# Patient Record
Sex: MALE | Race: BLACK OR AFRICAN AMERICAN | NOT HISPANIC OR LATINO
[De-identification: names, ages, dates, MRNs, and addresses within clinical notes are randomized per-mention and may not be internally consistent; named-entity substitution may affect disease eponyms.]

---

## 2015-10-14 RX ORDER — LEVETIRACETAM 250 MG/1
1 TABLET, FILM COATED ORAL
Qty: 0 | Refills: 0 | COMMUNITY
Start: 2015-10-14

## 2017-02-16 ENCOUNTER — APPOINTMENT (OUTPATIENT)
Dept: GASTROENTEROLOGY | Facility: CLINIC | Age: 82
End: 2017-02-16

## 2017-02-16 VITALS
HEIGHT: 69 IN | HEART RATE: 54 BPM | RESPIRATION RATE: 14 BRPM | TEMPERATURE: 98.2 F | SYSTOLIC BLOOD PRESSURE: 110 MMHG | DIASTOLIC BLOOD PRESSURE: 62 MMHG | OXYGEN SATURATION: 95 % | BODY MASS INDEX: 26.66 KG/M2 | WEIGHT: 180 LBS

## 2017-02-16 DIAGNOSIS — R13.14 DYSPHAGIA, PHARYNGOESOPHAGEAL PHASE: ICD-10-CM

## 2017-02-19 ENCOUNTER — INPATIENT (INPATIENT)
Facility: HOSPITAL | Age: 82
LOS: 8 days | Discharge: NURSING, REHAB OR LONG-TERM CARE | DRG: 871 | End: 2017-02-28
Attending: INTERNAL MEDICINE | Admitting: INTERNAL MEDICINE
Payer: MEDICARE

## 2017-02-19 VITALS
SYSTOLIC BLOOD PRESSURE: 79 MMHG | OXYGEN SATURATION: 97 % | RESPIRATION RATE: 26 BRPM | DIASTOLIC BLOOD PRESSURE: 43 MMHG | HEART RATE: 79 BPM

## 2017-02-19 DIAGNOSIS — Z99.2 DEPENDENCE ON RENAL DIALYSIS: Chronic | ICD-10-CM

## 2017-02-19 DIAGNOSIS — A41.9 SEPSIS, UNSPECIFIED ORGANISM: ICD-10-CM

## 2017-02-19 DIAGNOSIS — I50.32 CHRONIC DIASTOLIC (CONGESTIVE) HEART FAILURE: ICD-10-CM

## 2017-02-19 LAB
APPEARANCE UR: (no result)
BACTERIA # UR AUTO: (no result) /HPF
BASE EXCESS BLDCOV CALC-SCNC: 2.8 MMOL/L — HIGH (ref -9.3–0.3)
BILIRUB UR-MCNC: (no result)
COLOR SPEC: (no result)
COMMENT - URINE: SIGNIFICANT CHANGE UP
DIFF PNL FLD: (no result)
EPI CELLS # UR: SIGNIFICANT CHANGE UP /HPF
GAS PNL BLDCOV: 7.38 — SIGNIFICANT CHANGE UP (ref 7.25–7.45)
GAS PNL BLDCOV: SIGNIFICANT CHANGE UP
GLUCOSE UR QL: NEGATIVE — SIGNIFICANT CHANGE UP
HCO3 BLDCOV-SCNC: 28.9 MMOL/L — SIGNIFICANT CHANGE UP
KETONES UR-MCNC: NEGATIVE — SIGNIFICANT CHANGE UP
LEUKOCYTE ESTERASE UR-ACNC: (no result)
NITRITE UR-MCNC: POSITIVE
NT-PROBNP SERPL-SCNC: HIGH PG/ML
PCO2 BLDCOV: 50 MMHG — HIGH (ref 27–49)
PH UR: 8 — SIGNIFICANT CHANGE UP (ref 4–8)
PO2 BLDCOA: 54 MMHG — HIGH (ref 17–41)
PROT UR-MCNC: >=300 MG/DL
RBC CASTS # UR COMP ASSIST: (no result) /HPF
SAO2 % BLDCOV: SIGNIFICANT CHANGE UP
SP GR SPEC: 1.02 — SIGNIFICANT CHANGE UP (ref 1–1.03)
UROBILINOGEN FLD QL: 1 E.U./DL — SIGNIFICANT CHANGE UP
WBC UR QL: (no result) /HPF

## 2017-02-19 PROCEDURE — 93010 ELECTROCARDIOGRAM REPORT: CPT

## 2017-02-19 PROCEDURE — 71010: CPT | Mod: 26

## 2017-02-19 PROCEDURE — 99291 CRITICAL CARE FIRST HOUR: CPT

## 2017-02-19 PROCEDURE — 99291 CRITICAL CARE FIRST HOUR: CPT | Mod: 25

## 2017-02-19 RX ORDER — ACETAMINOPHEN 500 MG
650 TABLET ORAL ONCE
Qty: 0 | Refills: 0 | Status: COMPLETED | OUTPATIENT
Start: 2017-02-19 | End: 2017-02-19

## 2017-02-19 RX ORDER — ACETAMINOPHEN 500 MG
650 TABLET ORAL EVERY 6 HOURS
Qty: 0 | Refills: 0 | Status: DISCONTINUED | OUTPATIENT
Start: 2017-02-19 | End: 2017-02-22

## 2017-02-19 RX ORDER — SODIUM CHLORIDE 9 MG/ML
500 INJECTION INTRAMUSCULAR; INTRAVENOUS; SUBCUTANEOUS ONCE
Qty: 0 | Refills: 0 | Status: COMPLETED | OUTPATIENT
Start: 2017-02-19 | End: 2017-02-19

## 2017-02-19 RX ORDER — VANCOMYCIN HCL 1 G
1000 VIAL (EA) INTRAVENOUS ONCE
Qty: 0 | Refills: 0 | Status: COMPLETED | OUTPATIENT
Start: 2017-02-19 | End: 2017-02-19

## 2017-02-19 RX ORDER — NOREPINEPHRINE BITARTRATE/D5W 8 MG/250ML
0.01 PLASTIC BAG, INJECTION (ML) INTRAVENOUS
Qty: 8 | Refills: 0 | Status: DISCONTINUED | OUTPATIENT
Start: 2017-02-19 | End: 2017-02-19

## 2017-02-19 RX ORDER — PIPERACILLIN AND TAZOBACTAM 4; .5 G/20ML; G/20ML
INJECTION, POWDER, LYOPHILIZED, FOR SOLUTION INTRAVENOUS
Qty: 0 | Refills: 0 | Status: DISCONTINUED | OUTPATIENT
Start: 2017-02-19 | End: 2017-02-19

## 2017-02-19 RX ORDER — NOREPINEPHRINE BITARTRATE/D5W 8 MG/250ML
0.01 PLASTIC BAG, INJECTION (ML) INTRAVENOUS
Qty: 4 | Refills: 0 | Status: DISCONTINUED | OUTPATIENT
Start: 2017-02-19 | End: 2017-02-19

## 2017-02-19 RX ORDER — VANCOMYCIN HCL 1 G
1000 VIAL (EA) INTRAVENOUS ONCE
Qty: 0 | Refills: 0 | Status: DISCONTINUED | OUTPATIENT
Start: 2017-02-20 | End: 2017-02-22

## 2017-02-19 RX ORDER — NOREPINEPHRINE BITARTRATE/D5W 8 MG/250ML
0.02 PLASTIC BAG, INJECTION (ML) INTRAVENOUS
Qty: 8 | Refills: 0 | Status: DISCONTINUED | OUTPATIENT
Start: 2017-02-19 | End: 2017-02-19

## 2017-02-19 RX ORDER — HEPARIN SODIUM 5000 [USP'U]/ML
5000 INJECTION INTRAVENOUS; SUBCUTANEOUS EVERY 8 HOURS
Qty: 0 | Refills: 0 | Status: DISCONTINUED | OUTPATIENT
Start: 2017-02-19 | End: 2017-02-28

## 2017-02-19 RX ORDER — PIPERACILLIN AND TAZOBACTAM 4; .5 G/20ML; G/20ML
3.38 INJECTION, POWDER, LYOPHILIZED, FOR SOLUTION INTRAVENOUS ONCE
Qty: 0 | Refills: 0 | Status: COMPLETED | OUTPATIENT
Start: 2017-02-19 | End: 2017-02-19

## 2017-02-19 RX ORDER — IPRATROPIUM/ALBUTEROL SULFATE 18-103MCG
3 AEROSOL WITH ADAPTER (GRAM) INHALATION EVERY 4 HOURS
Qty: 0 | Refills: 0 | Status: DISCONTINUED | OUTPATIENT
Start: 2017-02-19 | End: 2017-02-28

## 2017-02-19 RX ORDER — MIRTAZAPINE 45 MG/1
15 TABLET, ORALLY DISINTEGRATING ORAL AT BEDTIME
Qty: 0 | Refills: 0 | Status: DISCONTINUED | OUTPATIENT
Start: 2017-02-19 | End: 2017-02-28

## 2017-02-19 RX ORDER — METOCLOPRAMIDE HCL 10 MG
10 TABLET ORAL
Qty: 0 | Refills: 0 | Status: DISCONTINUED | OUTPATIENT
Start: 2017-02-19 | End: 2017-02-28

## 2017-02-19 RX ORDER — PIPERACILLIN AND TAZOBACTAM 4; .5 G/20ML; G/20ML
2.25 INJECTION, POWDER, LYOPHILIZED, FOR SOLUTION INTRAVENOUS EVERY 12 HOURS
Qty: 0 | Refills: 0 | Status: DISCONTINUED | OUTPATIENT
Start: 2017-02-20 | End: 2017-02-26

## 2017-02-19 RX ORDER — POTASSIUM CHLORIDE 20 MEQ
10 PACKET (EA) ORAL ONCE
Qty: 0 | Refills: 0 | Status: DISCONTINUED | OUTPATIENT
Start: 2017-02-19 | End: 2017-02-19

## 2017-02-19 RX ORDER — NOREPINEPHRINE BITARTRATE/D5W 8 MG/250ML
0.01 PLASTIC BAG, INJECTION (ML) INTRAVENOUS
Qty: 8 | Refills: 0 | Status: DISCONTINUED | OUTPATIENT
Start: 2017-02-19 | End: 2017-02-22

## 2017-02-19 RX ORDER — POTASSIUM CHLORIDE 20 MEQ
10 PACKET (EA) ORAL ONCE
Qty: 0 | Refills: 0 | Status: COMPLETED | OUTPATIENT
Start: 2017-02-19 | End: 2017-02-19

## 2017-02-19 RX ORDER — SODIUM POLYSTYRENE SULFONATE 4.1 MEQ/G
15 POWDER, FOR SUSPENSION ORAL
Qty: 0 | Refills: 0 | Status: DISCONTINUED | OUTPATIENT
Start: 2017-02-19 | End: 2017-02-19

## 2017-02-19 RX ORDER — SODIUM CHLORIDE 9 MG/ML
250 INJECTION INTRAMUSCULAR; INTRAVENOUS; SUBCUTANEOUS ONCE
Qty: 0 | Refills: 0 | Status: COMPLETED | OUTPATIENT
Start: 2017-02-19 | End: 2017-02-19

## 2017-02-19 RX ORDER — LEVETIRACETAM 250 MG/1
250 TABLET, FILM COATED ORAL
Qty: 0 | Refills: 0 | Status: DISCONTINUED | OUTPATIENT
Start: 2017-02-19 | End: 2017-02-19

## 2017-02-19 RX ORDER — PANTOPRAZOLE SODIUM 20 MG/1
40 TABLET, DELAYED RELEASE ORAL
Qty: 0 | Refills: 0 | Status: DISCONTINUED | OUTPATIENT
Start: 2017-02-19 | End: 2017-02-23

## 2017-02-19 RX ORDER — DOCUSATE SODIUM 100 MG
100 CAPSULE ORAL DAILY
Qty: 0 | Refills: 0 | Status: DISCONTINUED | OUTPATIENT
Start: 2017-02-19 | End: 2017-02-28

## 2017-02-19 RX ORDER — LEVETIRACETAM 250 MG/1
250 TABLET, FILM COATED ORAL EVERY 12 HOURS
Qty: 0 | Refills: 0 | Status: DISCONTINUED | OUTPATIENT
Start: 2017-02-19 | End: 2017-02-28

## 2017-02-19 RX ORDER — SIMVASTATIN 20 MG/1
20 TABLET, FILM COATED ORAL AT BEDTIME
Qty: 0 | Refills: 0 | Status: DISCONTINUED | OUTPATIENT
Start: 2017-02-19 | End: 2017-02-28

## 2017-02-19 RX ORDER — GABAPENTIN 400 MG/1
100 CAPSULE ORAL
Qty: 0 | Refills: 0 | Status: DISCONTINUED | OUTPATIENT
Start: 2017-02-19 | End: 2017-02-28

## 2017-02-19 RX ORDER — ASPIRIN/CALCIUM CARB/MAGNESIUM 324 MG
81 TABLET ORAL DAILY
Qty: 0 | Refills: 0 | Status: DISCONTINUED | OUTPATIENT
Start: 2017-02-19 | End: 2017-02-28

## 2017-02-19 RX ORDER — FLUTICASONE PROPIONATE AND SALMETEROL 50; 250 UG/1; UG/1
1 POWDER ORAL; RESPIRATORY (INHALATION)
Qty: 0 | Refills: 0 | Status: DISCONTINUED | OUTPATIENT
Start: 2017-02-19 | End: 2017-02-28

## 2017-02-19 RX ORDER — FOLIC ACID 0.8 MG
1 TABLET ORAL DAILY
Qty: 0 | Refills: 0 | Status: DISCONTINUED | OUTPATIENT
Start: 2017-02-19 | End: 2017-02-28

## 2017-02-19 RX ORDER — LEVETIRACETAM 250 MG/1
250 TABLET, FILM COATED ORAL
Qty: 0 | Refills: 0 | Status: DISCONTINUED | OUTPATIENT
Start: 2017-02-20 | End: 2017-02-28

## 2017-02-19 RX ADMIN — PIPERACILLIN AND TAZOBACTAM 200 GRAM(S): 4; .5 INJECTION, POWDER, LYOPHILIZED, FOR SOLUTION INTRAVENOUS at 13:40

## 2017-02-19 RX ADMIN — SODIUM CHLORIDE 1000 MILLILITER(S): 9 INJECTION INTRAMUSCULAR; INTRAVENOUS; SUBCUTANEOUS at 14:19

## 2017-02-19 RX ADMIN — SODIUM CHLORIDE 500 MILLILITER(S): 9 INJECTION INTRAMUSCULAR; INTRAVENOUS; SUBCUTANEOUS at 13:40

## 2017-02-19 RX ADMIN — LEVETIRACETAM 410 MILLIGRAM(S): 250 TABLET, FILM COATED ORAL at 23:06

## 2017-02-19 RX ADMIN — Medication 250 MILLIGRAM(S): at 14:19

## 2017-02-19 RX ADMIN — HEPARIN SODIUM 5000 UNIT(S): 5000 INJECTION INTRAVENOUS; SUBCUTANEOUS at 23:06

## 2017-02-19 RX ADMIN — Medication 100 MILLIEQUIVALENT(S): at 23:31

## 2017-02-19 RX ADMIN — Medication 650 MILLIGRAM(S): at 13:31

## 2017-02-19 RX ADMIN — SODIUM CHLORIDE 500 MILLILITER(S): 9 INJECTION INTRAMUSCULAR; INTRAVENOUS; SUBCUTANEOUS at 18:12

## 2017-02-19 NOTE — CHART NOTE - NSCHARTNOTEFT_GEN_A_CORE
PGY1 Event Note    Patient upon arrival to the floors had a SBP 71/34 that dipped to a SBP of 63. ICU senior resident and Attending were notified. As per ICU attending, 250 cc bolus of normal saline was administered. Patient Blood pressure continued to drop so a decision was made to start Levophed peripherally to increase patients MAPs. Patients more lethargic and somnolent not answering questions when prompted.  Patients emergency contact was called however no response was obtained. A decision was made to place a central line and to transfer the patient to the ICU for continued care and titration of pressors. Consent for central line obtained via 2 physician consent.

## 2017-02-19 NOTE — ED ADULT NURSE NOTE - PMH
Alzheimers disease    Anemia  Anemia  Angina pectoris    Atherosclerosis of coronary artery  CAD (coronary artery disease)  AV graft thrombosis    CAD (coronary artery disease)    Chronic obstructive pulmonary disease  Chronic obstructive pulmonary disease  Congestive heart failure  CHF (congestive heart failure)  COPD (chronic obstructive pulmonary disease)    Deep venous embolism and thrombosis of lower extremity  DVT (deep venous thrombosis)  Dementia without behavioral disturbance  Dementia  Depressive disorder  Depression  Dysphagia    End-stage renal disease  ESRD on hemodialysis  ESRD (end stage renal disease)    Essential hypertension  HTN (hypertension)  Heart failure    HTN (hypertension)    Hyperlipidemia    Hypotension    Legal blindness  Legally blind  Nondependent alcohol abuse  ETOH abuse  Osteoarthritis  Osteoarthritis  Osteoarthritis    Polyneuropathy    PVD (peripheral vascular disease)    Seizures  post traumatic

## 2017-02-19 NOTE — H&P ADULT. - HISTORY OF PRESENT ILLNESS
88 YO M h/o ESRD (HD MWF, last HD Friday 2/17), Alzheimer's disease, CAD, COPD, angina, anemia, CHF, depression, HTN, HLD, OA, PVD, polyneuropathy, blindness sent from [facility] after he was noted to be febrile to 101, desaturating with increased oral secretions. Upon questioning, "patient does not know why he is here". Patient is a poor historian.  At baseline, pt is alert and oriented, wheelchair bound, and requires assistance with ADLs. Patient was recently  here in december 2016 for hypotension related to vomiting 2/2 to his [place] diverticulum. Patient was scheduled to follow up with GI as an outpatient for his condition.   In the ED, VS T 101.5 HR 66-79 BP 63-93/35-52 RR 18-29 SPO2 97-99%. Pt was placed on BiPAP for work of breathing, received Vancomycin 1 g, Zosyn 3.375 g,  cc bolus x2, Tylenol 650 mg. 77 yo F PMH HTN, HLD, PE/DVT on Xarelto, HIV on HAART (after receiving blood product in 1990s?), RA, GERD, IDDM p/w hyperglycemia for the past couple of days. blindness sent from [facility] after he was noted to be febrile to 101, desaturating with increased oral secretions. Upon questioning, "patient does not know why he is here". Patient is a poor historian.  At baseline, pt is alert and oriented, wheelchair bound, and requires assistance with ADLs. Patient was recently  here in december 2016 for hypotension related to vomiting 2/2 to his [place] diverticulum. Patient was scheduled to follow up with GI as an outpatient for his condition.   In the ED, VS T 101.5 HR 66-79 BP 63-93/35-52 RR 18-29 SPO2 97-99%. Pt was placed on BiPAP for work of breathing, received Vancomycin 1 g, Zosyn 3.375 g,  cc bolus x2, Tylenol 650 mg. 88 YO M h/o ESRD (HD MWF, last HD Friday 2/17), Alzheimer's disease, CAD, COPD, angina, anemia, CHF, depression, HTN, HLD, OA, PVD, polyneuropathy, blindness sent from [facility] after he was noted to be febrile to 101, desaturating with increased oral secretions. Upon questioning, "patient does not know why he is here". Patient is a poor historian.  At baseline, pt is alert and oriented, wheelchair bound, and requires assistance with ADLs. Patient was recently  here in december 2016 for hypotension related to vomiting 2/2 to his [place] diverticulum. Patient was scheduled to follow up with GI as an outpatient for his condition.   In the ED, VS T 101.5 HR 66-79 BP 63-93/35-52 RR 18-29 SPO2 97-99%. Pt was placed on BiPAP for work of breathing, received Vancomycin 1 g, Zosyn 3.375 g,  cc bolus x2, Tylenol 650 mg.

## 2017-02-19 NOTE — ED PROVIDER NOTE - OBJECTIVE STATEMENT
from [place] with fever, shortness of breath. Patient unable to provide any history due to dementia/ clinical status.

## 2017-02-19 NOTE — ED PROVIDER NOTE - PROGRESS NOTE DETAILS
discussed with caregiver on NH papers, Isaac Teodororonn [phone].  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  No known family.  No code status known.

## 2017-02-19 NOTE — ED PROVIDER NOTE - MEDICAL DECISION MAKING DETAILS
pt with ams, fever, hypotension.  sepsis vitals on arrival.  unable to provide history.  empiric vanc/zosyn given.  nacl 500ml bolus x2 for low bp but not given additional fluid due to ESRD on hd and appears volume overloaded clinically.  icu consulted.  started bipap for respiratory distress with improvement.  admit to tele for further treatment

## 2017-02-19 NOTE — ED ADULT TRIAGE NOTE - CHIEF COMPLAINT QUOTE
Pt BIBA from a nursing home for SOB to r/o pneumonia. Hypotensive noted at truage and pt has been taken to [facility].

## 2017-02-19 NOTE — CONSULT NOTE ADULT - ASSESSMENT
88 YO M h/o ESRD (HD MWF, last HD Friday 2/17), Alzheimer's disease, CAD, COPD, angina, anemia, CHF, dementia, depression, HTN, HLD, OA, PVD, polyneuropathy, blindness sent from [facility] after he was noted to be febrile to 101, desaturating with increased oral secretions and congested.

## 2017-02-19 NOTE — CONSULT NOTE ADULT - SUBJECTIVE AND OBJECTIVE BOX
ICU CONSULT    Patient is a 87y old  Male who presents with a chief complaint of     87yMale    PMHx -   PSx -   Meds -   Advair 250/50 1 puff INH BID  Albuterol 0.083% nebs Q4H  Artificial tears 1 gtt in both eyes QID  Aspirin 81 mg Daily  Colace 100 mg QHS  Folic acid 1 mg Daily  Ipratropium Bromide 0.02% 2.5 ml INH Q4H  Keppra 250 mg Daily MWF after HD  Keppra 250 mg BID  Metoclopramide 10 mg TID with meals  Neurontin 100 mg BID  Nitroglycerin Patch 24 Hr 0.1 mg/Hr TID PRN  Omeprazole 40 mg Daily  Periguard ointment  [place] 15 mg QHS  Simvastatin 20 mg QHS  Sodium Polystyrene 15 gm/60 mL 60 mL PO QHS MWF  Vitamins A & D ointment daily  Allergies -   FHx -   Sx -       PHYSICAL EXAM   Vital Signs Last 24 Hrs  T(C): 38.6, Max: 38.6 ( @ 13:08)  T(F): 101.5, Max: 101.5 ( @ 13:08)  HR: 67 (66 - 79)  BP: 93/52 (63/35 - 93/52)  BP(mean): --  RR: 18 (18 - 26)  SpO2: 100% (97% - 100%)      General -   HEENT -   CV -   Resp -   Abdomen -   Extremities -   Skin -       LABS                        9.2    15.4  )-----------( 302      ( 2017 13:25 )             27.6     2017 13:25    140    |  97     |  46     ----------------------------<  116    3.0     |  30     |  6.71     Ca    8.7        2017 13:25    TPro  7.6    /  Alb  2.4    /  TBili  0.6    /  DBili  x      /  AST  12     /  ALT  8      /  AlkPhos  107    2017 13:25    PT/INR - ( 2017 13:28 )   PT: 15.3 sec;   INR: 1.37          PTT - ( 2017 13:28 )  PTT:29.7 sec  Lactate, Blood: 1.7 mmoL/L ( @ 13:28)    Urinalysis Basic - ( 2017 14:04 )    Color: Red / Appearance: Cloudy / S.020 / pH: x  Gluc: x / Ketone: NEGATIVE  / Bili: Small / Urobili: 1.0 E.U./dL   Blood: x / Protein: >=300 mg/dL / Nitrite: POSITIVE   Leuk Esterase: Large / RBC: Many /HPF / WBC 5-10 /HPF   Sq Epi: x / Non Sq Epi: Rare /HPF / Bacteria: Many /HPF            IMAGING   CXR -   EKG - Sinus rhythm with 1st degree AV block, LAD ICU CONSULT    86 YO M h/o ESRD (HD MWF, last HD ), Alzheimer's disease, CAD, COPD, angina, anemia, CHF, dementia, depression, HTN, HLD, OA, PVD, polyneuropathy, blindness sent from [facility] after he was noted to be febrile to 101, desaturating with increased oral secretions and congested. Per NH nurse, pt was seen by the NH physician who felt that the patient was weak and transferred him to the ED. At baseline, pt is alert and oriented, wheelchair bound, and requires assistance with ADLs. On exam, pt is somnolent but arousable, unable to cooperate with exam.      In the ED, VS T 101.5 HR 66-79 BP 63-93/35-52 RR 18-29 SPO2 97-99%. Pt was placed on BiPAP for work of breathing, received Vancomycin 1 g, Zosyn 3.375 g,  cc bolus x2, Tylenol 650 mg.     Allergies    clonidine (Unknown)    Intolerances    Medications     Advair 250/50 1 puff INH BID  Albuterol 0.083% nebs Q4H  Artificial tears 1 gtt in both eyes QID  Aspirin 81 mg Daily  Colace 100 mg QHS  Folic acid 1 mg Daily  Ipratropium Bromide 0.02% 2.5 ml INH Q4H  Keppra 250 mg Daily MWF after HD  Keppra 250 mg BID  Metoclopramide 10 mg TID with meals  Neurontin 100 mg BID  Nitroglycerin Patch 24 Hr 0.1 mg/Hr TID PRN  Omeprazole 40 mg Daily  Periguard ointment  [place] 15 mg QHS  Simvastatin 20 mg QHS  Sodium Polystyrene 15 gm/60 mL 60 mL PO QHS MWF  Vitamins A & D ointment daily    PAST MEDICAL & SURGICAL HISTORY:  AV graft thrombosis  Osteoarthritis  PVD (peripheral vascular disease)  COPD (chronic obstructive pulmonary disease)  Heart failure  Angina pectoris  ESRD (end stage renal disease)  Seizures: post traumatic  CAD (coronary artery disease)  HTN (hypertension)  Polyneuropathy  Hyperlipidemia  Dysphagia  Hypotension  Alzheimers disease  Osteoarthritis: Osteoarthritis  Chronic obstructive pulmonary disease: Chronic obstructive pulmonary disease  Anemia: Anemia  Dementia without behavioral disturbance: Dementia  Atherosclerosis of coronary artery: CAD (coronary artery disease)  Nondependent alcohol abuse: ETOH abuse  Depressive disorder: Depression  End-stage renal disease: ESRD on hemodialysis  Essential hypertension: HTN (hypertension)  Deep venous embolism and thrombosis of lower extremity: DVT (deep venous thrombosis)  Congestive heart failure: CHF (congestive heart failure)  Legal blindness: Legally blind  Hemodialysis access, AV graft: LUE loop AVG  Acquired arteriovenous fistula: AV fistula     FAMILY HISTORY:  Family history unknown    SOCIAL HISTORY:  Uknown    PHYSICAL EXAM   Vital Signs Last 24 Hrs  T(C): 38.6, Max: 38.6 ( @ 13:08)  T(F): 101.5, Max: 101.5 ( @ 13:08)  HR: 67 (66 - 79)  BP: 93/52 (63/35 - 93/52)  BP(mean): --  RR: 18 (18 - 26)  SpO2: 100% (97% - 100%)    General - Alert & Oriented x 3 (name, place, year), lethargic   HEENT - NCAT, L pupil ~1 mm, R cataract, Dry mucous membranes  Neck - Supple  CV - RRR, (+) S1/ S2, No M/R/G; No JVD  Resp - BiPAP, mild respiratory distress, no accessory muscle use, Bibasilar crackles  Abdomen - Soft, NT, ND, Normoactive BS  Extremities - Radial pulses 2+, DP pulses 1+ B/L, No LE edema  Lymph nodes - No cervical/supraclavicular/submandibular LAD  Neuro - A&O x3, responds to commands  Skin - Warm, dry, intact, skin tenting present    LABS                        9.2    15.4  )-----------( 302      ( 2017 13:25 )             27.6     2017 13:25    140    |  97     |  46     ----------------------------<  116    3.0     |  30     |  6.71     Ca    8.7        2017 13:25    TPro  7.6    /  Alb  2.4    /  TBili  0.6    /  DBili  x      /  AST  12     /  ALT  8      /  AlkPhos  107    2017 13:25    PT/INR - ( 2017 13:28 )   PT: 15.3 sec;   INR: 1.37          PTT - ( 2017 13:28 )  PTT:29.7 sec  Lactate, Blood: 1.7 mmoL/L ( @ 13:28)    Urinalysis Basic - ( 2017 14:04 )    Color: Red / Appearance: Cloudy / S.020 / pH: x  Gluc: x / Ketone: NEGATIVE  / Bili: Small / Urobili: 1.0 E.U./dL   Blood: x / Protein: >=300 mg/dL / Nitrite: POSITIVE   Leuk Esterase: Large / RBC: Many /HPF / WBC 5-10 /HPF   Sq Epi: x / Non Sq Epi: Rare /HPF / Bacteria: Many /HPF    IMAGING   CXR - R basilar infiltrate, pulmonary venous congestion  EKG - Sinus rhythm with 1st degree AV block, LAD ICU CONSULT    86 YO M h/o ESRD (HD MWF, last HD ), Alzheimer's disease, CAD, COPD, angina, anemia, CHF, depression, HTN, HLD, OA, PVD, polyneuropathy, blindness sent from [facility] after he was noted to be febrile to 101, desaturating with increased oral secretions and congested. Per NH nurse, pt was seen by the NH physician who felt that the patient was weak and transferred him to the ED. At baseline, pt is alert and oriented, wheelchair bound, and requires assistance with ADLs. On exam, pt is somnolent but arousable, unable to cooperate with exam.      In the ED, VS T 101.5 HR 66-79 BP 63-93/35-52 RR 18-29 SPO2 97-99%. Pt was placed on BiPAP for work of breathing, received Vancomycin 1 g, Zosyn 3.375 g,  cc bolus x2, Tylenol 650 mg.     Allergies    clonidine (Unknown)    Intolerances    Medications     Advair 250/50 1 puff INH BID  Albuterol 0.083% nebs Q4H  Artificial tears 1 gtt in both eyes QID  Aspirin 81 mg Daily  Colace 100 mg QHS  Folic acid 1 mg Daily  Ipratropium Bromide 0.02% 2.5 ml INH Q4H  Keppra 250 mg Daily MWF after HD  Keppra 250 mg BID  Metoclopramide 10 mg TID with meals  Neurontin 100 mg BID  Nitroglycerin Patch 24 Hr 0.1 mg/Hr TID PRN  Omeprazole 40 mg Daily  Periguard ointment  [place] 15 mg QHS  Simvastatin 20 mg QHS  Sodium Polystyrene 15 gm/60 mL 60 mL PO QHS MWF  Vitamins A & D ointment daily    PAST MEDICAL & SURGICAL HISTORY:  AV graft thrombosis  Osteoarthritis  PVD (peripheral vascular disease)  COPD (chronic obstructive pulmonary disease)  Heart failure  Angina pectoris  ESRD (end stage renal disease)  Seizures: post traumatic  CAD (coronary artery disease)  HTN (hypertension)  Polyneuropathy  Hyperlipidemia  Dysphagia  Hypotension  Alzheimers disease  Osteoarthritis: Osteoarthritis  Chronic obstructive pulmonary disease: Chronic obstructive pulmonary disease  Anemia: Anemia  Dementia without behavioral disturbance: Dementia  Atherosclerosis of coronary artery: CAD (coronary artery disease)  Nondependent alcohol abuse: ETOH abuse  Depressive disorder: Depression  End-stage renal disease: ESRD on hemodialysis  Essential hypertension: HTN (hypertension)  Deep venous embolism and thrombosis of lower extremity: DVT (deep venous thrombosis)  Congestive heart failure: CHF (congestive heart failure)  Legal blindness: Legally blind  Hemodialysis access, AV graft: LUE loop AVG  Acquired arteriovenous fistula: AV fistula     FAMILY HISTORY:  Family history unknown    SOCIAL HISTORY:  Uknown    PHYSICAL EXAM   Vital Signs Last 24 Hrs  T(C): 38.6, Max: 38.6 ( @ 13:08)  T(F): 101.5, Max: 101.5 ( @ 13:08)  HR: 67 (66 - 79)  BP: 93/52 (63/35 - 93/52)  BP(mean): --  RR: 18 (18 - 26)  SpO2: 100% (97% - 100%)    General - Alert & Oriented x 3 (name, place, year), lethargic   HEENT - NCAT, L pupil ~1 mm, R cataract, Dry mucous membranes  Neck - Supple  CV - RRR, (+) S1/ S2, No M/R/G; No JVD  Resp - BiPAP, mild respiratory distress, no accessory muscle use, Bibasilar crackles  Abdomen - Soft, NT, ND, Normoactive BS  Extremities - Radial pulses 2+, DP pulses 1+ B/L, No LE edema  Lymph nodes - No cervical/supraclavicular/submandibular LAD  Neuro - A&O x3, responds to commands  Skin - Warm, dry, intact, skin tenting present    LABS                        9.2    15.4  )-----------( 302      ( 2017 13:25 )             27.6     2017 13:25    140    |  97     |  46     ----------------------------<  116    3.0     |  30     |  6.71     Ca    8.7        2017 13:25    TPro  7.6    /  Alb  2.4    /  TBili  0.6    /  DBili  x      /  AST  12     /  ALT  8      /  AlkPhos  107    2017 13:25    PT/INR - ( 2017 13:28 )   PT: 15.3 sec;   INR: 1.37          PTT - ( 2017 13:28 )  PTT:29.7 sec  Lactate, Blood: 1.7 mmoL/L ( @ 13:28)    Urinalysis Basic - ( 2017 14:04 )    Color: Red / Appearance: Cloudy / S.020 / pH: x  Gluc: x / Ketone: NEGATIVE  / Bili: Small / Urobili: 1.0 E.U./dL   Blood: x / Protein: >=300 mg/dL / Nitrite: POSITIVE   Leuk Esterase: Large / RBC: Many /HPF / WBC 5-10 /HPF   Sq Epi: x / Non Sq Epi: Rare /HPF / Bacteria: Many /HPF    IMAGING   CXR - R basilar infiltrate, pulmonary venous congestion  EKG - Sinus rhythm with 1st degree AV block, LAD

## 2017-02-19 NOTE — ED PROVIDER NOTE - CRITICAL CARE PROVIDED
direct patient care (not related to procedure)/conducted a detailed discussion of DNR status/documentation/telephone consultation with the patient's family/consultation with other physicians/additional history taking/interpretation of diagnostic studies/5 min speaking to primary contact

## 2017-02-19 NOTE — CONSULT NOTE ADULT - PROBLEM SELECTOR RECOMMENDATION 9
- Pt with tachypnea, leukocytosis, and subjective fever (per NH nurse manager)  - UA with positive nitrite, large LE, and 5-10 WBCs  - CXR shows R basilar infiltrate worse than previous CXR 12/14/16  - s/p 1L NS bolus with response in BP, upon review of previous progress notes and H&P, pt now at baseline BP  - s/p Vancomycin and Zosyn, in the setting of new infiltrate and positive UA, would c/w Vanc and Zosyn for HCAP as pt is from a NH. Zosyn would also cover for UTI  - F/u blood cultures, urine cultures - Pt with tachypnea, leukocytosis, and subjective fever (per NH nurse manager)  - UA with positive nitrite, large LE, and 5-10 WBCs  - CXR shows R basilar infiltrate worse than previous CXR 12/14/16  - s/p 1L NS bolus with response in BP, upon review of previous progress notes and H&P, pt now at baseline BP  - s/p Vancomycin and Zosyn, in the setting of new infiltrate and positive UA, would c/w Vanc and Zosyn for HCAP as pt is from a NH. Zosyn would also cover for UTI  - F/u blood cultures, urine cultures  - Admit to [facility]

## 2017-02-19 NOTE — H&P ADULT. - ASSESSMENT
A/P: 79 yo F PMH HTN, HLD, PE/DVT on Xarelto, HIV on HAART (after receiving blood product in 1990s?), RA, GERD, IDDM p/w hyperglycemia for the past couple of days and febrile.     ID: Septic shock 2/2 UTI vs secretions: Patient was febrile to 101, tachycardic, tachypneic, leukocytosis,  with heavy oral secretions and trouble protecting his airway, From last admission it is known that he is a chronic aspiration risk. Patient has a positive UA.   - Empiric Abx - c/w vancomycin and zosyn   - fu BCX/uCX   - Titrate levophed to MAP>65     GI: #Dysmotility: Patient with chronic dysmotility and probable aspiration risk.   - FU speech and swallow eval in AM   Respiratory:   Tachypnea: Patient tachypneic on admission with secretions. Currently stable on bipap monitor for s/s of needing intubation.   #COPD: C/W advair   #PE: C/W home xarelto   CV: HD: Stable. Patient is WWP and currently no lactate. Central line in place.   - Titrate levophed as per above   #CAD: C/W ASA daily   Renal: ESRD: Patient is on Dialysis MWF.   - FU with nephro regarding schedule   Neuro: #Seizure disorder   - C/W Keppra   #Alzheimers: C/W Keppra   FENP: Do not replete lytes as on HD, Pending speech eval, Hep sub Q PPX.   DISPO: MICU  CODE: FULL A/P: 79 yo F PMH HTN, HLD, PE/DVT on Xarelto, HIV on HAART (after receiving blood product in 1990s?), RA, GERD, IDDM p/w hyperglycemia for the past couple of days and febrile.     ID: Septic shock 2/2 UTI vs asp PNA : Patient was febrile to 101, tachycardic, tachypneic, leukocytosis,  with heavy oral secretions and trouble protecting his airway, From last admission it is known that he is a chronic aspiration risk. Patient has a positive UA.   - Empiric Abx - c/w vancomycin and zosyn   - fu BCX/uCX   - Titrate levophed to MAP>65     GI: #Dysmotility: Patient with chronic dysmotility and probable aspiration risk.   - FU speech and swallow eval in AM   Respiratory:   Tachypnea: Patient tachypneic on admission with secretions. Currently stable on bipap monitor for s/s of needing intubation.   #COPD: C/W advair   #PE: C/W home xarelto   CV: HD: Stable. Patient is WWP and currently no lactate. Central line in place.   - Titrate levophed as per above   #CAD: C/W ASA daily   Renal: ESRD: Patient is on Dialysis MWF.   - FU with nephro regarding schedule   Neuro: #Seizure disorder   - C/W Keppra   #Alzheimers: C/W Keppra   FENP: Do not replete lytes as on HD, Pending speech eval, Hep sub Q PPX.   DISPO: MICU  CODE: FULL A/P: 79 yo F PMH HTN, HLD, PE/DVT on Xarelto, HIV on HAART (after receiving blood product in 1990s?), RA, GERD, IDDM p/w hyperglycemia for the past couple of days and febrile.     ID: Septic shock 2/2 UTI vs asp PNA : Patient was febrile to 101, tachycardic, tachypneic, leukocytosis,  with heavy oral secretions and trouble protecting his airway, From last admission it is known that he is a chronic aspiration risk. Patient has a positive UA.   - Empiric Abx - c/w vancomycin and zosyn   - fu BCX/uCX   - Titrate levophed to MAP>65     GI: #Dysmotility: Patient with chronic dysmotility and probable aspiration risk.   - FU speech and swallow eval in AM   - Collasteral about manometry study   Respiratory:   Tachypnea: Patient tachypneic on admission with secretions. Currently tachypneic bipap  -  monitor for s/s of needing intubation.   #COPD: C/W advair, Duonebs Q4     CV: HD: Stable. Patient is WWP and currently no lactate. Central line in place.   - Titrate levophed as per above   #CAD: C/W ASA daily   Renal: ESRD: Patient is on Dialysis MWF.   - FU with nephro regarding schedule   Neuro: #Seizure disorder   - C/W Keppra   #Alzheimers: C/W Keppra   FENP: Do not replete lytes as on HD, Pending speech eval, Hep sub Q PPX.   DISPO: MICU  CODE: FULL

## 2017-02-19 NOTE — ED ADULT NURSE REASSESSMENT NOTE - NS ED NURSE REASSESS COMMENT FT1
BP 67/36. MD Jennings and RADHA Real have been made aware. As per MD Jennings, will reassess BP after 1L NS and no vasopressor med at this time.

## 2017-02-19 NOTE — H&P ADULT. - ATTENDING COMMENTS
Seen in ER lethargic but awakened to name, BP 90 systolic. chest clear, cor rr, abd soft, ext without edema.  CXR cler, UA positive.  Uncler site of infection.  Initially responded to fluid.  Received piptazo and vanc  triaged to floor.  Became hypotensive required levo and placement of tlc    A - septic shock/ unclear source of sepsis/encephalopathy/ CKD end stage/denentia    Suggest  levo for BP  continue vanco piptzo pending cultures  BIPAP follow ABG  palliative care to see to better defien LOC

## 2017-02-19 NOTE — ED ADULT NURSE NOTE - OBJECTIVE STATEMENT
[facility] [place]; pt moved immediately to resus room and sepsis interventions started. Pt noted to have AV fistula on left arm positive for bruit, no thrill noted. Pt noted to be hypotensive, on non-rebreather. Labs drawn and sent, pt placed on bipap settings 14/5/50%. Pt receiving antibiotics and NS as ordered. will continue to monitor closely.

## 2017-02-19 NOTE — ED PROVIDER NOTE - CONSTITUTIONAL, MLM
normal... ill appearing, well nourished, awake, alert, oriented to person,  and in moderate apparent distress.

## 2017-02-20 DIAGNOSIS — N18.6 END STAGE RENAL DISEASE: ICD-10-CM

## 2017-02-20 LAB
ANION GAP SERPL CALC-SCNC: 14 MMOL/L — SIGNIFICANT CHANGE UP (ref 9–16)
ANISOCYTOSIS BLD QL: SLIGHT — SIGNIFICANT CHANGE UP
APTT BLD: 30.1 SEC — SIGNIFICANT CHANGE UP (ref 27.5–37.4)
BASOPHILS NFR BLD AUTO: 1 % — SIGNIFICANT CHANGE UP (ref 0–2)
BUN SERPL-MCNC: 53 MG/DL — HIGH (ref 7–23)
CALCIUM SERPL-MCNC: 8.3 MG/DL — LOW (ref 8.5–10.5)
CHLORIDE SERPL-SCNC: 99 MMOL/L — SIGNIFICANT CHANGE UP (ref 96–108)
CO2 SERPL-SCNC: 28 MMOL/L — SIGNIFICANT CHANGE UP (ref 22–31)
CREAT SERPL-MCNC: 7.52 MG/DL — HIGH (ref 0.5–1.3)
EOSINOPHIL NFR BLD AUTO: 3 % — SIGNIFICANT CHANGE UP (ref 0–6)
GIANT PLATELETS BLD QL SMEAR: PRESENT — SIGNIFICANT CHANGE UP
GLUCOSE SERPL-MCNC: 111 MG/DL — HIGH (ref 70–99)
HBA1C BLD-MCNC: 5.1 % — SIGNIFICANT CHANGE UP (ref 4.8–5.6)
HBV SURFACE AG SER-ACNC: SIGNIFICANT CHANGE UP
HCT VFR BLD CALC: 27 % — LOW (ref 39–50)
HCV AB S/CO SERPL IA: 0.26 S/CO — SIGNIFICANT CHANGE UP
HCV AB SERPL-IMP: SIGNIFICANT CHANGE UP
HGB BLD-MCNC: 8.9 G/DL — LOW (ref 13–17)
INR BLD: 1.45 — HIGH (ref 0.88–1.16)
LG PLATELETS BLD QL AUTO: PRESENT — SIGNIFICANT CHANGE UP
LYMPHOCYTES # BLD AUTO: 14 % — SIGNIFICANT CHANGE UP (ref 13–44)
MAGNESIUM SERPL-MCNC: 1.7 MG/DL — SIGNIFICANT CHANGE UP (ref 1.6–2.4)
MANUAL DIF COMMENT BLD-IMP: SIGNIFICANT CHANGE UP
MANUAL SMEAR VERIFICATION: SIGNIFICANT CHANGE UP
MCHC RBC-ENTMCNC: 31.2 PG — SIGNIFICANT CHANGE UP (ref 27–34)
MCHC RBC-ENTMCNC: 33 G/DL — SIGNIFICANT CHANGE UP (ref 32–36)
MCV RBC AUTO: 94.7 FL — SIGNIFICANT CHANGE UP (ref 80–100)
MONOCYTES NFR BLD AUTO: 6 % — SIGNIFICANT CHANGE UP (ref 2–14)
NEUTROPHILS NFR BLD AUTO: 47 % — SIGNIFICANT CHANGE UP (ref 43–77)
NEUTS BAND # BLD: 29 % — HIGH
PHOSPHATE SERPL-MCNC: 3 MG/DL — SIGNIFICANT CHANGE UP (ref 2.5–4.5)
PLAT MORPH BLD: (no result)
PLATELET # BLD AUTO: 322 K/UL — SIGNIFICANT CHANGE UP (ref 150–400)
POTASSIUM SERPL-MCNC: 2.9 MMOL/L — CRITICAL LOW (ref 3.5–5.3)
POTASSIUM SERPL-SCNC: 2.9 MMOL/L — CRITICAL LOW (ref 3.5–5.3)
PROTHROM AB SERPL-ACNC: 16.2 SEC — HIGH (ref 10–13.1)
RBC # BLD: 2.85 M/UL — LOW (ref 4.2–5.8)
RBC # FLD: 15.7 % — SIGNIFICANT CHANGE UP (ref 10.3–16.9)
RBC BLD AUTO: (no result)
SODIUM SERPL-SCNC: 141 MMOL/L — SIGNIFICANT CHANGE UP (ref 135–145)
WBC # BLD: 16.3 K/UL — HIGH (ref 3.8–10.5)
WBC # FLD AUTO: 16.3 K/UL — HIGH (ref 3.8–10.5)

## 2017-02-20 PROCEDURE — 71010: CPT | Mod: 26

## 2017-02-20 PROCEDURE — 99291 CRITICAL CARE FIRST HOUR: CPT

## 2017-02-20 RX ORDER — MIDODRINE HYDROCHLORIDE 2.5 MG/1
5 TABLET ORAL EVERY 8 HOURS
Qty: 0 | Refills: 0 | Status: DISCONTINUED | OUTPATIENT
Start: 2017-02-20 | End: 2017-02-28

## 2017-02-20 RX ORDER — INSULIN LISPRO 100/ML
VIAL (ML) SUBCUTANEOUS
Qty: 0 | Refills: 0 | Status: DISCONTINUED | OUTPATIENT
Start: 2017-02-20 | End: 2017-02-28

## 2017-02-20 RX ORDER — SODIUM CHLORIDE 9 MG/ML
500 INJECTION INTRAMUSCULAR; INTRAVENOUS; SUBCUTANEOUS ONCE
Qty: 0 | Refills: 0 | Status: COMPLETED | OUTPATIENT
Start: 2017-02-20 | End: 2017-02-20

## 2017-02-20 RX ORDER — ALBUMIN HUMAN 25 %
50 VIAL (ML) INTRAVENOUS
Qty: 0 | Refills: 0 | Status: COMPLETED | OUTPATIENT
Start: 2017-02-20 | End: 2017-02-20

## 2017-02-20 RX ORDER — POTASSIUM CHLORIDE 20 MEQ
20 PACKET (EA) ORAL
Qty: 0 | Refills: 0 | Status: DISCONTINUED | OUTPATIENT
Start: 2017-02-20 | End: 2017-02-20

## 2017-02-20 RX ADMIN — MIRTAZAPINE 15 MILLIGRAM(S): 45 TABLET, ORALLY DISINTEGRATING ORAL at 22:04

## 2017-02-20 RX ADMIN — Medication 3 MILLILITER(S): at 10:36

## 2017-02-20 RX ADMIN — HEPARIN SODIUM 5000 UNIT(S): 5000 INJECTION INTRAVENOUS; SUBCUTANEOUS at 14:24

## 2017-02-20 RX ADMIN — Medication 10 MILLIGRAM(S): at 17:17

## 2017-02-20 RX ADMIN — Medication 1 MILLIGRAM(S): at 12:16

## 2017-02-20 RX ADMIN — Medication 50 MILLILITER(S): at 19:20

## 2017-02-20 RX ADMIN — Medication 1 DROP(S): at 12:17

## 2017-02-20 RX ADMIN — Medication 81 MILLIGRAM(S): at 12:16

## 2017-02-20 RX ADMIN — PIPERACILLIN AND TAZOBACTAM 200 GRAM(S): 4; .5 INJECTION, POWDER, LYOPHILIZED, FOR SOLUTION INTRAVENOUS at 22:05

## 2017-02-20 RX ADMIN — Medication 3 MILLILITER(S): at 22:29

## 2017-02-20 RX ADMIN — Medication 3 MILLILITER(S): at 17:26

## 2017-02-20 RX ADMIN — LEVETIRACETAM 410 MILLIGRAM(S): 250 TABLET, FILM COATED ORAL at 09:50

## 2017-02-20 RX ADMIN — LEVETIRACETAM 410 MILLIGRAM(S): 250 TABLET, FILM COATED ORAL at 22:02

## 2017-02-20 RX ADMIN — Medication 3 MILLILITER(S): at 13:23

## 2017-02-20 RX ADMIN — MIDODRINE HYDROCHLORIDE 5 MILLIGRAM(S): 2.5 TABLET ORAL at 22:11

## 2017-02-20 RX ADMIN — HEPARIN SODIUM 5000 UNIT(S): 5000 INJECTION INTRAVENOUS; SUBCUTANEOUS at 05:48

## 2017-02-20 RX ADMIN — GABAPENTIN 100 MILLIGRAM(S): 400 CAPSULE ORAL at 19:13

## 2017-02-20 RX ADMIN — HEPARIN SODIUM 5000 UNIT(S): 5000 INJECTION INTRAVENOUS; SUBCUTANEOUS at 22:04

## 2017-02-20 RX ADMIN — Medication 50 MILLIEQUIVALENT(S): at 08:14

## 2017-02-20 RX ADMIN — Medication 1 DROP(S): at 19:13

## 2017-02-20 RX ADMIN — PIPERACILLIN AND TAZOBACTAM 200 GRAM(S): 4; .5 INJECTION, POWDER, LYOPHILIZED, FOR SOLUTION INTRAVENOUS at 10:29

## 2017-02-20 RX ADMIN — Medication 100 MILLIGRAM(S): at 12:16

## 2017-02-20 RX ADMIN — SODIUM CHLORIDE 1000 MILLILITER(S): 9 INJECTION INTRAMUSCULAR; INTRAVENOUS; SUBCUTANEOUS at 12:30

## 2017-02-20 RX ADMIN — Medication 10 MILLIGRAM(S): at 12:15

## 2017-02-20 RX ADMIN — SIMVASTATIN 20 MILLIGRAM(S): 20 TABLET, FILM COATED ORAL at 22:05

## 2017-02-20 RX ADMIN — Medication 50 MILLILITER(S): at 18:00

## 2017-02-20 RX ADMIN — LEVETIRACETAM 410 MILLIGRAM(S): 250 TABLET, FILM COATED ORAL at 22:05

## 2017-02-20 RX ADMIN — Medication 50 MILLILITER(S): at 20:00

## 2017-02-20 RX ADMIN — SODIUM CHLORIDE 1000 MILLILITER(S): 9 INJECTION INTRAMUSCULAR; INTRAVENOUS; SUBCUTANEOUS at 15:05

## 2017-02-20 NOTE — CONSULT NOTE ADULT - PROBLEM SELECTOR RECOMMENDATION 9
Patient is an 87 year old male with ESRD on HD M/W/F via left AVG whom presented to the hospital with sepsis likely from pneumonia. Patient noted to have pulmonary congestion on Xray chest.     P - patient is due for dialysis today   Optiflux 180,   4K bath   Goal UF 2-3kg over 3.5 hours   Please monitor blood pressure closely while on dialysis  Please maintain blood pressure > 100

## 2017-02-20 NOTE — CONSULT NOTE ADULT - SUBJECTIVE AND OBJECTIVE BOX
Patient is a 87y Male with a history of ESRD on HD M/W/F via left AVG whom presented to the hospital for desaturation and increased oral secretions from [facility]. Patient was also noted to be febrile to 101. Patient is well known to the nephrology team. Patient was noted to be in respiratory distress requiring BiPAP. Nephrology consulted for management of hemodialysis.     PAST MEDICAL & SURGICAL HISTORY:  AV graft thrombosis  Osteoarthritis  PVD (peripheral vascular disease)  COPD (chronic obstructive pulmonary disease)  Heart failure  Angina pectoris  ESRD (end stage renal disease)  Seizures: post traumatic  CAD (coronary artery disease)  HTN (hypertension)  Polyneuropathy  Hyperlipidemia  Dysphagia  Hypotension  Alzheimers disease  Osteoarthritis: Osteoarthritis  Chronic obstructive pulmonary disease: Chronic obstructive pulmonary disease  Anemia: Anemia  Dementia without behavioral disturbance: Dementia  Atherosclerosis of coronary artery: CAD (coronary artery disease)  Nondependent alcohol abuse: ETOH abuse  Depressive disorder: Depression  End-stage renal disease: ESRD on hemodialysis  Essential hypertension: HTN (hypertension)  Deep venous embolism and thrombosis of lower extremity: DVT (deep venous thrombosis)  Congestive heart failure: CHF (congestive heart failure)  Legal blindness: Legally blind  Hemodialysis access, AV graft: LUE loop AVG  Acquired arteriovenous fistula: AV fistula    MEDICATIONS  (STANDING):  fluticasone / salmeterol 250-50 MICROgram(s) Diskus 1Dose(s) Inhalation two times a day  aspirin enteric coated 81milliGRAM(s) Oral daily  docusate sodium 100milliGRAM(s) Oral daily  heparin  Injectable 5000Unit(s) SubCutaneous every 8 hours  folic acid 1milliGRAM(s) Oral daily  artificial  tears Solution 1Drop(s) Both EYES every 6 hours  mirtazapine 15milliGRAM(s) Oral at bedtime  gabapentin 100milliGRAM(s) Oral two times a day  metoclopramide 10milliGRAM(s) Oral three times a day with meals  simvastatin 20milliGRAM(s) Oral at bedtime  pantoprazole    Tablet 40milliGRAM(s) Oral before breakfast  piperacillin/tazobactam IVPB. 2.25Gram(s) IV Intermittent every 12 hours  vancomycin  IVPB 1000milliGRAM(s) IV Intermittent once  ALBUTerol/ipratropium for Nebulization 3milliLiter(s) Nebulizer every 4 hours  norepinephrine Infusion 0.01MICROgram(s)/kG/Min IV Continuous <Continuous>  levETIRAcetam  IVPB 250milliGRAM(s) IV Intermittent every 12 hours  levETIRAcetam  IVPB 250milliGRAM(s) IV Intermittent <User Schedule>  insulin lispro (HumaLOG) corrective regimen sliding scale  SubCutaneous three times a day before meals    MEDICATIONS  (PRN):  acetaminophen   Tablet 650milliGRAM(s) Oral every 6 hours PRN For Temp greater than 38 C (100.4 F)  acetaminophen  Suppository 650milliGRAM(s) Rectal every 6 hours PRN For Temp greater than 38 C (100.4 F)    Allergies    clonidine (Unknown)    Intolerances    FAMILY HISTORY:  Family history unknown    T(C): , Max: 38.6 ( @ 13:08)  T(F): , Max: 101.5 ( @ 13:08)  HR: 72  BP: 100/50  BP(mean): 69  RR: 26  SpO2: 100%    I & Os for 24h ending  @ 07:00  =============================================  IN: 492 ml / OUT: 0 ml / NET: 492 ml    I & Os for current day (as of  @ 10:58)  =============================================  IN: 203 ml / OUT: 0 ml / NET: 203 ml    Height (cm): 180.3 ( @ 17:49)  Weight (kg): 65 ( @ 17:49)  BMI (kg/m2): 20 ( @ 17:49)  BSA (m2): 1.83 ( @ 17:49)    LABS:                        8.9    16.3  )-----------( 322      ( 2017 06:35 )             27.0     2017 06:32    141    |  99     |  53     ----------------------------<  111    2.9     |  28     |  7.52     Ca    8.3        2017 06:32  Phos  3.0       2017 06:32  Mg     1.7       2017 06:32    TPro  7.6    /  Alb  2.4    /  TBili  0.6    /  DBili  x      /  AST  12     /  ALT  8      /  AlkPhos  107    2017 13:25    Hemoglobin A1C, Whole Blood: 5.1 % [4.8 - 5.6] ( @ 08:39)  Creatine Kinase, Serum: 115 U/L [39 - 308] ( @ 13:28)    PT/INR - ( 2017 06:34 )   PT: 16.2 sec;   INR: 1.45        PTT - ( 2017 06:34 )  PTT:30.1 sec  Urinalysis Basic - ( 2017 14:04 )    Color: Red / Appearance: Cloudy / S.020 / pH: x  Gluc: x / Ketone: NEGATIVE  / Bili: Small / Urobili: 1.0 E.U./dL   Blood: x / Protein: >=300 mg/dL / Nitrite: POSITIVE   Leuk Esterase: Large / RBC: Many /HPF / WBC 5-10 /HPF   Sq Epi: x / Non Sq Epi: Rare /HPF / Bacteria: Many /HPF    Xray chest   The cardiomediastinal silhouette is within   normal limits. Pulmonary vascular congestion seen. Persistent bibasilar   opacities, more prominent on the right. No pneumothorax. No pleural   effusion seen.

## 2017-02-20 NOTE — CONSULT NOTE ADULT - RS GEN PE MLT RESP DETAILS PC
airway patent/B/L rales, moderate respiratory distress, requiring BiPAP/good air movement/breath sounds equal

## 2017-02-20 NOTE — PROGRESS NOTE ADULT - SUBJECTIVE AND OBJECTIVE BOX
OVERNIGHT: No overnight events.  SUBJECTIVE: Patient seen and examined at bedside.     ROS:  CV: Denies chest pain  Resp: Denies SOB  GI: Denies abdominal pain, constipation, diarrhea, nausea, vomiting  : Denies dysuria  ID: Denies fevers, chills  MSK: Denies joint pain     OBJECTIVE:    VITAL SIGNS:  ICU Vital Signs Last 24 Hrs  T(C): 37.2, Max: 38.6 ( @ 13:08)  T(F): 98.9, Max: 101.5 ( @ 13:08)  HR: 78 (61 - 94)  BP: 125/64 (63/35 - 150/70)  BP(mean): 82 (76 - 94)  ABP: --  ABP(mean): --  RR: 27 (16 - 30)  SpO2: 100% (94% - 100%)        I & Os for current day (as of  @ 06:50)  =============================================  IN: 488 ml / OUT: 0 ml / NET: 488 ml    CAPILLARY BLOOD GLUCOSE  106 (2017 06:00)      PHYSICAL EXAM:    General: NAD, comfortable  HEENT: NCAT, PERRL, clear conjunctiva, no scleral icterus  Neck: supple, no JVD  Respiratory: B/L coarse BS.   Cardiovascular: RRR, normal S1S2, no M/R/G  Abdomen: soft, NT/ND, bowel sounds in all four quadrants, no palpable masses  Extremities: WWP, no clubbing, cyanosis, or edema    MEDICATIONS:  MEDICATIONS  (STANDING):  fluticasone / salmeterol 250-50 MICROgram(s) Diskus 1Dose(s) Inhalation two times a day  aspirin enteric coated 81milliGRAM(s) Oral daily  docusate sodium 100milliGRAM(s) Oral daily  heparin  Injectable 5000Unit(s) SubCutaneous every 8 hours  folic acid 1milliGRAM(s) Oral daily  artificial  tears Solution 1Drop(s) Both EYES every 6 hours  mirtazapine 15milliGRAM(s) Oral at bedtime  gabapentin 100milliGRAM(s) Oral two times a day  metoclopramide 10milliGRAM(s) Oral three times a day with meals  simvastatin 20milliGRAM(s) Oral at bedtime  pantoprazole    Tablet 40milliGRAM(s) Oral before breakfast  piperacillin/tazobactam IVPB. 2.25Gram(s) IV Intermittent every 12 hours  vancomycin  IVPB 1000milliGRAM(s) IV Intermittent once  ALBUTerol/ipratropium for Nebulization 3milliLiter(s) Nebulizer every 4 hours  norepinephrine Infusion 0.01MICROgram(s)/kG/Min IV Continuous <Continuous>  levETIRAcetam  IVPB 250milliGRAM(s) IV Intermittent every 12 hours  levETIRAcetam  IVPB 250milliGRAM(s) IV Intermittent <User Schedule>    MEDICATIONS  (PRN):  acetaminophen   Tablet 650milliGRAM(s) Oral every 6 hours PRN For Temp greater than 38 C (100.4 F)  acetaminophen  Suppository 650milliGRAM(s) Rectal every 6 hours PRN For Temp greater than 38 C (100.4 F)      ALLERGIES:  Allergies    clonidine (Unknown)    Intolerances        LABS:                        9.2    15.4  )-----------( 302      ( 2017 13:25 )             27.6     2017 13:25    140    |  97     |  46     ----------------------------<  116    3.0     |  30     |  6.71     Ca    8.7        2017 13:25    TPro  7.6    /  Alb  2.4    /  TBili  0.6    /  DBili  x      /  AST  12     /  ALT  8      /  AlkPhos  107    2017 13:25    PT/INR - ( 2017 13:28 )   PT: 15.3 sec;   INR: 1.37          PTT - ( 2017 13:28 )  PTT:29.7 sec  Urinalysis Basic - ( 2017 14:04 )    Color: Red / Appearance: Cloudy / S.020 / pH: x  Gluc: x / Ketone: NEGATIVE  / Bili: Small / Urobili: 1.0 E.U./dL   Blood: x / Protein: >=300 mg/dL / Nitrite: POSITIVE   Leuk Esterase: Large / RBC: Many /HPF / WBC 5-10 /HPF   Sq Epi: x / Non Sq Epi: Rare /HPF / Bacteria: Many /HPF        RADIOLOGY & ADDITIONAL TESTS: Reviewed.        A/P: 77 yo F PMH HTN, HLD, HIV on HAART (after receiving blood product in ?), RA, GERD, IDDM p/w hyperglycemia for the past couple of days and febrile.     ID:   #Septic shock 2/2 UTI vs asp PNA : Patient was febrile to 101, tachycardic, tachypneic, leukocytosis,  with heavy oral secretions and trouble protecting his airway, From last admission it is known that he is a chronic aspiration risk. Patient has a positive UA.   - Empiric Abx - c/w vancomycin and zosyn   - fu BCX/uCX   - Titrate levophed to MAP>65       # HIV: Unclear what medications he is on.   - Collateral from nursing home  - CD4 count       GI:     #Dysmotility: Patient with chronic dysmotility and probable aspiration risk.   - FU speech and swallow eval in AM   - Collateral about manometry study   Respiratory:   Tachypnea: Patient tachypneic on admission with secretions. Currently tachypneic bipap  -  monitor for s/s of needing intubation.   #COPD: C/W advair, Duonebs Q4     CV:   #HD: Stable. Patient is WWP and currently no lactate. Central line in place.   - Titrate levophed as per above   #CAD: C/W ASA daily   Heme:   Anemia: Likely 2/2 to ESRD.   - Active T/S   Renal: ESRD: Patient is on Dialysis MWF.   - FU with nephro regarding schedule   Neuro: #Seizure disorder   - C/W Keppra   #Alzheimers: C/W Keppra   FENP: Do not replete lytes as on HD, Pending speech eval, Hep sub Q PPX.   DISPO: MICU  CODE: FULL OVERNIGHT: No overnight events.  SUBJECTIVE: Patient seen and examined at bedside.       OBJECTIVE:    VITAL SIGNS:  ICU Vital Signs Last 24 Hrs  T(C): 37.2, Max: 38.6 ( @ 13:08)  T(F): 98.9, Max: 101.5 ( @ 13:08)  HR: 78 (61 - 94)  BP: 125/64 (63/35 - 150/70)  BP(mean): 82 (76 - 94)  ABP: --  ABP(mean): --  RR: 27 (16 - 30)  SpO2: 100% (94% - 100%)        I & Os for current day (as of  @ 06:50)  =============================================  IN: 488 ml / OUT: 0 ml / NET: 488 ml    CAPILLARY BLOOD GLUCOSE  106 (2017 06:00)      PHYSICAL EXAM:    General: NAD, comfortable  HEENT: NCAT, PERRL, clear conjunctiva, no scleral icterus  Neck: supple, no JVD  Respiratory: B/L coarse BS.   Cardiovascular: RRR, normal S1S2, no M/R/G  Abdomen: soft, NT/ND, bowel sounds in all four quadrants, no palpable masses  Extremities: WWP, no clubbing, cyanosis, or edema    MEDICATIONS:  MEDICATIONS  (STANDING):  fluticasone / salmeterol 250-50 MICROgram(s) Diskus 1Dose(s) Inhalation two times a day  aspirin enteric coated 81milliGRAM(s) Oral daily  docusate sodium 100milliGRAM(s) Oral daily  heparin  Injectable 5000Unit(s) SubCutaneous every 8 hours  folic acid 1milliGRAM(s) Oral daily  artificial  tears Solution 1Drop(s) Both EYES every 6 hours  mirtazapine 15milliGRAM(s) Oral at bedtime  gabapentin 100milliGRAM(s) Oral two times a day  metoclopramide 10milliGRAM(s) Oral three times a day with meals  simvastatin 20milliGRAM(s) Oral at bedtime  pantoprazole    Tablet 40milliGRAM(s) Oral before breakfast  piperacillin/tazobactam IVPB. 2.25Gram(s) IV Intermittent every 12 hours  vancomycin  IVPB 1000milliGRAM(s) IV Intermittent once  ALBUTerol/ipratropium for Nebulization 3milliLiter(s) Nebulizer every 4 hours  norepinephrine Infusion 0.01MICROgram(s)/kG/Min IV Continuous <Continuous>  levETIRAcetam  IVPB 250milliGRAM(s) IV Intermittent every 12 hours  levETIRAcetam  IVPB 250milliGRAM(s) IV Intermittent <User Schedule>    MEDICATIONS  (PRN):  acetaminophen   Tablet 650milliGRAM(s) Oral every 6 hours PRN For Temp greater than 38 C (100.4 F)  acetaminophen  Suppository 650milliGRAM(s) Rectal every 6 hours PRN For Temp greater than 38 C (100.4 F)      ALLERGIES:  Allergies    clonidine (Unknown)    Intolerances        LABS:                        9.2    15.4  )-----------( 302      ( 2017 13:25 )             27.6     2017 13:25    140    |  97     |  46     ----------------------------<  116    3.0     |  30     |  6.71     Ca    8.7        2017 13:25    TPro  7.6    /  Alb  2.4    /  TBili  0.6    /  DBili  x      /  AST  12     /  ALT  8      /  AlkPhos  107    2017 13:25    PT/INR - ( 2017 13:28 )   PT: 15.3 sec;   INR: 1.37          PTT - ( 2017 13:28 )  PTT:29.7 sec  Urinalysis Basic - ( 2017 14:04 )    Color: Red / Appearance: Cloudy / S.020 / pH: x  Gluc: x / Ketone: NEGATIVE  / Bili: Small / Urobili: 1.0 E.U./dL   Blood: x / Protein: >=300 mg/dL / Nitrite: POSITIVE   Leuk Esterase: Large / RBC: Many /HPF / WBC 5-10 /HPF   Sq Epi: x / Non Sq Epi: Rare /HPF / Bacteria: Many /HPF        RADIOLOGY & ADDITIONAL TESTS: Reviewed.        A/P: 77 yo F PMH HTN, HLD, HIV  (after receiving blood product in ?), RA, GERD, IDDM p/w hyperglycemia for the past couple of days and febrile.     ID:   #Septic shock 2/2  asp PNA POA : Patient was febrile to 101, tachycardic, tachypneic, leukocytosis. UTI was positive on admission, but patient is usually anuric.  From last admission it is known that he is a chronic aspiration risk. Patient has a positive UA.   - Empiric Abx - c/w vancomycin and zosyn   - fu BCX/uCX   - Titrate levophed to MAP>65     # HIV: Unclear what medications he is on.   - Collateral from nursing home  - CD4 count     GI:     #Dysmotility: Patient with chronic dysmotility and probable aspiration risk.   - FU speech and swallow eval in AM   - Collateral about manometry study       Respiratory:   Tachypnea: Patient tachypneic on admission with secretions. Currently tachypneic bipap  -  monitor for s/s of needing intubation.   #COPD: C/W advair, Duonebs Q4     CV:   #HD: Stable. Patient is WWP and currently no lactate. Central line in place.   - Titrate levophed as per above   #CAD: C/W ASA daily     Heme:   #Anemia: Likely 2/2 to ESRD.   - Active T/S   Renal:   #ESRD: Patient is on Dialysis MWF.   - FU with nephro regarding schedule   Neuro:   #Seizure disorder   - C/W Keppra   #Alzheimers: C/W Keppra     FENP: Do not replete lytes as on HD, Pending speech eval, Hep sub Q PPX.   DISPO: MICU  CODE: FULL OVERNIGHT: No overnight events.  SUBJECTIVE: Patient seen and examined at bedside.       OBJECTIVE:    VITAL SIGNS:  ICU Vital Signs Last 24 Hrs  T(C): 37.2, Max: 38.6 ( @ 13:08)  T(F): 98.9, Max: 101.5 ( @ 13:08)  HR: 78 (61 - 94)  BP: 125/64 (63/35 - 150/70)  BP(mean): 82 (76 - 94)  ABP: --  ABP(mean): --  RR: 27 (16 - 30)  SpO2: 100% (94% - 100%)        I & Os for current day (as of  @ 06:50)  =============================================  IN: 488 ml / OUT: 0 ml / NET: 488 ml    CAPILLARY BLOOD GLUCOSE  106 (2017 06:00)      PHYSICAL EXAM:    General: NAD, comfortable  HEENT: NCAT, PERRL, clear conjunctiva, no scleral icterus  Neck: supple, no JVD  Respiratory: B/L coarse BS.   Cardiovascular: RRR, normal S1S2, no M/R/G  Abdomen: soft, NT/ND, bowel sounds in all four quadrants, no palpable masses  Extremities: WWP, no clubbing, cyanosis, or edema    MEDICATIONS:  MEDICATIONS  (STANDING):  fluticasone / salmeterol 250-50 MICROgram(s) Diskus 1Dose(s) Inhalation two times a day  aspirin enteric coated 81milliGRAM(s) Oral daily  docusate sodium 100milliGRAM(s) Oral daily  heparin  Injectable 5000Unit(s) SubCutaneous every 8 hours  folic acid 1milliGRAM(s) Oral daily  artificial  tears Solution 1Drop(s) Both EYES every 6 hours  mirtazapine 15milliGRAM(s) Oral at bedtime  gabapentin 100milliGRAM(s) Oral two times a day  metoclopramide 10milliGRAM(s) Oral three times a day with meals  simvastatin 20milliGRAM(s) Oral at bedtime  pantoprazole    Tablet 40milliGRAM(s) Oral before breakfast  piperacillin/tazobactam IVPB. 2.25Gram(s) IV Intermittent every 12 hours  vancomycin  IVPB 1000milliGRAM(s) IV Intermittent once  ALBUTerol/ipratropium for Nebulization 3milliLiter(s) Nebulizer every 4 hours  norepinephrine Infusion 0.01MICROgram(s)/kG/Min IV Continuous <Continuous>  levETIRAcetam  IVPB 250milliGRAM(s) IV Intermittent every 12 hours  levETIRAcetam  IVPB 250milliGRAM(s) IV Intermittent <User Schedule>    MEDICATIONS  (PRN):  acetaminophen   Tablet 650milliGRAM(s) Oral every 6 hours PRN For Temp greater than 38 C (100.4 F)  acetaminophen  Suppository 650milliGRAM(s) Rectal every 6 hours PRN For Temp greater than 38 C (100.4 F)      ALLERGIES:  Allergies    clonidine (Unknown)    Intolerances        LABS:                        9.2    15.4  )-----------( 302      ( 2017 13:25 )             27.6     2017 13:25    140    |  97     |  46     ----------------------------<  116    3.0     |  30     |  6.71     Ca    8.7        2017 13:25    TPro  7.6    /  Alb  2.4    /  TBili  0.6    /  DBili  x      /  AST  12     /  ALT  8      /  AlkPhos  107    2017 13:25    PT/INR - ( 2017 13:28 )   PT: 15.3 sec;   INR: 1.37          PTT - ( 2017 13:28 )  PTT:29.7 sec  Urinalysis Basic - ( 2017 14:04 )    Color: Red / Appearance: Cloudy / S.020 / pH: x  Gluc: x / Ketone: NEGATIVE  / Bili: Small / Urobili: 1.0 E.U./dL   Blood: x / Protein: >=300 mg/dL / Nitrite: POSITIVE   Leuk Esterase: Large / RBC: Many /HPF / WBC 5-10 /HPF   Sq Epi: x / Non Sq Epi: Rare /HPF / Bacteria: Many /HPF        RADIOLOGY & ADDITIONAL TESTS: Reviewed.        A/P: 77 yo F PMH HTN, HLD, HIV  (after receiving blood product in ?), RA, GERD, IDDM p/w hyperglycemia for the past couple of days and febrile.     ID:   #Septic shock 2/2  asp PNA POA : Patient was febrile to 101, tachycardic, tachypneic, leukocytosis. UTI was positive on admission, but patient is usually anuric. He was given a one time 500 cc bolus prior to b From last admission it is known that he is a chronic aspiration risk. Patient has a positive UA. Currently HD stable/perfusing well with CVP normal.   - Empiric Abx - c/w vancomycin and zosyn   - fu BCX/uCX   - Titrate levophed to MAP>65     CV:   #HD: Stable. Patient is WWP and currently no lactate. Central line in place.   - Titrate levophed as per above   #CAD: C/W ASA daily     Respiratory:   #Tachypnea: Patient tachypneic on admission with secretions. Now he is stable on NC and saturating well.   #COPD: C/W Matt singleton Q4     GI:     #Dysmotility: Patient with chronic dysmotility and probable aspiration risk.   - FU speech and swallow eval in AM   - Collateral about manometry study   Heme:   #Anemia: Likely 2/2 to ESRD.   - Active T/S   Renal:   #ESRD: Patient is on Dialysis MWF.     Neuro:   #Seizure disorder   - C/W Keppra   #Alzheimers: C/W Keppra     FENP: Do not replete lytes as on HD, Pending speech eval, Hep sub Q PPX.   DISPO: MICU  CODE: FULL

## 2017-02-20 NOTE — PROGRESS NOTE ADULT - SUBJECTIVE AND OBJECTIVE BOX
Patient was seen and evaluated on dialysis.   Patient is tolerating the procedure well.   HR: 78  BP: 104/53  Continue dialysis:   Optiflux 180,   4K bath   Goal UF 1.5kg over 3.5 hours

## 2017-02-20 NOTE — CONSULT NOTE ADULT - PROBLEM SELECTOR RECOMMENDATION 2
Patient presented with sepsis possibly from pneumonia. Patient was noted to be hypotensive and started on levophed. Please maintain systolic blood pressure > 100. Management as per primary team.

## 2017-02-21 DIAGNOSIS — J18.9 PNEUMONIA, UNSPECIFIED ORGANISM: ICD-10-CM

## 2017-02-21 DIAGNOSIS — D64.9 ANEMIA, UNSPECIFIED: ICD-10-CM

## 2017-02-21 DIAGNOSIS — K59.9 FUNCTIONAL INTESTINAL DISORDER, UNSPECIFIED: ICD-10-CM

## 2017-02-21 LAB
ANION GAP SERPL CALC-SCNC: 12 MMOL/L — SIGNIFICANT CHANGE UP (ref 9–16)
ANION GAP SERPL CALC-SCNC: 13 MMOL/L — SIGNIFICANT CHANGE UP (ref 9–16)
BASOPHILS NFR BLD AUTO: 0.3 % — SIGNIFICANT CHANGE UP (ref 0–2)
BUN SERPL-MCNC: 38 MG/DL — HIGH (ref 7–23)
BUN SERPL-MCNC: 44 MG/DL — HIGH (ref 7–23)
CALCIUM SERPL-MCNC: 8.6 MG/DL — SIGNIFICANT CHANGE UP (ref 8.5–10.5)
CALCIUM SERPL-MCNC: 8.8 MG/DL — SIGNIFICANT CHANGE UP (ref 8.5–10.5)
CHLORIDE SERPL-SCNC: 100 MMOL/L — SIGNIFICANT CHANGE UP (ref 96–108)
CHLORIDE SERPL-SCNC: 101 MMOL/L — SIGNIFICANT CHANGE UP (ref 96–108)
CO2 SERPL-SCNC: 27 MMOL/L — SIGNIFICANT CHANGE UP (ref 22–31)
CO2 SERPL-SCNC: 28 MMOL/L — SIGNIFICANT CHANGE UP (ref 22–31)
CREAT SERPL-MCNC: 5.32 MG/DL — HIGH (ref 0.5–1.3)
CREAT SERPL-MCNC: 6.18 MG/DL — HIGH (ref 0.5–1.3)
EOSINOPHIL NFR BLD AUTO: 1.2 % — SIGNIFICANT CHANGE UP (ref 0–6)
GLUCOSE SERPL-MCNC: 101 MG/DL — HIGH (ref 70–99)
GLUCOSE SERPL-MCNC: 105 MG/DL — HIGH (ref 70–99)
GRAM STN FLD: SIGNIFICANT CHANGE UP
HBV SURFACE AB SER-ACNC: SIGNIFICANT CHANGE UP
HCT VFR BLD CALC: 22 % — LOW (ref 39–50)
HCT VFR BLD CALC: 22.5 % — LOW (ref 39–50)
HCT VFR BLD CALC: 23.7 % — LOW (ref 39–50)
HGB BLD-MCNC: 7.4 G/DL — LOW (ref 13–17)
HGB BLD-MCNC: 7.5 G/DL — LOW (ref 13–17)
HGB BLD-MCNC: 7.8 G/DL — LOW (ref 13–17)
INR BLD: 1.73 — HIGH (ref 0.88–1.16)
LYMPHOCYTES # BLD AUTO: 14.5 % — SIGNIFICANT CHANGE UP (ref 13–44)
MAGNESIUM SERPL-MCNC: 1.6 MG/DL — SIGNIFICANT CHANGE UP (ref 1.6–2.4)
MCHC RBC-ENTMCNC: 30.6 PG — SIGNIFICANT CHANGE UP (ref 27–34)
MCHC RBC-ENTMCNC: 30.8 PG — SIGNIFICANT CHANGE UP (ref 27–34)
MCHC RBC-ENTMCNC: 31.3 PG — SIGNIFICANT CHANGE UP (ref 27–34)
MCHC RBC-ENTMCNC: 32.9 G/DL — SIGNIFICANT CHANGE UP (ref 32–36)
MCHC RBC-ENTMCNC: 33.3 G/DL — SIGNIFICANT CHANGE UP (ref 32–36)
MCHC RBC-ENTMCNC: 33.6 G/DL — SIGNIFICANT CHANGE UP (ref 32–36)
MCV RBC AUTO: 91.7 FL — SIGNIFICANT CHANGE UP (ref 80–100)
MCV RBC AUTO: 92.9 FL — SIGNIFICANT CHANGE UP (ref 80–100)
MCV RBC AUTO: 93.8 FL — SIGNIFICANT CHANGE UP (ref 80–100)
MONOCYTES NFR BLD AUTO: 9.9 % — SIGNIFICANT CHANGE UP (ref 2–14)
NEUTROPHILS NFR BLD AUTO: 74.1 % — SIGNIFICANT CHANGE UP (ref 43–77)
PHOSPHATE SERPL-MCNC: 1.8 MG/DL — LOW (ref 2.5–4.5)
PLATELET # BLD AUTO: 256 K/UL — SIGNIFICANT CHANGE UP (ref 150–400)
PLATELET # BLD AUTO: 273 K/UL — SIGNIFICANT CHANGE UP (ref 150–400)
PLATELET # BLD AUTO: 288 K/UL — SIGNIFICANT CHANGE UP (ref 150–400)
POTASSIUM SERPL-MCNC: 3.1 MMOL/L — LOW (ref 3.5–5.3)
POTASSIUM SERPL-MCNC: 3.6 MMOL/L — SIGNIFICANT CHANGE UP (ref 3.5–5.3)
POTASSIUM SERPL-SCNC: 3.1 MMOL/L — LOW (ref 3.5–5.3)
POTASSIUM SERPL-SCNC: 3.6 MMOL/L — SIGNIFICANT CHANGE UP (ref 3.5–5.3)
PROTHROM AB SERPL-ACNC: 19.3 SEC — HIGH (ref 10–13.1)
RBC # BLD: 2.4 M/UL — LOW (ref 4.2–5.8)
RBC # BLD: 2.4 M/UL — LOW (ref 4.2–5.8)
RBC # BLD: 2.55 M/UL — LOW (ref 4.2–5.8)
RBC # FLD: 15.4 % — SIGNIFICANT CHANGE UP (ref 10.3–16.9)
RBC # FLD: 15.7 % — SIGNIFICANT CHANGE UP (ref 10.3–16.9)
RBC # FLD: 15.9 % — SIGNIFICANT CHANGE UP (ref 10.3–16.9)
SODIUM SERPL-SCNC: 140 MMOL/L — SIGNIFICANT CHANGE UP (ref 135–145)
SODIUM SERPL-SCNC: 141 MMOL/L — SIGNIFICANT CHANGE UP (ref 135–145)
SPECIMEN SOURCE: SIGNIFICANT CHANGE UP
WBC # BLD: 12.5 K/UL — HIGH (ref 3.8–10.5)
WBC # BLD: 12.8 K/UL — HIGH (ref 3.8–10.5)
WBC # BLD: 13.8 K/UL — HIGH (ref 3.8–10.5)
WBC # FLD AUTO: 12.5 K/UL — HIGH (ref 3.8–10.5)
WBC # FLD AUTO: 12.8 K/UL — HIGH (ref 3.8–10.5)
WBC # FLD AUTO: 13.8 K/UL — HIGH (ref 3.8–10.5)

## 2017-02-21 PROCEDURE — 73130 X-RAY EXAM OF HAND: CPT | Mod: 26,LT

## 2017-02-21 PROCEDURE — 99291 CRITICAL CARE FIRST HOUR: CPT

## 2017-02-21 PROCEDURE — 71010: CPT | Mod: 26,76

## 2017-02-21 PROCEDURE — 99232 SBSQ HOSP IP/OBS MODERATE 35: CPT | Mod: GC

## 2017-02-21 RX ORDER — POTASSIUM CHLORIDE 20 MEQ
10 PACKET (EA) ORAL
Qty: 0 | Refills: 0 | Status: DISCONTINUED | OUTPATIENT
Start: 2017-02-21 | End: 2017-02-21

## 2017-02-21 RX ORDER — ACETYLCYSTEINE 200 MG/ML
5 VIAL (ML) MISCELLANEOUS EVERY 4 HOURS
Qty: 0 | Refills: 0 | Status: DISCONTINUED | OUTPATIENT
Start: 2017-02-21 | End: 2017-02-28

## 2017-02-21 RX ORDER — RADIOPAQUE PVC MARKERS/BARIUM 24MARKERS
30 CAPSULE ORAL ONCE
Qty: 0 | Refills: 0 | Status: COMPLETED | OUTPATIENT
Start: 2017-02-21 | End: 2017-02-21

## 2017-02-21 RX ORDER — POTASSIUM PHOSPHATE, MONOBASIC POTASSIUM PHOSPHATE, DIBASIC 236; 224 MG/ML; MG/ML
15 INJECTION, SOLUTION INTRAVENOUS ONCE
Qty: 0 | Refills: 0 | Status: COMPLETED | OUTPATIENT
Start: 2017-02-21 | End: 2017-02-21

## 2017-02-21 RX ORDER — POTASSIUM CHLORIDE 20 MEQ
40 PACKET (EA) ORAL DAILY
Qty: 0 | Refills: 0 | Status: DISCONTINUED | OUTPATIENT
Start: 2017-02-21 | End: 2017-02-21

## 2017-02-21 RX ORDER — ERYTHROPOIETIN 10000 [IU]/ML
7000 INJECTION, SOLUTION INTRAVENOUS; SUBCUTANEOUS ONCE
Qty: 0 | Refills: 0 | Status: COMPLETED | OUTPATIENT
Start: 2017-02-22 | End: 2018-01-20

## 2017-02-21 RX ORDER — MAGNESIUM SULFATE 500 MG/ML
2 VIAL (ML) INJECTION ONCE
Qty: 0 | Refills: 0 | Status: COMPLETED | OUTPATIENT
Start: 2017-02-21 | End: 2017-02-21

## 2017-02-21 RX ORDER — SODIUM CHLORIDE 9 MG/ML
250 INJECTION INTRAMUSCULAR; INTRAVENOUS; SUBCUTANEOUS ONCE
Qty: 0 | Refills: 0 | Status: COMPLETED | OUTPATIENT
Start: 2017-02-21 | End: 2017-02-21

## 2017-02-21 RX ORDER — HEPARIN SODIUM 5000 [USP'U]/ML
INJECTION INTRAVENOUS; SUBCUTANEOUS
Qty: 0 | Refills: 0 | Status: COMPLETED | OUTPATIENT
Start: 2017-02-21

## 2017-02-21 RX ADMIN — PANTOPRAZOLE SODIUM 40 MILLIGRAM(S): 20 TABLET, DELAYED RELEASE ORAL at 07:11

## 2017-02-21 RX ADMIN — HEPARIN SODIUM 5000 UNIT(S): 5000 INJECTION INTRAVENOUS; SUBCUTANEOUS at 21:37

## 2017-02-21 RX ADMIN — GABAPENTIN 100 MILLIGRAM(S): 400 CAPSULE ORAL at 07:12

## 2017-02-21 RX ADMIN — LEVETIRACETAM 410 MILLIGRAM(S): 250 TABLET, FILM COATED ORAL at 18:23

## 2017-02-21 RX ADMIN — FLUTICASONE PROPIONATE AND SALMETEROL 1 DOSE(S): 50; 250 POWDER ORAL; RESPIRATORY (INHALATION) at 07:20

## 2017-02-21 RX ADMIN — Medication 1 DROP(S): at 12:17

## 2017-02-21 RX ADMIN — Medication 3 MILLILITER(S): at 15:32

## 2017-02-21 RX ADMIN — POTASSIUM PHOSPHATE, MONOBASIC POTASSIUM PHOSPHATE, DIBASIC 62.5 MILLIMOLE(S): 236; 224 INJECTION, SOLUTION INTRAVENOUS at 09:26

## 2017-02-21 RX ADMIN — Medication 5 MILLILITER(S): at 16:58

## 2017-02-21 RX ADMIN — Medication 1 DROP(S): at 07:21

## 2017-02-21 RX ADMIN — MIDODRINE HYDROCHLORIDE 5 MILLIGRAM(S): 2.5 TABLET ORAL at 07:12

## 2017-02-21 RX ADMIN — Medication 10 MILLIGRAM(S): at 12:17

## 2017-02-21 RX ADMIN — Medication 1 DROP(S): at 18:23

## 2017-02-21 RX ADMIN — PIPERACILLIN AND TAZOBACTAM 200 GRAM(S): 4; .5 INJECTION, POWDER, LYOPHILIZED, FOR SOLUTION INTRAVENOUS at 12:17

## 2017-02-21 RX ADMIN — Medication 3 MILLILITER(S): at 05:49

## 2017-02-21 RX ADMIN — PIPERACILLIN AND TAZOBACTAM 200 GRAM(S): 4; .5 INJECTION, POWDER, LYOPHILIZED, FOR SOLUTION INTRAVENOUS at 21:54

## 2017-02-21 RX ADMIN — GABAPENTIN 100 MILLIGRAM(S): 400 CAPSULE ORAL at 18:23

## 2017-02-21 RX ADMIN — MIRTAZAPINE 15 MILLIGRAM(S): 45 TABLET, ORALLY DISINTEGRATING ORAL at 21:37

## 2017-02-21 RX ADMIN — Medication 1 MILLIGRAM(S): at 12:17

## 2017-02-21 RX ADMIN — Medication 3 MILLILITER(S): at 19:02

## 2017-02-21 RX ADMIN — Medication 100 MILLIEQUIVALENT(S): at 08:22

## 2017-02-21 RX ADMIN — Medication 100 MILLIEQUIVALENT(S): at 07:18

## 2017-02-21 RX ADMIN — Medication 50 GRAM(S): at 07:22

## 2017-02-21 RX ADMIN — MIDODRINE HYDROCHLORIDE 5 MILLIGRAM(S): 2.5 TABLET ORAL at 15:24

## 2017-02-21 RX ADMIN — Medication 650 MILLIGRAM(S): at 09:39

## 2017-02-21 RX ADMIN — Medication 3 MILLILITER(S): at 02:06

## 2017-02-21 RX ADMIN — Medication 1 DROP(S): at 23:07

## 2017-02-21 RX ADMIN — Medication 1 DROP(S): at 00:36

## 2017-02-21 RX ADMIN — Medication 10 MILLIGRAM(S): at 18:23

## 2017-02-21 RX ADMIN — Medication 81 MILLIGRAM(S): at 12:17

## 2017-02-21 RX ADMIN — HEPARIN SODIUM 5000 UNIT(S): 5000 INJECTION INTRAVENOUS; SUBCUTANEOUS at 07:19

## 2017-02-21 RX ADMIN — Medication 5 MILLILITER(S): at 21:31

## 2017-02-21 RX ADMIN — SIMVASTATIN 20 MILLIGRAM(S): 20 TABLET, FILM COATED ORAL at 21:37

## 2017-02-21 RX ADMIN — Medication 3 MILLILITER(S): at 11:02

## 2017-02-21 RX ADMIN — Medication 10 MILLIGRAM(S): at 07:22

## 2017-02-21 RX ADMIN — HEPARIN SODIUM 5000 UNIT(S): 5000 INJECTION INTRAVENOUS; SUBCUTANEOUS at 15:15

## 2017-02-21 RX ADMIN — Medication 3 MILLILITER(S): at 21:32

## 2017-02-21 RX ADMIN — LEVETIRACETAM 410 MILLIGRAM(S): 250 TABLET, FILM COATED ORAL at 07:19

## 2017-02-21 RX ADMIN — SODIUM CHLORIDE 500 MILLILITER(S): 9 INJECTION INTRAMUSCULAR; INTRAVENOUS; SUBCUTANEOUS at 20:54

## 2017-02-21 RX ADMIN — MIDODRINE HYDROCHLORIDE 5 MILLIGRAM(S): 2.5 TABLET ORAL at 21:37

## 2017-02-21 NOTE — PROGRESS NOTE ADULT - SUBJECTIVE AND OBJECTIVE BOX
Patient seen and examined at bedside.   Events noted and reviewed.  Awake and alert.  Tolerated dialysis well yesterday.        T(C): , Max: 37.8 ( @ 00:55)  T(F): , Max: 100.1 ( @ 00:55)  HR: 90  BP: 92/51  BP(mean): 63  RR: 37  SpO2: 98%  Wt(kg): --  I & Os for 24h ending  @ 07:00  =============================================  IN:    IV PiggyBack: 1400 ml    Sodium Chloride 0.9% IV Bolus: 500 ml    Oral Fluid: 75 ml    norepinephrine Infusion: 58.3 ml    Total IN: 2033.3 ml  ---------------------------------------------  OUT:    Other: 2000 ml    Total OUT: 2000 ml  ---------------------------------------------  Total NET: 33.3 ml    I & Os for current day (as of  @ 08:58)  =============================================  IN:    IV PiggyBack: 100 ml    Total IN: 100 ml  ---------------------------------------------  OUT:    Total OUT: 0 ml  ---------------------------------------------  Total NET: 100 ml        fluticasone / salmeterol 250-50 MICROgram(s) Diskus 1 two times a day  aspirin enteric coated 81 daily  docusate sodium 100 daily  heparin  Injectable 5000 every 8 hours  folic acid 1 daily  artificial  tears Solution 1 every 6 hours  mirtazapine 15 at bedtime  gabapentin 100 two times a day  metoclopramide 10 three times a day with meals  simvastatin 20 at bedtime  pantoprazole    Tablet 40 before breakfast  piperacillin/tazobactam IVPB. 2.25 every 12 hours  vancomycin  IVPB 1000 once  ALBUTerol/ipratropium for Nebulization 3 every 4 hours  norepinephrine Infusion 0.01 <Continuous>  levETIRAcetam  IVPB 250 every 12 hours  levETIRAcetam  IVPB 250 <User Schedule>  insulin lispro (HumaLOG) corrective regimen sliding scale  Before meals and at bedtime  midodrine 5 every 8 hours  potassium phosphate IVPB 15 once  potassium chloride    Tablet ER 40 daily    Allergies    clonidine (Unknown)          PHYSICAL EXAM:  Constitutional:  No acute distress  Respiratory: Clear to auscultation   Cardiovascular: S1, S2.  Regular rate and rhythm.    Gastrointestinal: soft, non-tender, non-distended, normal bowel sounds; no HSM  Vasc/Extremities:  No lower extremity edema.    Skin: Warm. Dry.      Psychiatric: Normal affect.    ACCESS: left arm AVF + bruit and thrill    LABS:                        7.5    12.5  )-----------( 256      ( 2017 07:54 )             22.5     2017 05:53    141    |  100    |  38     ----------------------------<  101    3.1     |  28     |  5.32     Ca    8.8        2017 05:53  Phos  1.8       2017 05:53  Mg     1.6       2017 05:53    TPro  7.6    /  Alb  2.4    /  TBili  0.6    /  DBili  x      /  AST  12     /  ALT  8      /  AlkPhos  107    2017 13:25      PT/INR - ( 2017 05:52 )   PT: 19.3 sec;   INR: 1.73          PTT - ( 2017 06:34 )  PTT:30.1 sec  Urinalysis Basic - ( 2017 14:04 )    Color: Red / Appearance: Cloudy / S.020 / pH: x  Gluc: x / Ketone: NEGATIVE  / Bili: Small / Urobili: 1.0 E.U./dL   Blood: x / Protein: >=300 mg/dL / Nitrite: POSITIVE   Leuk Esterase: Large / RBC: Many /HPF / WBC 5-10 /HPF   Sq Epi: x / Non Sq Epi: Rare /HPF / Bacteria: Many /HPF            RADIOLOGY & ADDITIONAL STUDIES:

## 2017-02-21 NOTE — CONSULT NOTE ADULT - ASSESSMENT
87 year old male with PMH ESRD (HD MWF, aneuric), Alzheimer's disease, CAD, COPD, angina, anemia, CHF, dementia, depression, HTN, HLD, OA, PVD, polyneuropathy, blindness sent from rehab with sepsis. We are consulted for longstanding history of dysphagia    #dysphagia - presents with likely aspiration pneumonia, probably from hx of regurgitation of solids and liquids. 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 Given his age and comorbidities, definitive achalasia tx is not practical, although palliative endoscopic therapy can be offered.  -NPO for possible EGD tomorrow pending clinical status

## 2017-02-21 NOTE — PROGRESS NOTE ADULT - ATTENDING COMMENTS
Pneumonia with resolving shock. NPO until cleared by SLP, high risk for aspiration will discuss with family regarding goals of care.

## 2017-02-21 NOTE — PROGRESS NOTE ADULT - SUBJECTIVE AND OBJECTIVE BOX
INTERVAL HPI/OVERNIGHT EVENTS:    OVERNIGHT: No overnight events.  SUBJECTIVE: Patient seen and examined at bedside.     ROS:  CV: Denies chest pain  Resp: Denies SOB  GI: Denies abdominal pain, constipation, diarrhea, nausea, vomiting  : Denies dysuria  ID: Denies fevers, chills  MSK: Denies joint pain     OBJECTIVE:    VITAL SIGNS:  ICU Vital Signs Last 24 Hrs  T(C): 37.6, Max: 37.8 ( @ 00:55)  T(F): 99.7, Max: 100.1 ( @ 00:55)  HR: 89 (69 - 101)  BP: 157/65 (85/47 - 157/65)  BP(mean): 85 (53 - 96)  ABP: --  ABP(mean): --  RR: 22 (21 - 37)  SpO2: 99% (96% - 100%)      I & Os for 24h ending  @ 07:00  =============================================  IN: 492 ml / OUT: 0 ml / NET: 492 ml    I & Os for current day (as of  @ 06:26)  =============================================  IN: 1731.3 ml / OUT: 2000 ml / NET: -268.7 ml    CAPILLARY BLOOD GLUCOSE  121 (2017 16:00)      PHYSICAL EXAM:      PHYSICAL EXAM:    General: NAD, comfortable  Neuro: Obeys commands, AAOx2   HEENT: NCAT, PERRL, clear conjunctiva, no scleral icterus  Neck: supple, no JVD, Right IJ CDI   Respiratory: B/L coarse BS.   Cardiovascular: RRR, normal S1S2, no M/R/G  Abdomen: soft, NT/ND, bowel sounds in all four quadrants, no palpable masses  Extremities: WWP, no clubbing, cyanosis, or edema    MEDICATIONS:  MEDICATIONS  (STANDING):  fluticasone / salmeterol 250-50 MICROgram(s) Diskus 1Dose(s) Inhalation two times a day  aspirin enteric coated 81milliGRAM(s) Oral daily  docusate sodium 100milliGRAM(s) Oral daily  heparin  Injectable 5000Unit(s) SubCutaneous every 8 hours  folic acid 1milliGRAM(s) Oral daily  artificial  tears Solution 1Drop(s) Both EYES every 6 hours  mirtazapine 15milliGRAM(s) Oral at bedtime  gabapentin 100milliGRAM(s) Oral two times a day  metoclopramide 10milliGRAM(s) Oral three times a day with meals  simvastatin 20milliGRAM(s) Oral at bedtime  pantoprazole    Tablet 40milliGRAM(s) Oral before breakfast  piperacillin/tazobactam IVPB. 2.25Gram(s) IV Intermittent every 12 hours  vancomycin  IVPB 1000milliGRAM(s) IV Intermittent once  ALBUTerol/ipratropium for Nebulization 3milliLiter(s) Nebulizer every 4 hours  norepinephrine Infusion 0.01MICROgram(s)/kG/Min IV Continuous <Continuous>  levETIRAcetam  IVPB 250milliGRAM(s) IV Intermittent every 12 hours  levETIRAcetam  IVPB 250milliGRAM(s) IV Intermittent <User Schedule>  insulin lispro (HumaLOG) corrective regimen sliding scale  SubCutaneous three times a day before meals  midodrine 5milliGRAM(s) Oral every 8 hours    MEDICATIONS  (PRN):  acetaminophen   Tablet 650milliGRAM(s) Oral every 6 hours PRN For Temp greater than 38 C (100.4 F)  acetaminophen  Suppository 650milliGRAM(s) Rectal every 6 hours PRN For Temp greater than 38 C (100.4 F)      ALLERGIES:  Allergies    clonidine (Unknown)    Intolerances        LABS:                        7.8    13.8  )-----------( 288      ( 2017 05:52 )             23.7     2017 05:53    141    |  100    |  38     ----------------------------<  101    3.1     |  28     |  5.32     Ca    8.8        2017 05:53  Phos  1.8       2017 05:53  Mg     1.6       2017 05:53    TPro  7.6    /  Alb  2.4    /  TBili  0.6    /  DBili  x      /  AST  12     /  ALT  8      /  AlkPhos  107    2017 13:25    PT/INR - ( 2017 05:52 )   PT: 19.3 sec;   INR: 1.73          PTT - ( 2017 06:34 )  PTT:30.1 sec  Urinalysis Basic - ( 2017 14:04 )    Color: Red / Appearance: Cloudy / S.020 / pH: x  Gluc: x / Ketone: NEGATIVE  / Bili: Small / Urobili: 1.0 E.U./dL   Blood: x / Protein: >=300 mg/dL / Nitrite: POSITIVE   Leuk Esterase: Large / RBC: Many /HPF / WBC 5-10 /HPF     A/P: 79 yo F PMH HTN, HLD, RA, GERD, IDDM p/w hyperglycemia for the past couple of days and febrile.     ID:   #Septic shock 2/2  asp PNA POA : Patient was febrile to 101, tachycardic, tachypneic, leukocytosis. UTI was positive on admission, but patient is usually anuric. He was given a one time 500 cc bolus prior to b From last admission it is known that he is a chronic aspiration risk. Patient has a positive UA. Currently HD stable/perfusing well with CVP normal.   - Empiric Abx - c/w vancomycin and zosyn   - fu BCX/uCX   - Titrate levophed to MAP>65     CV:   #HD: Stable. Patient is WWP and currently no lactate. Central line in place.   - Titrate levophed as per above and use midodrine.   #CAD: C/W ASA daily     Respiratory:   #Tachypnea: Patient tachypneic on admission with secretions. Now he is stable on NC and saturating well.   #COPD: C/W advMatt ocasio Q4     GI:     #Dysmotility: Patient with chronic dysmotility and probable aspiration risk.   - FU speech and swallow eval in AM   - Collateral about manometry study   Heme:   #Anemia: Likely 2/2 to ESRD.   - Active T/S   Renal:   #ESRD: Patient is on Dialysis MWF.     Endo  #IDDM: Patient on mISS   Neuro:   #Seizure disorder   - C/W Keppra   #Alzheimers: C/W Keppra     FENP: Do not replete lytes as on HD, Pending speech eval, Hep sub Q PPX.   DISPO: MICU  CODE: FULL       Sq Epi: x / Non Sq Epi: Rare /HPF / Bacteria: Many /HPF        RADIOLOGY & ADDITIONAL TESTS: Reviewed. OVERNIGHT: No overnight events.  SUBJECTIVE: Patient seen and examined at bedside.     ROS:  CV: Denies chest pain  Resp: Denies SOB  GI: Denies abdominal pain, constipation, diarrhea, nausea, vomiting  : Denies dysuria  ID: Denies fevers, chills  MSK: Denies joint pain     OBJECTIVE:    VITAL SIGNS:  ICU Vital Signs Last 24 Hrs  T(C): 37.6, Max: 37.8 ( @ 00:55)  T(F): 99.7, Max: 100.1 ( @ 00:55)  HR: 89 (69 - 101)  BP: 157/65 (85/47 - 157/65)  BP(mean): 85 (53 - 96)  ABP: --  ABP(mean): --  RR: 22 (21 - 37)  SpO2: 99% (96% - 100%)      I & Os for 24h ending  @ 07:00  =============================================  IN: 492 ml / OUT: 0 ml / NET: 492 ml    I & Os for current day (as of  @ 06:26)  =============================================  IN: 1731.3 ml / OUT: 2000 ml / NET: -268.7 ml    CAPILLARY BLOOD GLUCOSE  121 (2017 16:00)        PHYSICAL EXAM:    General: NAD, comfortable, with ronchorous cough   Neuro: Obeys commands, AAOx2   HEENT: NCAT, PERRL, clear conjunctiva, no scleral icterus  Neck: supple, no JVD, Right IJ CDI   Respiratory: B/L coarse BS.   Cardiovascular: RRR, normal S1S2, no M/R/G  Abdomen: soft, NT/ND, bowel sounds in all four quadrants, no palpable masses  Extremities: WWP, no clubbing, cyanosis, or edema    MEDICATIONS:  MEDICATIONS  (STANDING):  fluticasone / salmeterol 250-50 MICROgram(s) Diskus 1Dose(s) Inhalation two times a day  aspirin enteric coated 81milliGRAM(s) Oral daily  docusate sodium 100milliGRAM(s) Oral daily  heparin  Injectable 5000Unit(s) SubCutaneous every 8 hours  folic acid 1milliGRAM(s) Oral daily  artificial  tears Solution 1Drop(s) Both EYES every 6 hours  mirtazapine 15milliGRAM(s) Oral at bedtime  gabapentin 100milliGRAM(s) Oral two times a day  metoclopramide 10milliGRAM(s) Oral three times a day with meals  simvastatin 20milliGRAM(s) Oral at bedtime  pantoprazole    Tablet 40milliGRAM(s) Oral before breakfast  piperacillin/tazobactam IVPB. 2.25Gram(s) IV Intermittent every 12 hours  vancomycin  IVPB 1000milliGRAM(s) IV Intermittent once  ALBUTerol/ipratropium for Nebulization 3milliLiter(s) Nebulizer every 4 hours  norepinephrine Infusion 0.01MICROgram(s)/kG/Min IV Continuous <Continuous>  levETIRAcetam  IVPB 250milliGRAM(s) IV Intermittent every 12 hours  levETIRAcetam  IVPB 250milliGRAM(s) IV Intermittent <User Schedule>  insulin lispro (HumaLOG) corrective regimen sliding scale  SubCutaneous three times a day before meals  midodrine 5milliGRAM(s) Oral every 8 hours    MEDICATIONS  (PRN):  acetaminophen   Tablet 650milliGRAM(s) Oral every 6 hours PRN For Temp greater than 38 C (100.4 F)  acetaminophen  Suppository 650milliGRAM(s) Rectal every 6 hours PRN For Temp greater than 38 C (100.4 F)      ALLERGIES:  Allergies    clonidine (Unknown)    Intolerances        LABS:                        7.8    13.8  )-----------( 288      ( 2017 05:52 )             23.7     2017 05:53    141    |  100    |  38     ----------------------------<  101    3.1     |  28     |  5.32     Ca    8.8        2017 05:53  Phos  1.8       2017 05:53  Mg     1.6       2017 05:53    TPro  7.6    /  Alb  2.4    /  TBili  0.6    /  DBili  x      /  AST  12     /  ALT  8      /  AlkPhos  107    2017 13:25    PT/INR - ( 2017 05:52 )   PT: 19.3 sec;   INR: 1.73          PTT - ( 2017 06:34 )  PTT:30.1 sec  Urinalysis Basic - ( 2017 14:04 )    Color: Red / Appearance: Cloudy / S.020 / pH: x  Gluc: x / Ketone: NEGATIVE  / Bili: Small / Urobili: 1.0 E.U./dL   Blood: x / Protein: >=300 mg/dL / Nitrite: POSITIVE   Leuk Esterase: Large / RBC: Many /HPF / WBC 5-10 /HPF     A/P: 79 yo F PMH ESRD, Epilepsy, Alzheimers dementia, HTN, HLD, RA, GERD, IDDM admitted to MICU 2/2 to septic shock, now with decreasing levophed requirements.    ID:   #Septic shock 2/  asp PNA/UTI  POA : Patient was febrile to 101, tachycardic, tachypneic, leukocytosis requiring levophed and admission to MICU. UTI was positive on admission, but patient is usually anuric, so culture not sent and found to have a RLL infiltrate as possible sources as he is known to have chronic aspirations. Currently HD stable/perfusing well but he is having increasing fevers and copious secretions. Also noted on  to have pus from penile head.   - Empiric Abx - c/w vancomycin and zosyn until source is identified   - Titrate levophed to MAP>60 due to known baseline of low SBP  - BCX from admission are NGTD, will FU repeat cultures today as well as surgical swab culture.      CV:   #HD: Stable. Patient is WWP and currently no lactate. Central line in place.   - Titrate levophed as per above and use midodrine.   #CAD: C/W ASA daily     Respiratory:   #Tachypnea: Patient tachypneic on admission with secretions. Now he is stable on NC and saturating well.   #COPD: C/W advair, Duonesylvia Q4     GI:     #Dysmotility: Patient with chronic dysmotility and probable aspiration risk.   - FU speech and swallow eval in AM   - Collateral about manometry study   Heme:   #Anemia: Likely 2/2 to ESRD.   - Active T/S   Renal:   #ESRD: Patient is on Dialysis MWF.     Endo  #IDDM: Patient on mISS   Neuro:   #Seizure disorder   - C/W Keppra   #Alzheimers: C/W Keppra     FENP: Do not replete lytes as on HD, Pending speech eval, Hep sub Q PPX.   DISPO: MICU  CODE: FULL       Sq Epi: x / Non Sq Epi: Rare /HPF / Bacteria: Many /HPF        RADIOLOGY & ADDITIONAL TESTS: Reviewed. OVERNIGHT: No overnight events.  SUBJECTIVE: Patient seen and examined at bedside.     ROS:  CV: Denies chest pain  Resp: Denies SOB  GI: Denies abdominal pain, constipation, diarrhea, nausea, vomiting  : Denies dysuria  ID: Denies fevers, chills  MSK: Denies joint pain     OBJECTIVE:    VITAL SIGNS:  ICU Vital Signs Last 24 Hrs  T(C): 37.6, Max: 37.8 ( @ 00:55)  T(F): 99.7, Max: 100.1 ( @ 00:55)  HR: 89 (69 - 101)  BP: 157/65 (85/47 - 157/65)  BP(mean): 85 (53 - 96)  ABP: --  ABP(mean): --  RR: 22 (21 - 37)  SpO2: 99% (96% - 100%)      I & Os for 24h ending  @ 07:00  =============================================  IN: 492 ml / OUT: 0 ml / NET: 492 ml    I & Os for current day (as of  @ 06:26)  =============================================  IN: 1731.3 ml / OUT: 2000 ml / NET: -268.7 ml    CAPILLARY BLOOD GLUCOSE  121 (2017 16:00)        PHYSICAL EXAM:    General: NAD, comfortable, with ronchorous cough   Neuro: Obeys commands, AAOx2   HEENT: NCAT, PERRL, clear conjunctiva, no scleral icterus  Neck: supple, no JVD, Right IJ CDI   Respiratory: B/L coarse BS.   Cardiovascular: RRR, normal S1S2, no M/R/G  Abdomen: soft, NT/ND, bowel sounds in all four quadrants, no palpable masses  Extremities: WWP, no clubbing, cyanosis, or edema    MEDICATIONS:  MEDICATIONS  (STANDING):  fluticasone / salmeterol 250-50 MICROgram(s) Diskus 1Dose(s) Inhalation two times a day  aspirin enteric coated 81milliGRAM(s) Oral daily  docusate sodium 100milliGRAM(s) Oral daily  heparin  Injectable 5000Unit(s) SubCutaneous every 8 hours  folic acid 1milliGRAM(s) Oral daily  artificial  tears Solution 1Drop(s) Both EYES every 6 hours  mirtazapine 15milliGRAM(s) Oral at bedtime  gabapentin 100milliGRAM(s) Oral two times a day  metoclopramide 10milliGRAM(s) Oral three times a day with meals  simvastatin 20milliGRAM(s) Oral at bedtime  pantoprazole    Tablet 40milliGRAM(s) Oral before breakfast  piperacillin/tazobactam IVPB. 2.25Gram(s) IV Intermittent every 12 hours  vancomycin  IVPB 1000milliGRAM(s) IV Intermittent once  ALBUTerol/ipratropium for Nebulization 3milliLiter(s) Nebulizer every 4 hours  norepinephrine Infusion 0.01MICROgram(s)/kG/Min IV Continuous <Continuous>  levETIRAcetam  IVPB 250milliGRAM(s) IV Intermittent every 12 hours  levETIRAcetam  IVPB 250milliGRAM(s) IV Intermittent <User Schedule>  insulin lispro (HumaLOG) corrective regimen sliding scale  SubCutaneous three times a day before meals  midodrine 5milliGRAM(s) Oral every 8 hours    MEDICATIONS  (PRN):  acetaminophen   Tablet 650milliGRAM(s) Oral every 6 hours PRN For Temp greater than 38 C (100.4 F)  acetaminophen  Suppository 650milliGRAM(s) Rectal every 6 hours PRN For Temp greater than 38 C (100.4 F)      ALLERGIES:  Allergies    clonidine (Unknown)    Intolerances        LABS:                        7.8    13.8  )-----------( 288      ( 2017 05:52 )             23.7     2017 05:53    141    |  100    |  38     ----------------------------<  101    3.1     |  28     |  5.32     Ca    8.8        2017 05:53  Phos  1.8       2017 05:53  Mg     1.6       2017 05:53    TPro  7.6    /  Alb  2.4    /  TBili  0.6    /  DBili  x      /  AST  12     /  ALT  8      /  AlkPhos  107    2017 13:25    PT/INR - ( 2017 05:52 )   PT: 19.3 sec;   INR: 1.73          PTT - ( 2017 06:34 )  PTT:30.1 sec  Urinalysis Basic - ( 2017 14:04 )    Color: Red / Appearance: Cloudy / S.020 / pH: x  Gluc: x / Ketone: NEGATIVE  / Bili: Small / Urobili: 1.0 E.U./dL   Blood: x / Protein: >=300 mg/dL / Nitrite: POSITIVE   Leuk Esterase: Large / RBC: Many /HPF / WBC 5-10 /HPF     A/P: 77 yo F PMH ESRD, Epilepsy, Alzheimers dementia, HTN, HLD, RA, GERD, IDDM admitted to MICU 2/ to septic shock, now with decreasing levophed requirements.    ID:   #Septic shock   asp PNA/UTI  POA : Patient was febrile to 101, tachycardic, tachypneic, leukocytosis requiring levophed and admission to MICU. UTI was positive on admission, but patient is usually anuric, so culture not sent and found to have a RLL infiltrate as possible sources as he is known to have chronic aspirations. Currently HD stable/perfusing well but he is having increasing fevers and copious secretions. Also noted on  to have pus from penile head.   - Empiric Abx - c/w vancomycin and zosyn until source is identified   - Titrate levophed to MAP>60 due to known baseline of low SBP  - BCX from admission are NGTD, will FU repeat cultures drawn  as well as surgical swab culture.      CV:   #HD: Stable. Patient is WWP and currently no lactate. Central line in place.   - Titrate levophed as per above and transition to midodrine when able to tolerate PO.  #CAD: C/W ASA daily     Respiratory:   #Tachypnea: Patient tachypneic on admission with secretions. Now he is stable, improving tachypnea,  on 2 L NC and saturating well.   #COPD: C/W advair, Duonebs Q4     GI:     #Dysmotility: Patient with chronic dysmotility and probable aspiration risk which was noted a few weeks ago on past admission for emesis.   - Per S/S Barium esophagram   - GI consulted, FU reccs   - Collateral about manometry study   Heme:   #Anemia: Likely 2/2 to ESRD.   - Active T/S   Renal:   #ESRD: Patient is on Dialysis MWF.     Endo  #IDDM: Patient on mISS   Neuro:   #Seizure disorder   - C/W Keppra   #Alzheimers: C/W Keppra     FENP: Do not replete lytes as on HD, Pending speech eval, Hep sub Q PPX.   DISPO: MICU  Lines: MARION placed    CODE: FULL       Sq Epi: x / Non Sq Epi: Rare /HPF / Bacteria: Many /HPF        RADIOLOGY & ADDITIONAL TESTS: Reviewed. OVERNIGHT: No overnight events.  SUBJECTIVE: Patient seen and examined at bedside.     ROS:  CV: Denies chest pain  Resp: Denies SOB  GI: Denies abdominal pain, constipation, diarrhea, nausea, vomiting  : Denies dysuria  ID: Denies fevers, chills  MSK: Denies joint pain     OBJECTIVE:    VITAL SIGNS:  ICU Vital Signs Last 24 Hrs  T(C): 37.6, Max: 37.8 ( @ 00:55)  T(F): 99.7, Max: 100.1 ( @ 00:55)  HR: 89 (69 - 101)  BP: 157/65 (85/47 - 157/65)  BP(mean): 85 (53 - 96)  ABP: --  ABP(mean): --  RR: 22 (21 - 37)  SpO2: 99% (96% - 100%)      I & Os for 24h ending  @ 07:00  =============================================  IN: 492 ml / OUT: 0 ml / NET: 492 ml    I & Os for current day (as of  @ 06:26)  =============================================  IN: 1731.3 ml / OUT: 2000 ml / NET: -268.7 ml    CAPILLARY BLOOD GLUCOSE  121 (2017 16:00)        PHYSICAL EXAM:    General: NAD, comfortable, with ronchorous cough   Neuro: Obeys commands, AAOx2   HEENT: NCAT, PERRL, clear conjunctiva, no scleral icterus  Neck: supple, no JVD, Right IJ CDI   Respiratory: B/L coarse BS.   Cardiovascular: RRR, normal S1S2, no M/R/G  Abdomen: soft, NT/ND, bowel sounds in all four quadrants, no palpable masses  Extremities: WWP, no clubbing, cyanosis, or edema    MEDICATIONS:  MEDICATIONS  (STANDING):  fluticasone / salmeterol 250-50 MICROgram(s) Diskus 1Dose(s) Inhalation two times a day  aspirin enteric coated 81milliGRAM(s) Oral daily  docusate sodium 100milliGRAM(s) Oral daily  heparin  Injectable 5000Unit(s) SubCutaneous every 8 hours  folic acid 1milliGRAM(s) Oral daily  artificial  tears Solution 1Drop(s) Both EYES every 6 hours  mirtazapine 15milliGRAM(s) Oral at bedtime  gabapentin 100milliGRAM(s) Oral two times a day  metoclopramide 10milliGRAM(s) Oral three times a day with meals  simvastatin 20milliGRAM(s) Oral at bedtime  pantoprazole    Tablet 40milliGRAM(s) Oral before breakfast  piperacillin/tazobactam IVPB. 2.25Gram(s) IV Intermittent every 12 hours  vancomycin  IVPB 1000milliGRAM(s) IV Intermittent once  ALBUTerol/ipratropium for Nebulization 3milliLiter(s) Nebulizer every 4 hours  norepinephrine Infusion 0.01MICROgram(s)/kG/Min IV Continuous <Continuous>  levETIRAcetam  IVPB 250milliGRAM(s) IV Intermittent every 12 hours  levETIRAcetam  IVPB 250milliGRAM(s) IV Intermittent <User Schedule>  insulin lispro (HumaLOG) corrective regimen sliding scale  SubCutaneous three times a day before meals  midodrine 5milliGRAM(s) Oral every 8 hours    MEDICATIONS  (PRN):  acetaminophen   Tablet 650milliGRAM(s) Oral every 6 hours PRN For Temp greater than 38 C (100.4 F)  acetaminophen  Suppository 650milliGRAM(s) Rectal every 6 hours PRN For Temp greater than 38 C (100.4 F)      ALLERGIES:  Allergies    clonidine (Unknown)    Intolerances        LABS:                        7.8    13.8  )-----------( 288      ( 2017 05:52 )             23.7     2017 05:53    141    |  100    |  38     ----------------------------<  101    3.1     |  28     |  5.32     Ca    8.8        2017 05:53  Phos  1.8       2017 05:53  Mg     1.6       2017 05:53    TPro  7.6    /  Alb  2.4    /  TBili  0.6    /  DBili  x      /  AST  12     /  ALT  8      /  AlkPhos  107    2017 13:25    PT/INR - ( 2017 05:52 )   PT: 19.3 sec;   INR: 1.73          PTT - ( 2017 06:34 )  PTT:30.1 sec  Urinalysis Basic - ( 2017 14:04 )    Color: Red / Appearance: Cloudy / S.020 / pH: x  Gluc: x / Ketone: NEGATIVE  / Bili: Small / Urobili: 1.0 E.U./dL   Blood: x / Protein: >=300 mg/dL / Nitrite: POSITIVE   Leuk Esterase: Large / RBC: Many /HPF / WBC 5-10 /HPF     A/P: 77 yo F PMH ESRD, Epilepsy, Alzheimers dementia, HTN, HLD, RA, GERD, IDDM admitted to MICU 2/ to septic shock, now with decreasing levophed requirements.    ID:   #Septic shock   asp PNA/UTI  POA : Patient was febrile to 101, tachycardic, tachypneic, leukocytosis requiring levophed and admission to MICU. UTI was positive on admission, but patient is usually anuric, so culture not sent and found to have a RLL infiltrate as possible sources as he is known to have chronic aspirations. Currently HD stable/perfusing well but he is having increasing fevers and copious secretions. Also noted on  to have pus from penile head.   - Empiric Abx - c/w vancomycin and zosyn until source is identified   - Titrate levophed to MAP>60 due to known baseline of low SBP  - BCX from admission are NGTD, will FU repeat cultures drawn  as well as surgical swab culture.      CV:   #HD: Stable. Patient is WWP and currently no lactate. Central line in place.   - Titrate levophed as per above and transition to midodrine when able to tolerate PO.  #CAD: C/W ASA daily     Respiratory:   #Tachypnea: Patient tachypneic on admission with secretions. Now he is stable, improving tachypnea,  on 2 L NC and saturating well.   #COPD: C/W advair, Duonebs Q4     GI:     #Dysmotility: Patient with chronic dysmotility and probable aspiration risk which was noted a few weeks ago on past admission for emesis.   - Per S/S Barium esophagram   - GI consulted, FU reccs   -C/W reglan  - Collateral about manometry study   Heme:   #Anemia: Likely 2/2 to ESRD.   - Active T/S   Renal:   #ESRD: Patient is on Dialysis MWF.     Endo  #IDDM: Patient on mISS   Neuro:   #Seizure disorder   - C/W Keppra   #Alzheimers: C/W Keppra     FENP: Do not replete lytes as on HD, Pending speech eval, Hep sub Q PPX.   DISPO: MICU  Lines: MARION placed    CODE: FULL       Sq Epi: x / Non Sq Epi: Rare /HPF / Bacteria: Many /HPF        RADIOLOGY & ADDITIONAL TESTS: Reviewed.

## 2017-02-21 NOTE — CONSULT NOTE ADULT - SUBJECTIVE AND OBJECTIVE BOX
88 YO M h/o ESRD (HD MWF, last HD Friday 2/17), Alzheimer's disease, CAD, COPD, angina, anemia, CHF, depression, HTN, HLD, OA, PVD, polyneuropathy, blindness presents from rehab with sepsis    Patient presents with pneumonia, likely 2/2 aspiration, admitted to ICU for levophed support. Now mentating better and off of levophed.  He has long standing hx of dysphagia manifesting as recurrent postprandial regurgitation. He reports no problems swallowing, but has poor PO intake and does not relate the regurgitation to swallowing issue. He has no odynophagia. Last endoscopy was 2015 showing resoltion of previous candida, tertiary contractions concerning for motility disorder, and distal esophageal diverticulum. Last admission in december 2016 barium esophagram shows multiple esophageal diverticula, most prominently in the distal esophagus. 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 He followed up outpatient, but has not yet recieved any medical or endoscopic therapy since.    REVIEW OF SYSTEMS:  Constitutional: No fever, weight loss or fatigue  ENMT:  No difficulty hearing, tinnitus, vertigo; No sinus or throat pain  Respiratory: No cough, wheezing, chills or hemoptysis  Cardiovascular: No chest pain, palpitations, dizziness or leg swelling  Gastrointestinal: No abdominal or epigastric pain. No nausea, vomiting or hematemesis; No diarrhea or constipation. No melena or hematochezia.  Skin: No itching, burning, rashes or lesions   Musculoskeletal: No joint pain or swelling; No muscle, back or extremity pain    PAST MEDICAL & SURGICAL HISTORY:  AV graft thrombosis  Osteoarthritis  PVD (peripheral vascular disease)  COPD (chronic obstructive pulmonary disease)  Heart failure  Angina pectoris  ESRD (end stage renal disease)  Seizures: post traumatic  CAD (coronary artery disease)  HTN (hypertension)  Polyneuropathy  Hyperlipidemia  Dysphagia  Hypotension  Alzheimers disease  Osteoarthritis: Osteoarthritis  Chronic obstructive pulmonary disease: Chronic obstructive pulmonary disease  Anemia: Anemia  Dementia without behavioral disturbance: Dementia  Atherosclerosis of coronary artery: CAD (coronary artery disease)  Nondependent alcohol abuse: ETOH abuse  Depressive disorder: Depression  End-stage renal disease: ESRD on hemodialysis  Essential hypertension: HTN (hypertension)  Deep venous embolism and thrombosis of lower extremity: DVT (deep venous thrombosis)  Congestive heart failure: CHF (congestive heart failure)  Legal blindness: Legally blind  Hemodialysis access, AV graft: LUE loop AVG  Acquired arteriovenous fistula: AV fistula      FAMILY HISTORY:  Family history unknown      SOCIAL HISTORY:  Smoking Status: [ ] Current, [ ] Former, [ ] Never  Pack Years:    MEDICATIONS:  MEDICATIONS  (STANDING):  fluticasone / salmeterol 250-50 MICROgram(s) Diskus 1Dose(s) Inhalation two times a day  aspirin enteric coated 81milliGRAM(s) Oral daily  docusate sodium 100milliGRAM(s) Oral daily  heparin  Injectable 5000Unit(s) SubCutaneous every 8 hours  folic acid 1milliGRAM(s) Oral daily  artificial  tears Solution 1Drop(s) Both EYES every 6 hours  mirtazapine 15milliGRAM(s) Oral at bedtime  gabapentin 100milliGRAM(s) Oral two times a day  metoclopramide 10milliGRAM(s) Oral three times a day with meals  simvastatin 20milliGRAM(s) Oral at bedtime  pantoprazole    Tablet 40milliGRAM(s) Oral before breakfast  piperacillin/tazobactam IVPB. 2.25Gram(s) IV Intermittent every 12 hours  vancomycin  IVPB 1000milliGRAM(s) IV Intermittent once  ALBUTerol/ipratropium for Nebulization 3milliLiter(s) Nebulizer every 4 hours  norepinephrine Infusion 0.01MICROgram(s)/kG/Min IV Continuous <Continuous>  levETIRAcetam  IVPB 250milliGRAM(s) IV Intermittent every 12 hours  levETIRAcetam  IVPB 250milliGRAM(s) IV Intermittent <User Schedule>  insulin lispro (HumaLOG) corrective regimen sliding scale  SubCutaneous Before meals and at bedtime  midodrine 5milliGRAM(s) Oral every 8 hours  potassium chloride    Tablet ER 40milliEquivalent(s) Oral daily  barium sulfate 0.1% Suspension 30milliLiter(s) Oral once    MEDICATIONS  (PRN):  acetaminophen   Tablet 650milliGRAM(s) Oral every 6 hours PRN For Temp greater than 38 C (100.4 F)  acetaminophen  Suppository 650milliGRAM(s) Rectal every 6 hours PRN For Temp greater than 38 C (100.4 F)      Allergies    clonidine (Unknown)    Intolerances        Vital Signs Last 24 Hrs  T(C): 37.6, Max: 38.3 (02-21 @ 09:16)  T(F): 99.6, Max: 101 (02-21 @ 09:16)  HR: 78 (76 - 101)  BP: 105/58 (86/47 - 157/65)  BP(mean): 75 (53 - 88)  RR: 27 (20 - 37)  SpO2: 99% (95% - 100%)  I & Os for 24h ending 02-21 @ 07:00  =============================================  IN: 2033.3 ml / OUT: 2000 ml / NET: 33.3 ml    I & Os for current day (as of 02-21 @ 14:46)  =============================================  IN: 234.2 ml / OUT: 0 ml / NET: 234.2 ml        PHYSICAL EXAM:    General: lethargic; in no acute distress  HEENT: dry mucous membranes, conjunctiva and sclera clear  Gastrointestinal: Soft, non-tender non-distended; Normal bowel sounds; No rebound or guarding  Extremities: Normal range of motion, No clubbing, cyanosis or edema  Neurological: Alert and oriented x2  Skin: Warm and dry. No obvious rash      LABS:                        7.5    12.5  )-----------( 256      ( 21 Feb 2017 07:54 )             22.5     21 Feb 2017 05:53    141    |  100    |  38     ----------------------------<  101    3.1     |  28     |  5.32     Ca    8.8        21 Feb 2017 05:53  Phos  1.8       21 Feb 2017 05:53  Mg     1.6       21 Feb 2017 05:53        Culture Results:   Moderate Normal Respiratory Trudy present  Culture in progress (02-20 @ 15:19)    RADIOLOGY & ADDITIONAL STUDIES:

## 2017-02-22 DIAGNOSIS — N18.6 END STAGE RENAL DISEASE: ICD-10-CM

## 2017-02-22 DIAGNOSIS — N18.9 CHRONIC KIDNEY DISEASE, UNSPECIFIED: ICD-10-CM

## 2017-02-22 LAB
ALBUMIN SERPL ELPH-MCNC: 2.1 G/DL — LOW (ref 3.4–5)
ALP SERPL-CCNC: 79 U/L — SIGNIFICANT CHANGE UP (ref 40–120)
ALT FLD-CCNC: 7 U/L — LOW (ref 12–42)
ANION GAP SERPL CALC-SCNC: 13 MMOL/L — SIGNIFICANT CHANGE UP (ref 9–16)
AST SERPL-CCNC: 9 U/L — LOW (ref 15–37)
BASOPHILS NFR BLD AUTO: 0.5 % — SIGNIFICANT CHANGE UP (ref 0–2)
BILIRUB SERPL-MCNC: 0.7 MG/DL — SIGNIFICANT CHANGE UP (ref 0.2–1.2)
BUN SERPL-MCNC: 47 MG/DL — HIGH (ref 7–23)
CALCIUM SERPL-MCNC: 8.2 MG/DL — LOW (ref 8.5–10.5)
CHLORIDE SERPL-SCNC: 102 MMOL/L — SIGNIFICANT CHANGE UP (ref 96–108)
CO2 SERPL-SCNC: 25 MMOL/L — SIGNIFICANT CHANGE UP (ref 22–31)
CREAT SERPL-MCNC: 6.54 MG/DL — HIGH (ref 0.5–1.3)
CULTURE RESULTS: SIGNIFICANT CHANGE UP
EOSINOPHIL NFR BLD AUTO: 2.4 % — SIGNIFICANT CHANGE UP (ref 0–6)
FERRITIN SERPL-MCNC: 3100.5 NG/ML — HIGH (ref 26–388)
FOLATE SERPL-MCNC: >20 NG/ML — SIGNIFICANT CHANGE UP (ref 4.8–24.2)
GLUCOSE SERPL-MCNC: 98 MG/DL — SIGNIFICANT CHANGE UP (ref 70–99)
HCT VFR BLD CALC: 20.8 % — CRITICAL LOW (ref 39–50)
HGB BLD-MCNC: 7.1 G/DL — LOW (ref 13–17)
IRON SATN MFR SERPL: 41 UG/DL — LOW (ref 65–175)
IRON SATN MFR SERPL: 53 % — HIGH (ref 26–39)
LYMPHOCYTES # BLD AUTO: 14.7 % — SIGNIFICANT CHANGE UP (ref 13–44)
MAGNESIUM SERPL-MCNC: 1.8 MG/DL — SIGNIFICANT CHANGE UP (ref 1.6–2.4)
MCHC RBC-ENTMCNC: 31.1 PG — SIGNIFICANT CHANGE UP (ref 27–34)
MCHC RBC-ENTMCNC: 34.1 G/DL — SIGNIFICANT CHANGE UP (ref 32–36)
MCV RBC AUTO: 91.2 FL — SIGNIFICANT CHANGE UP (ref 80–100)
MONOCYTES NFR BLD AUTO: 10.8 % — SIGNIFICANT CHANGE UP (ref 2–14)
NEUTROPHILS NFR BLD AUTO: 71.6 % — SIGNIFICANT CHANGE UP (ref 43–77)
PHOSPHATE SERPL-MCNC: 3 MG/DL — SIGNIFICANT CHANGE UP (ref 2.5–4.5)
PLATELET # BLD AUTO: 254 K/UL — SIGNIFICANT CHANGE UP (ref 150–400)
POTASSIUM SERPL-MCNC: 3.4 MMOL/L — LOW (ref 3.5–5.3)
POTASSIUM SERPL-SCNC: 3.4 MMOL/L — LOW (ref 3.5–5.3)
PROT SERPL-MCNC: 6.5 G/DL — SIGNIFICANT CHANGE UP (ref 6.4–8.2)
RBC # BLD: 2.28 M/UL — LOW (ref 4.2–5.8)
RBC # BLD: 2.28 M/UL — LOW (ref 4.2–5.8)
RBC # FLD: 15.6 % — SIGNIFICANT CHANGE UP (ref 10.3–16.9)
RETICS/RBC NFR: 1.5 % — SIGNIFICANT CHANGE UP (ref 0.5–2.5)
SODIUM SERPL-SCNC: 140 MMOL/L — SIGNIFICANT CHANGE UP (ref 135–145)
SPECIMEN SOURCE: SIGNIFICANT CHANGE UP
TIBC SERPL-MCNC: 78 UG/DL — LOW (ref 250–450)
TRANSFERRIN SERPL-MCNC: 70.6 MG/DL — LOW (ref 200–360)
VANCOMYCIN TROUGH SERPL-MCNC: 5 UG/ML — LOW (ref 10–20)
VIT B12 SERPL-MCNC: >2000 PG/ML — HIGH (ref 243–894)
WBC # BLD: 11 K/UL — HIGH (ref 3.8–10.5)
WBC # FLD AUTO: 11 K/UL — HIGH (ref 3.8–10.5)

## 2017-02-22 PROCEDURE — 99291 CRITICAL CARE FIRST HOUR: CPT

## 2017-02-22 PROCEDURE — 71010: CPT | Mod: 26

## 2017-02-22 PROCEDURE — 90935 HEMODIALYSIS ONE EVALUATION: CPT | Mod: GC

## 2017-02-22 PROCEDURE — 99223 1ST HOSP IP/OBS HIGH 75: CPT

## 2017-02-22 RX ORDER — ERYTHROPOIETIN 10000 [IU]/ML
7000 INJECTION, SOLUTION INTRAVENOUS; SUBCUTANEOUS ONCE
Qty: 0 | Refills: 0 | Status: COMPLETED | OUTPATIENT
Start: 2017-02-22 | End: 2017-02-22

## 2017-02-22 RX ORDER — ACETAMINOPHEN 500 MG
650 TABLET ORAL EVERY 6 HOURS
Qty: 0 | Refills: 0 | Status: DISCONTINUED | OUTPATIENT
Start: 2017-02-22 | End: 2017-02-22

## 2017-02-22 RX ORDER — ACETAMINOPHEN 500 MG
650 TABLET ORAL EVERY 6 HOURS
Qty: 0 | Refills: 0 | Status: DISCONTINUED | OUTPATIENT
Start: 2017-02-22 | End: 2017-02-28

## 2017-02-22 RX ORDER — HEPARIN SODIUM 5000 [USP'U]/ML
1000 INJECTION INTRAVENOUS; SUBCUTANEOUS ONCE
Qty: 0 | Refills: 0 | Status: COMPLETED | OUTPATIENT
Start: 2017-02-22 | End: 2017-02-22

## 2017-02-22 RX ORDER — HEPARIN SODIUM 5000 [USP'U]/ML
500 INJECTION INTRAVENOUS; SUBCUTANEOUS
Qty: 0 | Refills: 0 | Status: COMPLETED | OUTPATIENT
Start: 2017-02-22 | End: 2017-02-22

## 2017-02-22 RX ORDER — SODIUM CHLORIDE 9 MG/ML
250 INJECTION INTRAMUSCULAR; INTRAVENOUS; SUBCUTANEOUS ONCE
Qty: 0 | Refills: 0 | Status: COMPLETED | OUTPATIENT
Start: 2017-02-22 | End: 2017-02-22

## 2017-02-22 RX ORDER — HEPARIN SODIUM 5000 [USP'U]/ML
INJECTION INTRAVENOUS; SUBCUTANEOUS
Qty: 0 | Refills: 0 | Status: COMPLETED | OUTPATIENT
Start: 2017-02-22 | End: 2017-02-22

## 2017-02-22 RX ORDER — ACETAMINOPHEN 500 MG
650 TABLET ORAL ONCE
Qty: 0 | Refills: 0 | Status: COMPLETED | OUTPATIENT
Start: 2017-02-22 | End: 2017-02-22

## 2017-02-22 RX ORDER — NOREPINEPHRINE BITARTRATE/D5W 8 MG/250ML
0.01 PLASTIC BAG, INJECTION (ML) INTRAVENOUS
Qty: 8 | Refills: 0 | Status: DISCONTINUED | OUTPATIENT
Start: 2017-02-22 | End: 2017-02-22

## 2017-02-22 RX ORDER — VANCOMYCIN HCL 1 G
1500 VIAL (EA) INTRAVENOUS ONCE
Qty: 0 | Refills: 0 | Status: COMPLETED | OUTPATIENT
Start: 2017-02-22 | End: 2017-02-22

## 2017-02-22 RX ORDER — ACETAMINOPHEN 500 MG
1000 TABLET ORAL ONCE
Qty: 0 | Refills: 0 | Status: COMPLETED | OUTPATIENT
Start: 2017-02-22 | End: 2017-02-22

## 2017-02-22 RX ADMIN — Medication 5 MILLILITER(S): at 06:03

## 2017-02-22 RX ADMIN — LEVETIRACETAM 410 MILLIGRAM(S): 250 TABLET, FILM COATED ORAL at 05:15

## 2017-02-22 RX ADMIN — HEPARIN SODIUM 5000 UNIT(S): 5000 INJECTION INTRAVENOUS; SUBCUTANEOUS at 15:00

## 2017-02-22 RX ADMIN — Medication 650 MILLIGRAM(S): at 10:10

## 2017-02-22 RX ADMIN — Medication 5 MILLILITER(S): at 13:58

## 2017-02-22 RX ADMIN — FLUTICASONE PROPIONATE AND SALMETEROL 1 DOSE(S): 50; 250 POWDER ORAL; RESPIRATORY (INHALATION) at 19:17

## 2017-02-22 RX ADMIN — Medication 3 MILLILITER(S): at 10:01

## 2017-02-22 RX ADMIN — Medication 10 MILLIGRAM(S): at 07:10

## 2017-02-22 RX ADMIN — Medication 1 DROP(S): at 12:00

## 2017-02-22 RX ADMIN — LEVETIRACETAM 410 MILLIGRAM(S): 250 TABLET, FILM COATED ORAL at 15:37

## 2017-02-22 RX ADMIN — Medication 650 MILLIGRAM(S): at 18:00

## 2017-02-22 RX ADMIN — Medication 5 MILLILITER(S): at 23:33

## 2017-02-22 RX ADMIN — LEVETIRACETAM 410 MILLIGRAM(S): 250 TABLET, FILM COATED ORAL at 19:16

## 2017-02-22 RX ADMIN — HEPARIN SODIUM 5000 UNIT(S): 5000 INJECTION INTRAVENOUS; SUBCUTANEOUS at 21:32

## 2017-02-22 RX ADMIN — HEPARIN SODIUM 0.1 UNIT(S): 5000 INJECTION INTRAVENOUS; SUBCUTANEOUS at 12:13

## 2017-02-22 RX ADMIN — HEPARIN SODIUM 5000 UNIT(S): 5000 INJECTION INTRAVENOUS; SUBCUTANEOUS at 05:14

## 2017-02-22 RX ADMIN — Medication 650 MILLIGRAM(S): at 07:09

## 2017-02-22 RX ADMIN — SODIUM CHLORIDE 500 MILLILITER(S): 9 INJECTION INTRAMUSCULAR; INTRAVENOUS; SUBCUTANEOUS at 04:35

## 2017-02-22 RX ADMIN — ERYTHROPOIETIN 7000 UNIT(S): 10000 INJECTION, SOLUTION INTRAVENOUS; SUBCUTANEOUS at 13:51

## 2017-02-22 RX ADMIN — MIDODRINE HYDROCHLORIDE 5 MILLIGRAM(S): 2.5 TABLET ORAL at 05:15

## 2017-02-22 RX ADMIN — Medication 5 MILLILITER(S): at 17:08

## 2017-02-22 RX ADMIN — Medication 3 MILLILITER(S): at 13:58

## 2017-02-22 RX ADMIN — Medication 300 MILLIGRAM(S): at 17:49

## 2017-02-22 RX ADMIN — Medication 5 MILLILITER(S): at 10:02

## 2017-02-22 RX ADMIN — Medication 3 MILLILITER(S): at 06:03

## 2017-02-22 RX ADMIN — HEPARIN SODIUM 0.1 UNIT(S): 5000 INJECTION INTRAVENOUS; SUBCUTANEOUS at 13:53

## 2017-02-22 RX ADMIN — Medication 5 MILLILITER(S): at 02:48

## 2017-02-22 RX ADMIN — Medication 400 MILLIGRAM(S): at 20:37

## 2017-02-22 RX ADMIN — PIPERACILLIN AND TAZOBACTAM 200 GRAM(S): 4; .5 INJECTION, POWDER, LYOPHILIZED, FOR SOLUTION INTRAVENOUS at 10:23

## 2017-02-22 RX ADMIN — FLUTICASONE PROPIONATE AND SALMETEROL 1 DOSE(S): 50; 250 POWDER ORAL; RESPIRATORY (INHALATION) at 06:54

## 2017-02-22 RX ADMIN — Medication 1 DROP(S): at 19:17

## 2017-02-22 RX ADMIN — Medication 1 DROP(S): at 05:15

## 2017-02-22 RX ADMIN — Medication 3 MILLILITER(S): at 23:32

## 2017-02-22 RX ADMIN — PANTOPRAZOLE SODIUM 40 MILLIGRAM(S): 20 TABLET, DELAYED RELEASE ORAL at 06:07

## 2017-02-22 RX ADMIN — HEPARIN SODIUM 0.1 UNIT(S): 5000 INJECTION INTRAVENOUS; SUBCUTANEOUS at 13:10

## 2017-02-22 RX ADMIN — Medication 3 MILLILITER(S): at 02:49

## 2017-02-22 RX ADMIN — PIPERACILLIN AND TAZOBACTAM 200 GRAM(S): 4; .5 INJECTION, POWDER, LYOPHILIZED, FOR SOLUTION INTRAVENOUS at 21:32

## 2017-02-22 RX ADMIN — GABAPENTIN 100 MILLIGRAM(S): 400 CAPSULE ORAL at 05:15

## 2017-02-22 RX ADMIN — Medication 3 MILLILITER(S): at 17:06

## 2017-02-22 NOTE — PROGRESS NOTE ADULT - SUBJECTIVE AND OBJECTIVE BOX
INTERVAL HPI/OVERNIGHT EVENTS:    SUBJECTIVE: Patient seen and examined at bedside.     ROS:  CV: Denies chest pain  Resp: Denies SOB  GI: Denies abdominal pain, constipation, diarrhea, nausea, vomiting  : Denies dysuria  ID: Denies fevers, chills  MSK: Denies joint pain     OBJECTIVE:    VITAL SIGNS:  ICU Vital Signs Last 24 Hrs  T(C): 37, Max: 38.3 (02-21 @ 09:16)  T(F): 98.6, Max: 101 (02-21 @ 09:16)  HR: 90 (76 - 90)  BP: 101/50 (79/52 - 124/59)  BP(mean): 74 (50 - 96)  ABP: --  ABP(mean): --  RR: 16 (9 - 37)  SpO2: 93% (88% - 100%)        I & Os for current day (as of 02-22 @ 07:31)  =============================================  IN: 1159.2 ml / OUT: 0 ml / NET: 1159.2 ml    CAPILLARY BLOOD GLUCOSE  98 (22 Feb 2017 06:00)      PHYSICAL EXAM:    General: NAD  HEENT: NC/AT; PERRL, clear conjunctiva  Neck: supple  Respiratory: CTA b/l  Cardiovascular: +S1/S2; RRR  Abdomen: soft, NT/ND; +BS x4  Extremities: WWP, 2+ peripheral pulses b/l; no LE edema  Skin: normal color and turgor; no rash  Neurological:     MEDICATIONS:  MEDICATIONS  (STANDING):  fluticasone / salmeterol 250-50 MICROgram(s) Diskus 1Dose(s) Inhalation two times a day  aspirin enteric coated 81milliGRAM(s) Oral daily  docusate sodium 100milliGRAM(s) Oral daily  heparin  Injectable 5000Unit(s) SubCutaneous every 8 hours  folic acid 1milliGRAM(s) Oral daily  artificial  tears Solution 1Drop(s) Both EYES every 6 hours  mirtazapine 15milliGRAM(s) Oral at bedtime  gabapentin 100milliGRAM(s) Oral two times a day  metoclopramide 10milliGRAM(s) Oral three times a day with meals  simvastatin 20milliGRAM(s) Oral at bedtime  pantoprazole    Tablet 40milliGRAM(s) Oral before breakfast  piperacillin/tazobactam IVPB. 2.25Gram(s) IV Intermittent every 12 hours  vancomycin  IVPB 1000milliGRAM(s) IV Intermittent once  ALBUTerol/ipratropium for Nebulization 3milliLiter(s) Nebulizer every 4 hours  norepinephrine Infusion 0.01MICROgram(s)/kG/Min IV Continuous <Continuous>  levETIRAcetam  IVPB 250milliGRAM(s) IV Intermittent every 12 hours  levETIRAcetam  IVPB 250milliGRAM(s) IV Intermittent <User Schedule>  insulin lispro (HumaLOG) corrective regimen sliding scale  SubCutaneous Before meals and at bedtime  midodrine 5milliGRAM(s) Oral every 8 hours  acetylcysteine 20% Inhalation 5milliLiter(s) Inhalation every 4 hours  heparin -  Bolus     epoetin nicki Injectable 7000Unit(s) IV Push once    MEDICATIONS  (PRN):  acetaminophen   Tablet 650milliGRAM(s) Oral every 6 hours PRN For Temp greater than 38 C (100.4 F)  acetaminophen  Suppository 650milliGRAM(s) Rectal every 6 hours PRN For Temp greater than 38 C (100.4 F)      ALLERGIES:  Allergies    clonidine (Unknown)    Intolerances        LABS:                        7.1    11.0  )-----------( 254      ( 22 Feb 2017 05:02 )             20.8     22 Feb 2017 05:02    140    |  102    |  47     ----------------------------<  98     3.4     |  25     |  6.54     Ca    8.2        22 Feb 2017 05:02  Phos  3.0       22 Feb 2017 05:02  Mg     1.8       22 Feb 2017 05:02    TPro  6.5    /  Alb  2.1    /  TBili  0.7    /  DBili  x      /  AST  9      /  ALT  7      /  AlkPhos  79     22 Feb 2017 05:02    PT/INR - ( 21 Feb 2017 05:52 )   PT: 19.3 sec;   INR: 1.73                RADIOLOGY & ADDITIONAL TESTS: Reviewed. PGY-1 PROGRESS NOTE    INTERVAL HPI/ OVERNIGHT EVENTS: Hypotensive overnight to SBP 84 and MAP 53 off Levo, received 250cc IVF bolus, resolved. Hgb downtrending slowly, night team did rectal exam, guaiac negative.     SUBJECTIVE: Patient seen and examined at bedside. Follows commands, but only minimally answers questions. Awake and alert, but fatigued. Denies SOB at this time. Pt able to clear secretions and nurse has been suctioning them out of his mouth. Comfortable on NC @3L.     ROS:  CV: Denies chest pain  Resp: Denies SOB  GI: Denies abdominal pain, constipation, diarrhea, nausea, vomiting  ID: Denies fevers, chills  MSK: Denies joint pain     OBJECTIVE:    VITAL SIGNS:  ICU Vital Signs Last 24 Hrs  T(C): 37, Max: 38.3 (02-21 @ 09:16)  T(F): 98.6, Max: 101 (02-21 @ 09:16)  HR: 90 (76 - 90)  BP: 101/50 (79/52 - 124/59)  BP(mean): 74 (50 - 96)  RR: 16 (9 - 37)  SpO2: 93% (88% - 100%) on NC @3L    I & Os for current day (as of 02-22 @ 07:31)  =============================================  IN: 1159.2 ml / OUT: 0 ml / NET: 1159.2 ml    CAPILLARY BLOOD GLUCOSE  98 (22 Feb 2017 06:00)    PHYSICAL EXAM:  General: NAD, comfortable, with ronchorous cough   Neuro: Obeys commands, AAOx2, tremor at baseline  HEENT: NCAT, PERRL, clear conjunctiva, no scleral icterus  Neck: supple, no JVD, Right IJ CDI   Respiratory: B/L coarse BS.   Cardiovascular: RRR, normal S1S2, no M/R/G  Abdomen: soft, NTND, bowel sounds in all four quadrants, no palpable masses  Extremities: WWP, no clubbing, cyanosis, or edema    MEDICATIONS:  fluticasone / salmeterol 250-50 MICROgram(s) Diskus 1Dose(s) Inhalation two times a day  aspirin enteric coated 81milliGRAM(s) Oral daily  docusate sodium 100milliGRAM(s) Oral daily  heparin  Injectable 5000Unit(s) SubCutaneous every 8 hours  folic acid 1milliGRAM(s) Oral daily  artificial  tears Solution 1Drop(s) Both EYES every 6 hours  mirtazapine 15milliGRAM(s) Oral at bedtime  gabapentin 100milliGRAM(s) Oral two times a day  metoclopramide 10milliGRAM(s) Oral three times a day with meals  simvastatin 20milliGRAM(s) Oral at bedtime  pantoprazole    Tablet 40milliGRAM(s) Oral before breakfast  piperacillin/tazobactam IVPB. 2.25Gram(s) IV Intermittent every 12 hours  ALBUTerol/ipratropium for Nebulization 3milliLiter(s) Nebulizer every 4 hours  levETIRAcetam  IVPB 250milliGRAM(s) IV Intermittent every 12 hours  levETIRAcetam  IVPB 250milliGRAM(s) IV Intermittent <User Schedule>  insulin lispro (HumaLOG) corrective regimen sliding scale  SubCutaneous Before meals and at bedtime  midodrine 5milliGRAM(s) Oral every 8 hours  acetylcysteine 20% Inhalation 5milliLiter(s) Inhalation every 4 hours  heparin -  Bolus  IV Bolus   heparin -  Bolus 500Unit(s) IV Bolus every 1 hour  epoetin nicki Injectable 7000Unit(s) IV Push once  vancomycin  IVPB 1500milliGRAM(s) IV Intermittent once    LABS:             7.1    11.0  )-----------( 254                  20.8     140    |  102    |  47     ----------------------------<  98     3.4     |  25     |  6.54     Ca     8.2         Phos  3.0        Mg     1.8            TPro  6.5    /  Alb  2.1    /  TBili  0.7    /  DBili  x      /  AST  9      /  ALT  7      /  AlkPhos  79       PT/INR -  PT: 19.3 sec;   INR: 1.73       RADIOLOGY & ADDITIONAL TESTS: Reviewed.    ASSESSMENT + PLAN:   Pt is a 88yo M with PMH of ESRD, Epilepsy, Alzheimer's dementia, HTN, HLD, RA, GERD, IDDM admitted to MICU for septic shock 2/2 aspiration PNA vs. UTI, resolving, now off pressors.     #ID: Septic shock 2/2 asp PNA vs. UTI: Pt was febrile to 101, tachycardic, tachypneic, + leukocytosis requiring levophed and admission to MICU. UTI was positive on admission, but patient is usually anuric, so culture not sent; + pus found on penis yesterday. Found to have a RLL infiltrate on CXR in setting of chronic aspirations. Currently HD stable/perfusing well despite brief episode of hypotension overnight, with SBPs in 90-100s off levophed pressor support and on midodrine. Blood clx NGTD.  - Empiric Abx - c/w vancomycin and zosyn until source is identified   - Blood clx from admission are NGTD; f/u repeat blood clx drawn yesterday 2/21 and clx from penile pus.      #CV: Pt no longer requiring levophed pressor support, HD stable and perfusing well with SBPs 90-100s. C/w Midodrine 5mg po TID.  #CAD: C/w ASA daily     #Respiratory: Patient tachypneic on admission with secretions. Now he is stable, improving tachypnea, on 3L NC and saturating well.   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  - Pt unable to tolerate barium esophagram on 2/21 - desatted while in radiology. Pt possibly to go for EGD today - NPO at midnight and f/u GI recs regarding EGD.  - GI consulted, f/u recs.   - C/w Reglan    #Renal: ESRD: Dialysis MWF, no repletion of lytes as done during dialysis.    #Heme: Anemia: Likely 2/2 to ESRD.   -Active T/S   -C/w Epogen    #Endo: DM: C/w ISS.     #Neuro: Seizure disorder: C/w Keppra.    #FEN: NPO for possible EGD today. Do not replete lytes as on HD.  #PPX: HSQ, Protonix, bowel regimen  DISPO: MICU  Lines: MARION placed 02/19   CODE: FULL

## 2017-02-22 NOTE — DIETITIAN INITIAL EVALUATION ADULT. - NS AS NUTRI INTERV ENTERAL NUTRITION2
Rate/Composition/if unable to advance via po and consistent with goals of care will need consider alternative nutritional  support,Recommend:Nepro @40c/hr (goal rate

## 2017-02-22 NOTE — DIETITIAN INITIAL EVALUATION ADULT. - NS AS NUTRI INTERV MEALS SNACK2
pending SLP clearance for consistency :add renal diet with appropriate fluid restriction./Composition of meals/snacks

## 2017-02-22 NOTE — PROGRESS NOTE ADULT - ATTENDING COMMENTS
seen and evaluated while o dialysis.  BP very good.  Tolerating procedure well.  Continue full treatment as prescribed

## 2017-02-22 NOTE — PROGRESS NOTE ADULT - SUBJECTIVE AND OBJECTIVE BOX
Patient was seen and evaluated on dialysis.   Patient is tolerating the procedure well.   HR: 89  BP: 132/56    Initial UF goal of 0kg over 3.5 hours  In light of improved hemodynamics without an increase in his vasopressor use, have opted to increase goal to 1.5kg over 3.5 hours    Continue dialysis:   Dialyzer: F180         QB:  400      QD: 500  Goal UF 1.5kg over 3.5 Hours

## 2017-02-22 NOTE — PROGRESS NOTE ADULT - RS GEN PE MLT RESP DETAILS PC
diminished breath sounds, L/clear to auscultation bilaterally/normal/no rales/airway patent/no wheezes/diminished breath sounds, R/no rhonchi

## 2017-02-22 NOTE — CONSULT NOTE ADULT - SUBJECTIVE AND OBJECTIVE BOX
LUIS GARDNER   MRN-[id number]     :1929    HPI:  88 YO M h/o ESRD (HD MWF, last HD ), Alzheimer's disease, CAD, COPD, angina, anemia, CHF, depression, HTN, HLD, OA, PVD, polyneuropathy, blindness sent from [facility] after he was noted to be febrile to 101, desaturating with increased oral secretions.     PAST MEDICAL & SURGICAL HISTORY:  AV graft thrombosis  Osteoarthritis  PVD (peripheral vascular disease)  COPD (chronic obstructive pulmonary disease)  Heart failure  Angina pectoris  ESRD (end stage renal disease)  Seizures: post traumatic  CAD (coronary artery disease)  HTN (hypertension)  Polyneuropathy  Hyperlipidemia  Dysphagia  Hypotension  Alzheimers disease  Osteoarthritis: Osteoarthritis  Chronic obstructive pulmonary disease: Chronic obstructive pulmonary disease  Anemia: Anemia  Dementia without behavioral disturbance: Dementia  Atherosclerosis of coronary artery: CAD (coronary artery disease)  Nondependent alcohol abuse: ETOH abuse  Depressive disorder: Depression  End-stage renal disease: ESRD on hemodialysis  Essential hypertension: HTN (hypertension)  Deep venous embolism and thrombosis of lower extremity: DVT (deep venous thrombosis)  Congestive heart failure: CHF (congestive heart failure)  Legal blindness: Legally blind  Hemodialysis access, AV graft: LUE loop AVG  Acquired arteriovenous fistula: AV fistula    FAMILY HISTORY:  Family history unknown    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, former musician    ROS:    Unable to attain due to: AMS                     DYSPNEA (Charanjit 0-10):                        N/V (Y/N):                              SECRETIONS (Y/N):                AGITATION(Y/N):   PAIN (Y/N):          -Provocation/Palliation:     -Quality/Quantity:     -Radiating:     -Severity:     -Timing/Frequency:     -Impact on ADLs:    ALLERGIES:  Allergies    clonidine (Unknown)    Intolerances    OPIATE NAIVE (Y/N): y  -iStop reviewed (Y/N): y, ref#  [id number]    MEDICATIONS:      MEDICATIONS  (STANDING):  fluticasone / salmeterol 250-50 MICROgram(s) Diskus 1Dose(s) Inhalation two times a day  aspirin enteric coated 81milliGRAM(s) Oral daily  docusate sodium 100milliGRAM(s) Oral daily  heparin  Injectable 5000Unit(s) SubCutaneous every 8 hours  folic acid 1milliGRAM(s) Oral daily  artificial  tears Solution 1Drop(s) Both EYES every 6 hours  mirtazapine 15milliGRAM(s) Oral at bedtime  gabapentin 100milliGRAM(s) Oral two times a day  metoclopramide 10milliGRAM(s) Oral three times a day with meals  simvastatin 20milliGRAM(s) Oral at bedtime  pantoprazole    Tablet 40milliGRAM(s) Oral before breakfast  piperacillin/tazobactam IVPB. 2.25Gram(s) IV Intermittent every 12 hours  ALBUTerol/ipratropium for Nebulization 3milliLiter(s) Nebulizer every 4 hours  levETIRAcetam  IVPB 250milliGRAM(s) IV Intermittent every 12 hours  levETIRAcetam  IVPB 250milliGRAM(s) IV Intermittent <User Schedule>  insulin lispro (HumaLOG) corrective regimen sliding scale  SubCutaneous Before meals and at bedtime  midodrine 5milliGRAM(s) Oral every 8 hours  acetylcysteine 20% Inhalation 5milliLiter(s) Inhalation every 4 hours  vancomycin  IVPB 1500milliGRAM(s) IV Intermittent once    MEDICATIONS  (PRN):    PHYSICAL EXAM:  Vital Signs Last 24 Hrs  T(C): 37.1, Max: 38.8 ( @ 10:00)  T(F): 98.7, Max: 101.9 ( @ 10:00)  HR: 98 (76 - 98)  BP: 171/73 (79/52 - 171/73)  BP(mean): 92 (49 - 107)  RR: 25 (11 - 32)  SpO2: 99% (88% - 100%)  Daily     Daily Weight in k.8 (2017 12:19)  CAPILLARY BLOOD GLUCOSE  98 (2017 06:00)    I&O's Summary  I & Os for 24h ending 2017 07:00  =============================================  IN: 1159.2 ml / OUT: 0 ml / NET: 1159.2 ml    I & Os for current day (as of 2017 15:35)  =============================================  IN: 0 ml / OUT: 1500 ml / NET: -1500 ml    GENERAL: Tired looking, elderly gentleman just about to complete HD  HEENT: no tracking, blind per history  NECK: normal  CVS: SR on ECG  RESP: 2LNC, fine basilar rhonchi  GI: normal   : left AV graft currently in use for HD    MS: generalized weakness  NEURO: oriented to self and place, slow to react, answers simple questions appropriately  PSYCH: normal  SKIN: normal  LYMPH: normal  KARNOFSKY: PRE-ADMIT=40  %               CURRENT=30-40  %  CACHEXIA (Y/N):n  BMI: 20.0    LABS:    CBC:                        7.1    11.0  )-----------( 254      ( 2017 05:02 )             20.8     CMP:    2017 05:02    140    |  102    |  47     ----------------------------<  98     3.4     |  25     |  6.54     Ca    8.2        2017 05:02  Phos  3.0       2017 05:02  Mg     1.8       2017 05:02    TPro  6.5    /  Alb  2.1    /  TBili  0.7    /  DBili  x      /  AST  9      /  ALT  7      /  AlkPhos  79     2017 05:02    PT/INR - ( 2017 05:52 )   PT: 19.3 sec;   INR: 1.73        LIVER FUNCTIONS - ( 2017 05:02 )  Alb: 2.1 g/dL / Pro: 6.5 g/dL / ALK PHOS: 79 U/L / ALT: 7 U/L / AST: 9 U/L / GGT: x    Culture - Blood (17 @ 14:55)    Specimen Source: .Blood Blood-Peripheral    Culture Results:   No growth at 1 day.    IMAGING:  Reviewed  XAM:  XR CHEST 1 VIEW PORT IMMEDIATE                           PROCEDURE DATE:  2017     IMPRESSION:  Bilateral basilar consolidation.    EXAM:  ECHOCARDIOGRAM (CARDIOL)       PROCEDURE DATE:  2017  mild concentric left ventricular hypertrophy.left ventricular ejection fraction is estimated to be 65-70%, mildtricuspid regurgitation      ADVANCED DIRECTIVES:     Full Code       DECISION MAKER:  LEGAL SURROGATE: ? emergency contacts Estrellita Luke [phone]/ Isaac Land [phone]    GOALS OF CARE DISCUSSION       Palliative care info/counseling provided	                  REFERRALS	        Palliative Med        Unit SW/Case Mgmt       Speech/Swallow       Music Therapy       Ethics

## 2017-02-22 NOTE — CONSULT NOTE ADULT - ASSESSMENT
86 y/o legally blind savanna with h/o alzheimer's, Zencker's diverticulum, ESRD on HD, CHF, CADdepression, HTN, HLD, OA, PVD, polyneuropathy, COPD, dysphagia, DVT, p/w fevers and desaturation found to be in septic shock from aspiration vs. uti, with generalized weakness and hypoalbuminemia, seen for goals of care.       -will reach out to emergency contacts found on old charts to discuss further

## 2017-02-22 NOTE — DIETITIAN INITIAL EVALUATION ADULT. - ENERGY NEEDS
BMI:20,IBW:172lbs,90% of IBW.SKin intact.Noted vomiting/No diarrhea.Noted dysphagia/high aspiration risk.FLds per MD due to CHF/HD

## 2017-02-22 NOTE — DIETITIAN INITIAL EVALUATION ADULT. - OTHER INFO
86 y/o male wheelchairbound from assisted living.Presently NPO due to high aspiration risk.Noted SLP input.Barium swallow.Noted zenkers Diverticulum.

## 2017-02-23 LAB
-  AMPICILLIN/SULBACTAM: SIGNIFICANT CHANGE UP
-  AMPICILLIN: SIGNIFICANT CHANGE UP
-  AMPICILLIN: SIGNIFICANT CHANGE UP
-  CEFAZOLIN: SIGNIFICANT CHANGE UP
-  CEFTRIAXONE: SIGNIFICANT CHANGE UP
-  CIPROFLOXACIN: SIGNIFICANT CHANGE UP
-  GENTAMICIN: SIGNIFICANT CHANGE UP
-  LINEZOLID: SIGNIFICANT CHANGE UP
-  PIPERACILLIN/TAZOBACTAM: SIGNIFICANT CHANGE UP
-  TOBRAMYCIN: SIGNIFICANT CHANGE UP
-  TRIMETHOPRIM/SULFAMETHOXAZOLE: SIGNIFICANT CHANGE UP
ALBUMIN SERPL ELPH-MCNC: 2.2 G/DL — LOW (ref 3.4–5)
ALP SERPL-CCNC: 91 U/L — SIGNIFICANT CHANGE UP (ref 40–120)
ALT FLD-CCNC: 9 U/L — LOW (ref 12–42)
ANION GAP SERPL CALC-SCNC: 12 MMOL/L — SIGNIFICANT CHANGE UP (ref 9–16)
AST SERPL-CCNC: 23 U/L — SIGNIFICANT CHANGE UP (ref 15–37)
BASOPHILS NFR BLD AUTO: 0.3 % — SIGNIFICANT CHANGE UP (ref 0–2)
BILIRUB SERPL-MCNC: 0.4 MG/DL — SIGNIFICANT CHANGE UP (ref 0.2–1.2)
BUN SERPL-MCNC: 20 MG/DL — SIGNIFICANT CHANGE UP (ref 7–23)
CALCIUM SERPL-MCNC: 8.3 MG/DL — LOW (ref 8.5–10.5)
CHLORIDE SERPL-SCNC: 99 MMOL/L — SIGNIFICANT CHANGE UP (ref 96–108)
CO2 SERPL-SCNC: 27 MMOL/L — SIGNIFICANT CHANGE UP (ref 22–31)
CREAT SERPL-MCNC: 3.68 MG/DL — HIGH (ref 0.5–1.3)
CULTURE RESULTS: SIGNIFICANT CHANGE UP
CULTURE RESULTS: SIGNIFICANT CHANGE UP
EOSINOPHIL NFR BLD AUTO: 1.1 % — SIGNIFICANT CHANGE UP (ref 0–6)
GLUCOSE SERPL-MCNC: 100 MG/DL — HIGH (ref 70–99)
GRAM STN FLD: SIGNIFICANT CHANGE UP
HCT VFR BLD CALC: 24.2 % — LOW (ref 39–50)
HGB BLD-MCNC: 8 G/DL — LOW (ref 13–17)
LYMPHOCYTES # BLD AUTO: 18.1 % — SIGNIFICANT CHANGE UP (ref 13–44)
MAGNESIUM SERPL-MCNC: 1.9 MG/DL — SIGNIFICANT CHANGE UP (ref 1.6–2.4)
MCHC RBC-ENTMCNC: 30.8 PG — SIGNIFICANT CHANGE UP (ref 27–34)
MCHC RBC-ENTMCNC: 33.1 G/DL — SIGNIFICANT CHANGE UP (ref 32–36)
MCV RBC AUTO: 93.1 FL — SIGNIFICANT CHANGE UP (ref 80–100)
METHOD TYPE: SIGNIFICANT CHANGE UP
METHOD TYPE: SIGNIFICANT CHANGE UP
MONOCYTES NFR BLD AUTO: 7.4 % — SIGNIFICANT CHANGE UP (ref 2–14)
NEUTROPHILS NFR BLD AUTO: 73.1 % — SIGNIFICANT CHANGE UP (ref 43–77)
ORGANISM # SPEC MICROSCOPIC CNT: SIGNIFICANT CHANGE UP
PHOSPHATE SERPL-MCNC: 2.8 MG/DL — SIGNIFICANT CHANGE UP (ref 2.5–4.5)
PLATELET # BLD AUTO: 258 K/UL — SIGNIFICANT CHANGE UP (ref 150–400)
POTASSIUM SERPL-MCNC: 3.6 MMOL/L — SIGNIFICANT CHANGE UP (ref 3.5–5.3)
POTASSIUM SERPL-SCNC: 3.6 MMOL/L — SIGNIFICANT CHANGE UP (ref 3.5–5.3)
PROT SERPL-MCNC: 6.9 G/DL — SIGNIFICANT CHANGE UP (ref 6.4–8.2)
RBC # BLD: 2.6 M/UL — LOW (ref 4.2–5.8)
RBC # FLD: 16.4 % — SIGNIFICANT CHANGE UP (ref 10.3–16.9)
SODIUM SERPL-SCNC: 138 MMOL/L — SIGNIFICANT CHANGE UP (ref 135–145)
SPECIMEN SOURCE: SIGNIFICANT CHANGE UP
SPECIMEN SOURCE: SIGNIFICANT CHANGE UP
WBC # BLD: 14.2 K/UL — HIGH (ref 3.8–10.5)
WBC # FLD AUTO: 14.2 K/UL — HIGH (ref 3.8–10.5)

## 2017-02-23 PROCEDURE — 99497 ADVNCD CARE PLAN 30 MIN: CPT

## 2017-02-23 PROCEDURE — 99291 CRITICAL CARE FIRST HOUR: CPT

## 2017-02-23 PROCEDURE — 71010: CPT | Mod: 26

## 2017-02-23 PROCEDURE — 99233 SBSQ HOSP IP/OBS HIGH 50: CPT | Mod: 25,GC

## 2017-02-23 PROCEDURE — 99232 SBSQ HOSP IP/OBS MODERATE 35: CPT | Mod: GC

## 2017-02-23 RX ORDER — FENTANYL CITRATE 50 UG/ML
75 INJECTION INTRAVENOUS ONCE
Qty: 0 | Refills: 0 | Status: DISCONTINUED | OUTPATIENT
Start: 2017-02-23 | End: 2017-02-23

## 2017-02-23 RX ORDER — LINEZOLID 600 MG/300ML
600 INJECTION, SOLUTION INTRAVENOUS ONCE
Qty: 0 | Refills: 0 | Status: COMPLETED | OUTPATIENT
Start: 2017-02-23 | End: 2017-02-23

## 2017-02-23 RX ORDER — NOREPINEPHRINE BITARTRATE/D5W 8 MG/250ML
0.01 PLASTIC BAG, INJECTION (ML) INTRAVENOUS
Qty: 8 | Refills: 0 | Status: DISCONTINUED | OUTPATIENT
Start: 2017-02-23 | End: 2017-02-24

## 2017-02-23 RX ORDER — MIDAZOLAM HYDROCHLORIDE 1 MG/ML
4 INJECTION, SOLUTION INTRAMUSCULAR; INTRAVENOUS ONCE
Qty: 0 | Refills: 0 | Status: DISCONTINUED | OUTPATIENT
Start: 2017-02-23 | End: 2017-02-23

## 2017-02-23 RX ORDER — HEPARIN SODIUM 5000 [USP'U]/ML
INJECTION INTRAVENOUS; SUBCUTANEOUS
Qty: 0 | Refills: 0 | Status: DISCONTINUED | OUTPATIENT
Start: 2017-02-23 | End: 2017-02-28

## 2017-02-23 RX ORDER — ERYTHROPOIETIN 10000 [IU]/ML
7000 INJECTION, SOLUTION INTRAVENOUS; SUBCUTANEOUS
Qty: 0 | Refills: 0 | Status: DISCONTINUED | OUTPATIENT
Start: 2017-02-23 | End: 2017-02-28

## 2017-02-23 RX ORDER — PANTOPRAZOLE SODIUM 20 MG/1
40 TABLET, DELAYED RELEASE ORAL DAILY
Qty: 0 | Refills: 0 | Status: DISCONTINUED | OUTPATIENT
Start: 2017-02-23 | End: 2017-02-28

## 2017-02-23 RX ORDER — POTASSIUM CHLORIDE 20 MEQ
10 PACKET (EA) ORAL
Qty: 0 | Refills: 0 | Status: DISCONTINUED | OUTPATIENT
Start: 2017-02-23 | End: 2017-02-23

## 2017-02-23 RX ADMIN — Medication 5 MILLILITER(S): at 22:34

## 2017-02-23 RX ADMIN — Medication 1 DROP(S): at 11:40

## 2017-02-23 RX ADMIN — SIMVASTATIN 20 MILLIGRAM(S): 20 TABLET, FILM COATED ORAL at 21:25

## 2017-02-23 RX ADMIN — Medication 3 MILLILITER(S): at 11:29

## 2017-02-23 RX ADMIN — Medication 5 MILLILITER(S): at 02:08

## 2017-02-23 RX ADMIN — HEPARIN SODIUM 5000 UNIT(S): 5000 INJECTION INTRAVENOUS; SUBCUTANEOUS at 21:24

## 2017-02-23 RX ADMIN — Medication 5 MILLILITER(S): at 07:14

## 2017-02-23 RX ADMIN — LINEZOLID 300 MILLIGRAM(S): 600 INJECTION, SOLUTION INTRAVENOUS at 19:12

## 2017-02-23 RX ADMIN — Medication 5 MILLILITER(S): at 11:30

## 2017-02-23 RX ADMIN — Medication 5 MILLILITER(S): at 13:46

## 2017-02-23 RX ADMIN — LEVETIRACETAM 410 MILLIGRAM(S): 250 TABLET, FILM COATED ORAL at 05:46

## 2017-02-23 RX ADMIN — FENTANYL CITRATE 75 MICROGRAM(S): 50 INJECTION INTRAVENOUS at 18:07

## 2017-02-23 RX ADMIN — Medication 1 DROP(S): at 01:05

## 2017-02-23 RX ADMIN — PANTOPRAZOLE SODIUM 40 MILLIGRAM(S): 20 TABLET, DELAYED RELEASE ORAL at 11:40

## 2017-02-23 RX ADMIN — LEVETIRACETAM 410 MILLIGRAM(S): 250 TABLET, FILM COATED ORAL at 19:05

## 2017-02-23 RX ADMIN — Medication 1 DROP(S): at 05:46

## 2017-02-23 RX ADMIN — Medication 2: at 06:23

## 2017-02-23 RX ADMIN — Medication 1.22 MICROGRAM(S)/KG/MIN: at 18:08

## 2017-02-23 RX ADMIN — HEPARIN SODIUM 5000 UNIT(S): 5000 INJECTION INTRAVENOUS; SUBCUTANEOUS at 05:46

## 2017-02-23 RX ADMIN — Medication 10 MILLIGRAM(S): at 18:34

## 2017-02-23 RX ADMIN — FLUTICASONE PROPIONATE AND SALMETEROL 1 DOSE(S): 50; 250 POWDER ORAL; RESPIRATORY (INHALATION) at 18:08

## 2017-02-23 RX ADMIN — GABAPENTIN 100 MILLIGRAM(S): 400 CAPSULE ORAL at 18:32

## 2017-02-23 RX ADMIN — PIPERACILLIN AND TAZOBACTAM 200 GRAM(S): 4; .5 INJECTION, POWDER, LYOPHILIZED, FOR SOLUTION INTRAVENOUS at 09:30

## 2017-02-23 RX ADMIN — HEPARIN SODIUM 5000 UNIT(S): 5000 INJECTION INTRAVENOUS; SUBCUTANEOUS at 14:04

## 2017-02-23 RX ADMIN — MIDAZOLAM HYDROCHLORIDE 4 MILLIGRAM(S): 1 INJECTION, SOLUTION INTRAMUSCULAR; INTRAVENOUS at 18:08

## 2017-02-23 RX ADMIN — Medication 3 MILLILITER(S): at 02:08

## 2017-02-23 RX ADMIN — FLUTICASONE PROPIONATE AND SALMETEROL 1 DOSE(S): 50; 250 POWDER ORAL; RESPIRATORY (INHALATION) at 07:15

## 2017-02-23 RX ADMIN — Medication 2: at 18:31

## 2017-02-23 RX ADMIN — Medication 5 MILLILITER(S): at 18:23

## 2017-02-23 RX ADMIN — Medication 1 DROP(S): at 18:33

## 2017-02-23 RX ADMIN — Medication 3 MILLILITER(S): at 13:44

## 2017-02-23 RX ADMIN — Medication 3 MILLILITER(S): at 22:34

## 2017-02-23 RX ADMIN — Medication 3 MILLILITER(S): at 07:13

## 2017-02-23 RX ADMIN — Medication 3 MILLILITER(S): at 18:22

## 2017-02-23 NOTE — PROGRESS NOTE ADULT - ASSESSMENT
87 year old male with PMH ESRD (HD MWF, aneuric), Alzheimer's disease, CAD, COPD, angina, anemia, CHF, dementia, depression, HTN, HLD, OA, PVD, polyneuropathy, blindness sent from rehab with sepsis. We are consulted for longstanding history of dysphagia    #dysphagia - presents with likely aspiration pneumonia, probably from hx of regurgitation of solids and liquids. 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 Given his age and comorbidities, definitive achalasia tx is not practical, although palliative endoscopic therapy  will be beneficial.  My opinion is that patient was consentable this morning, and consent was taken, but Ethics will come to see patient and evaluate situation as he does not have any friends/family to assist in decision making  -NPO for possible EGD today pending clinical status

## 2017-02-23 NOTE — CONSULT NOTE ADULT - SUBJECTIVE AND OBJECTIVE BOX
Clinical Per Team:   Mr. De Jesus is an 87M PMH ESRD, Epilepsy, HTN, HLD, RA, GERD, IDDM admitted to MICU for septic shock 2/2 aspiration PNA no longer requiring pressors. Now being evaluated for chronic aspiration.    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  Patient had be thought to lack capacity and no surrogate was available to provide consent.    Ethics Process:  Reviewed the record, spoke with primary team and consulting GI before and again after meeting with the patient.  Talked with the patient at the bedside at 11:30am today.  Repeated elements of the discussion at noon with first the bedside MICU nurse, then medical resident present.  Also, phoned and left VM message for the patient's friend Love whom he hoped would be willing to serve as HCP.      Ethics Findings:   Although per the record, Mr. De Jesus demonstrated impaired mental status at times since admission, on my visit today he demonstrates quite good understanding of his overall situation and the GI procedure proposed, including risks, benefits and alternatives.  The GI doc saw the patient prior to my consult and documents his opinion of same.  All members of the staff present noted personally to me that they agree that the patient is participating with the ability to understand and deliberate.\    The patient consented to the procedure earlier today and I concur that the patient is capable.  He reiterated his desire to have the procedure.  The patient's goals for himself are to return to [facility] where he indicates that he is happy and "doing Ok."  Mr. De Jesus was given the opportunity to express his personal values around life support measures in the event that he suffers a complication in the procedure or if his clinical status should worsen.  He indicated that does have a clear view on the matter.  He does not want to accept intubation for respiratory distress nor CPR for arrest, understanding that if applicable, to defer these treatments will likely result in death.  He wishes for DNR/DNI orders to be in force even during the planned EGD procedure.  He was given the opportunity to consider accepting a trial of intubation, since he is at risk for respiratory trouble.  He declined to accept even a trial.  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    The patient indicated his desire to appoint his friend Love [phone].  However, Prerna Tello NP palliative informs me that she has contacted her since this morning and Love is not willing to serve.  She does seem concerned about the patient but does not want the responsibility.      Recommendations:  Recommend that the team enter DNR/DNI orders if there is physician agreement as this represents the patient's expressed wishes.  Primary team and GI are aware of the parameters and understand the patient's goals and priorities. Recommend that a MOLST is completed (which has now been initiated by palliative).  As pt's friend Love declines to be HCP, recommend asking if the patient wishes to appoint someone else.  If not, it will certainly help to address the MOLST while still with capacity. 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  Mr. De Jesus is looking forward to that visit.      *Of important note, if the patient loses decisional capacity, has no HCP or available surrogate,  and a therapeutic intervention is determined to be in the patients interest, the procedure may be authorized by an attending physician in collaboration with the rest of the treatment team.  Feel free to reconsult if additional ethics questions arise.    50 minutes spent, 30 mins in advance care planning discussion. Clinical Per Team:   Mr. De Jesus is an 87M PMH ESRD, Epilepsy, HTN, HLD, RA, GERD, IDDM admitted to MICU for septic shock 2/2 aspiration PNA no longer requiring pressors. Now being evaluated for chronic aspiration.    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  Patient had been thought to lack capacity and no surrogate was available to provide consent.    Ethics Process:  Reviewed the record, spoke with primary team and consulting GI before and again after meeting with the patient.  Talked with the patient at the bedside at 11:30am today.  Repeated elements of the discussion at noon with first the bedside MICU nurse, then medical resident present.  Also, phoned and left VM message for the patient's friend Love whom he hoped would be willing to serve as HCP.      Ethics Findings:   Although per the record, Mr. De Jesus demonstrated impaired mental status at times since admission, on my visit today he demonstrates quite good understanding of his overall situation and the GI procedure proposed, including risks, benefits and alternatives.  The GI doc saw the patient prior to my consult and documents his opinion of same.  All members of the staff present noted personally to me that they agree that the patient is participating with the ability to understand and deliberate.\    The patient consented to the procedure earlier today and I concur that the patient is capable.  He reiterated his desire to have the procedure.  The patient's goals for himself are to return to [facility] where he indicates that he is happy and "doing Ok."  Mr. De Jesus was given the opportunity to express his personal values around life support measures in the event that he suffers a complication in the procedure or if his clinical status should worsen.  He indicated that does have a clear view on the matter.  He does not want to accept intubation for respiratory distress nor CPR for arrest, understanding that if applicable, to defer these treatments will likely result in death.  He wishes for DNR/DNI orders to be in force even during the planned EGD procedure.  He was given the opportunity to consider accepting a trial of intubation, since he is at risk for respiratory trouble.  He declined to accept even a trial.  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    The patient indicated his desire to appoint his friend Love [phone].  However, Prerna Tello NP palliative informs me that she has contacted her since this morning and Love is not willing to serve.  She does seem concerned about the patient but does not want the responsibility.      Recommendations:  Recommend that the team enter DNR/DNI orders if there is physician agreement as this represents the patient's expressed wishes.  Primary team and GI are aware of the parameters and understand the patient's goals and priorities. Recommend that a MOLST is completed (which has now been initiated by palliative).  As pt's friend Love declines to be HCP, recommend asking if the patient wishes to appoint someone else.  If not, it will certainly help to address the MOLST while still with capacity. 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  Mr. De Jesus is looking forward to that visit.      *Of important note, if the patient loses decisional capacity, has no HCP or available surrogate,  and a therapeutic intervention is determined to be in the patients interest, the procedure may be authorized by an attending physician in collaboration with the rest of the treatment team.  Feel free to reconsult if additional ethics questions arise.    50 minutes spent, 30 mins in advance care planning discussion. Clinical Per Team:   Mr. De Jesus is an 87M PMH ESRD, Epilepsy, HTN, HLD, RA, GERD, IDDM admitted to MICU for septic shock 2/2 aspiration PNA no longer requiring pressors. Now being evaluated for chronic aspiration.    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  Patient had been thought to lack capacity and no surrogate was available to provide consent.    Ethics Process:  Reviewed the record, spoke with primary team and consulting GI before and again after meeting with the patient.  Talked with the patient at the bedside at 11:30am today.  Repeated elements of the discussion at noon with first the bedside MICU nurse, then medical resident present.  Also, phoned and left VM message for the patient's friend Love whom he hoped would be willing to serve as HCP.      Ethics Findings:   Although per the record, Mr. De Jesus demonstrated impaired mental status at times since admission, on my visit today he demonstrates quite good understanding of his overall situation and the GI procedure proposed, including risks, benefits and alternatives.  The GI doc saw the patient prior to my consult and documents his opinion of same.  All members of the staff present noted personally to me that they agree that the patient is participating with the ability to understand and deliberate.\    The patient consented to the procedure earlier today and I concur that the patient is capable.  He reiterated his desire to have the procedure.  The patient's goals for himself are to return to [facility] where he indicates that he is happy and "doing Ok."  Mr. De Jesus was given the opportunity to express his personal values around life support measures in the event that he suffers a complication in the procedure or if his clinical status should worsen.  He indicated that does have a clear view on the matter.  He does not want to accept intubation for respiratory distress nor CPR for arrest, understanding that if applicable, to defer these treatments will likely result in death.  He wishes for DNR/DNI orders to be in force even during the planned EGD procedure.  He was given the opportunity to consider accepting a trial of intubation, since he is at risk for respiratory trouble.  He declined to accept even a trial.  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    The patient indicated his desire to appoint his friend Love [phone] as HCP.  However, Prerna Tello NP palliative informs me that she has contacted her since this morning and Love is not willing to serve.  She does seem concerned about the patient but does not want the responsibility.      Recommendations:  Recommend that the team enter DNR/DNI orders if there is physician agreement as this represents the patient's expressed wishes.  Primary team and GI are aware of the parameters and understand the patient's goals and priorities. Recommend that a MOLST is completed (which has now been initiated by palliative).  As pt's friend Love declines to be HCP, recommend asking if the patient wishes to appoint someone else.  If not, it will certainly help to address the MOLST while still with capacity. 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  Mr. De Jesus is looking forward to that visit.      *Of important note, if the patient loses decisional capacity, has no HCP or available surrogate,  and a therapeutic intervention is determined to be in the patients interest, the procedure may be authorized by an attending physician in collaboration with the rest of the treatment team.  Feel free to reconsult if additional ethics questions arise.    50 minutes spent, 30 mins in advance care planning discussion. Clinical Per Team:   Mr. De Jesus is an 87M PMH ESRD, Epilepsy, HTN, HLD, RA, GERD, IDDM admitted to MICU for septic shock 2/2 aspiration PNA no longer requiring pressors. Now being evaluated for chronic aspiration.    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  Patient had been thought to lack capacity and no surrogate was available to provide consent.    Ethics Process:  Reviewed the record, spoke with primary team and consulting GI before and again after meeting with the patient.  Talked with the patient at the bedside at 11:30am today.  Repeated elements of the discussion at noon with first the bedside MICU nurse, then medical resident present.  Also, phoned and left VM message for the patient's friend Love whom he hoped would be willing to serve as HCP.      Ethics Findings:   Although per the record, Mr. De Jesus demonstrated impaired mental status at times since admission, on my visit today he demonstrates quite good understanding of his overall situation and the GI procedure proposed, including risks, benefits and alternatives.  The GI doc saw the patient prior to my consult and documents his opinion of same.  All members of the staff present noted personally to me that they agree that the patient is participating with the ability to understand and deliberate.\    The patient consented to the procedure earlier today and I concur that the patient is capable.  He reiterated his desire to have the procedure.  The patient's goals for himself are to return to [facility] where he indicates that he is happy and "doing Ok."  Mr. De Jesus was given the opportunity to express his personal values around life support measures in the event that he suffers a complication in the procedure or if his clinical status should worsen.  He indicated that does have a clear view on the matter.  He does not want to accept intubation for respiratory distress nor CPR for arrest, understanding that if applicable, to defer these treatments will likely result in death.  He wishes for DNR/DNI orders to be in force even during the planned EGD procedure.  He was given the opportunity to consider accepting a trial of intubation, since he is at risk for respiratory trouble.  He declined to accept even a trial.  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    The patient indicated his desire to appoint his friend Love [phone] as HCP.  However, Prerna Tello NP palliative informs me that she has contacted her since this morning and Love is not willing to serve.  She does seem concerned about the patient but does not want the responsibility.      Recommendations:  Recommend that the team enter DNR/DNI orders if there is physician agreement as this represents the patient's expressed wishes.  Primary team and GI are aware of the parameters and understand the patient's goals and priorities. Recommend that a MOLST is completed (which has now been initiated by palliative).  As pt's friend Love declines to be HCP, recommend asking if the patient wishes to appoint someone else.  If not, it will certainly help to address the MOLST while still with capacity. 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  Mr. De Jesus is looking forward to that visit.      *Of important note, if the patient loses decisional capacity, has no HCP or available surrogate, and a therapeutic intervention is determined to be in the patients interest, the procedure may be authorized by an attending physician in collaboration with the rest of the treatment team if the patient does not refuse.  Feel free to reconsult if additional ethics questions arise.    50 minutes spent, 30 mins in advance care planning discussion. Clinical Per Team:   Mr. De Jesus is an 87M PMH ESRD, Epilepsy, HTN, HLD, RA, GERD, IDDM admitted to MICU for septic shock 2/2 aspiration PNA no longer requiring pressors. Now being evaluated for chronic aspiration.    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  Patient had been thought to lack capacity and no surrogate was available to provide consent.    Ethics Process:  Reviewed the record, spoke with primary team and consulting GI before and again after meeting with the patient.  Talked with the patient at the bedside at 11:30am today.  Repeated elements of the discussion at noon with first the bedside MICU nurse, then medical resident present.  Also, phoned and left VM message for the patient's friend Love whom he hoped would be willing to serve as HCP.      Ethics Findings:   Although per the record, Mr. De Jesus demonstrated impaired mental status at times since admission, on my visit today he demonstrates quite good understanding of his overall situation and the GI procedure proposed, including risks, benefits and alternatives.  The GI doc saw the patient prior to my consult and documents his opinion of same.  All members of the staff present noted personally to me that they agree that the patient is participating with the ability to understand and deliberate.\    The patient consented to the procedure earlier today and I concur that the patient is capable.  He reiterated his desire to have the procedure.  The patient's goals for himself are to return to [facility] where he indicates that he is happy and "doing Ok."  Mr. De Jesus was given the opportunity to express his personal values around life support measures in the event that he suffers a complication in the procedure or if his clinical status should worsen.  He indicated that does have a clear view on the matter.  He does not want to accept intubation for respiratory distress nor CPR for arrest, understanding that if applicable, to defer these treatments will likely result in death.  He wishes for DNR/DNI orders to be in force even during the planned EGD procedure.  He was given the opportunity to consider accepting a trial of intubation, since he is at risk for respiratory trouble.  He declined to accept even a trial.  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    The patient indicated his desire to appoint his friend Love [phone] as HCP.  However, Prerna Tello NP palliative informs me that she has contacted her since this morning and Love is not willing to serve.  She does seem concerned about the patient but does not want the responsibility.      Recommendations:  Recommend that the team enter DNR/DNI orders if there is physician agreement as this represents the patient's expressed wishes.  Primary team and GI are aware of the parameters and understand the patient's goals and priorities. Recommend that a MOLST is completed (which has now been initiated by palliative).  As pt's friend Love declines to be HCP, recommend asking if the patient wishes to appoint someone else.  If not, it will certainly help to address the MOLST while still with capacity. 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  Mr. De Jesus is looking forward to that visit.      *Of important note, if the patient loses decisional capacity, has no HCP or available surrogate, and a therapeutic intervention is determined to be in the patients interest, the procedure may be authorized by an attending physician in collaboration with the rest of the treatment team if the patient does not refuse. The patient's prior decisions (MOLST orders) stand even if capacity is lost unless there is a compelling reason to readdress those orders.  Feel free to reconsult if additional ethics questions arise.    50 minutes spent, 30 mins in advance care planning discussion.

## 2017-02-23 NOTE — CONSULT NOTE ADULT - CONSULT REASON
Clarify mechanism for decision-making as the patient seems to lack capacity and no surrogate is available

## 2017-02-23 NOTE — PROGRESS NOTE ADULT - SUBJECTIVE AND OBJECTIVE BOX
ADVANCE CARE PLANNING MEETING  START TIME:1430  END TIME:1500  TOTAL TIME:30    A FACE TO FACE MEETING TO DISCUSS ADVANCE CARE PLANNING WAS HELD TODAY REGARDING: LUIS GARDNER  PRIMARY DECISION MAKER: Luis Gardner  ALTERNATE/SURROGATE: pt verbally elected friend Estrellita Butler to be HCP, but when told she didn't want to be HCP, pt said he was tired and wanted to sleep  DISCUSSED ADVANCE DIRECTIVES INCLUDING, BUT NOT LIMITED TO, HEALTHCARE PROXY AND CODE STATUS.  DECISION REGARDING CODE STATUS:DNR/DNI  DOCUMENTATION COMPLETED TODAY: NISHI.  Pt clearly stated that he would not want his life prolonged artificially on machines/tubes in the event his "heart stops or I stop breathing" when discussing cpr/intubation.  Feeding tubes were also discussed and pt opted for only a trial of a feeding tube if needed but "never for forever"

## 2017-02-23 NOTE — PROGRESS NOTE ADULT - SUBJECTIVE AND OBJECTIVE BOX
LUIS GARDNER   [id number]     : 1929    CC: Patient is a 87y old  Male who presents with a chief complaint of Fever (2017 17:26) rx for presumed aspiration pna.  Pt reports feeling better.  Per GI notes, pt was a&ox3 today    ROS:  UNABLE TO OBTAIN  due to:    DYSPNEA (Y/N): y	  N/V (Y/N):n	  SECRETIONS (Y/N):n	  AGITATION (Y/N):n  PAIN(Y/N):  n      -Provocation/Palliation:     -Quality/Quantity:     -Radiating:     -Severity:     -Timing/Frequency:     -Impact on ADLs:     OTHER REVIEW OF SYSTEMS:  ALLERGIES:  clonidine (Unknown)    OPIATE NAIVE (Y/N): y  -iStop reviewed (Y/N): y, ref#  [id number]    MEDICATIONS: reviewed  MEDICATIONS  (STANDING):  fluticasone / salmeterol 250-50 MICROgram(s) Diskus 1Dose(s) Inhalation two times a day  aspirin enteric coated 81milliGRAM(s) Oral daily  docusate sodium 100milliGRAM(s) Oral daily  heparin  Injectable 5000Unit(s) SubCutaneous every 8 hours  folic acid 1milliGRAM(s) Oral daily  artificial  tears Solution 1Drop(s) Both EYES every 6 hours  mirtazapine 15milliGRAM(s) Oral at bedtime  gabapentin 100milliGRAM(s) Oral two times a day  metoclopramide 10milliGRAM(s) Oral three times a day with meals  simvastatin 20milliGRAM(s) Oral at bedtime  piperacillin/tazobactam IVPB. 2.25Gram(s) IV Intermittent every 12 hours  ALBUTerol/ipratropium for Nebulization 3milliLiter(s) Nebulizer every 4 hours  levETIRAcetam  IVPB 250milliGRAM(s) IV Intermittent every 12 hours  levETIRAcetam  IVPB 250milliGRAM(s) IV Intermittent <User Schedule>  insulin lispro (HumaLOG) corrective regimen sliding scale  SubCutaneous Before meals and at bedtime  midodrine 5milliGRAM(s) Oral every 8 hours  acetylcysteine 20% Inhalation 5milliLiter(s) Inhalation every 4 hours  pantoprazole  Injectable 40milliGRAM(s) IV Push daily  botulinum toxin Type A Injectable 100Unit(s) IntraMuscular once    MEDICATIONS  (PRN):  acetaminophen  Suppository 650milliGRAM(s) Rectal every 6 hours PRN For Temp greater than 38 C (100.4 F)    PHYSICAL EXAM:  T(C): 36.8, Max: 38.9 ( @ 18:10)  T(F): 98.3, Max: 102 ( @ 18:10)  HR: 74 (70 - 102)  BP: 101/54 (75/52 - 144/60)  RR: 26 (19 - 32)  SpO2: 100% (93% - 100%)    GENERAL: Much more awake, alert and engaging today.  Reports he will be having an edoscopy later  HEENT: dry mucuous membranes    	  NECK: normal           CVS: NSR on ECG          	  RESP: nebulizer in progress, bilateral rhonchi      	  GI: soft, NT, ND             	  : left AV graft           	  MUSC: AVI x4      	  NEURO: alert and oriented x3, recalls speaking to a woman earlier regarding cpr and breathing tubes, following all commands     	  PSYCH: calm         SKIN: no open lesions        	   LYMPH: no cervical LAD         LABS: reviewed                        8.0    14.2  )-----------( 258      ( 2017 06:28 )             24.2     2017 06:29    138    |  99     |  20     ----------------------------<  100    3.6     |  27     |  3.68     Ca    8.3        2017 06:29  Phos  2.8       2017 06:29  Mg     1.9       2017 06:29    TPro  6.9    /  Alb  2.2    /  TBili  0.4    /  DBili  x      /  AST  23     /  ALT  9      /  AlkPhos  91     2017 06:29    LIVER FUNCTIONS - ( 2017 06:29 )  Alb: 2.2 g/dL / Pro: 6.9 g/dL / ALK PHOS: 91 U/L / ALT: 9 U/L / AST: 23 U/L / GGT: x           IMAGING: reviewed  EXAM:  XR CHEST 1 VIEW PORT IMMEDIATE                           PROCEDURE DATE:  2017       IMPRESSION: No significant interval change.    ADVANCED DIRECTIVES:     DNR     DNI     MOLST-17    DECISION MAKER: pt  LEGAL SURROGATE:    PSYCHOSOCIAL-SPIRITUAL ASSESSMENT:       Reviewed         GOALS OF CARE DISCUSSION       Palliative care info/counseling provided	           Advanced Directives addressed (*please see Advance Care Planning Note)	          	      AGENCY CHOICE DISCUSSED:          Other: LTC back at [facility]    REFERRALS	        Palliative Med        Music Therapy        [ ]CRITICAL CARE TIME PROVIDED TO UNSTABLE PT W/ ORGAN FAILURE    Start:               End:  	       Minutes:          [x]> 50% OF THE TIME SPENT IN COUNSELING AND COORDINATING CARE   Start:               End:  	       Minutes:  [ ]PROLONGED SERVICE             FACE TO FACE: [x]PT     [ ]PT & FAMILY   Start:               End:  	       Minutes:

## 2017-02-23 NOTE — PROGRESS NOTE ADULT - ATTENDING COMMENTS
unable to proceed with GI procedure yesterday as pt began to sundown- unable to give consent.  This AM awake and alert- recognizes me and states my name- reports that he amber like to procedure with GI intervention.  to d/w GI  Tolerated dialysis well yesterday-  Nephrologically stable   next HD 2/24

## 2017-02-23 NOTE — PROGRESS NOTE ADULT - SUBJECTIVE AND OBJECTIVE BOX
Patient seen and examined at bedside.     Last HD was on 2/22  Weight 72.2kg bedscale to 70.7kg bedscale  /69 to 129/70     Was able to increase UF goal in light of improving hemodynamics while on vasopressors     This morning unable to provide history due to mental status     fluticasone / salmeterol 250-50 MICROgram(s) Diskus 1Dose(s) two times a day  aspirin enteric coated 81milliGRAM(s) daily  docusate sodium 100milliGRAM(s) daily  heparin  Injectable 5000Unit(s) every 8 hours  folic acid 1milliGRAM(s) daily  artificial  tears Solution 1Drop(s) every 6 hours  mirtazapine 15milliGRAM(s) at bedtime  gabapentin 100milliGRAM(s) two times a day  metoclopramide 10milliGRAM(s) three times a day with meals  simvastatin 20milliGRAM(s) at bedtime  piperacillin/tazobactam IVPB. 2.25Gram(s) every 12 hours  ALBUTerol/ipratropium for Nebulization 3milliLiter(s) every 4 hours  levETIRAcetam  IVPB 250milliGRAM(s) every 12 hours  levETIRAcetam  IVPB 250milliGRAM(s) <User Schedule>  insulin lispro (HumaLOG) corrective regimen sliding scale  Before meals and at bedtime  midodrine 5milliGRAM(s) every 8 hours  acetylcysteine 20% Inhalation 5milliLiter(s) every 4 hours  acetaminophen  Suppository 650milliGRAM(s) every 6 hours PRN  pantoprazole  Injectable 40milliGRAM(s) daily      Allergies    clonidine (Unknown)    Intolerances        T(C): , Max: 38.9 (02-22 @ 18:10)  T(F): , Max: 102 (02-22 @ 18:10)  HR: 78  BP: 93/58  BP(mean): 69  RR: 25  SpO2: 100%  Wt(kg): --  I & Os for 24h ending 02-23 @ 07:00  =============================================  IN:    IV PiggyBack: 300 ml    Total IN: 300 ml  ---------------------------------------------  OUT:    Other: 1500 ml    Total OUT: 1500 ml  ---------------------------------------------  Total NET: -1200 ml    I & Os for current day (as of 02-23 @ 11:46)  =============================================  IN:    IV PiggyBack: 100 ml    Total IN: 100 ml  ---------------------------------------------  OUT:    Total OUT: 0 ml  ---------------------------------------------  Total NET: 100 ml          LABS:                        8.0    14.2  )-----------( 258      ( 23 Feb 2017 06:28 )             24.2     23 Feb 2017 06:29    138    |  99     |  20     ----------------------------<  100    3.6     |  27     |  3.68     Ca    8.3        23 Feb 2017 06:29  Phos  2.8       23 Feb 2017 06:29  Mg     1.9       23 Feb 2017 06:29    TPro  6.9    /  Alb  2.2    /  TBili  0.4    /  DBili  x      /  AST  23     /  ALT  9      /  AlkPhos  91     23 Feb 2017 06:29                RADIOLOGY & ADDITIONAL STUDIES:      ***Preliminary Report***    EXAM:  XR CHEST 1 VIEW PORT IMMEDIATE                           PROCEDURE DATE:  02/22/2017                 INTERPRETATION:  Resident preliminary report    CLINICAL INFORMATION: Altered mental status.  Fever.    TECHNIQUE: A frontal view of the chest is dated 2/22/2017 6:27 PM     COMPARISON: Chest x-ray 2/22/2017 4:17 AM     FINDINGS: Right-sided IJ venous catheter is noted with tip overlying the   cavoatrial junction. No change in right basilar consolidation. Suspected   consolidation at the left lung base. No pneumothorax. No acute osseous   abnormalities.    IMPRESSION: No significant interval change.            "Thank you for the opportunity to participate in the care of this   patient."    FREDDIE KRAFT M.D., RADIOLOGY RESIDENT

## 2017-02-23 NOTE — PROGRESS NOTE ADULT - SUBJECTIVE AND OBJECTIVE BOX
INTERVAL HPI/OVERNIGHT EVENTS: DONATO o/n    SUBJECTIVE: Patient seen and examined at bedside.     ROS: NEG F/C/S/HA/N/V/CP/Dyspnea/Abdominal Pain    OBJECTIVE:    VITAL SIGNS:  ICU Vital Signs Last 24 Hrs  T(C): 36.8, Max: 38.9 (02-22 @ 18:10)  T(F): 98.3, Max: 102 (02-22 @ 18:10)  HR: 74 (70 - 102)  BP: 101/54 (75/52 - 144/60)  BP(mean): 72 (55 - 94)  ABP: --  ABP(mean): --  RR: 26 (19 - 32)  SpO2: 100% (93% - 100%)      I & Os for 24h ending 02-23 @ 07:00  =============================================  IN: 300 ml / OUT: 1500 ml / NET: -1200 ml    I & Os for current day (as of 02-23 @ 14:44)  =============================================  IN: 100 ml / OUT: 0 ml / NET: 100 ml    CAPILLARY BLOOD GLUCOSE  137 (23 Feb 2017 11:00)      PHYSICAL EXAM:    General: NAD  HEENT: NC/AT; PERRL, clear conjunctiva  Neck: no JVD  Respiratory: mild bibasilar crackles  Cardiovascular: +S1/S2; RRR, no M/R/G  Abdomen: soft, NT/ND; +BS x4  Extremities: no edema  Neurological: AAOx2-3    MEDICATIONS:  MEDICATIONS  (STANDING):  fluticasone / salmeterol 250-50 MICROgram(s) Diskus 1Dose(s) Inhalation two times a day  aspirin enteric coated 81milliGRAM(s) Oral daily  docusate sodium 100milliGRAM(s) Oral daily  heparin  Injectable 5000Unit(s) SubCutaneous every 8 hours  folic acid 1milliGRAM(s) Oral daily  artificial  tears Solution 1Drop(s) Both EYES every 6 hours  mirtazapine 15milliGRAM(s) Oral at bedtime  gabapentin 100milliGRAM(s) Oral two times a day  metoclopramide 10milliGRAM(s) Oral three times a day with meals  simvastatin 20milliGRAM(s) Oral at bedtime  piperacillin/tazobactam IVPB. 2.25Gram(s) IV Intermittent every 12 hours  ALBUTerol/ipratropium for Nebulization 3milliLiter(s) Nebulizer every 4 hours  levETIRAcetam  IVPB 250milliGRAM(s) IV Intermittent every 12 hours  levETIRAcetam  IVPB 250milliGRAM(s) IV Intermittent <User Schedule>  insulin lispro (HumaLOG) corrective regimen sliding scale  SubCutaneous Before meals and at bedtime  midodrine 5milliGRAM(s) Oral every 8 hours  acetylcysteine 20% Inhalation 5milliLiter(s) Inhalation every 4 hours  pantoprazole  Injectable 40milliGRAM(s) IV Push daily  botulinum toxin Type A Injectable 100Unit(s) IntraMuscular once    MEDICATIONS  (PRN):  acetaminophen  Suppository 650milliGRAM(s) Rectal every 6 hours PRN For Temp greater than 38 C (100.4 F)      ALLERGIES:  Allergies    clonidine (Unknown)    Intolerances        LABS:                        8.0    14.2  )-----------( 258      ( 23 Feb 2017 06:28 )             24.2     23 Feb 2017 06:29    138    |  99     |  20     ----------------------------<  100    3.6     |  27     |  3.68     Ca    8.3        23 Feb 2017 06:29  Phos  2.8       23 Feb 2017 06:29  Mg     1.9       23 Feb 2017 06:29    TPro  6.9    /  Alb  2.2    /  TBili  0.4    /  DBili  x      /  AST  23     /  ALT  9      /  AlkPhos  91     23 Feb 2017 06:29          RADIOLOGY & ADDITIONAL TESTS: Reviewed.

## 2017-02-23 NOTE — PROGRESS NOTE ADULT - ASSESSMENT
88 y/o legally blind savanna with h/o alzheimer's, Zencker's diverticulum, ESRD on HD, CHF, CADdepression, HTN, HLD, OA, PVD, polyneuropathy, COPD, dysphagia, DVT, p/w fevers and desaturation found to be in septic shock from aspiration vs. uti, with generalized weakness and hypoalbuminemia with significantly improved mentation today     -case d/w primary team.  MOLST started.  Pt is now DNR/DNI

## 2017-02-23 NOTE — PROGRESS NOTE ADULT - SUBJECTIVE AND OBJECTIVE BOX
Pt seen and examined. Yesterday evening procedure was cancelled because patient was confused in setting of fever and rigors. Patient now feeling well, now at normal baseline state per him. When asked if he remembers what procedure was planned, he repeated that he was going to get botox. Risks and benefits of planned endoscopy was described at bedtime and patient expressed understanding    REVIEW OF SYSTEMS:  Constitutional: No fever, weight loss or fatigue  Cardiovascular: No chest pain, palpitations, dizziness or leg swelling  Gastrointestinal: No abdominal or epigastric pain. No nausea, vomiting or hematemesis; No diarrhea or constipation. No melena or hematochezia.  Skin: No itching, burning, rashes or lesions       MEDICATIONS:  MEDICATIONS  (STANDING):  fluticasone / salmeterol 250-50 MICROgram(s) Diskus 1Dose(s) Inhalation two times a day  aspirin enteric coated 81milliGRAM(s) Oral daily  docusate sodium 100milliGRAM(s) Oral daily  heparin  Injectable 5000Unit(s) SubCutaneous every 8 hours  folic acid 1milliGRAM(s) Oral daily  artificial  tears Solution 1Drop(s) Both EYES every 6 hours  mirtazapine 15milliGRAM(s) Oral at bedtime  gabapentin 100milliGRAM(s) Oral two times a day  metoclopramide 10milliGRAM(s) Oral three times a day with meals  simvastatin 20milliGRAM(s) Oral at bedtime  piperacillin/tazobactam IVPB. 2.25Gram(s) IV Intermittent every 12 hours  ALBUTerol/ipratropium for Nebulization 3milliLiter(s) Nebulizer every 4 hours  levETIRAcetam  IVPB 250milliGRAM(s) IV Intermittent every 12 hours  levETIRAcetam  IVPB 250milliGRAM(s) IV Intermittent <User Schedule>  insulin lispro (HumaLOG) corrective regimen sliding scale  SubCutaneous Before meals and at bedtime  midodrine 5milliGRAM(s) Oral every 8 hours  acetylcysteine 20% Inhalation 5milliLiter(s) Inhalation every 4 hours  pantoprazole  Injectable 40milliGRAM(s) IV Push daily    MEDICATIONS  (PRN):  acetaminophen  Suppository 650milliGRAM(s) Rectal every 6 hours PRN For Temp greater than 38 C (100.4 F)      Allergies    clonidine (Unknown)    Intolerances        Vital Signs Last 24 Hrs  T(C): 36.3, Max: 38.9 (02-22 @ 18:10)  T(F): 97.4, Max: 102 (02-22 @ 18:10)  HR: 78 (70 - 102)  BP: 93/58 (75/52 - 171/73)  BP(mean): 69 (55 - 107)  RR: 25 (19 - 32)  SpO2: 100% (93% - 100%)  I & Os for 24h ending 02-23 @ 07:00  =============================================  IN: 300 ml / OUT: 1500 ml / NET: -1200 ml    I & Os for current day (as of 02-23 @ 11:22)  =============================================  IN: 100 ml / OUT: 0 ml / NET: 100 ml      PHYSICAL EXAM:    General: Awake, AAOx3 in no acute distress  HEENT: Dry, conjunctiva and sclera clear  Gastrointestinal: Soft non-tender non-distended; Normal bowel sounds; No hepatosplenomegaly. No rebound or guarding  Skin: Warm and dry. No obvious rash    LABS:  CBC Full  -  ( 23 Feb 2017 06:28 )  WBC Count : 14.2 K/uL  Hemoglobin : 8.0 g/dL  Hematocrit : 24.2 %  Platelet Count - Automated : 258 K/uL  Mean Cell Volume : 93.1 fL  Mean Cell Hemoglobin : 30.8 pg  Mean Cell Hemoglobin Concentration : 33.1 g/dL  Auto Neutrophil # : x  Auto Lymphocyte # : x  Auto Monocyte # : x  Auto Eosinophil # : x  Auto Basophil # : x  Auto Neutrophil % : 73.1 %  Auto Lymphocyte % : 18.1 %  Auto Monocyte % : 7.4 %  Auto Eosinophil % : 1.1 %  Auto Basophil % : 0.3 %    23 Feb 2017 06:29    138    |  99     |  20     ----------------------------<  100    3.6     |  27     |  3.68     Ca    8.3        23 Feb 2017 06:29  Phos  2.8       23 Feb 2017 06:29  Mg     1.9       23 Feb 2017 06:29    TPro  6.9    /  Alb  2.2    /  TBili  0.4    /  DBili  x      /  AST  23     /  ALT  9      /  AlkPhos  91     23 Feb 2017 06:29

## 2017-02-23 NOTE — PROGRESS NOTE ADULT - RS GEN PE MLT RESP DETAILS PC
no rales/rhonchi/normal/diminished breath sounds, L/diminished breath sounds, R/no wheezes/clear to auscultation bilaterally

## 2017-02-23 NOTE — PROGRESS NOTE ADULT - ASSESSMENT
A/P:  87M PMH ESRD, Epilepsy, Alzheimer's dementia, HTN, HLD, RA, GERD, IDDM admitted to MICU for septic shock 2/2 aspiration PNA vs. UTI no longer requiring pressors. Now being evaluated for chronic aspiration    #ID: Septic shock 2/2 asp PNA vs. UTI: Pt was febrile to 101, tachycardic, tachypneic, + leukocytosis requiring levophed and admission to MICU. UTI was positive on admission, but patient is usually anuric, so culture not sent; + pus found on penis yesterday. Found to have a RLL infiltrate on CXR in setting of chronic aspirations now LLL suspicious for infiltrate. Currently HD stable/perfusing well despite brief episode of hypotension overnight, with SBPs in 90s-110s off levophed pressor support and on midodrine.  - Empiric Abx - c/w zosyn; d/c-ed vanc as low suspicion for MRSA  - All BCx NGTD, Sputum - nl resp soraida; f/u Penile pus Cx from 2/21     #CV: Pt no longer requiring levophed pressor support, HD stable and perfusing well with SBPs 90s-110s. - C/w Midodrine 5mg po TID.  #CAD: - C/w ASA daily     #Respiratory: Patient tachypneic on admission with secretions. Now he is stable, improving tachypnea, on 4L NC and saturating well.   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  - Pt unable to tolerate barium esophagram on 2/21 - desatted while in radiology. Plan to go for EGD today - DNI rescinded for elective procedure  - GI consulted, f/u recs.   - C/w Reglan    #Renal: ESRD: Dialysis MWF, no repletion of lytes as done during dialysis.    #Heme: Anemia: Likely 2/2 to ESRD.   -Active T/S   -C/w Epogen    #Endo: DM: C/w ISS.     #Neuro: Seizure disorder: C/w Keppra.    #FEN: NPO for possible EGD today. Do not replete lytes as on HD.  #PPX: HSQ, Protonix, bowel regimen  DISPO: MICU  Lines: MARION placed 02/19   CODE: FULL

## 2017-02-24 DIAGNOSIS — R63.8 OTHER SYMPTOMS AND SIGNS CONCERNING FOOD AND FLUID INTAKE: ICD-10-CM

## 2017-02-24 DIAGNOSIS — I25.10 ATHEROSCLEROTIC HEART DISEASE OF NATIVE CORONARY ARTERY WITHOUT ANGINA PECTORIS: ICD-10-CM

## 2017-02-24 DIAGNOSIS — N18.6 END STAGE RENAL DISEASE: ICD-10-CM

## 2017-02-24 DIAGNOSIS — R56.9 UNSPECIFIED CONVULSIONS: ICD-10-CM

## 2017-02-24 DIAGNOSIS — Z29.9 ENCOUNTER FOR PROPHYLACTIC MEASURES, UNSPECIFIED: ICD-10-CM

## 2017-02-24 DIAGNOSIS — Z91.89 OTHER SPECIFIED PERSONAL RISK FACTORS, NOT ELSEWHERE CLASSIFIED: ICD-10-CM

## 2017-02-24 DIAGNOSIS — E11.9 TYPE 2 DIABETES MELLITUS WITHOUT COMPLICATIONS: ICD-10-CM

## 2017-02-24 LAB
ALBUMIN SERPL ELPH-MCNC: 2 G/DL — LOW (ref 3.4–5)
ALP SERPL-CCNC: 84 U/L — SIGNIFICANT CHANGE UP (ref 40–120)
ALT FLD-CCNC: 9 U/L — LOW (ref 12–42)
ANION GAP SERPL CALC-SCNC: 13 MMOL/L — SIGNIFICANT CHANGE UP (ref 9–16)
AST SERPL-CCNC: 17 U/L — SIGNIFICANT CHANGE UP (ref 15–37)
BASOPHILS NFR BLD AUTO: 0.4 % — SIGNIFICANT CHANGE UP (ref 0–2)
BILIRUB SERPL-MCNC: 0.4 MG/DL — SIGNIFICANT CHANGE UP (ref 0.2–1.2)
BUN SERPL-MCNC: 28 MG/DL — HIGH (ref 7–23)
CALCIUM SERPL-MCNC: 8.1 MG/DL — LOW (ref 8.5–10.5)
CHLORIDE SERPL-SCNC: 100 MMOL/L — SIGNIFICANT CHANGE UP (ref 96–108)
CO2 SERPL-SCNC: 25 MMOL/L — SIGNIFICANT CHANGE UP (ref 22–31)
CREAT SERPL-MCNC: 4.99 MG/DL — HIGH (ref 0.5–1.3)
EOSINOPHIL NFR BLD AUTO: 2.4 % — SIGNIFICANT CHANGE UP (ref 0–6)
GLUCOSE SERPL-MCNC: 86 MG/DL — SIGNIFICANT CHANGE UP (ref 70–99)
HCT VFR BLD CALC: 22.6 % — LOW (ref 39–50)
HGB BLD-MCNC: 7.4 G/DL — LOW (ref 13–17)
LYMPHOCYTES # BLD AUTO: 20 % — SIGNIFICANT CHANGE UP (ref 13–44)
MAGNESIUM SERPL-MCNC: 1.7 MG/DL — SIGNIFICANT CHANGE UP (ref 1.6–2.4)
MCHC RBC-ENTMCNC: 30.8 PG — SIGNIFICANT CHANGE UP (ref 27–34)
MCHC RBC-ENTMCNC: 32.7 G/DL — SIGNIFICANT CHANGE UP (ref 32–36)
MCV RBC AUTO: 94.2 FL — SIGNIFICANT CHANGE UP (ref 80–100)
MONOCYTES NFR BLD AUTO: 4.7 % — SIGNIFICANT CHANGE UP (ref 2–14)
NEUTROPHILS NFR BLD AUTO: 72.5 % — SIGNIFICANT CHANGE UP (ref 43–77)
PHOSPHATE SERPL-MCNC: 3.4 MG/DL — SIGNIFICANT CHANGE UP (ref 2.5–4.5)
PLATELET # BLD AUTO: 248 K/UL — SIGNIFICANT CHANGE UP (ref 150–400)
POTASSIUM SERPL-MCNC: 3.3 MMOL/L — LOW (ref 3.5–5.3)
POTASSIUM SERPL-SCNC: 3.3 MMOL/L — LOW (ref 3.5–5.3)
PROT SERPL-MCNC: 6.5 G/DL — SIGNIFICANT CHANGE UP (ref 6.4–8.2)
RBC # BLD: 2.4 M/UL — LOW (ref 4.2–5.8)
RBC # FLD: 16.3 % — SIGNIFICANT CHANGE UP (ref 10.3–16.9)
SODIUM SERPL-SCNC: 138 MMOL/L — SIGNIFICANT CHANGE UP (ref 135–145)
WBC # BLD: 16.1 K/UL — HIGH (ref 3.8–10.5)
WBC # FLD AUTO: 16.1 K/UL — HIGH (ref 3.8–10.5)

## 2017-02-24 PROCEDURE — 99233 SBSQ HOSP IP/OBS HIGH 50: CPT

## 2017-02-24 PROCEDURE — 99232 SBSQ HOSP IP/OBS MODERATE 35: CPT

## 2017-02-24 PROCEDURE — 71010: CPT | Mod: 26

## 2017-02-24 PROCEDURE — 90935 HEMODIALYSIS ONE EVALUATION: CPT | Mod: GC

## 2017-02-24 RX ORDER — POTASSIUM CHLORIDE 20 MEQ
10 PACKET (EA) ORAL
Qty: 0 | Refills: 0 | Status: DISCONTINUED | OUTPATIENT
Start: 2017-02-24 | End: 2017-02-24

## 2017-02-24 RX ORDER — SODIUM CHLORIDE 9 MG/ML
1000 INJECTION, SOLUTION INTRAVENOUS
Qty: 0 | Refills: 0 | Status: DISCONTINUED | OUTPATIENT
Start: 2017-02-24 | End: 2017-02-24

## 2017-02-24 RX ORDER — MAGNESIUM SULFATE 500 MG/ML
1 VIAL (ML) INJECTION ONCE
Qty: 0 | Refills: 0 | Status: COMPLETED | OUTPATIENT
Start: 2017-02-24 | End: 2017-02-24

## 2017-02-24 RX ORDER — POTASSIUM CHLORIDE 20 MEQ
40 PACKET (EA) ORAL ONCE
Qty: 0 | Refills: 0 | Status: DISCONTINUED | OUTPATIENT
Start: 2017-02-24 | End: 2017-02-24

## 2017-02-24 RX ORDER — POTASSIUM CHLORIDE 20 MEQ
20 PACKET (EA) ORAL
Qty: 0 | Refills: 0 | Status: COMPLETED | OUTPATIENT
Start: 2017-02-24 | End: 2017-02-24

## 2017-02-24 RX ORDER — VANCOMYCIN HCL 1 G
1000 VIAL (EA) INTRAVENOUS ONCE
Qty: 0 | Refills: 0 | Status: COMPLETED | OUTPATIENT
Start: 2017-02-24 | End: 2017-02-24

## 2017-02-24 RX ADMIN — Medication 3 MILLILITER(S): at 14:24

## 2017-02-24 RX ADMIN — FLUTICASONE PROPIONATE AND SALMETEROL 1 DOSE(S): 50; 250 POWDER ORAL; RESPIRATORY (INHALATION) at 06:17

## 2017-02-24 RX ADMIN — SIMVASTATIN 20 MILLIGRAM(S): 20 TABLET, FILM COATED ORAL at 22:43

## 2017-02-24 RX ADMIN — Medication 5 MILLILITER(S): at 06:16

## 2017-02-24 RX ADMIN — Medication 50 MILLIEQUIVALENT(S): at 08:39

## 2017-02-24 RX ADMIN — Medication 3 MILLILITER(S): at 22:43

## 2017-02-24 RX ADMIN — MIDODRINE HYDROCHLORIDE 5 MILLIGRAM(S): 2.5 TABLET ORAL at 23:56

## 2017-02-24 RX ADMIN — SODIUM CHLORIDE 50 MILLILITER(S): 9 INJECTION, SOLUTION INTRAVENOUS at 11:21

## 2017-02-24 RX ADMIN — Medication 2: at 11:20

## 2017-02-24 RX ADMIN — HEPARIN SODIUM 5000 UNIT(S): 5000 INJECTION INTRAVENOUS; SUBCUTANEOUS at 23:57

## 2017-02-24 RX ADMIN — Medication 50 MILLIEQUIVALENT(S): at 05:52

## 2017-02-24 RX ADMIN — Medication 1 DROP(S): at 11:21

## 2017-02-24 RX ADMIN — Medication 3 MILLILITER(S): at 02:41

## 2017-02-24 RX ADMIN — GABAPENTIN 100 MILLIGRAM(S): 400 CAPSULE ORAL at 17:08

## 2017-02-24 RX ADMIN — Medication 5 MILLILITER(S): at 14:06

## 2017-02-24 RX ADMIN — Medication 1 DROP(S): at 05:53

## 2017-02-24 RX ADMIN — FLUTICASONE PROPIONATE AND SALMETEROL 1 DOSE(S): 50; 250 POWDER ORAL; RESPIRATORY (INHALATION) at 17:10

## 2017-02-24 RX ADMIN — Medication 1 DROP(S): at 00:15

## 2017-02-24 RX ADMIN — Medication 3 MILLILITER(S): at 06:16

## 2017-02-24 RX ADMIN — LEVETIRACETAM 410 MILLIGRAM(S): 250 TABLET, FILM COATED ORAL at 17:42

## 2017-02-24 RX ADMIN — PANTOPRAZOLE SODIUM 40 MILLIGRAM(S): 20 TABLET, DELAYED RELEASE ORAL at 11:20

## 2017-02-24 RX ADMIN — HEPARIN SODIUM 5000 UNIT(S): 5000 INJECTION INTRAVENOUS; SUBCUTANEOUS at 05:51

## 2017-02-24 RX ADMIN — Medication 5 MILLILITER(S): at 22:44

## 2017-02-24 RX ADMIN — HEPARIN SODIUM 5000 UNIT(S): 5000 INJECTION INTRAVENOUS; SUBCUTANEOUS at 13:18

## 2017-02-24 RX ADMIN — Medication 5 MILLILITER(S): at 10:51

## 2017-02-24 RX ADMIN — PIPERACILLIN AND TAZOBACTAM 200 GRAM(S): 4; .5 INJECTION, POWDER, LYOPHILIZED, FOR SOLUTION INTRAVENOUS at 09:10

## 2017-02-24 RX ADMIN — Medication 50 MILLIEQUIVALENT(S): at 06:56

## 2017-02-24 RX ADMIN — Medication 1 DROP(S): at 23:58

## 2017-02-24 RX ADMIN — Medication 250 MILLIGRAM(S): at 16:11

## 2017-02-24 RX ADMIN — Medication 3 MILLILITER(S): at 09:57

## 2017-02-24 RX ADMIN — Medication 5 MILLILITER(S): at 18:14

## 2017-02-24 RX ADMIN — Medication 3 MILLILITER(S): at 18:23

## 2017-02-24 RX ADMIN — LEVETIRACETAM 410 MILLIGRAM(S): 250 TABLET, FILM COATED ORAL at 05:52

## 2017-02-24 RX ADMIN — MIRTAZAPINE 15 MILLIGRAM(S): 45 TABLET, ORALLY DISINTEGRATING ORAL at 22:43

## 2017-02-24 RX ADMIN — LEVETIRACETAM 410 MILLIGRAM(S): 250 TABLET, FILM COATED ORAL at 11:20

## 2017-02-24 RX ADMIN — PIPERACILLIN AND TAZOBACTAM 200 GRAM(S): 4; .5 INJECTION, POWDER, LYOPHILIZED, FOR SOLUTION INTRAVENOUS at 22:42

## 2017-02-24 RX ADMIN — ERYTHROPOIETIN 7000 UNIT(S): 10000 INJECTION, SOLUTION INTRAVENOUS; SUBCUTANEOUS at 13:18

## 2017-02-24 RX ADMIN — Medication 100 GRAM(S): at 05:51

## 2017-02-24 RX ADMIN — Medication 1 DROP(S): at 17:08

## 2017-02-24 RX ADMIN — Medication 2: at 22:43

## 2017-02-24 RX ADMIN — Medication 10 MILLIGRAM(S): at 17:08

## 2017-02-24 NOTE — PROGRESS NOTE ADULT - ATTENDING COMMENTS
seen and evaluated while on dialysis  tolerating procedure well.  continue full treatment as prescribed

## 2017-02-24 NOTE — PROGRESS NOTE ADULT - ASSESSMENT
87M PMH ESRD, Epilepsy, Alzheimer's dementia, HTN, HLD, RA, GERD, IDDM admitted to MICU for septic shock 2/2 aspiration PNA vs. UTI no longer requiring pressors with no current SIRS. Recently evaluated for chronic aspiration w/ botox injection for achalasia. satting well on NC and mentating well. Currently on comfort feeds despite aspiration risk.

## 2017-02-24 NOTE — SWALLOW BEDSIDE ASSESSMENT ADULT - DIET PRIOR TO ADMI
Puree and nectar pe FEES recs from last admission on 12/16.  Previous to this, pt. on [facility] soft and thins at NH.

## 2017-02-24 NOTE — SWALLOW BEDSIDE ASSESSMENT ADULT - SWALLOW EVAL: RECOMMENDED DIET
Puree and Nectar and water sips for oral comfort. Can advance to [facility] soft and thins per patient's discretion.

## 2017-02-24 NOTE — PROGRESS NOTE ADULT - SUBJECTIVE AND OBJECTIVE BOX
INTERVAL HPI/OVERNIGHT EVENTS:    SUBJECTIVE: Patient seen and examined at bedside.     ROS:  CV: Denies chest pain  Resp: Denies SOB  GI: Denies abdominal pain, constipation, diarrhea, nausea, vomiting  : Denies dysuria  ID: Denies fevers, chills  MSK: Denies joint pain     OBJECTIVE:    VITAL SIGNS:  ICU Vital Signs Last 24 Hrs  T(C): 36.4, Max: 37 (02-24 @ 01:46)  T(F): 97.6, Max: 98.6 (02-24 @ 01:46)  HR: 72 (70 - 92)  BP: 108/53 (70/38 - 140/61)  BP(mean): 73 (55 - 100)  ABP: --  ABP(mean): --  RR: 24 (19 - 29)  SpO2: 100% (92% - 100%)      I & Os for 24h ending 02-23 @ 07:00  =============================================  IN: 300 ml / OUT: 1500 ml / NET: -1200 ml    I & Os for current day (as of 02-24 @ 06:13)  =============================================  IN: 780 ml / OUT: 0 ml / NET: 780 ml    CAPILLARY BLOOD GLUCOSE  162 (23 Feb 2017 17:00)      PHYSICAL EXAM:    General: NAD  HEENT: NC/AT; PERRL, clear conjunctiva  Neck: supple  Respiratory: CTA b/l  Cardiovascular: +S1/S2; RRR  Abdomen: soft, NT/ND; +BS x4  Extremities: WWP, 2+ peripheral pulses b/l; no LE edema  Skin: normal color and turgor; no rash  Neurological:     MEDICATIONS:  MEDICATIONS  (STANDING):  fluticasone / salmeterol 250-50 MICROgram(s) Diskus 1Dose(s) Inhalation two times a day  aspirin enteric coated 81milliGRAM(s) Oral daily  docusate sodium 100milliGRAM(s) Oral daily  heparin  Injectable 5000Unit(s) SubCutaneous every 8 hours  folic acid 1milliGRAM(s) Oral daily  artificial  tears Solution 1Drop(s) Both EYES every 6 hours  mirtazapine 15milliGRAM(s) Oral at bedtime  gabapentin 100milliGRAM(s) Oral two times a day  metoclopramide 10milliGRAM(s) Oral three times a day with meals  simvastatin 20milliGRAM(s) Oral at bedtime  piperacillin/tazobactam IVPB. 2.25Gram(s) IV Intermittent every 12 hours  ALBUTerol/ipratropium for Nebulization 3milliLiter(s) Nebulizer every 4 hours  levETIRAcetam  IVPB 250milliGRAM(s) IV Intermittent every 12 hours  levETIRAcetam  IVPB 250milliGRAM(s) IV Intermittent <User Schedule>  insulin lispro (HumaLOG) corrective regimen sliding scale  SubCutaneous Before meals and at bedtime  midodrine 5milliGRAM(s) Oral every 8 hours  acetylcysteine 20% Inhalation 5milliLiter(s) Inhalation every 4 hours  pantoprazole  Injectable 40milliGRAM(s) IV Push daily  norepinephrine Infusion 0.01MICROgram(s)/kG/Min IV Continuous <Continuous>  epoetin nicki Injectable 7000Unit(s) IV Push <User Schedule>  heparin -  Bolus     potassium chloride  20 mEq/100 mL IVPB 20milliEquivalent(s) IV Intermittent every 2 hours    MEDICATIONS  (PRN):  acetaminophen  Suppository 650milliGRAM(s) Rectal every 6 hours PRN For Temp greater than 38 C (100.4 F)      ALLERGIES:  Allergies    clonidine (Unknown)    Intolerances        LABS:                        7.4    16.1  )-----------( 248      ( 24 Feb 2017 03:52 )             22.6     24 Feb 2017 03:44    138    |  100    |  28     ----------------------------<  86     3.3     |  25     |  4.99     Ca    8.1        24 Feb 2017 03:44  Phos  3.4       24 Feb 2017 03:44  Mg     1.7       24 Feb 2017 03:44    TPro  6.5    /  Alb  2.0    /  TBili  0.4    /  DBili  x      /  AST  17     /  ALT  9      /  AlkPhos  84     24 Feb 2017 03:44          RADIOLOGY & ADDITIONAL TESTS: Reviewed. INTERVAL HPI/OVERNIGHT EVENTS:    SUBJECTIVE: Patient seen and examined at bedside.     ROS: NEG N/V/CP/Dyspnea/Abdominal Pain    OBJECTIVE:    VITAL SIGNS:  ICU Vital Signs Last 24 Hrs  T(C): 36.4, Max: 37 (02-24 @ 01:46)  T(F): 97.6, Max: 98.6 (02-24 @ 01:46)  HR: 72 (70 - 92)  BP: 108/53 (70/38 - 140/61)  BP(mean): 73 (55 - 100)  ABP: --  ABP(mean): --  RR: 24 (19 - 29)  SpO2: 100% (92% - 100%)      I & Os for 24h ending 02-23 @ 07:00  =============================================  IN: 300 ml / OUT: 1500 ml / NET: -1200 ml    I & Os for current day (as of 02-24 @ 06:13)  =============================================  IN: 780 ml / OUT: 0 ml / NET: 780 ml    CAPILLARY BLOOD GLUCOSE  162 (23 Feb 2017 17:00)      PHYSICAL EXAM:    General: NAD  HEENT: NC/AT; PERRL, clear conjunctiva  Neck: no JVD  Respiratory: CTA b/l  Cardiovascular: +S1/S2; RRR, no M/R/G  Abdomen: soft, NT/ND; +BS x4  Extremities: WWP, no LE edema  Neurological: AAOx3    MEDICATIONS:  MEDICATIONS  (STANDING):  fluticasone / salmeterol 250-50 MICROgram(s) Diskus 1Dose(s) Inhalation two times a day  aspirin enteric coated 81milliGRAM(s) Oral daily  docusate sodium 100milliGRAM(s) Oral daily  heparin  Injectable 5000Unit(s) SubCutaneous every 8 hours  folic acid 1milliGRAM(s) Oral daily  artificial  tears Solution 1Drop(s) Both EYES every 6 hours  mirtazapine 15milliGRAM(s) Oral at bedtime  gabapentin 100milliGRAM(s) Oral two times a day  metoclopramide 10milliGRAM(s) Oral three times a day with meals  simvastatin 20milliGRAM(s) Oral at bedtime  piperacillin/tazobactam IVPB. 2.25Gram(s) IV Intermittent every 12 hours  ALBUTerol/ipratropium for Nebulization 3milliLiter(s) Nebulizer every 4 hours  levETIRAcetam  IVPB 250milliGRAM(s) IV Intermittent every 12 hours  levETIRAcetam  IVPB 250milliGRAM(s) IV Intermittent <User Schedule>  insulin lispro (HumaLOG) corrective regimen sliding scale  SubCutaneous Before meals and at bedtime  midodrine 5milliGRAM(s) Oral every 8 hours  acetylcysteine 20% Inhalation 5milliLiter(s) Inhalation every 4 hours  pantoprazole  Injectable 40milliGRAM(s) IV Push daily  norepinephrine Infusion 0.01MICROgram(s)/kG/Min IV Continuous <Continuous>  epoetin nicki Injectable 7000Unit(s) IV Push <User Schedule>  heparin -  Bolus     potassium chloride  20 mEq/100 mL IVPB 20milliEquivalent(s) IV Intermittent every 2 hours    MEDICATIONS  (PRN):  acetaminophen  Suppository 650milliGRAM(s) Rectal every 6 hours PRN For Temp greater than 38 C (100.4 F)      ALLERGIES:  Allergies    clonidine (Unknown)    Intolerances        LABS:                        7.4    16.1  )-----------( 248      ( 24 Feb 2017 03:52 )             22.6     24 Feb 2017 03:44    138    |  100    |  28     ----------------------------<  86     3.3     |  25     |  4.99     Ca    8.1        24 Feb 2017 03:44  Phos  3.4       24 Feb 2017 03:44  Mg     1.7       24 Feb 2017 03:44    TPro  6.5    /  Alb  2.0    /  TBili  0.4    /  DBili  x      /  AST  17     /  ALT  9      /  AlkPhos  84     24 Feb 2017 03:44          RADIOLOGY & ADDITIONAL TESTS: Reviewed. TRANSFER NOTE:  Hospital Course: 87M PMH ESRD, Epilepsy, Alzheimer's dementia, HTN, HLD, RA, GERD, IDDM admitted to MICU for septic shock 2/2 aspiration PNA vs. UTI no longer requiring pressors. Evaluated for chronic aspiration and Zenker's diverticulum found in 2016.     SUBJECTIVE: Patient seen and examined at bedside.     ROS: NEG N/V/CP/Dyspnea/Abdominal Pain    OBJECTIVE:    VITAL SIGNS:  ICU Vital Signs Last 24 Hrs  T(C): 36.4, Max: 37 (02-24 @ 01:46)  T(F): 97.6, Max: 98.6 (02-24 @ 01:46)  HR: 72 (70 - 92)  BP: 108/53 (70/38 - 140/61)  BP(mean): 73 (55 - 100)  ABP: --  ABP(mean): --  RR: 24 (19 - 29)  SpO2: 100% (92% - 100%)      I & Os for 24h ending 02-23 @ 07:00  =============================================  IN: 300 ml / OUT: 1500 ml / NET: -1200 ml    I & Os for current day (as of 02-24 @ 06:13)  =============================================  IN: 780 ml / OUT: 0 ml / NET: 780 ml    CAPILLARY BLOOD GLUCOSE  162 (23 Feb 2017 17:00)      PHYSICAL EXAM:    General: NAD  HEENT: NC/AT; PERRL, clear conjunctiva  Neck: no JVD  Respiratory: CTA b/l  Cardiovascular: +S1/S2; RRR, no M/R/G  Abdomen: soft, NT/ND; +BS x4  Extremities: WWP, no LE edema  Neurological: AAOx3    MEDICATIONS:  MEDICATIONS  (STANDING):  fluticasone / salmeterol 250-50 MICROgram(s) Diskus 1Dose(s) Inhalation two times a day  aspirin enteric coated 81milliGRAM(s) Oral daily  docusate sodium 100milliGRAM(s) Oral daily  heparin  Injectable 5000Unit(s) SubCutaneous every 8 hours  folic acid 1milliGRAM(s) Oral daily  artificial  tears Solution 1Drop(s) Both EYES every 6 hours  mirtazapine 15milliGRAM(s) Oral at bedtime  gabapentin 100milliGRAM(s) Oral two times a day  metoclopramide 10milliGRAM(s) Oral three times a day with meals  simvastatin 20milliGRAM(s) Oral at bedtime  piperacillin/tazobactam IVPB. 2.25Gram(s) IV Intermittent every 12 hours  ALBUTerol/ipratropium for Nebulization 3milliLiter(s) Nebulizer every 4 hours  levETIRAcetam  IVPB 250milliGRAM(s) IV Intermittent every 12 hours  levETIRAcetam  IVPB 250milliGRAM(s) IV Intermittent <User Schedule>  insulin lispro (HumaLOG) corrective regimen sliding scale  SubCutaneous Before meals and at bedtime  midodrine 5milliGRAM(s) Oral every 8 hours  acetylcysteine 20% Inhalation 5milliLiter(s) Inhalation every 4 hours  pantoprazole  Injectable 40milliGRAM(s) IV Push daily  norepinephrine Infusion 0.01MICROgram(s)/kG/Min IV Continuous <Continuous>  epoetin nicki Injectable 7000Unit(s) IV Push <User Schedule>  heparin -  Bolus     potassium chloride  20 mEq/100 mL IVPB 20milliEquivalent(s) IV Intermittent every 2 hours    MEDICATIONS  (PRN):  acetaminophen  Suppository 650milliGRAM(s) Rectal every 6 hours PRN For Temp greater than 38 C (100.4 F)      ALLERGIES:  Allergies    clonidine (Unknown)    Intolerances        LABS:                        7.4    16.1  )-----------( 248      ( 24 Feb 2017 03:52 )             22.6     24 Feb 2017 03:44    138    |  100    |  28     ----------------------------<  86     3.3     |  25     |  4.99     Ca    8.1        24 Feb 2017 03:44  Phos  3.4       24 Feb 2017 03:44  Mg     1.7       24 Feb 2017 03:44    TPro  6.5    /  Alb  2.0    /  TBili  0.4    /  DBili  x      /  AST  17     /  ALT  9      /  AlkPhos  84     24 Feb 2017 03:44          RADIOLOGY & ADDITIONAL TESTS: Reviewed. TRANSFER NOTE:  Hospital Course: 87M PMH ESRD, Epilepsy, Alzheimer's dementia, HTN, HLD, RA, GERD, IDDM admitted to MICU for septic shock 2/2 aspiration PNA vs. UTI no longer requiring pressors. PNA seen on CXR bibasilar infiltrates and UA positive UTI w/ enterococcus raffinosus sens to vanc. Pt on day 6 out of 7 of zosyn, to finish 2/25. Vanc 7d course started 2/23. Evaluated for chronic aspiration and Zenker's diverticulum found in 2016. EGD done by GI, suctioned diverticulum, botox injection to achalasia site, and insertion of NGT. Episode of hypotension while under sedation for EGD, started on levo which was titrated off. No longer requiring additional pressors. NGT clogged off and dislodged. Seen by palliative w/ end result comfort feeds, no PEG.    SUBJECTIVE: Patient seen and examined at bedside.     ROS: NEG N/V/CP/Dyspnea/Abdominal Pain    OBJECTIVE:    VITAL SIGNS:  ICU Vital Signs Last 24 Hrs  T(C): 36.4, Max: 37 (02-24 @ 01:46)  T(F): 97.6, Max: 98.6 (02-24 @ 01:46)  HR: 72 (70 - 92)  BP: 108/53 (70/38 - 140/61)  BP(mean): 73 (55 - 100)  ABP: --  ABP(mean): --  RR: 24 (19 - 29)  SpO2: 100% (92% - 100%)      I & Os for 24h ending 02-23 @ 07:00  =============================================  IN: 300 ml / OUT: 1500 ml / NET: -1200 ml    I & Os for current day (as of 02-24 @ 06:13)  =============================================  IN: 780 ml / OUT: 0 ml / NET: 780 ml    CAPILLARY BLOOD GLUCOSE  162 (23 Feb 2017 17:00)      PHYSICAL EXAM:    General: NAD  HEENT: NC/AT; PERRL, clear conjunctiva  Neck: no JVD  Respiratory: CTA b/l  Cardiovascular: +S1/S2; RRR, no M/R/G  Abdomen: soft, NT/ND; +BS x4  Extremities: WWP, no LE edema  Neurological: AAOx3    MEDICATIONS:  MEDICATIONS  (STANDING):  fluticasone / salmeterol 250-50 MICROgram(s) Diskus 1Dose(s) Inhalation two times a day  aspirin enteric coated 81milliGRAM(s) Oral daily  docusate sodium 100milliGRAM(s) Oral daily  heparin  Injectable 5000Unit(s) SubCutaneous every 8 hours  folic acid 1milliGRAM(s) Oral daily  artificial  tears Solution 1Drop(s) Both EYES every 6 hours  mirtazapine 15milliGRAM(s) Oral at bedtime  gabapentin 100milliGRAM(s) Oral two times a day  metoclopramide 10milliGRAM(s) Oral three times a day with meals  simvastatin 20milliGRAM(s) Oral at bedtime  piperacillin/tazobactam IVPB. 2.25Gram(s) IV Intermittent every 12 hours  ALBUTerol/ipratropium for Nebulization 3milliLiter(s) Nebulizer every 4 hours  levETIRAcetam  IVPB 250milliGRAM(s) IV Intermittent every 12 hours  levETIRAcetam  IVPB 250milliGRAM(s) IV Intermittent <User Schedule>  insulin lispro (HumaLOG) corrective regimen sliding scale  SubCutaneous Before meals and at bedtime  midodrine 5milliGRAM(s) Oral every 8 hours  acetylcysteine 20% Inhalation 5milliLiter(s) Inhalation every 4 hours  pantoprazole  Injectable 40milliGRAM(s) IV Push daily  norepinephrine Infusion 0.01MICROgram(s)/kG/Min IV Continuous <Continuous>  epoetin nicki Injectable 7000Unit(s) IV Push <User Schedule>  heparin -  Bolus     potassium chloride  20 mEq/100 mL IVPB 20milliEquivalent(s) IV Intermittent every 2 hours    MEDICATIONS  (PRN):  acetaminophen  Suppository 650milliGRAM(s) Rectal every 6 hours PRN For Temp greater than 38 C (100.4 F)      ALLERGIES:  Allergies    clonidine (Unknown)    Intolerances        LABS:                        7.4    16.1  )-----------( 248      ( 24 Feb 2017 03:52 )             22.6     24 Feb 2017 03:44    138    |  100    |  28     ----------------------------<  86     3.3     |  25     |  4.99     Ca    8.1        24 Feb 2017 03:44  Phos  3.4       24 Feb 2017 03:44  Mg     1.7       24 Feb 2017 03:44    TPro  6.5    /  Alb  2.0    /  TBili  0.4    /  DBili  x      /  AST  17     /  ALT  9      /  AlkPhos  84     24 Feb 2017 03:44          RADIOLOGY & ADDITIONAL TESTS: Reviewed.

## 2017-02-24 NOTE — PROGRESS NOTE ADULT - ASSESSMENT
87 year old male with PMH ESRD (HD MWF, aneuric), Alzheimer's disease, CAD, COPD, angina, anemia, CHF, dementia, depression, HTN, HLD, OA, PVD, polyneuropathy, blindness sent from rehab with sepsis. We are consulted for longstanding history of dysphagia    #dysphagia - presents with likely aspiration pneumonia, probably from hx of regurgitation of solids and liquids. 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 Given his age and comorbidities, definitive achalasia tx is not practical, although palliative endoscopic therapy  will be beneficial.  S/P EGD with suction of copious secretions pooled in esopahgus and large esophageal diverticulum just prox to GEJ. Dilation with scope performed with botox injection at GEJ yielding more patent sphincter.   -Attempt speech and swallow evaluation today and remove NGT if not functioning  -if fails s/s and meeds NGT, proceed with caution as there seemed to be sinus tract from naso-oropharynx that required adjustment of position under direct visualization to correctly approach the esopahgus. PRocedural component should make passing NGT into stomach easier.  -cont abx 87 year old male with PMH ESRD (HD MWF, aneuric), Alzheimer's disease, CAD, COPD, angina, anemia, CHF, dementia, depression, HTN, HLD, OA, PVD, polyneuropathy, blindness sent from rehab with sepsis. We are consulted for longstanding history of dysphagia    #dysphagia - presents with likely aspiration pneumonia, probably from hx of regurgitation of solids and liquids. 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 Given his age and comorbidities, definitive achalasia tx is not practical, although palliative endoscopic therapy  will be beneficial.  S/P EGD with suction of copious secretions pooled in esopahgus and large esophageal diverticulum just prox to GEJ. Dilation with scope performed with botox injection at GEJ yielding more patent sphincter.   -Attempt speech and swallow evaluation today and remove NGT if not functioning  -if fails s/s and meeds NGT, proceed with caution as there seemed to be sinus tract from naso-oropharynx that required adjustment of position under direct visualization to correctly approach the esopahgus. PRocedural component should make passing NGT into stomach easier.  -cont abx    Addendum - decision was made to initiate comfort feeds.   -make sure patient remains upright at least 2 hours after meals, and only have small frequent meals  -please reconsult as needed

## 2017-02-24 NOTE — PROGRESS NOTE ADULT - SUBJECTIVE AND OBJECTIVE BOX
Patient seen and examined at bedside.     Due for dialysis today  Had been undergoing GI workup for aspiration at present  Has not had significant interdialytic fluid or oral intake. Merely about 1100cc   Liberated off vasopressors     Patient is resting but does not provide history at present     fluticasone / salmeterol 250-50 MICROgram(s) Diskus 1Dose(s) two times a day  aspirin enteric coated 81milliGRAM(s) daily  docusate sodium 100milliGRAM(s) daily  heparin  Injectable 5000Unit(s) every 8 hours  folic acid 1milliGRAM(s) daily  artificial  tears Solution 1Drop(s) every 6 hours  mirtazapine 15milliGRAM(s) at bedtime  gabapentin 100milliGRAM(s) two times a day  metoclopramide 10milliGRAM(s) three times a day with meals  simvastatin 20milliGRAM(s) at bedtime  piperacillin/tazobactam IVPB. 2.25Gram(s) every 12 hours  ALBUTerol/ipratropium for Nebulization 3milliLiter(s) every 4 hours  levETIRAcetam  IVPB 250milliGRAM(s) every 12 hours  levETIRAcetam  IVPB 250milliGRAM(s) <User Schedule>  insulin lispro (HumaLOG) corrective regimen sliding scale  Before meals and at bedtime  midodrine 5milliGRAM(s) every 8 hours  acetylcysteine 20% Inhalation 5milliLiter(s) every 4 hours  acetaminophen  Suppository 650milliGRAM(s) every 6 hours PRN  pantoprazole  Injectable 40milliGRAM(s) daily  epoetin nicki Injectable 7000Unit(s) <User Schedule>  heparin -  Bolus    dextrose 5%. 1000milliLiter(s) <Continuous>      Allergies    clonidine (Unknown)    Intolerances        T(C): , Max: 37 (02-24 @ 01:46)  T(F): , Max: 98.6 (02-24 @ 01:46)  HR: 78  BP: 109/59  BP(mean): 77  RR: 26  SpO2: 100%  Wt(kg): --  I & Os for 24h ending 02-24 @ 07:00  =============================================  IN:    IV PiggyBack: 950 ml    norepinephrine Infusion: 30 ml    Total IN: 980 ml  ---------------------------------------------  OUT:    Total OUT: 0 ml  ---------------------------------------------  Total NET: 980 ml    I & Os for current day (as of 02-24 @ 11:13)  =============================================  IN:    IV PiggyBack: 200 ml    Total IN: 200 ml  ---------------------------------------------  OUT:    Total OUT: 0 ml  ---------------------------------------------  Total NET: 200 ml          LABS:                        7.4    16.1  )-----------( 248      ( 24 Feb 2017 03:52 )             22.6     24 Feb 2017 03:44    138    |  100    |  28     ----------------------------<  86     3.3     |  25     |  4.99     Ca    8.1        24 Feb 2017 03:44  Phos  3.4       24 Feb 2017 03:44  Mg     1.7       24 Feb 2017 03:44    TPro  6.5    /  Alb  2.0    /  TBili  0.4    /  DBili  x      /  AST  17     /  ALT  9      /  AlkPhos  84     24 Feb 2017 03:44                RADIOLOGY & ADDITIONAL STUDIES:      ***Preliminary Report***    EXAM:  XR CHEST 1 VIEW PORT IMMEDIATE                           PROCEDURE DATE:  02/22/2017                 INTERPRETATION:  Resident preliminary report    CLINICAL INFORMATION: Altered mental status.  Fever.    TECHNIQUE: A frontal view of the chest is dated 2/22/2017 6:27 PM     COMPARISON: Chest x-ray 2/22/2017 4:17 AM     FINDINGS: Right-sided IJ venous catheter is noted with tip overlying the   cavoatrial junction. No change in right basilar consolidation. Suspected   consolidation at the left lung base. No pneumothorax. No acute osseous   abnormalities.    IMPRESSION: No significant interval change.            "Thank you for the opportunity to participate in the care of this   patient."    FREDDIE KRAFT M.D., RADIOLOGY RESIDENT

## 2017-02-24 NOTE — SWALLOW BEDSIDE ASSESSMENT ADULT - SPECIFY REASON(S)
Pt well known to this department from previous admission and this admission.  Now s/p EGD w Botox to esophagus 2/23.  Being followed by this service for dysphagia management.
Assess swallow function for safest, least restrictive PO intake.

## 2017-02-24 NOTE — PROGRESS NOTE ADULT - ASSESSMENT
86 y/o legally blind gentlemen with h/o alzheimer's, Zencker's diverticulum, ESRD on HD, CHF, CAD, depression, HTN, HLD, OA, PVD, polyneuropathy, COPD, dysphagia, DVT, p/w fevers and desaturation found to be in septic shock from aspiration vs. uti, with generalized weakness and hypoalbuminemia     -readdressed role of HCP and election with pt.  Pt continues to want friend Estrellita Butler to be his HCP despite being informed that she does not want to be, states he will discuss further with her when he returns to [facility].  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  Temporary feeding via NGT/peg readdressed, pt again refused any long term feeding.  Discussed comfort feeding with death as a possible consequence, pt cont. to opt for comfort feeding.  He is aware he will be getting an official swallow eval by Speech Pathology.  Above witnessed by Dr. Baer from ICU.

## 2017-02-24 NOTE — PROGRESS NOTE ADULT - SUBJECTIVE AND OBJECTIVE BOX
Pt seen and examined. Feelign well today. Reports less regurgitation.  no pain reported   of note nurse reports NGT now clogged    REVIEW OF SYSTEMS:  Constitutional: No fever, weight loss or fatigue  Cardiovascular: No chest pain, palpitations, dizziness or leg swelling  Gastrointestinal: No abdominal or epigastric pain. No nausea, vomiting or hematemesis; No diarrhea or constipation. No melena or hematochezia.  Skin: No itching, burning, rashes or lesions       MEDICATIONS:  MEDICATIONS  (STANDING):  fluticasone / salmeterol 250-50 MICROgram(s) Diskus 1Dose(s) Inhalation two times a day  aspirin enteric coated 81milliGRAM(s) Oral daily  docusate sodium 100milliGRAM(s) Oral daily  heparin  Injectable 5000Unit(s) SubCutaneous every 8 hours  folic acid 1milliGRAM(s) Oral daily  artificial  tears Solution 1Drop(s) Both EYES every 6 hours  mirtazapine 15milliGRAM(s) Oral at bedtime  gabapentin 100milliGRAM(s) Oral two times a day  metoclopramide 10milliGRAM(s) Oral three times a day with meals  simvastatin 20milliGRAM(s) Oral at bedtime  piperacillin/tazobactam IVPB. 2.25Gram(s) IV Intermittent every 12 hours  ALBUTerol/ipratropium for Nebulization 3milliLiter(s) Nebulizer every 4 hours  levETIRAcetam  IVPB 250milliGRAM(s) IV Intermittent every 12 hours  levETIRAcetam  IVPB 250milliGRAM(s) IV Intermittent <User Schedule>  insulin lispro (HumaLOG) corrective regimen sliding scale  SubCutaneous Before meals and at bedtime  midodrine 5milliGRAM(s) Oral every 8 hours  acetylcysteine 20% Inhalation 5milliLiter(s) Inhalation every 4 hours  pantoprazole  Injectable 40milliGRAM(s) IV Push daily  epoetin nicki Injectable 7000Unit(s) IV Push <User Schedule>  heparin -  Bolus       MEDICATIONS  (PRN):  acetaminophen  Suppository 650milliGRAM(s) Rectal every 6 hours PRN For Temp greater than 38 C (100.4 F)      Allergies    clonidine (Unknown)    Intolerances        Vital Signs Last 24 Hrs  T(C): 36.4, Max: 37 (02-24 @ 01:46)  T(F): 97.6, Max: 98.6 (02-24 @ 01:46)  HR: 72 (72 - 92)  BP: 109/54 (70/38 - 140/61)  BP(mean): 81 (55 - 100)  RR: 20 (11 - 29)  SpO2: 99% (92% - 100%)  I & Os for 24h ending 02-24 @ 07:00  =============================================  IN: 980 ml / OUT: 0 ml / NET: 980 ml    I & Os for current day (as of 02-24 @ 09:55)  =============================================  IN: 200 ml / OUT: 0 ml / NET: 200 ml      PHYSICAL EXAM:    General: Well developed; well nourished; in no acute distress  HEENT: dry, conjunctiva and sclera clear  Gastrointestinal: Soft non-tender non-distended; Normal bowel sounds; No hepatosplenomegaly. No rebound or guarding  Skin: Warm and dry. No obvious rash    LABS:  CBC Full  -  ( 24 Feb 2017 03:52 )  WBC Count : 16.1 K/uL  Hemoglobin : 7.4 g/dL  Hematocrit : 22.6 %  Platelet Count - Automated : 248 K/uL  Mean Cell Volume : 94.2 fL  Mean Cell Hemoglobin : 30.8 pg  Mean Cell Hemoglobin Concentration : 32.7 g/dL  Auto Neutrophil # : x  Auto Lymphocyte # : x  Auto Monocyte # : x  Auto Eosinophil # : x  Auto Basophil # : x  Auto Neutrophil % : 72.5 %  Auto Lymphocyte % : 20.0 %  Auto Monocyte % : 4.7 %  Auto Eosinophil % : 2.4 %  Auto Basophil % : 0.4 %    24 Feb 2017 03:44    138    |  100    |  28     ----------------------------<  86     3.3     |  25     |  4.99     Ca    8.1        24 Feb 2017 03:44  Phos  3.4       24 Feb 2017 03:44  Mg     1.7       24 Feb 2017 03:44    TPro  6.5    /  Alb  2.0    /  TBili  0.4    /  DBili  x      /  AST  17     /  ALT  9      /  AlkPhos  84     24 Feb 2017 03:44                    RADIOLOGY & ADDITIONAL STUDIES:

## 2017-02-24 NOTE — PROGRESS NOTE ADULT - ATTENDING COMMENTS
awake and alert.  s/p botox of esophagus.  Still not swallowing NGT in place.  comfortable  for dialysis today

## 2017-02-24 NOTE — SWALLOW BEDSIDE ASSESSMENT ADULT - SWALLOW EVAL: RECOMMENDED FEEDING/EATING TECHNIQUES
position upright (90 degrees)/allow for swallow between intakes/small sips/bites/maintain upright posture during/after eating for 30 mins

## 2017-02-24 NOTE — PROGRESS NOTE ADULT - ATTENDING COMMENTS
pt seen and examined on 2/25/2017  reviewed chart note, vs, labs  pt admited to ICU for septic shock from aspiration PNA vs UTI, stabilized, off pressors, transferred to regional from ICU. On GI evaluation during ICU stay noted to have  zenker's diverticulum s/p sucitioning and achalasia s/p botox injection.   agree w/ PE findings as above, except lungs CTA b/l at time of exam  a/p:   1. sepsis/ UTI/ PNA: on vanc and zosyn; checking vanc trough prior to next dose  2. achalasia: s/p GI intervention; pt wishes to have comfort feeds; follow up palliative recs  3. ESRD on HD: monitor lytes, follow up renal recs pt seen and examined on 2/25/2017  reviewed chart note, vs, labs  pt admited to ICU for septic shock from aspiration PNA vs UTI, stabilized, off pressors, transferred to regional from ICU. On GI evaluation during ICU stay noted to have  zenker's diverticulum s/p sucitioning and achalasia s/p botox injection.   agree w/ PE findings as above, except lungs CTA b/l at time of exam  a/p:   agree w/ plan as above   1. sepsis/ UTI/ PNA: on vanc and zosyn; checking vanc trough prior to next dose  2. achalasia: s/p GI intervention; pt wishes to have comfort feeds; follow up palliative recs  3. ESRD on HD: monitor lytes, follow up renal recs

## 2017-02-24 NOTE — PROGRESS NOTE ADULT - SUBJECTIVE AND OBJECTIVE BOX
LUIS GARDNER   MRN-[id number]     :1929    CC: Patient is a 87y old  Male who presents with a chief complaint of Fever (2017 17:26)    ROS:  UNABLE TO OBTAIN  due to:    DYSPNEA (Y/N):n	  N/V (Y/N):n	  SECRETIONS (Y/N):n	  AGITATION (Y/N):n  PAIN(Y/N): n       -Provocation/Palliation:     -Quality/Quantity:     -Radiating:     -Severity:     -Timing/Frequency:     -Impact on ADLs:     OTHER REVIEW OF SYSTEMS:  ALLERGIES:  clonidine (Unknown)    OPIATE NAIVE (Y/N): y  -iStop reviewed (Y/N): y, ref#  [id number]    MEDICATIONS: reviewed  MEDICATIONS  (STANDING):  fluticasone / salmeterol 250-50 MICROgram(s) Diskus 1Dose(s) Inhalation two times a day  aspirin enteric coated 81milliGRAM(s) Oral daily  docusate sodium 100milliGRAM(s) Oral daily  heparin  Injectable 5000Unit(s) SubCutaneous every 8 hours  folic acid 1milliGRAM(s) Oral daily  artificial  tears Solution 1Drop(s) Both EYES every 6 hours  mirtazapine 15milliGRAM(s) Oral at bedtime  gabapentin 100milliGRAM(s) Oral two times a day  metoclopramide 10milliGRAM(s) Oral three times a day with meals  simvastatin 20milliGRAM(s) Oral at bedtime  piperacillin/tazobactam IVPB. 2.25Gram(s) IV Intermittent every 12 hours  ALBUTerol/ipratropium for Nebulization 3milliLiter(s) Nebulizer every 4 hours  levETIRAcetam  IVPB 250milliGRAM(s) IV Intermittent every 12 hours  levETIRAcetam  IVPB 250milliGRAM(s) IV Intermittent <User Schedule>  insulin lispro (HumaLOG) corrective regimen sliding scale  SubCutaneous Before meals and at bedtime  midodrine 5milliGRAM(s) Oral every 8 hours  acetylcysteine 20% Inhalation 5milliLiter(s) Inhalation every 4 hours  pantoprazole  Injectable 40milliGRAM(s) IV Push daily  epoetin nicki Injectable 7000Unit(s) IV Push <User Schedule>  heparin -  Bolus     dextrose 5%. 1000milliLiter(s) IV Continuous <Continuous>    MEDICATIONS  (PRN):  acetaminophen  Suppository 650milliGRAM(s) Rectal every 6 hours PRN For Temp greater than 38 C (100.4 F)    PHYSICAL EXAM:  T(C): 36.4, Max: 37 ( @ 01:46)  T(F): 97.5, Max: 98.6 ( @ 01:46)  HR: 82 (72 - 92)  BP: 106/60 (70/38 - 140/61)  RR: 24 (11 - 29)  SpO2: 100% (92% - 100%)    GENERAL: Awake and alert, appears fairly comfortable  HEENT: dry mucous membranes    	  NECK: normal           CVS: NSR on ECG          	  RESP: 3LNC, resp even and not labored 	  GI: soft, NT, ND, dobboff clamped             	  : left AV graft with HD in progress          	  MUSC: AVI x4      	  NEURO: alert and oriented x3     	  PSYCH: calm         SKIN: no open lesions        	   LYMPH: no cervical LAD         LABS: reviewed                        7.4    16.1  )-----------( 248      ( 2017 03:52 )             22.6     2017 03:44    138    |  100    |  28     ----------------------------<  86     3.3     |  25     |  4.99     Ca    8.1        2017 03:44  Phos  3.4       2017 03:44  Mg     1.7       2017 03:44    TPro  6.5    /  Alb  2.0    /  TBili  0.4    /  DBili  x      /  AST  17     /  ALT  9      /  AlkPhos  84     2017 03:44    LIVER FUNCTIONS - ( 2017 03:44 )  Alb: 2.0 g/dL / Pro: 6.5 g/dL / ALK PHOS: 84 U/L / ALT: 9 U/L / AST: 17 U/L / GGT: x           IMAGING: reviewed    ADVANCED DIRECTIVES:     DNR     DNI          MOLST    DECISION MAKER: pt  LEGAL SURROGATE:    PSYCHOSOCIAL-SPIRITUAL ASSESSMENT:       Reviewed       Care plan unchanged         GOALS OF CARE DISCUSSION       Palliative care info/counseling provided	           See previous Palliative Medicine Note       Documentation of GOC:   	      AGENCY CHOICE DISCUSSED:          Other: return to [facility]    REFERRALS	        Unit SW/Case Mgmt       Music Therapy        [ ]CRITICAL CARE TIME PROVIDED TO UNSTABLE PT W/ ORGAN FAILURE    Start:               End:  	       Minutes:          [x]> 50% OF THE TIME SPENT IN COUNSELING AND COORDINATING CARE   Start:               End:  	       Minutes:  [ ]PROLONGED SERVICE             FACE TO FACE: [x]PT     [ ]PT & FAMILY   Start:               End:  	       Minutes:

## 2017-02-24 NOTE — SWALLOW BEDSIDE ASSESSMENT ADULT - ADDITIONAL RECOMMENDATIONS
Re-evaluation today to document safest of the PO consistencies while adhering to pt.'s QOL requests.  Patient received in bed.  Patient demonstrates understanding of potential aspiration risk and confirms that his wishes are to eat without further assessment.  Patient's secretion management seems improved as compared to previous SLP encounter.  Patient accepted ice chips, ~4 oz of thin liquids, ~2 oz of nectar and 4 tspns puree.  Oral phase adequate for consistencies tested.  Currently edentulous, pt. states that he can chew w/o teeth, but deferred advanced solids today given lethargy.  Pharyngeal swallow trigger appeared grossly timely.  Multiple swallows with thins with cough x1 only.  Improved control with nectar thick.  No s/s of aspiration or evidence of pharyngeal clearance issues with puree or with nectar.  Patient reports that he would like to start with the "safer" PO consistencies.  Given pt.'s wishes, can advance per his discretion.  Discussed plan with MD and RN.  Maintain reflux precautions w/i setting of esophageal pathology.

## 2017-02-24 NOTE — PROGRESS NOTE ADULT - ASSESSMENT
A/P:  87M PMH ESRD, Epilepsy, Alzheimer's dementia, HTN, HLD, RA, GERD, IDDM admitted to MICU for septic shock 2/2 aspiration PNA vs. UTI no longer requiring pressors. recently evaluated for chronic aspiration w/ botox injection for achalasia. satting well on NC and mentating well.    #ID: Septic shock 2/2 asp PNA vs. UTI: Pt was febrile to 101, tachycardic, tachypneic, + leukocytosis requiring levophed and admission to MICU. UTI was positive on admission, but patient is usually anuric, so culture not sent; + pus found on penis yesterday. Found to have a RLL infiltrate on CXR in setting of chronic aspirations now LLL suspicious for infiltrate. Currently HD stable/perfusing well despite brief episode of hypotension overnight, with SBPs in 90s-110s off levophed pressor support and on midodrine.  - Empiric Abx - c/w zosyn; d/c-ed vanc as low suspicion for MRSA  - All BCx NGTD, Sputum - nl resp soraida; f/u Penile pus Cx from 2/21     #CV: Pt no longer requiring levophed pressor support, HD stable and perfusing well with SBPs 90s-110s. - C/w Midodrine 5mg po TID.  #CAD: - C/w ASA daily     #Respiratory: Patient tachypneic on admission with secretions. Now he is stable, improving tachypnea, on 4L NC and saturating well.   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  - Pt unable to tolerate barium esophagram on 2/21 - desatted while in radiology. Plan to go for EGD today - DNI rescinded for elective procedure  - GI consulted, f/u recs.   - C/w Reglan    #Renal: ESRD: Dialysis MWF, no repletion of lytes as done during dialysis.    #Heme: Anemia: Likely 2/2 to ESRD.   -Active T/S   -C/w Epogen    #Endo: DM: C/w ISS.     #Neuro: Seizure disorder: C/w Keppra.    #FEN: No lytes repletion as pt on HD.  #PPX: HSQ, Protonix, bowel regimen  DISPO: MICU  Lines: MARION placed 02/19   CODE: DNR/DNI A/P:  87M PMH ESRD, Epilepsy, Alzheimer's dementia, HTN, HLD, RA, GERD, IDDM admitted to MICU for septic shock 2/2 aspiration PNA vs. UTI no longer requiring pressors. recently evaluated for chronic aspiration w/ botox injection for achalasia. satting well on NC and mentating well.    #ID: Septic shock 2/2 asp PNA vs. UTI: Pt was febrile to 101, tachycardic, tachypneic, + leukocytosis requiring levophed and admission to MICU. UTI was positive on admission, but patient is usually anuric, so culture not sent; + pus found on penis yesterday. Found to have a RLL infiltrate on CXR in setting of chronic aspirations now LLL suspicious for infiltrate. Currently HD stable on midodrine.  - Empiric Abx - c/w zosyn; enterococcus sens to vanc - dosed vanc s/p HD  - All BCx NGTD, Sputum - nl resp soraida; Penile pus cx growing enterococcus raffigous    #CV: Pt no longer requiring levophed pressor support, HD stable and perfusing well with SBPs 90s-110s. - C/w Midodrine 5mg po TID.  #CAD: - C/w ASA daily     #Respiratory: Patient tachypneic on admission with secretions. Now he is stable, improving tachypnea, on 4L NC and saturating well.   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  - Pt unable to tolerate barium esophagram on 2/21 - desatted while in radiology. Plan to go for EGD today - DNI rescinded for elective procedure  - GI consulted, f/u recs.   - C/w Reglan    #Renal: ESRD: Dialysis MWF, no repletion of lytes as done during dialysis.    #Heme: Anemia: Likely 2/2 to ESRD.   -Active T/S   -C/w Epogen    #Endo: DM: C/w ISS.     #Neuro: AAOx3 Seizure disorder: C/w Keppra.    #FEN: Comfort feeds - puree diet w/ nectar thin liquids. No lytes repletion as pt on HD.  #PPX: HSQ, Protonix, bowel regimen  DISPO: step down to [facility]  Lines: MARION placed 02/19 - will be taken out  CODE: DNR/DNI A/P:  87M PMH ESRD, Epilepsy, Alzheimer's dementia, HTN, HLD, RA, GERD, IDDM admitted to MICU for septic shock 2/2 aspiration PNA vs. UTI no longer requiring pressors. recently evaluated for chronic aspiration w/ botox injection for achalasia. satting well on NC and mentating well.    #ID: Septic shock 2/2 asp PNA vs. UTI: Pt was febrile to 101, tachycardic, tachypneic, + leukocytosis requiring levophed and admission to MICU. UTI was positive on admission, but patient is usually anuric, so culture not sent; + pus found on penis yesterday. Found to have a RLL infiltrate on CXR in setting of chronic aspirations now LLL suspicious for infiltrate. Currently HD stable on midodrine.  - Empiric Abx - c/w zosyn; enterococcus sens to vanc - dosed vanc s/p HD  - All BCx NGTD, Sputum - nl resp soraida; Penile pus cx growing enterococcus raffigous    #CV: Pt no longer requiring levophed pressor support, HD stable and perfusing well with SBPs 90s-110s. - C/w Midodrine 5mg po TID.  #CAD: - C/w ASA daily     #Respiratory: Patient tachypneic on admission with secretions. Now he is stable, improving tachypnea, on 4L NC and saturating well.   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  - EGD done 2/23 given Botox to achalasia site w/ NGT placement, clogged NGT o/n, seen by speech and swallow and palliative today - decision made for comfort feeds  - GI consulted, f/u recs. - rec sit up for at least 2hrs after eating to prevent aspiration. rec for small freq meals.  - C/w Reglan    #Renal: ESRD: Dialysis MWF, no repletion of lytes as done during dialysis.    #Heme: Anemia: Likely 2/2 to ESRD.   -Active T/S   -C/w Epogen    #Endo: DM: C/w ISS.     #Neuro: AAOx3 Seizure disorder: C/w Keppra.    #FEN: Comfort feeds - puree diet w/ nectar thin liquids. No lytes repletion as pt on HD.  #PPX: HSQ, Protonix, bowel regimen  DISPO: step down to RM[facility]  Lines: MARION placed 02/19 - will be taken out  CODE: DNR/DNI A/P:  87M PMH ESRD, Epilepsy, Alzheimer's dementia, HTN, HLD, RA, GERD, IDDM admitted to MICU for septic shock 2/2 aspiration PNA vs. UTI no longer requiring pressors. recently evaluated for chronic aspiration w/ botox injection for achalasia. satting well on NC and mentating well.    #ID: Septic shock 2/2 asp PNA vs. UTI: Pt was febrile to 101, tachycardic, tachypneic, + leukocytosis requiring levophed and admission to MICU. UTI was positive on admission, but patient is usually anuric, so culture not sent; + pus found on penis yesterday. Found to have a RLL infiltrate on CXR in setting of chronic aspirations now LLL suspicious for infiltrate. Currently HD stable on midodrine.  - Empiric Abx - c/w zosyn 7d course last dose 7/25; enterococcus sens to vanc - dosed vanc s/p HD 7d course started 2/23.  - All BCx NGTD, Sputum - nl resp soraida; Penile pus cx growing enterococcus raffigous    #CV: Pt no longer requiring levophed pressor support, HD stable and perfusing well with SBPs 90s-110s. - C/w Midodrine 5mg po TID.  #CAD: - C/w ASA daily     #Respiratory: Patient tachypneic on admission with secretions. Now he is stable, improving tachypnea, on 4L NC and saturating well.   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  - EGD done 2/23 given Botox to achalasia site w/ NGT placement, clogged NGT o/n, seen by speech and swallow and palliative today - decision made for comfort feeds  - GI consulted, f/u recs. - rec sit up for at least 2hrs after eating to prevent aspiration. rec for small freq meals.  - C/w Reglan    #Renal: ESRD: Dialysis MWF, no repletion of lytes as done during dialysis.    #Heme: Anemia: Likely 2/2 to ESRD.   -Active T/S   -C/w Epogen    #Endo: DM: C/w ISS.     #Neuro: AAOx3 Seizure disorder: C/w Keppra.    #FEN: Comfort feeds - puree diet w/ nectar thin liquids. No lytes repletion as pt on HD.  #PPX: HSQ, Protonix, bowel regimen  DISPO: step down to RMF  Lines: MARION placed 02/19 - will be taken out  CODE: DNR/DNI

## 2017-02-24 NOTE — SWALLOW BEDSIDE ASSESSMENT ADULT - SWALLOW EVAL: DIAGNOSIS
Mild-moderate oral dysphagia and suspected pharyngeal dysphagia
Pt p/w moderate pharyngeal dysphagia, characterized by gurgly voice and throat clearing on all consistencies, indicating aspiration.

## 2017-02-24 NOTE — PROGRESS NOTE ADULT - SUBJECTIVE AND OBJECTIVE BOX
Patient was seen and evaluated on dialysis.   Patient is tolerating the procedure well.   HR: 82  BP: 101/55    Due to intradialytic hypotension, have opted to reduce UF goal to 0kg in light of profound hypotension and no significant oxygenation difficulties or change in CXR (other than his aspiration related infiltrates)     Continue dialysis:   Dialyzer:  F180        QB:400        QD: 500  Goal UF 0kg over 3.5 Hours

## 2017-02-24 NOTE — PROGRESS NOTE ADULT - SUBJECTIVE AND OBJECTIVE BOX
Hospital Course: 87M PMH ESRD, Epilepsy, Alzheimer's dementia, HTN, HLD, RA, GERD, IDDM admitted to MICU for septic shock 2/2 aspiration PNA vs. UTI no longer requiring pressors. PNA seen on CXR bibasilar infiltrates and UA positive UTI w/ enterococcus raffinosus sens to vanc. Pt on day 6 out of 7 of zosyn, to finish 2/25. Vanc 7d course started 2/23 (dosed w/ HD as per trough.) Evaluated for chronic aspiration and Zenker's diverticulum found in 2016. EGD done by GI, suctioned diverticulum, botox injection to achalasia site, and insertion of NGT. Episode of hypotension while under sedation for EGD, started on levo which was titrated off. No longer requiring additional pressors. NGT clogged off and dislodged. Seen by palliative w/ end result comfort feeds, no PEG.    VITAL SIGNS:  T(F): 98.2  HR: 80  BP: 93/48  RR: 24  SpO2: 98%  Wt(kg): --    PHYSICAL EXAM:    Constitutional: Awake Alert, Frail   Eyes: Left eye round reactive to light. 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   ENMT: Dry MM  Neck: no JVD. Dressing on R neck (s/p Central line removal) clean dry intact   Respiratory: +rhonchi b/l no wheezing  Cardiovascular: RRR no murmurs appreciated  Gastrointestinal: Soft NTND  Extremities: WWP +dp pulses      MEDICATIONS  (STANDING):  fluticasone / salmeterol 250-50 MICROgram(s) Diskus 1Dose(s) Inhalation two times a day  aspirin enteric coated 81milliGRAM(s) Oral daily  docusate sodium 100milliGRAM(s) Oral daily  heparin  Injectable 5000Unit(s) SubCutaneous every 8 hours  folic acid 1milliGRAM(s) Oral daily  artificial  tears Solution 1Drop(s) Both EYES every 6 hours  mirtazapine 15milliGRAM(s) Oral at bedtime  gabapentin 100milliGRAM(s) Oral two times a day  metoclopramide 10milliGRAM(s) Oral three times a day with meals  simvastatin 20milliGRAM(s) Oral at bedtime  piperacillin/tazobactam IVPB. 2.25Gram(s) IV Intermittent every 12 hours  ALBUTerol/ipratropium for Nebulization 3milliLiter(s) Nebulizer every 4 hours  levETIRAcetam  IVPB 250milliGRAM(s) IV Intermittent every 12 hours  levETIRAcetam  IVPB 250milliGRAM(s) IV Intermittent <User Schedule>  insulin lispro (HumaLOG) corrective regimen sliding scale  SubCutaneous Before meals and at bedtime  midodrine 5milliGRAM(s) Oral every 8 hours  acetylcysteine 20% Inhalation 5milliLiter(s) Inhalation every 4 hours  pantoprazole  Injectable 40milliGRAM(s) IV Push daily  epoetin nicki Injectable 7000Unit(s) IV Push <User Schedule>  heparin -  Bolus       MEDICATIONS  (PRN):  acetaminophen  Suppository 650milliGRAM(s) Rectal every 6 hours PRN For Temp greater than 38 C (100.4 F)      Allergies    clonidine (Unknown)    Intolerances        LABS:                        7.4    16.1  )-----------( 248      ( 24 Feb 2017 03:52 )             22.6     24 Feb 2017 03:44    138    |  100    |  28     ----------------------------<  86     3.3     |  25     |  4.99     Ca    8.1        24 Feb 2017 03:44  Phos  3.4       24 Feb 2017 03:44  Mg     1.7       24 Feb 2017 03:44    TPro  6.5    /  Alb  2.0    /  TBili  0.4    /  DBili  x      /  AST  17     /  ALT  9      /  AlkPhos  84     24 Feb 2017 03:44          RADIOLOGY & ADDITIONAL TESTS: Hospital Course: 87M PMH ESRD, Epilepsy, Alzheimer's dementia, HTN, HLD, RA, GERD, IDDM admitted to MICU for septic shock 2/2 aspiration PNA vs. UTI no longer requiring pressors. PNA seen on CXR bibasilar infiltrates and UA positive UTI w/ enterococcus raffinosus sens to vanc. Pt on day 6 out of 7 of zosyn, to finish 2/25. Vanc 7d course started 2/23 (dosed w/ HD as per trough.) Evaluated for chronic aspiration and Zenker's diverticulum found in 2016. EGD done by GI, suctioned diverticulum, botox injection to achalasia site, and insertion of NGT. Episode of hypotension while under sedation for EGD, started on levo which was titrated off. No longer requiring additional pressors. NGT clogged off and dislodged. Seen by palliative w/ end result comfort feeds, no PEG.    VITAL SIGNS:  T(F): 98.2  HR: 80  BP: 93/48  RR: 24  SpO2: 98%  Wt(kg): --    PHYSICAL EXAM:    Constitutional: Awake Alert, Frail   Eyes: Left eye round reactive to light. 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   ENMT: Dry MM  Neck: no JVD. Dressing on R neck (s/p Central line removal) clean dry intact   Respiratory: +rhonchi b/l no wheezing  Cardiovascular: RRR no murmurs appreciated  Gastrointestinal: Soft NTND  Extremities: WWP +dp pulses. Left AV graft      MEDICATIONS  (STANDING):  fluticasone / salmeterol 250-50 MICROgram(s) Diskus 1Dose(s) Inhalation two times a day  aspirin enteric coated 81milliGRAM(s) Oral daily  docusate sodium 100milliGRAM(s) Oral daily  heparin  Injectable 5000Unit(s) SubCutaneous every 8 hours  folic acid 1milliGRAM(s) Oral daily  artificial  tears Solution 1Drop(s) Both EYES every 6 hours  mirtazapine 15milliGRAM(s) Oral at bedtime  gabapentin 100milliGRAM(s) Oral two times a day  metoclopramide 10milliGRAM(s) Oral three times a day with meals  simvastatin 20milliGRAM(s) Oral at bedtime  piperacillin/tazobactam IVPB. 2.25Gram(s) IV Intermittent every 12 hours  ALBUTerol/ipratropium for Nebulization 3milliLiter(s) Nebulizer every 4 hours  levETIRAcetam  IVPB 250milliGRAM(s) IV Intermittent every 12 hours  levETIRAcetam  IVPB 250milliGRAM(s) IV Intermittent <User Schedule>  insulin lispro (HumaLOG) corrective regimen sliding scale  SubCutaneous Before meals and at bedtime  midodrine 5milliGRAM(s) Oral every 8 hours  acetylcysteine 20% Inhalation 5milliLiter(s) Inhalation every 4 hours  pantoprazole  Injectable 40milliGRAM(s) IV Push daily  epoetin nicki Injectable 7000Unit(s) IV Push <User Schedule>  heparin -  Bolus       MEDICATIONS  (PRN):  acetaminophen  Suppository 650milliGRAM(s) Rectal every 6 hours PRN For Temp greater than 38 C (100.4 F)      Allergies    clonidine (Unknown)    Intolerances        LABS:                        7.4    16.1  )-----------( 248      ( 24 Feb 2017 03:52 )             22.6     24 Feb 2017 03:44    138    |  100    |  28     ----------------------------<  86     3.3     |  25     |  4.99     Ca    8.1        24 Feb 2017 03:44  Phos  3.4       24 Feb 2017 03:44  Mg     1.7       24 Feb 2017 03:44    TPro  6.5    /  Alb  2.0    /  TBili  0.4    /  DBili  x      /  AST  17     /  ALT  9      /  AlkPhos  84     24 Feb 2017 03:44 Hospital Course: 87M PMH ESRD, Epilepsy, Alzheimer's dementia, HTN, HLD, RA, GERD, Seizure disorder, IDDM admitted to MICU for septic shock 2/2 aspiration PNA vs. UTI no longer requiring pressors. PNA seen on CXR bibasilar infiltrates and UA positive UTI w/ enterococcus raffinosus sens to vanc. Pt on day 6 out of 7 of zosyn, to finish 2/25. Vanc 7d course started 2/23 (dosed w/ HD as per trough.) Evaluated for chronic aspiration and Zenker's diverticulum found in 2016. EGD done by GI, suctioned diverticulum, botox injection to achalasia site, and insertion of NGT. Episode of hypotension while under sedation for EGD, started on levo which was titrated off. No longer requiring additional pressors. NGT clogged off and dislodged. Seen by palliative w/ end result comfort feeds, no PEG.    VITAL SIGNS:  T(F): 98.2  HR: 80  BP: 93/48  RR: 24  SpO2: 98%  Wt(kg): --    PHYSICAL EXAM:    Constitutional: Awake Alert, Frail   Eyes: Left eye round reactive to light. 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   ENMT: Dry MM  Neck: no JVD. Dressing on R neck (s/p Central line removal) clean dry intact   Respiratory: +rhonchi b/l no wheezing  Cardiovascular: RRR no murmurs appreciated  Gastrointestinal: Soft NTND  Extremities: WWP +dp pulses. Left AV graft      MEDICATIONS  (STANDING):  fluticasone / salmeterol 250-50 MICROgram(s) Diskus 1Dose(s) Inhalation two times a day  aspirin enteric coated 81milliGRAM(s) Oral daily  docusate sodium 100milliGRAM(s) Oral daily  heparin  Injectable 5000Unit(s) SubCutaneous every 8 hours  folic acid 1milliGRAM(s) Oral daily  artificial  tears Solution 1Drop(s) Both EYES every 6 hours  mirtazapine 15milliGRAM(s) Oral at bedtime  gabapentin 100milliGRAM(s) Oral two times a day  metoclopramide 10milliGRAM(s) Oral three times a day with meals  simvastatin 20milliGRAM(s) Oral at bedtime  piperacillin/tazobactam IVPB. 2.25Gram(s) IV Intermittent every 12 hours  ALBUTerol/ipratropium for Nebulization 3milliLiter(s) Nebulizer every 4 hours  levETIRAcetam  IVPB 250milliGRAM(s) IV Intermittent every 12 hours  levETIRAcetam  IVPB 250milliGRAM(s) IV Intermittent <User Schedule>  insulin lispro (HumaLOG) corrective regimen sliding scale  SubCutaneous Before meals and at bedtime  midodrine 5milliGRAM(s) Oral every 8 hours  acetylcysteine 20% Inhalation 5milliLiter(s) Inhalation every 4 hours  pantoprazole  Injectable 40milliGRAM(s) IV Push daily  epoetin nicki Injectable 7000Unit(s) IV Push <User Schedule>  heparin -  Bolus       MEDICATIONS  (PRN):  acetaminophen  Suppository 650milliGRAM(s) Rectal every 6 hours PRN For Temp greater than 38 C (100.4 F)      Allergies    clonidine (Unknown)    Intolerances        LABS:                        7.4    16.1  )-----------( 248      ( 24 Feb 2017 03:52 )             22.6     24 Feb 2017 03:44    138    |  100    |  28     ----------------------------<  86     3.3     |  25     |  4.99     Ca    8.1        24 Feb 2017 03:44  Phos  3.4       24 Feb 2017 03:44  Mg     1.7       24 Feb 2017 03:44    TPro  6.5    /  Alb  2.0    /  TBili  0.4    /  DBili  x      /  AST  17     /  ALT  9      /  AlkPhos  84     24 Feb 2017 03:44

## 2017-02-24 NOTE — SWALLOW BEDSIDE ASSESSMENT ADULT - SLP PERTINENT HISTORY OF CURRENT PROBLEM
Initial plan was for MBS post EGD.  Pt. has since had FU with [facility].  Spoke with team.  Pt. wants to eat despite risks.  Instrumental swallow study to be deferred.

## 2017-02-25 DIAGNOSIS — J18.9 PNEUMONIA, UNSPECIFIED ORGANISM: ICD-10-CM

## 2017-02-25 LAB
ANION GAP SERPL CALC-SCNC: 10 MMOL/L — SIGNIFICANT CHANGE UP (ref 9–16)
BUN SERPL-MCNC: 18 MG/DL — SIGNIFICANT CHANGE UP (ref 7–23)
CALCIUM SERPL-MCNC: 8.4 MG/DL — LOW (ref 8.5–10.5)
CHLORIDE SERPL-SCNC: 95 MMOL/L — LOW (ref 96–108)
CO2 SERPL-SCNC: 30 MMOL/L — SIGNIFICANT CHANGE UP (ref 22–31)
CREAT SERPL-MCNC: 3.81 MG/DL — HIGH (ref 0.5–1.3)
GLUCOSE SERPL-MCNC: 83 MG/DL — SIGNIFICANT CHANGE UP (ref 70–99)
MAGNESIUM SERPL-MCNC: 1.8 MG/DL — SIGNIFICANT CHANGE UP (ref 1.6–2.4)
POTASSIUM SERPL-MCNC: 3.6 MMOL/L — SIGNIFICANT CHANGE UP (ref 3.5–5.3)
POTASSIUM SERPL-SCNC: 3.6 MMOL/L — SIGNIFICANT CHANGE UP (ref 3.5–5.3)
SODIUM SERPL-SCNC: 135 MMOL/L — SIGNIFICANT CHANGE UP (ref 135–145)

## 2017-02-25 PROCEDURE — 99233 SBSQ HOSP IP/OBS HIGH 50: CPT

## 2017-02-25 RX ORDER — POTASSIUM CHLORIDE 20 MEQ
10 PACKET (EA) ORAL
Qty: 0 | Refills: 0 | Status: DISCONTINUED | OUTPATIENT
Start: 2017-02-25 | End: 2017-02-25

## 2017-02-25 RX ORDER — MAGNESIUM SULFATE 500 MG/ML
1 VIAL (ML) INJECTION ONCE
Qty: 0 | Refills: 0 | Status: DISCONTINUED | OUTPATIENT
Start: 2017-02-25 | End: 2017-02-25

## 2017-02-25 RX ADMIN — Medication 5 MILLILITER(S): at 23:03

## 2017-02-25 RX ADMIN — LEVETIRACETAM 410 MILLIGRAM(S): 250 TABLET, FILM COATED ORAL at 06:26

## 2017-02-25 RX ADMIN — Medication 81 MILLIGRAM(S): at 12:05

## 2017-02-25 RX ADMIN — FLUTICASONE PROPIONATE AND SALMETEROL 1 DOSE(S): 50; 250 POWDER ORAL; RESPIRATORY (INHALATION) at 06:26

## 2017-02-25 RX ADMIN — Medication 3 MILLILITER(S): at 17:30

## 2017-02-25 RX ADMIN — Medication 5 MILLILITER(S): at 09:44

## 2017-02-25 RX ADMIN — Medication 1 DROP(S): at 23:02

## 2017-02-25 RX ADMIN — FLUTICASONE PROPIONATE AND SALMETEROL 1 DOSE(S): 50; 250 POWDER ORAL; RESPIRATORY (INHALATION) at 17:33

## 2017-02-25 RX ADMIN — Medication 1 DROP(S): at 17:33

## 2017-02-25 RX ADMIN — Medication 5 MILLILITER(S): at 06:27

## 2017-02-25 RX ADMIN — HEPARIN SODIUM 5000 UNIT(S): 5000 INJECTION INTRAVENOUS; SUBCUTANEOUS at 13:31

## 2017-02-25 RX ADMIN — PANTOPRAZOLE SODIUM 40 MILLIGRAM(S): 20 TABLET, DELAYED RELEASE ORAL at 12:05

## 2017-02-25 RX ADMIN — LEVETIRACETAM 410 MILLIGRAM(S): 250 TABLET, FILM COATED ORAL at 17:32

## 2017-02-25 RX ADMIN — Medication 3 MILLILITER(S): at 23:00

## 2017-02-25 RX ADMIN — PIPERACILLIN AND TAZOBACTAM 200 GRAM(S): 4; .5 INJECTION, POWDER, LYOPHILIZED, FOR SOLUTION INTRAVENOUS at 23:01

## 2017-02-25 RX ADMIN — Medication 5 MILLILITER(S): at 17:33

## 2017-02-25 RX ADMIN — Medication 10 MILLIGRAM(S): at 12:05

## 2017-02-25 RX ADMIN — MIDODRINE HYDROCHLORIDE 5 MILLIGRAM(S): 2.5 TABLET ORAL at 23:01

## 2017-02-25 RX ADMIN — Medication 3 MILLILITER(S): at 06:26

## 2017-02-25 RX ADMIN — Medication 1 DROP(S): at 06:26

## 2017-02-25 RX ADMIN — Medication 1 MILLIGRAM(S): at 12:05

## 2017-02-25 RX ADMIN — Medication 3 MILLILITER(S): at 13:31

## 2017-02-25 RX ADMIN — MIDODRINE HYDROCHLORIDE 5 MILLIGRAM(S): 2.5 TABLET ORAL at 13:33

## 2017-02-25 RX ADMIN — GABAPENTIN 100 MILLIGRAM(S): 400 CAPSULE ORAL at 17:31

## 2017-02-25 RX ADMIN — Medication 3 MILLILITER(S): at 09:44

## 2017-02-25 RX ADMIN — Medication 10 MILLIGRAM(S): at 09:09

## 2017-02-25 RX ADMIN — SIMVASTATIN 20 MILLIGRAM(S): 20 TABLET, FILM COATED ORAL at 23:02

## 2017-02-25 RX ADMIN — GABAPENTIN 100 MILLIGRAM(S): 400 CAPSULE ORAL at 06:26

## 2017-02-25 RX ADMIN — Medication 1 DROP(S): at 12:05

## 2017-02-25 RX ADMIN — HEPARIN SODIUM 5000 UNIT(S): 5000 INJECTION INTRAVENOUS; SUBCUTANEOUS at 06:26

## 2017-02-25 RX ADMIN — HEPARIN SODIUM 5000 UNIT(S): 5000 INJECTION INTRAVENOUS; SUBCUTANEOUS at 23:00

## 2017-02-25 RX ADMIN — MIRTAZAPINE 15 MILLIGRAM(S): 45 TABLET, ORALLY DISINTEGRATING ORAL at 23:01

## 2017-02-25 RX ADMIN — Medication 5 MILLILITER(S): at 13:32

## 2017-02-25 RX ADMIN — Medication 10 MILLIGRAM(S): at 17:31

## 2017-02-25 RX ADMIN — PIPERACILLIN AND TAZOBACTAM 200 GRAM(S): 4; .5 INJECTION, POWDER, LYOPHILIZED, FOR SOLUTION INTRAVENOUS at 09:44

## 2017-02-25 RX ADMIN — MIDODRINE HYDROCHLORIDE 5 MILLIGRAM(S): 2.5 TABLET ORAL at 06:26

## 2017-02-25 NOTE — PROGRESS NOTE ADULT - ASSESSMENT
88yo M, PMH of ESRD on HD, Epilepsy, Alzheimer's dementia, HTN, HLD, RA, GERD, IDDM. Admitted to MICU for septic shock 2/2 aspiration PNA and UTI, evaluated for chronic aspiration w/ botox injection for achalasia. Transferred to  for further management.

## 2017-02-25 NOTE — PROGRESS NOTE ADULT - SUBJECTIVE AND OBJECTIVE BOX
INTERVAL HPI/OVERNIGHT EVENTS: DONATO overnight    SUBJECTIVE: Patient seen and examined at bedside.       OBJECTIVE:    VITAL SIGNS:  ICU Vital Signs Last 24 Hrs  T(C): 36.8, Max: 37.2 (02-24 @ 20:00)  T(F): 98.2, Max: 98.9 (02-24 @ 20:00)  HR: 70 (70 - 90)  BP: 117/55 (93/48 - 122/58)  BP(mean): 62 (49 - 74)  ABP: --  ABP(mean): --  RR: 18 (18 - 25)  SpO2: 95% (95% - 100%)      I & Os for 24h ending 02-25 @ 07:00  =============================================  IN: 1900 ml / OUT: 900 ml / NET: 1000 ml    I & Os for current day (as of 02-25 @ 13:48)  =============================================  IN: 100 ml / OUT: 0 ml / NET: 100 ml    CAPILLARY BLOOD GLUCOSE  144 (25 Feb 2017 07:40)      PHYSICAL EXAM:      Constitutional: Awake Alert, Frail   Eyes: Left eye round reactive to light. 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   ENMT: Dry MM  Neck: no JVD. Dressing on R neck (s/p Central line removal) clean dry intact   Respiratory: +rhonchi b/l no wheezing  Cardiovascular: RRR no murmurs appreciated  Gastrointestinal: Soft NTND  Extremities: WWP +dp pulses. Left AV graft      MEDICATIONS:  MEDICATIONS  (STANDING):  fluticasone / salmeterol 250-50 MICROgram(s) Diskus 1Dose(s) Inhalation two times a day  aspirin enteric coated 81milliGRAM(s) Oral daily  docusate sodium 100milliGRAM(s) Oral daily  heparin  Injectable 5000Unit(s) SubCutaneous every 8 hours  folic acid 1milliGRAM(s) Oral daily  artificial  tears Solution 1Drop(s) Both EYES every 6 hours  mirtazapine 15milliGRAM(s) Oral at bedtime  gabapentin 100milliGRAM(s) Oral two times a day  metoclopramide 10milliGRAM(s) Oral three times a day with meals  simvastatin 20milliGRAM(s) Oral at bedtime  piperacillin/tazobactam IVPB. 2.25Gram(s) IV Intermittent every 12 hours  ALBUTerol/ipratropium for Nebulization 3milliLiter(s) Nebulizer every 4 hours  levETIRAcetam  IVPB 250milliGRAM(s) IV Intermittent every 12 hours  levETIRAcetam  IVPB 250milliGRAM(s) IV Intermittent <User Schedule>  insulin lispro (HumaLOG) corrective regimen sliding scale  SubCutaneous Before meals and at bedtime  midodrine 5milliGRAM(s) Oral every 8 hours  acetylcysteine 20% Inhalation 5milliLiter(s) Inhalation every 4 hours  pantoprazole  Injectable 40milliGRAM(s) IV Push daily  epoetin nicki Injectable 7000Unit(s) IV Push <User Schedule>  heparin -  Bolus       MEDICATIONS  (PRN):  acetaminophen  Suppository 650milliGRAM(s) Rectal every 6 hours PRN For Temp greater than 38 C (100.4 F)      ALLERGIES:  Allergies    clonidine (Unknown)    Intolerances        LABS:                        7.4    16.1  )-----------( 248      ( 24 Feb 2017 03:52 )             22.6     25 Feb 2017 06:38    135    |  95     |  18     ----------------------------<  83     3.6     |  30     |  3.81     Ca    8.4        25 Feb 2017 06:38  Phos  3.4       24 Feb 2017 03:44  Mg     1.8       25 Feb 2017 06:38    TPro  6.5    /  Alb  2.0    /  TBili  0.4    /  DBili  x      /  AST  17     /  ALT  9      /  AlkPhos  84     24 Feb 2017 03:44          RADIOLOGY & ADDITIONAL TESTS: Reviewed.

## 2017-02-25 NOTE — PROGRESS NOTE ADULT - SUBJECTIVE AND OBJECTIVE BOX
No overnight events.  No complaints.  ROS negative.  Eating with help of aide.    Vital Signs Last 24 Hrs  T(C): 36.8, Max: 37.2 (02-24 @ 20:00)  T(F): 98.2, Max: 98.9 (02-24 @ 20:00)  HR: 70 (70 - 90)  BP: 117/55 (93/48 - 122/58)  BP(mean): 62 (49 - 76)  RR: 18 (18 - 25)  SpO2: 95% (95% - 100%)    MEDICATIONS  (STANDING):  fluticasone / salmeterol 250-50 MICROgram(s) Diskus 1Dose(s) Inhalation two times a day  aspirin enteric coated 81milliGRAM(s) Oral daily  docusate sodium 100milliGRAM(s) Oral daily  heparin  Injectable 5000Unit(s) SubCutaneous every 8 hours  folic acid 1milliGRAM(s) Oral daily  artificial  tears Solution 1Drop(s) Both EYES every 6 hours  mirtazapine 15milliGRAM(s) Oral at bedtime  gabapentin 100milliGRAM(s) Oral two times a day  metoclopramide 10milliGRAM(s) Oral three times a day with meals  simvastatin 20milliGRAM(s) Oral at bedtime  piperacillin/tazobactam IVPB. 2.25Gram(s) IV Intermittent every 12 hours  ALBUTerol/ipratropium for Nebulization 3milliLiter(s) Nebulizer every 4 hours  levETIRAcetam  IVPB 250milliGRAM(s) IV Intermittent every 12 hours  levETIRAcetam  IVPB 250milliGRAM(s) IV Intermittent <User Schedule>  insulin lispro (HumaLOG) corrective regimen sliding scale  SubCutaneous Before meals and at bedtime  midodrine 5milliGRAM(s) Oral every 8 hours  acetylcysteine 20% Inhalation 5milliLiter(s) Inhalation every 4 hours  pantoprazole  Injectable 40milliGRAM(s) IV Push daily  epoetin nicki Injectable 7000Unit(s) IV Push <User Schedule>  heparin -  Bolus       MEDICATIONS  (PRN):  acetaminophen  Suppository 650milliGRAM(s) Rectal every 6 hours PRN For Temp greater than 38 C (100.4 F)

## 2017-02-25 NOTE — PROGRESS NOTE ADULT - ASSESSMENT
79 yo F PMH ESRD, Epilepsy, Alzheimers dementia, HTN, HLD, RA, GERD, IDDM admitted to MICU 2/2 to septic shock,  doing better now on regional floor  tolerated HD well yesterday - clearance only

## 2017-02-25 NOTE — PROGRESS NOTE ADULT - SUBJECTIVE AND OBJECTIVE BOX
CC: UTI URINARY TRACT INFECTION SEPSIS      INTERVAL HISTORY:resting comfortably  in NAD      ROS: No chest pain, no sob, no abd pain. No n/v/d    PAST MEDICAL & SURGICAL HISTORY:  AV graft thrombosis  Osteoarthritis  PVD (peripheral vascular disease)  COPD (chronic obstructive pulmonary disease)  Heart failure  Angina pectoris  ESRD (end stage renal disease)  Seizures: post traumatic  CAD (coronary artery disease)  HTN (hypertension)  Polyneuropathy  Hyperlipidemia  Dysphagia  Hypotension  Alzheimers disease  Osteoarthritis: Osteoarthritis  Chronic obstructive pulmonary disease: Chronic obstructive pulmonary disease  Anemia: Anemia  Dementia without behavioral disturbance: Dementia  Atherosclerosis of coronary artery: CAD (coronary artery disease)  Nondependent alcohol abuse: ETOH abuse  Depressive disorder: Depression  End-stage renal disease: ESRD on hemodialysis  Essential hypertension: HTN (hypertension)  Deep venous embolism and thrombosis of lower extremity: DVT (deep venous thrombosis)  Congestive heart failure: CHF (congestive heart failure)  Legal blindness: Legally blind  Hemodialysis access, AV graft: LUE loop AVG  Acquired arteriovenous fistula: AV fistula      PHYSICAL EXAM:  T(C): 37.2, Max: 37.2 (02-24 @ 20:00)  HR: 71  BP: 122/58 (90/47 - 122/58)  RR: 19  SpO2: 95%  Wt(kg): --  I&O's Summary    I & Os for current day (as of 25 Feb 2017 08:10)  =============================================  IN: 1900 ml / OUT: 900 ml / NET: 1000 ml    Weight 65 (02-19 @ 17:49)  General: AAO x 3,  NAD.  HEENT: moist mucous membranes, no pallor/cyanosis.  Neck: no JVD visible.  Cardiac: S1, S2. RRR. No murmurs   Respratory: CTA b/l, no access muscle use.   Abdomen: soft. nontender. nondistended  Skin: no rashes.  Extremities: no LE edema b/l  Access: + bruit in LAF      DATA:                        7.4<L>  16.1<H> )-----------( 248      ( 24 Feb 2017 03:52 )             22.6<L>    Ferritin, Serum: 3100.5 ng/mL <H> (02-22 @ 04:58)      135    |  95<L>  |  18     ----------------------------<  83     Ca:8.4<L> (25 Feb 2017 06:38)  3.6     |  30     |  3.81<H>      eGFR if Non : 13 <L>  eGFR if : 16 <L>    TPro  6.5 g/dL  /  Alb  2.0 g/dL<L>  /  TBili  0.4 mg/dL  /  DBili  x      /  AST  17 U/L  /  ALT  9 U/L<L>  /  AlkPhos  84 U/L  24 Feb 2017 03:44                    MEDICATIONS  (STANDING):  fluticasone / salmeterol 250-50 MICROgram(s) Diskus 1Dose(s) Inhalation two times a day  aspirin enteric coated 81milliGRAM(s) Oral daily  docusate sodium 100milliGRAM(s) Oral daily  heparin  Injectable 5000Unit(s) SubCutaneous every 8 hours  folic acid 1milliGRAM(s) Oral daily  artificial  tears Solution 1Drop(s) Both EYES every 6 hours  mirtazapine 15milliGRAM(s) Oral at bedtime  gabapentin 100milliGRAM(s) Oral two times a day  metoclopramide 10milliGRAM(s) Oral three times a day with meals  simvastatin 20milliGRAM(s) Oral at bedtime  piperacillin/tazobactam IVPB. 2.25Gram(s) IV Intermittent every 12 hours  ALBUTerol/ipratropium for Nebulization 3milliLiter(s) Nebulizer every 4 hours  levETIRAcetam  IVPB 250milliGRAM(s) IV Intermittent every 12 hours  levETIRAcetam  IVPB 250milliGRAM(s) IV Intermittent <User Schedule>  insulin lispro (HumaLOG) corrective regimen sliding scale  SubCutaneous Before meals and at bedtime  midodrine 5milliGRAM(s) Oral every 8 hours  acetylcysteine 20% Inhalation 5milliLiter(s) Inhalation every 4 hours  pantoprazole  Injectable 40milliGRAM(s) IV Push daily  epoetin nicki Injectable 7000Unit(s) IV Push <User Schedule>  heparin -  Bolus     potassium chloride  10 mEq/100 mL IVPB 10milliEquivalent(s) IV Intermittent every 1 hour  magnesium sulfate  IVPB 1Gram(s) IV Intermittent once    MEDICATIONS  (PRN):  acetaminophen  Suppository 650milliGRAM(s) Rectal every 6 hours PRN For Temp greater than 38 C (100.4 F)

## 2017-02-26 LAB
ANION GAP SERPL CALC-SCNC: 12 MMOL/L — SIGNIFICANT CHANGE UP (ref 9–16)
BLD GP AB SCN SERPL QL: NEGATIVE — SIGNIFICANT CHANGE UP
BUN SERPL-MCNC: 27 MG/DL — HIGH (ref 7–23)
CALCIUM SERPL-MCNC: 8 MG/DL — LOW (ref 8.5–10.5)
CHLORIDE SERPL-SCNC: 94 MMOL/L — LOW (ref 96–108)
CO2 SERPL-SCNC: 28 MMOL/L — SIGNIFICANT CHANGE UP (ref 22–31)
CREAT SERPL-MCNC: 5.15 MG/DL — HIGH (ref 0.5–1.3)
CULTURE RESULTS: SIGNIFICANT CHANGE UP
CULTURE RESULTS: SIGNIFICANT CHANGE UP
GLUCOSE SERPL-MCNC: 88 MG/DL — SIGNIFICANT CHANGE UP (ref 70–99)
HCT VFR BLD CALC: 24.2 % — LOW (ref 39–50)
HGB BLD-MCNC: 7.7 G/DL — LOW (ref 13–17)
MCHC RBC-ENTMCNC: 30.1 PG — SIGNIFICANT CHANGE UP (ref 27–34)
MCHC RBC-ENTMCNC: 31.8 G/DL — LOW (ref 32–36)
MCV RBC AUTO: 94.5 FL — SIGNIFICANT CHANGE UP (ref 80–100)
PLATELET # BLD AUTO: 264 K/UL — SIGNIFICANT CHANGE UP (ref 150–400)
POTASSIUM SERPL-MCNC: 3.2 MMOL/L — LOW (ref 3.5–5.3)
POTASSIUM SERPL-SCNC: 3.2 MMOL/L — LOW (ref 3.5–5.3)
RBC # BLD: 2.56 M/UL — LOW (ref 4.2–5.8)
RBC # FLD: 16.5 % — SIGNIFICANT CHANGE UP (ref 10.3–16.9)
RH IG SCN BLD-IMP: NEGATIVE — SIGNIFICANT CHANGE UP
SODIUM SERPL-SCNC: 134 MMOL/L — LOW (ref 135–145)
SPECIMEN SOURCE: SIGNIFICANT CHANGE UP
SPECIMEN SOURCE: SIGNIFICANT CHANGE UP
WBC # BLD: 11.6 K/UL — HIGH (ref 3.8–10.5)
WBC # FLD AUTO: 11.6 K/UL — HIGH (ref 3.8–10.5)

## 2017-02-26 PROCEDURE — 99233 SBSQ HOSP IP/OBS HIGH 50: CPT

## 2017-02-26 RX ORDER — POTASSIUM CHLORIDE 20 MEQ
20 PACKET (EA) ORAL ONCE
Qty: 0 | Refills: 0 | Status: DISCONTINUED | OUTPATIENT
Start: 2017-02-26 | End: 2017-02-26

## 2017-02-26 RX ORDER — PIPERACILLIN AND TAZOBACTAM 4; .5 G/20ML; G/20ML
2.25 INJECTION, POWDER, LYOPHILIZED, FOR SOLUTION INTRAVENOUS ONCE
Qty: 0 | Refills: 0 | Status: COMPLETED | OUTPATIENT
Start: 2017-02-26 | End: 2017-02-26

## 2017-02-26 RX ORDER — POTASSIUM CHLORIDE 20 MEQ
20 PACKET (EA) ORAL ONCE
Qty: 0 | Refills: 0 | Status: COMPLETED | OUTPATIENT
Start: 2017-02-26 | End: 2017-02-26

## 2017-02-26 RX ADMIN — Medication 5 MILLILITER(S): at 22:45

## 2017-02-26 RX ADMIN — Medication 5 MILLILITER(S): at 18:00

## 2017-02-26 RX ADMIN — Medication 1 DROP(S): at 12:09

## 2017-02-26 RX ADMIN — GABAPENTIN 100 MILLIGRAM(S): 400 CAPSULE ORAL at 06:25

## 2017-02-26 RX ADMIN — FLUTICASONE PROPIONATE AND SALMETEROL 1 DOSE(S): 50; 250 POWDER ORAL; RESPIRATORY (INHALATION) at 17:13

## 2017-02-26 RX ADMIN — Medication 5 MILLILITER(S): at 10:00

## 2017-02-26 RX ADMIN — HEPARIN SODIUM 5000 UNIT(S): 5000 INJECTION INTRAVENOUS; SUBCUTANEOUS at 22:42

## 2017-02-26 RX ADMIN — FLUTICASONE PROPIONATE AND SALMETEROL 1 DOSE(S): 50; 250 POWDER ORAL; RESPIRATORY (INHALATION) at 06:24

## 2017-02-26 RX ADMIN — Medication 2: at 12:53

## 2017-02-26 RX ADMIN — Medication 5 MILLILITER(S): at 06:27

## 2017-02-26 RX ADMIN — GABAPENTIN 100 MILLIGRAM(S): 400 CAPSULE ORAL at 17:14

## 2017-02-26 RX ADMIN — Medication 20 MILLIEQUIVALENT(S): at 09:54

## 2017-02-26 RX ADMIN — HEPARIN SODIUM 5000 UNIT(S): 5000 INJECTION INTRAVENOUS; SUBCUTANEOUS at 06:25

## 2017-02-26 RX ADMIN — MIDODRINE HYDROCHLORIDE 5 MILLIGRAM(S): 2.5 TABLET ORAL at 06:25

## 2017-02-26 RX ADMIN — Medication 10 MILLIGRAM(S): at 12:11

## 2017-02-26 RX ADMIN — Medication 3 MILLILITER(S): at 18:00

## 2017-02-26 RX ADMIN — Medication 5 MILLILITER(S): at 14:36

## 2017-02-26 RX ADMIN — Medication 3 MILLILITER(S): at 22:41

## 2017-02-26 RX ADMIN — LEVETIRACETAM 410 MILLIGRAM(S): 250 TABLET, FILM COATED ORAL at 06:28

## 2017-02-26 RX ADMIN — Medication 3 MILLILITER(S): at 01:39

## 2017-02-26 RX ADMIN — HEPARIN SODIUM 5000 UNIT(S): 5000 INJECTION INTRAVENOUS; SUBCUTANEOUS at 14:35

## 2017-02-26 RX ADMIN — MIRTAZAPINE 15 MILLIGRAM(S): 45 TABLET, ORALLY DISINTEGRATING ORAL at 22:43

## 2017-02-26 RX ADMIN — Medication 1 DROP(S): at 17:11

## 2017-02-26 RX ADMIN — PANTOPRAZOLE SODIUM 40 MILLIGRAM(S): 20 TABLET, DELAYED RELEASE ORAL at 12:10

## 2017-02-26 RX ADMIN — Medication 5 MILLILITER(S): at 01:39

## 2017-02-26 RX ADMIN — SIMVASTATIN 20 MILLIGRAM(S): 20 TABLET, FILM COATED ORAL at 22:44

## 2017-02-26 RX ADMIN — Medication 2: at 18:11

## 2017-02-26 RX ADMIN — Medication 1 MILLIGRAM(S): at 12:11

## 2017-02-26 RX ADMIN — PIPERACILLIN AND TAZOBACTAM 200 GRAM(S): 4; .5 INJECTION, POWDER, LYOPHILIZED, FOR SOLUTION INTRAVENOUS at 10:01

## 2017-02-26 RX ADMIN — Medication 1 DROP(S): at 06:24

## 2017-02-26 RX ADMIN — Medication 81 MILLIGRAM(S): at 12:10

## 2017-02-26 RX ADMIN — Medication 3 MILLILITER(S): at 10:00

## 2017-02-26 RX ADMIN — Medication 10 MILLIGRAM(S): at 08:36

## 2017-02-26 RX ADMIN — MIDODRINE HYDROCHLORIDE 5 MILLIGRAM(S): 2.5 TABLET ORAL at 22:43

## 2017-02-26 RX ADMIN — PIPERACILLIN AND TAZOBACTAM 200 GRAM(S): 4; .5 INJECTION, POWDER, LYOPHILIZED, FOR SOLUTION INTRAVENOUS at 22:44

## 2017-02-26 RX ADMIN — LEVETIRACETAM 410 MILLIGRAM(S): 250 TABLET, FILM COATED ORAL at 18:10

## 2017-02-26 RX ADMIN — Medication 100 MILLIGRAM(S): at 12:10

## 2017-02-26 RX ADMIN — MIDODRINE HYDROCHLORIDE 5 MILLIGRAM(S): 2.5 TABLET ORAL at 14:36

## 2017-02-26 RX ADMIN — Medication 2: at 08:35

## 2017-02-26 RX ADMIN — Medication 10 MILLIGRAM(S): at 17:13

## 2017-02-26 RX ADMIN — Medication 3 MILLILITER(S): at 06:24

## 2017-02-26 RX ADMIN — Medication 3 MILLILITER(S): at 14:36

## 2017-02-26 NOTE — PROGRESS NOTE ADULT - ASSESSMENT
86yo M, PMH of ESRD on HD, Epilepsy, Alzheimer's dementia, HTN, HLD, RA, GERD, IDDM. Admitted to MICU for septic shock 2/2 aspiration PNA and UTI, evaluated for chronic aspiration w/ botox injection for achalasia. Transferred to [facility] for further management.

## 2017-02-26 NOTE — PROGRESS NOTE ADULT - SUBJECTIVE AND OBJECTIVE BOX
Patient is a 87y old  Male who presents with a chief complaint of Fever (19 Feb 2017 17:26)      HPI:  88 YO M h/o ESRD (HD MWF, last HD Friday 2/17), Alzheimer's disease, CAD, COPD, angina, anemia, CHF, depression, HTN, HLD, OA, PVD, polyneuropathy, blindness sent from [facility] after he was noted to be febrile to 101, desaturating with increased oral secretions. Upon questioning, "patient does not know why he is here". Patient is a poor historian.  At baseline, pt is alert and oriented, wheelchair bound, and requires assistance with ADLs. Patient was recently  here in december 2016 for hypotension related to vomiting 2/2 to his Zenker diverticulum. Patient was scheduled to follow up with GI as an outpatient for his condition.   In the ED, VS T 101.5 HR 66-79 BP 63-93/35-52 RR 18-29 SPO2 97-99%. Pt was placed on BiPAP for work of breathing, received Vancomycin 1 g, Zosyn 3.375 g,  cc bolus x2, Tylenol 650 mg. (19 Feb 2017 17:26)    INTERVAL HPI/OVERNIGHT EVENTS:::unable to give history, well known to me. Admitted to ICU-- transfer to floor    HEALTH ISSUES - PROBLEM Dx:  HAP (hospital-acquired pneumonia): HAP (hospital-acquired pneumonia)  Prophylactic measure: Prophylactic measure  Nutrition, metabolism, and development symptoms: Nutrition, metabolism, and development symptoms  Seizures: Seizures  Diabetes: Diabetes  CAD (coronary artery disease): CAD (coronary artery disease)  End-stage renal disease: End-stage renal disease  Aspiration precautions: Aspiration precautions  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  Anemia due to chronic kidney disease: Anemia due to chronic kidney disease  ESRD (end stage renal disease) on dialysis: ESRD (end stage renal disease) on dialysis  Anemia, unspecified type: Anemia, unspecified type  Motility disorder of intestine: Motility disorder of intestine  Pneumonia: Pneumonia  ESRD (end stage renal disease): ESRD (end stage renal disease)  Sepsis: Sepsis          PAST MEDICAL & SURGICAL HISTORY:  AV graft thrombosis  Osteoarthritis  PVD (peripheral vascular disease)  COPD (chronic obstructive pulmonary disease)  Heart failure  Angina pectoris  ESRD (end stage renal disease)  Seizures: post traumatic  CAD (coronary artery disease)  HTN (hypertension)  Polyneuropathy  Hyperlipidemia  Dysphagia  Hypotension  Alzheimers disease  Osteoarthritis: Osteoarthritis  Chronic obstructive pulmonary disease: Chronic obstructive pulmonary disease  Anemia: Anemia  Dementia without behavioral disturbance: Dementia  Atherosclerosis of coronary artery: CAD (coronary artery disease)  Nondependent alcohol abuse: ETOH abuse  Depressive disorder: Depression  End-stage renal disease: ESRD on hemodialysis  Essential hypertension: HTN (hypertension)  Deep venous embolism and thrombosis of lower extremity: DVT (deep venous thrombosis)  Congestive heart failure: CHF (congestive heart failure)  Legal blindness: Legally blind  Hemodialysis access, AV graft: LUE loop AVG  Acquired arteriovenous fistula: AV fistula          Consultant NOTE  REVIEWED  (  +++ )    REVIEW OF SYSTEMS:  [x] As per HPI  CONSTITUTIONAL: No fever, weight loss, or fatigue   weakness  req assistance for all.  RESPIRATORY: resp failure  CARDIOVASCULAR: No chest pain, palpitations, dizziness, or leg swelling  GASTROINTESTINAL: No abdominal or epigastric pain. No nausea, vomiting, or hematemesis; No diarrhea or constipation. No melena or hematochezia.  MUSCULOSKELETAL: contracture  PSYCH    awake, dementia   [x] All others negative	  [ ] Unable to obtain          Vital Signs Last 24 Hrs  T(C): 35.8, Max: 37.1 (02-26 @ 05:41)  T(F): 96.5, Max: 98.7 (02-26 @ 05:41)  HR: 67 (64 - 69)  BP: 151/70 (112/58 - 158/63)  BP(mean): --  RR: 18 (16 - 95)  SpO2: 100% (100% - 100%)        I & Os for current day (as of 02-26 @ 22:10)  =============================================  IN: 300 ml / OUT: 0 ml / NET: 300 ml    PHYSICAL EXAMINATION:                                    (    )  NO CHANGE  Appearance: Normal	contracte, ill  HEENT:   Normal oral mucosa, PERRL, EOMI	  Neck: Supple, + JVD/ - JVD; Carotid Bruit   Cardiovascular: Normal S1 S2, No JVD, mur   Respiratory: occ rhonchi and rales  Gastrointestinal:  Soft, Non-tender, + BS	  Skin: No rashes, No ecchymoses, No cyanosis  Extremities: contracted  Vascular: Peripheral pulses decr  Neurologic:   Psychiatry: dementia    fluticasone / salmeterol 250-50 MICROgram(s) Diskus 1Dose(s) Inhalation two times a day  aspirin enteric coated 81milliGRAM(s) Oral daily  docusate sodium 100milliGRAM(s) Oral daily  heparin  Injectable 5000Unit(s) SubCutaneous every 8 hours  folic acid 1milliGRAM(s) Oral daily  artificial  tears Solution 1Drop(s) Both EYES every 6 hours  mirtazapine 15milliGRAM(s) Oral at bedtime  gabapentin 100milliGRAM(s) Oral two times a day  metoclopramide 10milliGRAM(s) Oral three times a day with meals  simvastatin 20milliGRAM(s) Oral at bedtime  ALBUTerol/ipratropium for Nebulization 3milliLiter(s) Nebulizer every 4 hours  levETIRAcetam  IVPB 250milliGRAM(s) IV Intermittent every 12 hours  levETIRAcetam  IVPB 250milliGRAM(s) IV Intermittent <User Schedule>  insulin lispro (HumaLOG) corrective regimen sliding scale  SubCutaneous Before meals and at bedtime  midodrine 5milliGRAM(s) Oral every 8 hours  acetylcysteine 20% Inhalation 5milliLiter(s) Inhalation every 4 hours  acetaminophen  Suppository 650milliGRAM(s) Rectal every 6 hours PRN  pantoprazole  Injectable 40milliGRAM(s) IV Push daily  epoetin nicki Injectable 7000Unit(s) IV Push <User Schedule>  heparin -  Bolus     piperacillin/tazobactam IVPB. 2.25Gram(s) IV Intermittent once                                      7.7    11.6  )-----------( 264      ( 26 Feb 2017 06:21 )             24.2     26 Feb 2017 06:21    134    |  94     |  27     ----------------------------<  88     3.2     |  28     |  5.15     Ca    8.0        26 Feb 2017 06:21  Mg     1.8       25 Feb 2017 06:38        CAPILLARY BLOOD GLUCOSE  178 (26 Feb 2017 17:27)  207 (26 Feb 2017 13:13)  173 (26 Feb 2017 11:41)  169 (26 Feb 2017 07:45)           INTERPRETATION:  Resident preliminary report    CLINICAL INFORMATION: Altered mental status.  Fever.    TECHNIQUE: A frontal view of the chest is dated 2/22/2017 6:27 PM     COMPARISON: Chest x-ray 2/22/2017 4:17 AM     FINDINGS: Right-sided IJ venous catheter is noted with tip overlying the   cavoatrial junction. No change in right basilar consolidation. Suspected   consolidation at the left lung base. No pneumothorax. No acute osseous   abnormalities.    IMPRESSION: No significant interval change.    Patient is a 87y old  Male who presents with a chief complaint of Fever (19 Feb 2017 17:26)      HPI:  88 YO M h/o ESRD (HD MWF, last HD Friday 2/17), Alzheimer's disease, CAD, COPD, angina, anemia, CHF, depression, HTN, HLD, OA, PVD, polyneuropathy, blindness sent from [facility] after he was noted to be febrile to 101, desaturating with increased oral secretions. Upon questioning, "patient does not know why he is here". Patient is a poor historian.  At baseline, pt is alert and oriented, wheelchair bound, and requires assistance with ADLs. Patient was recently  here in december 2016 for hypotension related to vomiting 2/2 to his Zenker diverticulum. Patient was scheduled to follow up with GI as an outpatient for his condition.   In the ED, VS T 101.5 HR 66-79 BP 63-93/35-52 RR 18-29 SPO2 97-99%. Pt was placed on BiPAP for work of breathing, received Vancomycin 1 g, Zosyn 3.375 g,  cc bolus x2, Tylenol 650 mg. (19 Feb 2017 17:26)    INTERVAL HPI/OVERNIGHT EVENTS:::    HEALTH ISSUES - PROBLEM Dx:  HAP (hospital-acquired pneumonia): HAP (hospital-acquired pneumonia)  Prophylactic measure: Prophylactic measure  Nutrition, metabolism, and development symptoms: Nutrition, metabolism, and development symptoms  Seizures: Seizures  Diabetes: Diabetes  CAD (coronary artery disease): CAD (coronary artery disease)  End-stage renal disease: End-stage renal disease  Aspiration precautions: Aspiration precautions  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  Anemia due to chronic kidney disease: Anemia due to chronic kidney disease  ESRD (end stage renal disease) on dialysis: ESRD (end stage renal disease) on dialysis  Anemia, unspecified type: Anemia, unspecified type  Motility disorder of intestine: Motility disorder of intestine  Pneumonia: Pneumonia  ESRD (end stage renal disease): ESRD (end stage renal disease)  Sepsis: Sepsis          PAST MEDICAL & SURGICAL HISTORY:  AV graft thrombosis  Osteoarthritis  PVD (peripheral vascular disease)  COPD (chronic obstructive pulmonary disease)  Heart failure  Angina pectoris  ESRD (end stage renal disease)  Seizures: post traumatic  CAD (coronary artery disease)  HTN (hypertension)  Polyneuropathy  Hyperlipidemia  Dysphagia  Hypotension  Alzheimers disease  Osteoarthritis: Osteoarthritis  Chronic obstructive pulmonary disease: Chronic obstructive pulmonary disease  Anemia: Anemia  Dementia without behavioral disturbance: Dementia  Atherosclerosis of coronary artery: CAD (coronary artery disease)  Nondependent alcohol abuse: ETOH abuse  Depressive disorder: Depression  End-stage renal disease: ESRD on hemodialysis  Essential hypertension: HTN (hypertension)  Deep venous embolism and thrombosis of lower extremity: DVT (deep venous thrombosis)  Congestive heart failure: CHF (congestive heart failure)  Legal blindness: Legally blind  Hemodialysis access, AV graft: LUE loop AVG  Acquired arteriovenous fistula: AV fistula          Consultant NOTE  REVIEWED  (   )    REVIEW OF SYSTEMS:  [x] As per HPI  CONSTITUTIONAL: No fever, weight loss, or fatigue  RESPIRATORY: No cough, wheezing, chills or hemoptysis; No Shortness of Breath  CARDIOVASCULAR: No chest pain, palpitations, dizziness, or leg swelling  GASTROINTESTINAL: No abdominal or epigastric pain. No nausea, vomiting, or hematemesis; No diarrhea or constipation. No melena or hematochezia.  MUSCULOSKELETAL: No joint pain or swelling; No muscle, back, or extremity pain  PSYCH    awake, alert       [x] All others negative	  [ ] Unable to obtain          Vital Signs Last 24 Hrs  T(C): 35.8, Max: 37.1 (02-26 @ 05:41)  T(F): 96.5, Max: 98.7 (02-26 @ 05:41)  HR: 67 (64 - 69)  BP: 151/70 (112/58 - 158/63)  BP(mean): --  RR: 18 (16 - 95)  SpO2: 100% (100% - 100%)        I & Os for current day (as of 02-26 @ 22:14)  =============================================  IN: 300 ml / OUT: 0 ml / NET: 300 ml    PHYSICAL EXAMINATION:                                    (    )  NO CHANGE  Appearance: Normal	  HEENT:   Normal oral mucosa, PERRL, EOMI	  Neck: Supple, + JVD/ - JVD; Carotid Bruit   Cardiovascular: Normal S1 S2, No JVD, No murmurs,   Respiratory: Lungs clear to auscultation/Decreased Breath Sounds/No Rales, Rhonchi, Wheezing	  Gastrointestinal:  Soft, Non-tender, + BS	  Skin: No rashes, No ecchymoses, No cyanosis  Extremities: Normal range of motion, No clubbing, cyanosis or edema  Vascular: Peripheral pulses palpable 2+ bilaterally  Neurologic: Non-focal  Psychiatry: A & O x 3, Mood & affect appropriate    fluticasone / salmeterol 250-50 MICROgram(s) Diskus 1Dose(s) Inhalation two times a day  aspirin enteric coated 81milliGRAM(s) Oral daily  docusate sodium 100milliGRAM(s) Oral daily  heparin  Injectable 5000Unit(s) SubCutaneous every 8 hours  folic acid 1milliGRAM(s) Oral daily  artificial  tears Solution 1Drop(s) Both EYES every 6 hours  mirtazapine 15milliGRAM(s) Oral at bedtime  gabapentin 100milliGRAM(s) Oral two times a day  metoclopramide 10milliGRAM(s) Oral three times a day with meals  simvastatin 20milliGRAM(s) Oral at bedtime  ALBUTerol/ipratropium for Nebulization 3milliLiter(s) Nebulizer every 4 hours  levETIRAcetam  IVPB 250milliGRAM(s) IV Intermittent every 12 hours  levETIRAcetam  IVPB 250milliGRAM(s) IV Intermittent <User Schedule>  insulin lispro (HumaLOG) corrective regimen sliding scale  SubCutaneous Before meals and at bedtime  midodrine 5milliGRAM(s) Oral every 8 hours  acetylcysteine 20% Inhalation 5milliLiter(s) Inhalation every 4 hours  acetaminophen  Suppository 650milliGRAM(s) Rectal every 6 hours PRN  pantoprazole  Injectable 40milliGRAM(s) IV Push daily  epoetin nicki Injectable 7000Unit(s) IV Push <User Schedule>  heparin -  Bolus     piperacillin/tazobactam IVPB. 2.25Gram(s) IV Intermittent once                                      7.7    11.6  )-----------( 264      ( 26 Feb 2017 06:21 )             24.2     26 Feb 2017 06:21    134    |  94     |  27     ----------------------------<  88     3.2     |  28     |  5.15     Ca    8.0        26 Feb 2017 06:21  Mg     1.8       25 Feb 2017 06:38        CAPILLARY BLOOD GLUCOSE  90 (26 Feb 2017 22:13)  178 (26 Feb 2017 17:27)  207 (26 Feb 2017 13:13)  173 (26 Feb 2017 11:41)  169 (26 Feb 2017 07:45)    INTERPRETATION:  Clinical history: Possible seizure.    Technique: CT head was performed utilizing axial images from the base of   the skull through the vertex without the administration of intravenous   contrast. Images were reviewed in the bone, brain and subdural windows.    Findings: Comparison is made to a prior study performed on 9/2/2015.    There is no evidence of acute intracranial hemorrhage or acute   transcortical infarct.  There are tiny bilateral external capsule chronic   lacunar infarcts. There are patchy areas of low density within the   periventricular white matter consistent with mild to moderate   microvascular disease. There is enlargement the sulci, cisterns and   ventricles consistent with mild to moderate cerebral volume loss. There   is no evidence of hydrocephalus. There is no evidence of mass-effect or   midline shift. There is no evidence of an intra or extra-axial fluid   collection.  There are calcifications involving the bilateral cavernous and   supraclinoid internal carotid arteries as well as left vertebral artery   consistent with atherosclerotic disease.  Visualized paranasal sinuses and bilateral mastoid air cells are clear.   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 Tiny bilateral   external capsule chronic lacunar infarcts. No evidence of acute   intracranial injury.

## 2017-02-26 NOTE — PROGRESS NOTE ADULT - PROBLEM SELECTOR PLAN 10
Aspiration precautions- patient to sit upright for 2 hours after eating. HSQ     Dispo: DNR/DNI, comfort feeding Palliative care following, appreciate recs
Aspiration precautions- patient to sit upright for 2 hours after eating. HSQ     Dispo: DNR/DNI, comfort feeding Palliative care following, appreciate recs

## 2017-02-26 NOTE — PROGRESS NOTE ADULT - SUBJECTIVE AND OBJECTIVE BOX
CC: UTI URINARY TRACT INFECTION SEPSIS      INTERVAL HISTORY: lying in bed in NAD      ROS: No chest pain, no sob, no abd pain. No n/v/d    PAST MEDICAL & SURGICAL HISTORY:  AV graft thrombosis  Osteoarthritis  PVD (peripheral vascular disease)  COPD (chronic obstructive pulmonary disease)  Heart failure  Angina pectoris  ESRD (end stage renal disease)  Seizures: post traumatic  CAD (coronary artery disease)  HTN (hypertension)  Polyneuropathy  Hyperlipidemia  Dysphagia  Hypotension  Alzheimers disease  Osteoarthritis: Osteoarthritis  Chronic obstructive pulmonary disease: Chronic obstructive pulmonary disease  Anemia: Anemia  Dementia without behavioral disturbance: Dementia  Atherosclerosis of coronary artery: CAD (coronary artery disease)  Nondependent alcohol abuse: ETOH abuse  Depressive disorder: Depression  End-stage renal disease: ESRD on hemodialysis  Essential hypertension: HTN (hypertension)  Deep venous embolism and thrombosis of lower extremity: DVT (deep venous thrombosis)  Congestive heart failure: CHF (congestive heart failure)  Legal blindness: Legally blind  Hemodialysis access, AV graft: LUE loop AVG  Acquired arteriovenous fistula: AV fistula      PHYSICAL EXAM:  T(C): 37.1, Max: 37.1 (02-26 @ 05:41)  HR: 64  BP: 158/63 (117/55 - 158/63)  RR: 95  SpO2: 100%  Wt(kg): --  I&O's Summary    I & Os for current day (as of 26 Feb 2017 07:48)  =============================================  IN: 300 ml / OUT: 0 ml / NET: 300 ml    Weight 65 (02-19 @ 17:49)  General: AAO x 3,  NAD.  HEENT: moist mucous membranes, no pallor/cyanosis.  Neck: no JVD visible.  Cardiac: S1, S2. RRR. No murmurs   Respratory: CTA b/l, no access muscle use.   Abdomen: soft. nontender. nondistended  Skin: no rashes.  Extremities: no LE edema b/l  Access: + bruit in OMAYRA      DATA:                        7.7<L>  11.6<H> )-----------( 264      ( 26 Feb 2017 06:21 )             24.2<L>    Ferritin, Serum: 3100.5 ng/mL <H> (02-22 @ 04:58)      134<L>  |  94<L>  |  27<H>  ----------------------------<  88     Ca:8.0<L> (26 Feb 2017 06:21)  3.2<L>   |  28     |  5.15<H>      eGFR if Non : 9 <L>  eGFR if : 11 <L>    TPro  6.5 g/dL  /  Alb  2.0 g/dL<L>  /  TBili  0.4 mg/dL  /  DBili  x      /  AST  17 U/L  /  ALT  9 U/L<L>  /  AlkPhos  84 U/L  24 Feb 2017 03:44                    MEDICATIONS  (STANDING):  fluticasone / salmeterol 250-50 MICROgram(s) Diskus 1Dose(s) Inhalation two times a day  aspirin enteric coated 81milliGRAM(s) Oral daily  docusate sodium 100milliGRAM(s) Oral daily  heparin  Injectable 5000Unit(s) SubCutaneous every 8 hours  folic acid 1milliGRAM(s) Oral daily  artificial  tears Solution 1Drop(s) Both EYES every 6 hours  mirtazapine 15milliGRAM(s) Oral at bedtime  gabapentin 100milliGRAM(s) Oral two times a day  metoclopramide 10milliGRAM(s) Oral three times a day with meals  simvastatin 20milliGRAM(s) Oral at bedtime  piperacillin/tazobactam IVPB. 2.25Gram(s) IV Intermittent every 12 hours  ALBUTerol/ipratropium for Nebulization 3milliLiter(s) Nebulizer every 4 hours  levETIRAcetam  IVPB 250milliGRAM(s) IV Intermittent every 12 hours  levETIRAcetam  IVPB 250milliGRAM(s) IV Intermittent <User Schedule>  insulin lispro (HumaLOG) corrective regimen sliding scale  SubCutaneous Before meals and at bedtime  midodrine 5milliGRAM(s) Oral every 8 hours  acetylcysteine 20% Inhalation 5milliLiter(s) Inhalation every 4 hours  pantoprazole  Injectable 40milliGRAM(s) IV Push daily  epoetin nicki Injectable 7000Unit(s) IV Push <User Schedule>  heparin -  Bolus     potassium chloride    Tablet ER 20milliEquivalent(s) Oral once    MEDICATIONS  (PRN):  acetaminophen  Suppository 650milliGRAM(s) Rectal every 6 hours PRN For Temp greater than 38 C (100.4 F)

## 2017-02-26 NOTE — PROGRESS NOTE ADULT - SUBJECTIVE AND OBJECTIVE BOX
No overnight events.  No complaints.  ROS negative.    Vital Signs Last 24 Hrs  T(C): 36.2, Max: 37.1 (02-26 @ 05:41)  T(F): 97.2, Max: 98.7 (02-26 @ 05:41)  HR: 69 (63 - 70)  BP: 133/69 (120/85 - 158/63)  BP(mean): --  RR: 20 (19 - 95)  SpO2: 100% (100% - 100%)    PHYSICAL EXAMINATION  * General: Not in acute distress. Awake and alert. Lying comfortably in bed.  * Head: Normocephalic, atraumatic.  * HEENT: ears no discharge, eyes PERRLA, nose no discharge, throat no exudates, normal tonsils.  * Neck: no JVD, supple.  * Lungs: Clear to auscultation, no rales, no wheezes.  * Cardio: Regular rate and rhythm, no murmurs, no rubs, no gallops. Good peripheral pulses.  * Abdomen: Soft, non-tender, non-distended, tympanic to percussion, no rebound, no guarding, no rigidity. Bowel sounds present. No suprapubic or CVA tenderness.  * : Deferred.  * Extremities: Acyanotic, no edema.  * Skin: Warm and dry.  * Neuro: Alert and oriented x 3. No focal deficits. Motor strength is 5/5 throughout. Sensation intact. Cranial nerves II-XII grossly intact.                         7.7    11.6  )-----------( 264      ( 26 Feb 2017 06:21 )             24.2   26 Feb 2017 06:21    134    |  94     |  27     ----------------------------<  88     3.2     |  28     |  5.15     Ca    8.0        26 Feb 2017 06:21  Mg     1.8       25 Feb 2017 06:38    MEDICATIONS  (STANDING):  fluticasone / salmeterol 250-50 MICROgram(s) Diskus 1Dose(s) Inhalation two times a day  aspirin enteric coated 81milliGRAM(s) Oral daily  docusate sodium 100milliGRAM(s) Oral daily  heparin  Injectable 5000Unit(s) SubCutaneous every 8 hours  folic acid 1milliGRAM(s) Oral daily  artificial  tears Solution 1Drop(s) Both EYES every 6 hours  mirtazapine 15milliGRAM(s) Oral at bedtime  gabapentin 100milliGRAM(s) Oral two times a day  metoclopramide 10milliGRAM(s) Oral three times a day with meals  simvastatin 20milliGRAM(s) Oral at bedtime  ALBUTerol/ipratropium for Nebulization 3milliLiter(s) Nebulizer every 4 hours  levETIRAcetam  IVPB 250milliGRAM(s) IV Intermittent every 12 hours  levETIRAcetam  IVPB 250milliGRAM(s) IV Intermittent <User Schedule>  insulin lispro (HumaLOG) corrective regimen sliding scale  SubCutaneous Before meals and at bedtime  midodrine 5milliGRAM(s) Oral every 8 hours  acetylcysteine 20% Inhalation 5milliLiter(s) Inhalation every 4 hours  pantoprazole  Injectable 40milliGRAM(s) IV Push daily  epoetin nicki Injectable 7000Unit(s) IV Push <User Schedule>  heparin -  Bolus     piperacillin/tazobactam IVPB. 2.25Gram(s) IV Intermittent once    MEDICATIONS  (PRN):  acetaminophen  Suppository 650milliGRAM(s) Rectal every 6 hours PRN For Temp greater than 38 C (100.4 F)

## 2017-02-26 NOTE — PROGRESS NOTE ADULT - ASSESSMENT
77 yo F PMH ESRD, Epilepsy, Alzheimers dementia, HTN, HLD, RA, GERD, IDDM admitted to MICU 2/2 to septic shock,   evaluated for chronic aspiration w/ botox injection for achalasia.

## 2017-02-27 ENCOUNTER — TRANSCRIPTION ENCOUNTER (OUTPATIENT)
Age: 82
End: 2017-02-27

## 2017-02-27 LAB
ANION GAP SERPL CALC-SCNC: 13 MMOL/L — SIGNIFICANT CHANGE UP (ref 9–16)
BUN SERPL-MCNC: 31 MG/DL — HIGH (ref 7–23)
CALCIUM SERPL-MCNC: 7.8 MG/DL — LOW (ref 8.5–10.5)
CHLORIDE SERPL-SCNC: 94 MMOL/L — LOW (ref 96–108)
CO2 SERPL-SCNC: 26 MMOL/L — SIGNIFICANT CHANGE UP (ref 22–31)
CREAT SERPL-MCNC: 5.96 MG/DL — HIGH (ref 0.5–1.3)
FERRITIN SERPL-MCNC: 1985 NG/ML — HIGH (ref 26–388)
GLUCOSE SERPL-MCNC: 95 MG/DL — SIGNIFICANT CHANGE UP (ref 70–99)
HCT VFR BLD CALC: 22.5 % — LOW (ref 39–50)
HGB BLD-MCNC: 7.3 G/DL — LOW (ref 13–17)
IRON SATN MFR SERPL: 43 % — HIGH (ref 26–39)
IRON SATN MFR SERPL: 50 UG/DL — LOW (ref 65–175)
MAGNESIUM SERPL-MCNC: 1.6 MG/DL — SIGNIFICANT CHANGE UP (ref 1.6–2.4)
MCHC RBC-ENTMCNC: 30 PG — SIGNIFICANT CHANGE UP (ref 27–34)
MCHC RBC-ENTMCNC: 32.4 G/DL — SIGNIFICANT CHANGE UP (ref 32–36)
MCV RBC AUTO: 92.6 FL — SIGNIFICANT CHANGE UP (ref 80–100)
PLATELET # BLD AUTO: 256 K/UL — SIGNIFICANT CHANGE UP (ref 150–400)
POTASSIUM SERPL-MCNC: 3.3 MMOL/L — LOW (ref 3.5–5.3)
POTASSIUM SERPL-SCNC: 3.3 MMOL/L — LOW (ref 3.5–5.3)
RBC # BLD: 2.43 M/UL — LOW (ref 4.2–5.8)
RBC # FLD: 16.2 % — SIGNIFICANT CHANGE UP (ref 10.3–16.9)
SODIUM SERPL-SCNC: 133 MMOL/L — LOW (ref 135–145)
TIBC SERPL-MCNC: 117 UG/DL — LOW (ref 250–450)
VANCOMYCIN TROUGH SERPL-MCNC: 21.7 UG/ML — HIGH (ref 10–20)
WBC # BLD: 9.9 K/UL — SIGNIFICANT CHANGE UP (ref 3.8–10.5)
WBC # FLD AUTO: 9.9 K/UL — SIGNIFICANT CHANGE UP (ref 3.8–10.5)

## 2017-02-27 PROCEDURE — 99233 SBSQ HOSP IP/OBS HIGH 50: CPT

## 2017-02-27 RX ORDER — HEPARIN SODIUM 5000 [USP'U]/ML
1000 INJECTION INTRAVENOUS; SUBCUTANEOUS ONCE
Qty: 0 | Refills: 0 | Status: COMPLETED | OUTPATIENT
Start: 2017-02-27 | End: 2017-02-27

## 2017-02-27 RX ORDER — HEPARIN SODIUM 5000 [USP'U]/ML
INJECTION INTRAVENOUS; SUBCUTANEOUS
Qty: 0 | Refills: 0 | Status: COMPLETED | OUTPATIENT
Start: 2017-02-27 | End: 2017-02-27

## 2017-02-27 RX ORDER — MAGNESIUM SULFATE 500 MG/ML
2 VIAL (ML) INJECTION ONCE
Qty: 0 | Refills: 0 | Status: COMPLETED | OUTPATIENT
Start: 2017-02-27 | End: 2017-02-27

## 2017-02-27 RX ORDER — ERYTHROPOIETIN 10000 [IU]/ML
0 INJECTION, SOLUTION INTRAVENOUS; SUBCUTANEOUS
Qty: 0 | Refills: 0 | COMMUNITY
Start: 2017-02-27

## 2017-02-27 RX ORDER — VANCOMYCIN HCL 1 G
750 VIAL (EA) INTRAVENOUS ONCE
Qty: 0 | Refills: 0 | Status: COMPLETED | OUTPATIENT
Start: 2017-02-27 | End: 2017-02-27

## 2017-02-27 RX ORDER — INSULIN LISPRO 100/ML
0 VIAL (ML) SUBCUTANEOUS
Qty: 0 | Refills: 0 | COMMUNITY
Start: 2017-02-27

## 2017-02-27 RX ORDER — HEPARIN SODIUM 5000 [USP'U]/ML
500 INJECTION INTRAVENOUS; SUBCUTANEOUS
Qty: 0 | Refills: 0 | Status: COMPLETED | OUTPATIENT
Start: 2017-02-27 | End: 2017-02-27

## 2017-02-27 RX ORDER — VANCOMYCIN HCL 1 G
750 VIAL (EA) INTRAVENOUS
Qty: 1 | Refills: 0 | OUTPATIENT
Start: 2017-02-27 | End: 2017-02-28

## 2017-02-27 RX ORDER — POTASSIUM CHLORIDE 20 MEQ
10 PACKET (EA) ORAL
Qty: 0 | Refills: 0 | Status: DISCONTINUED | OUTPATIENT
Start: 2017-02-27 | End: 2017-02-27

## 2017-02-27 RX ORDER — PANTOPRAZOLE SODIUM 20 MG/1
40 TABLET, DELAYED RELEASE ORAL
Qty: 0 | Refills: 0 | COMMUNITY
Start: 2017-02-27

## 2017-02-27 RX ORDER — POTASSIUM CHLORIDE 20 MEQ
40 PACKET (EA) ORAL ONCE
Qty: 0 | Refills: 0 | Status: COMPLETED | OUTPATIENT
Start: 2017-02-27 | End: 2017-02-27

## 2017-02-27 RX ORDER — MIDODRINE HYDROCHLORIDE 2.5 MG/1
1 TABLET ORAL
Qty: 0 | Refills: 0 | COMMUNITY
Start: 2017-02-27

## 2017-02-27 RX ORDER — ACETYLCYSTEINE 200 MG/ML
5 VIAL (ML) MISCELLANEOUS
Qty: 0 | Refills: 0 | COMMUNITY
Start: 2017-02-27

## 2017-02-27 RX ORDER — ACETAMINOPHEN 500 MG
1 TABLET ORAL
Qty: 0 | Refills: 0 | COMMUNITY
Start: 2017-02-27

## 2017-02-27 RX ORDER — HEPARIN SODIUM 5000 [USP'U]/ML
1 INJECTION INTRAVENOUS; SUBCUTANEOUS
Qty: 1 | Refills: 0 | OUTPATIENT
Start: 2017-02-27 | End: 2017-03-02

## 2017-02-27 RX ADMIN — HEPARIN SODIUM 5000 UNIT(S): 5000 INJECTION INTRAVENOUS; SUBCUTANEOUS at 14:51

## 2017-02-27 RX ADMIN — MIDODRINE HYDROCHLORIDE 5 MILLIGRAM(S): 2.5 TABLET ORAL at 14:50

## 2017-02-27 RX ADMIN — Medication 5 MILLILITER(S): at 03:05

## 2017-02-27 RX ADMIN — LEVETIRACETAM 410 MILLIGRAM(S): 250 TABLET, FILM COATED ORAL at 06:46

## 2017-02-27 RX ADMIN — Medication 250 MILLIGRAM(S): at 16:39

## 2017-02-27 RX ADMIN — Medication 10 MILLIGRAM(S): at 09:12

## 2017-02-27 RX ADMIN — LEVETIRACETAM 410 MILLIGRAM(S): 250 TABLET, FILM COATED ORAL at 18:50

## 2017-02-27 RX ADMIN — Medication 1 MILLIGRAM(S): at 14:48

## 2017-02-27 RX ADMIN — Medication 3 MILLILITER(S): at 22:34

## 2017-02-27 RX ADMIN — LEVETIRACETAM 410 MILLIGRAM(S): 250 TABLET, FILM COATED ORAL at 14:52

## 2017-02-27 RX ADMIN — Medication 5 MILLILITER(S): at 22:35

## 2017-02-27 RX ADMIN — Medication 2: at 22:37

## 2017-02-27 RX ADMIN — Medication 2: at 18:25

## 2017-02-27 RX ADMIN — HEPARIN SODIUM 5000 UNIT(S): 5000 INJECTION INTRAVENOUS; SUBCUTANEOUS at 22:36

## 2017-02-27 RX ADMIN — Medication 3 MILLILITER(S): at 18:00

## 2017-02-27 RX ADMIN — Medication 40 MILLIEQUIVALENT(S): at 09:12

## 2017-02-27 RX ADMIN — GABAPENTIN 100 MILLIGRAM(S): 400 CAPSULE ORAL at 17:37

## 2017-02-27 RX ADMIN — Medication 5 MILLILITER(S): at 06:43

## 2017-02-27 RX ADMIN — FLUTICASONE PROPIONATE AND SALMETEROL 1 DOSE(S): 50; 250 POWDER ORAL; RESPIRATORY (INHALATION) at 17:39

## 2017-02-27 RX ADMIN — HEPARIN SODIUM 0.1 UNIT(S): 5000 INJECTION INTRAVENOUS; SUBCUTANEOUS at 12:25

## 2017-02-27 RX ADMIN — Medication 1 DROP(S): at 06:44

## 2017-02-27 RX ADMIN — PANTOPRAZOLE SODIUM 40 MILLIGRAM(S): 20 TABLET, DELAYED RELEASE ORAL at 14:48

## 2017-02-27 RX ADMIN — Medication 81 MILLIGRAM(S): at 14:48

## 2017-02-27 RX ADMIN — FLUTICASONE PROPIONATE AND SALMETEROL 1 DOSE(S): 50; 250 POWDER ORAL; RESPIRATORY (INHALATION) at 06:45

## 2017-02-27 RX ADMIN — Medication 3 MILLILITER(S): at 14:48

## 2017-02-27 RX ADMIN — MIRTAZAPINE 15 MILLIGRAM(S): 45 TABLET, ORALLY DISINTEGRATING ORAL at 22:36

## 2017-02-27 RX ADMIN — MIDODRINE HYDROCHLORIDE 5 MILLIGRAM(S): 2.5 TABLET ORAL at 22:36

## 2017-02-27 RX ADMIN — Medication 50 GRAM(S): at 15:36

## 2017-02-27 RX ADMIN — Medication 1 DROP(S): at 14:48

## 2017-02-27 RX ADMIN — HEPARIN SODIUM 0.1 UNIT(S): 5000 INJECTION INTRAVENOUS; SUBCUTANEOUS at 10:30

## 2017-02-27 RX ADMIN — MIDODRINE HYDROCHLORIDE 5 MILLIGRAM(S): 2.5 TABLET ORAL at 06:45

## 2017-02-27 RX ADMIN — ERYTHROPOIETIN 7000 UNIT(S): 10000 INJECTION, SOLUTION INTRAVENOUS; SUBCUTANEOUS at 11:04

## 2017-02-27 RX ADMIN — SIMVASTATIN 20 MILLIGRAM(S): 20 TABLET, FILM COATED ORAL at 22:36

## 2017-02-27 RX ADMIN — Medication 3 MILLILITER(S): at 03:04

## 2017-02-27 RX ADMIN — Medication 100 MILLIEQUIVALENT(S): at 09:13

## 2017-02-27 RX ADMIN — Medication 100 MILLIGRAM(S): at 14:51

## 2017-02-27 RX ADMIN — HEPARIN SODIUM 5000 UNIT(S): 5000 INJECTION INTRAVENOUS; SUBCUTANEOUS at 06:45

## 2017-02-27 RX ADMIN — HEPARIN SODIUM 0.1 UNIT(S): 5000 INJECTION INTRAVENOUS; SUBCUTANEOUS at 11:25

## 2017-02-27 RX ADMIN — GABAPENTIN 100 MILLIGRAM(S): 400 CAPSULE ORAL at 06:50

## 2017-02-27 RX ADMIN — Medication 1 DROP(S): at 22:35

## 2017-02-27 RX ADMIN — Medication 5 MILLILITER(S): at 18:02

## 2017-02-27 RX ADMIN — Medication 5 MILLILITER(S): at 15:34

## 2017-02-27 RX ADMIN — Medication 10 MILLIGRAM(S): at 17:37

## 2017-02-27 RX ADMIN — Medication 3 MILLILITER(S): at 06:44

## 2017-02-27 NOTE — CONSULT NOTE ADULT - SUBJECTIVE AND OBJECTIVE BOX
Patient is a 87y old  Male who presents with a chief complaint of Fever (19 Feb 2017 17:26)        HPI:  88 YO M h/o ESRD (HD MWF, last HD Friday 2/17), Alzheimer's disease, CAD, COPD, angina, anemia, CHF, depression, HTN, HLD, OA, PVD, polyneuropathy, blindness sent from [facility] after he was noted to be febrile to 101, desaturating with increased oral secretions. Upon questioning, "patient does not know why he is here". Patient is a poor historian.  At baseline, pt is alert and oriented, wheelchair bound, and requires assistance with ADLs. Patient was recently  here in december 2016 for hypotension related to vomiting 2/2 to his Zenker diverticulum. Patient was scheduled to follow up with GI as an outpatient for his condition.   In the ED, VS T 101.5 HR 66-79 BP 63-93/35-52 RR 18-29 SPO2 97-99%. Pt was placed on BiPAP for work of breathing, received Vancomycin 1 g, Zosyn 3.375 g,  cc bolus x2, Tylenol 650 mg. (19 Feb 2017 17:26)    Lethargic- Pt on keppra with history of seizure      Allergies  clonidine (Unknown)      Health Issues  UTI URINARY TRACT INFECTION SEPSIS  H/o or current diagnosis of HF- ACEI/ARB contraindication unknown  H/o or current diagnosis of HF- ACEI/ARB contraindication unknown  Yes  Family history unknown  Handoff  MEWS Score  AV graft thrombosis  Osteoarthritis  PVD (peripheral vascular disease)  COPD (chronic obstructive pulmonary disease)  Heart failure  Angina pectoris  ESRD (end stage renal disease)  Seizures  CAD (coronary artery disease)  HTN (hypertension)  Polyneuropathy  Hyperlipidemia  Dysphagia  Hypotension  Alzheimers disease  Osteoarthritis  Chronic obstructive pulmonary disease  Anemia  Dementia without behavioral disturbance  Atherosclerosis of coronary artery  Nondependent alcohol abuse  Depressive disorder  End-stage renal disease  Essential hypertension  Deep venous embolism and thrombosis of lower extremity  Congestive heart failure  Legal blindness  UTI (urinary tract infection)  HAP (hospital-acquired pneumonia)  Prophylactic measure  Nutrition, metabolism, and development symptoms  Seizures  Diabetes  CAD (coronary artery disease)  End-stage renal disease  Aspiration precautions  Chronic kidney disease-mineral and bone disorder  Anemia due to chronic kidney disease  ESRD (end stage renal disease) on dialysis  Anemia, unspecified type  Motility disorder of intestine  Pneumonia  ESRD (end stage renal disease)  Sepsis  Hemodialysis access, AV graft  Acquired arteriovenous fistula  SOB  64  Sepsis        FAMILY HISTORY:  Family history unknown      MEDICATIONS  (STANDING):  fluticasone / salmeterol 250-50 MICROgram(s) Diskus 1Dose(s) Inhalation two times a day  aspirin enteric coated 81milliGRAM(s) Oral daily  docusate sodium 100milliGRAM(s) Oral daily  heparin  Injectable 5000Unit(s) SubCutaneous every 8 hours  folic acid 1milliGRAM(s) Oral daily  artificial  tears Solution 1Drop(s) Both EYES every 6 hours  mirtazapine 15milliGRAM(s) Oral at bedtime  gabapentin 100milliGRAM(s) Oral two times a day  metoclopramide 10milliGRAM(s) Oral three times a day with meals  simvastatin 20milliGRAM(s) Oral at bedtime  ALBUTerol/ipratropium for Nebulization 3milliLiter(s) Nebulizer every 4 hours  levETIRAcetam  IVPB 250milliGRAM(s) IV Intermittent every 12 hours  levETIRAcetam  IVPB 250milliGRAM(s) IV Intermittent <User Schedule>  insulin lispro (HumaLOG) corrective regimen sliding scale  SubCutaneous Before meals and at bedtime  midodrine 5milliGRAM(s) Oral every 8 hours  acetylcysteine 20% Inhalation 5milliLiter(s) Inhalation every 4 hours  pantoprazole  Injectable 40milliGRAM(s) IV Push daily  epoetin nicki Injectable 7000Unit(s) IV Push <User Schedule>  heparin -  Bolus     heparin -  Bolus 1000Unit(s) IV Bolus once  heparin -  Bolus 500Unit(s) IV Bolus every 1 hour  heparin -  Bolus  IV Bolus     MEDICATIONS  (PRN):  acetaminophen  Suppository 650milliGRAM(s) Rectal every 6 hours PRN For Temp greater than 38 C (100.4 F)      PAST MEDICAL & SURGICAL HISTORY:  AV graft thrombosis  Osteoarthritis  PVD (peripheral vascular disease)  COPD (chronic obstructive pulmonary disease)  Heart failure  Angina pectoris  ESRD (end stage renal disease)  Seizures: post traumatic  CAD (coronary artery disease)  HTN (hypertension)  Polyneuropathy  Hyperlipidemia  Dysphagia  Hypotension  Alzheimers disease  Osteoarthritis: Osteoarthritis  Chronic obstructive pulmonary disease: Chronic obstructive pulmonary disease  Anemia: Anemia  Dementia without behavioral disturbance: Dementia  Atherosclerosis of coronary artery: CAD (coronary artery disease)  Nondependent alcohol abuse: ETOH abuse  Depressive disorder: Depression  End-stage renal disease: ESRD on hemodialysis  Essential hypertension: HTN (hypertension)  Deep venous embolism and thrombosis of lower extremity: DVT (deep venous thrombosis)  Congestive heart failure: CHF (congestive heart failure)  Legal blindness: Legally blind  Hemodialysis access, AV graft: LUE loop AVG  Acquired arteriovenous fistula: AV fistula      Labs                          7.3    9.9   )-----------( 256      ( 27 Feb 2017 06:18 )             22.5     27 Feb 2017 06:18    133    |  94     |  31     ----------------------------<  95     3.3     |  26     |  5.96     Ca    7.8        27 Feb 2017 06:18  Mg     1.6       27 Feb 2017 06:18        Radiology:    Physical Exam    MENTAL STATUS  -Level of Consciousness- lethargic    Orientation- person, place time    Memory- recent and remote unable to evaluate  Responds to simple commands      Cranial Nerve 1- 12  Pupils- equal and reactive  Eye movements- full lateral movements  Facial - no asymmetry   Lower CN-nl    Gait and Station-n/a    MOTOR  Upper-moves against gravity  Lower- weakness    Reflexes- absent LE    Sensation- no sensory level    Cerebellar- no tremors    vascular - no bruits    Assessment- Alzheimer's, Seizures by history     Plan EEG, CT head, Keppra levels

## 2017-02-27 NOTE — DISCHARGE NOTE ADULT - SECONDARY DIAGNOSIS.
Motility disorder of intestine Aspiration precautions ESRD (end stage renal disease) CAD (coronary artery disease) Diabetes Seizures

## 2017-02-27 NOTE — PROGRESS NOTE ADULT - SUBJECTIVE AND OBJECTIVE BOX
Patient was seen and evaluated on dialysis.   Patient is tolerating the procedure well.   HR: 82  BP: 138/58  Wt(kg): 74.1kg bed  Continue dialysis:   Dialyzer:  F180        QB:   400     QD: 500  Goal UF 2.5kg over 3.5 Hours

## 2017-02-27 NOTE — DISCHARGE NOTE ADULT - CARE PROVIDER_API CALL
Oliver Haq (MD), Internal Medicine  [address] [address]  Phone: [phone]  Fax: [phone]

## 2017-02-27 NOTE — DISCHARGE NOTE ADULT - MEDICATION SUMMARY - MEDICATIONS TO TAKE
I will START or STAY ON the medications listed below when I get home from the hospital:    aspirin 81 mg oral tablet  -- 1 tab(s) by mouth once a day  -- Indication: For CAD (coronary artery disease)    meloxicam 7.5 mg oral tablet  -- 1 tab(s) by mouth once a day  -- Indication: For Nutrition, metabolism, and development symptoms    nitroglycerin 0.1 mg/hr transdermal film, extended release  -- 1 patch by transdermal patch 3 times a week, As Needed only during dialysis  -- Indication: For CAD (coronary artery disease)    Neurontin 100 mg oral capsule  -- 1 cap(s) by mouth 2 times a day 2pm and 6pm  -- Indication: For Diabetes    levETIRAcetam 250 mg oral tablet  -- 1 tab(s) by mouth 2 times a day  -- Indication: For Seizures    levETIRAcetam 250 mg oral tablet  -- 1 tab(s) by mouth on MWF after dialysis  -- Indication: For Seizures    Remeron 15 mg oral tablet  -- 1 tab(s) by mouth once a day (at bedtime)  -- Indication: For Nutrition, metabolism, and development symptoms    metoclopramide 10 mg oral tablet  -- 1 tab(s) by mouth 3 times a day (with meals)  -- Indication: For Motility disorder of intestine    simvastatin 20 mg oral tablet  -- 1 tab(s) by mouth once a day (at bedtime)  -- Indication: For CAD (coronary artery disease)    Advair Diskus 250 mcg-50 mcg inhalation powder  -- 1 puff(s) inhaled 2 times a day  -- Indication: For Prophylactic measure    albuterol-ipratropium 2.5 mg-0.5 mg/3 mL inhalation solution  -- 3 milliliter(s) inhaled every 4 hours  -- Indication: For Prophylactic measure    vancomycin 750 mg intravenous injection  -- 750 milligram(s) intravenous once to be given after dialysis 3/1/17  -- Indication: For HAP (hospital-acquired pneumonia)    Colace 100 mg oral capsule  -- 1 cap(s) by mouth once (at bedtime)  -- Indication: For Constipation    midodrine 5 mg oral tablet  -- 1 tab(s) by mouth every 8 hours  -- Indication: For ESRD (end stage renal disease)    sodium polystyrene sulfonate 15 g/60 mL oral and rectal suspension  -- 60 milliliter(s) oral once a day every M,W,F for ESRD  -- Indication: For ESRD (end stage renal disease)    Artificial Tears ophthalmic solution  --  to each affected eye 4 times a day  -- Indication: For Prophylactic measure    PhosLo Gelcap 667 mg oral capsule  -- 1 cap(s) by mouth 3 times a day  -- Indication: For ESRD (end stage renal disease)    omeprazole 40 mg oral delayed release capsule  -- 1 cap(s) by mouth once a day  -- Indication: For Prophylactic measure    folic acid 1 mg oral tablet  -- 1 tab(s) by mouth once a day  -- Indication: For Nutrition, metabolism, and development symptoms I will START or STAY ON the medications listed below when I get home from the hospital:    aspirin 81 mg oral tablet  -- 1 tab(s) by mouth once a day  -- Indication: For CAD (coronary artery disease)    meloxicam 7.5 mg oral tablet  -- 1 tab(s) by mouth once a day  -- Indication: For Nutrition, metabolism, and development symptoms    acetaminophen 650 mg rectal suppository  -- 1 suppository(ies) rectally every 6 hours, As needed, For Temp greater than 38 C (100.4 F)  -- Indication: For HAP (hospital-acquired pneumonia)    nitroglycerin 0.1 mg/hr transdermal film, extended release  -- 1 patch by transdermal patch 3 times a week, As Needed only during dialysis  -- Indication: For CAD (coronary artery disease)    heparin 5000 units/mL injectable solution  -- 1 dose(s) injectable every 12 hours  -- Indication: For Prophylactic measure    Neurontin 100 mg oral capsule  -- 1 cap(s) by mouth 2 times a day 2pm and 6pm  -- Indication: For Diabetes    levETIRAcetam 250 mg oral tablet  -- 1 tab(s) by mouth 2 times a day  -- Indication: For Seizures    levETIRAcetam 250 mg oral tablet  -- 1 tab(s) by mouth on MWF after dialysis  -- Indication: For Seizures    Remeron 15 mg oral tablet  -- 1 tab(s) by mouth once a day (at bedtime)  -- Indication: For Nutrition, metabolism, and development symptoms    insulin lispro 100 units/mL subcutaneous solution  --  subcutaneous 4 times a day (before meals and at bedtime); 2 Unit(s) if Glucose 151 - 200  4 Unit(s) if Glucose 201 - 250  6 Unit(s) if Glucose 251 - 300  8 Unit(s) if Glucose 301 - 350  10 Unit(s) if Glucose 351 - 400  12 Unit(s) if Glucose Greater Than 400  -- Indication: For Diabetes    acetylcysteine 20% inhalation solution  -- 5 milliliter(s) inhaled every 4 hours  -- Indication: For Nutrition, metabolism, and development symptoms    metoclopramide 10 mg oral tablet  -- 1 tab(s) by mouth 3 times a day (with meals)  -- Indication: For Motility disorder of intestine    simvastatin 20 mg oral tablet  -- 1 tab(s) by mouth once a day (at bedtime)  -- Indication: For CAD (coronary artery disease)    Advair Diskus 250 mcg-50 mcg inhalation powder  -- 1 puff(s) inhaled 2 times a day  -- Indication: For Prophylactic measure    albuterol-ipratropium 2.5 mg-0.5 mg/3 mL inhalation solution  -- 3 milliliter(s) inhaled every 4 hours  -- Indication: For Prophylactic measure    epoetin nicki  --     -- Indication: For ESRD (end stage renal disease)    vancomycin 750 mg intravenous injection  -- 750 milligram(s) intravenous once to be given after dialysis 3/1/17  -- Indication: For HAP (hospital-acquired pneumonia)    Colace 100 mg oral capsule  -- 1 cap(s) by mouth once (at bedtime)  -- Indication: For Constipation    midodrine 5 mg oral tablet  -- 1 tab(s) by mouth every 8 hours  -- Indication: For ESRD (end stage renal disease)    sodium polystyrene sulfonate 15 g/60 mL oral and rectal suspension  -- 60 milliliter(s) oral once a day every M,W,F for ESRD  -- Indication: For ESRD (end stage renal disease)    Artificial Tears ophthalmic solution  --  to each affected eye 4 times a day  -- Indication: For Prophylactic measure    PhosLo Gelcap 667 mg oral capsule  -- 1 cap(s) by mouth 3 times a day  -- Indication: For ESRD (end stage renal disease)    omeprazole 40 mg oral delayed release capsule  -- 1 cap(s) by mouth once a day  -- Indication: For Prophylactic measure    pantoprazole 40 mg intravenous injection  -- 40 milligram(s) intravenous once a day  -- Indication: For Prophylactic measure    folic acid 1 mg oral tablet  -- 1 tab(s) by mouth once a day  -- Indication: For Nutrition, metabolism, and development symptoms

## 2017-02-27 NOTE — DISCHARGE NOTE ADULT - HOSPITAL COURSE
87M PMH ESRD, Epilepsy, Alzheimer's dementia, HTN, HLD, RA, GERD, Seizure disorder, IDDM admitted to MICU for septic shock 2/2 aspiration PNA vs. UTI no longer requiring pressors. PNA seen on CXR bibasilar infiltrates and UA positive UTI w/ enterococcus raffinosus sens to vanc. Pt on day 6 out of 7 of zosyn, to finish 2/25. Vanc 7d course started 2/23 (dosed w/ HD as per trough.) Evaluated for chronic aspiration and Zenker's diverticulum found in 2016. EGD done by GI, suctioned diverticulum, botox injection to achalasia site, and insertion of NGT. Episode of hypotension while under sedation for EGD, started on levo which was titrated off. No longer requiring additional pressors. NGT clogged off and dislodged. Seen by palliative w/ end result comfort feeds, no PEG. Patient transferred to regional medical floors. Completed 7 day course zosyn for HCAP on 2/25/17. Will be discharged to [facility] with one dose of vancomycin remaining; to be given following dialysis session on 3/1/17. 87M PMH ESRD, Epilepsy, Alzheimer's dementia, HTN, HLD, RA, GERD, Seizure disorder, IDDM admitted to MICU for septic shock 2/2 aspiration PNA vs. UTI no longer requiring pressors. PNA seen on CXR bibasilar infiltrates and UA positive UTI w/ enterococcus raffinosus sens to vanc. Pt on day 6 out of 7 of zosyn, to finish 2/25. Vanc 7d course started 2/23 (dosed w/ HD as per trough.) Evaluated for chronic aspiration and Zenker's diverticulum found in 2016. EGD done by GI, suctioned diverticulum, botox injection to achalasia site, and insertion of NGT. Episode of hypotension while under sedation for EGD, started on levo which was titrated off. No longer requiring additional pressors. NGT clogged off and dislodged. Seen by palliative w/ end result comfort feeds, no PEG. Patient transferred to regional medical floors. Completed 7 day course zosyn for HCAP on 2/25/17. Patient noted to have possible osteomylitis Left distal radius on XR 2/21. No erythema, pain on movement, WBC or fever. Could not get MRI as not able to stay still for procedure. Ortho consulted who noted no acute management indicated at this time. Will be discharged to [facility] with one dose of vancomycin remaining; to be given following dialysis session on 3/1/17.

## 2017-02-27 NOTE — PROGRESS NOTE ADULT - SUBJECTIVE AND OBJECTIVE BOX
Patient seen and examined at bedside.     Patient seen during HD  Tolerating well presently    No dyspnea, orthopnea, leg swelling reported  Does not report any significant interdialytic fluid intake at present     Bedscale is 74.1kg which is down from previous 75kg    However, his hemodynamics are improved at present.         fluticasone / salmeterol 250-50 MICROgram(s) Diskus 1Dose(s) two times a day  aspirin enteric coated 81milliGRAM(s) daily  docusate sodium 100milliGRAM(s) daily  heparin  Injectable 5000Unit(s) every 8 hours  folic acid 1milliGRAM(s) daily  artificial  tears Solution 1Drop(s) every 6 hours  mirtazapine 15milliGRAM(s) at bedtime  gabapentin 100milliGRAM(s) two times a day  metoclopramide 10milliGRAM(s) three times a day with meals  simvastatin 20milliGRAM(s) at bedtime  ALBUTerol/ipratropium for Nebulization 3milliLiter(s) every 4 hours  levETIRAcetam  IVPB 250milliGRAM(s) every 12 hours  levETIRAcetam  IVPB 250milliGRAM(s) <User Schedule>  insulin lispro (HumaLOG) corrective regimen sliding scale  Before meals and at bedtime  midodrine 5milliGRAM(s) every 8 hours  acetylcysteine 20% Inhalation 5milliLiter(s) every 4 hours  acetaminophen  Suppository 650milliGRAM(s) every 6 hours PRN  pantoprazole  Injectable 40milliGRAM(s) daily  epoetin nicki Injectable 7000Unit(s) <User Schedule>  heparin -  Bolus    heparin -  Bolus 500Unit(s) every 1 hour  heparin -  Bolus    potassium chloride  10 mEq/100 mL IVPB 10milliEquivalent(s) every 1 hour      Allergies    clonidine (Unknown)    Intolerances        T(C): , Max: 36.5 (02-27 @ 05:22)  T(F): , Max: 97.7 (02-27 @ 05:22)  HR: 82  BP: 138/58  BP(mean): --  RR: 20  SpO2: 98%  Wt(kg): --    I & Os for current day (as of 02-27 @ 11:04)  =============================================  IN:    IV PiggyBack: 200 ml    Total IN: 200 ml  ---------------------------------------------  OUT:    Total OUT: 0 ml  ---------------------------------------------  Total NET: 200 ml          LABS:                        7.3    9.9   )-----------( 256      ( 27 Feb 2017 06:18 )             22.5     27 Feb 2017 06:18    133    |  94     |  31     ----------------------------<  95     3.3     |  26     |  5.96     Ca    7.8        27 Feb 2017 06:18  Mg     1.6       27 Feb 2017 06:18                  RADIOLOGY & ADDITIONAL STUDIES:

## 2017-02-27 NOTE — DISCHARGE NOTE ADULT - PLAN OF CARE
complete final dose of vancomycin 750mg following dialysis on 3/1/17 You were treated for hospital acquired pneumonia due to the organism enteroccocus with a 7 day course of the antibiotic zosyn. You were also treated with vancomycin. Following discharge, you will be given one more dose of vancomycin after dialysis on 3/1/17 to complete your course of antibiotics. You will follow up with Dr. Haq for continued management of your condition. You have a history of chronic esophageal dysmotility as per endoscopy, manometry and radiography.  This puts you at a high risk for regurgitation and aspiration. You will be continued on Comfort feed as per discussion w/ palliative care. You are advised to Sit up for at least 2hrs after eating to prevent aspiration and to have Small frequent meals. continue metoclopramide, sit up 2 hours prior to meals and eat small frequent meals sit up 2 hours prior to meals and eat small frequent meals You are a high risk for aspiration due to your comorbidities, You are advised to Sit up for at least 2hrs after eating to prevent aspiration and to have Small frequent meals. continue hemodialysis You have a history of ESRD. You are advised to continue hemodialysis three times a week continue medications as previously prescribed. follow up with Dr. Haq You have a known history of coronary artery disease prior to your admission. Coronary artery disease can cause damage to your heart’s blood vessels and increases your risk of heart attack. To avoid this, It is important that you continue to take this medication when you are discharged so that you can continue to control this condition. Additionally be sure to follow up with Dr. Haq on a regular basis to make sure no additional medications or medication changes need to be made. You have a known history of diabetes mellitus prior to your admission. This condition results from blood sugar levels getting too high and because your body is more resistant to insulin. Uncontrolled blood sugar levels can lead to kidney and heart damage, pain/numbness/paralysis in your hands and feet, and increased rates of infections.  It is important that you continue to take this medication when you are discharged so that you can continue to control your blood sugar levels. Additionally be sure to follow up with Dr. Haq, a podiatrist, a ophthalmologist, and nephrologist on a regular basis to make sure your blood sugar continues to be well controlled. continue Keppra as previously prescribed You have a history of seizure disorder. Following discharge you are advised to continue your Keppra as previously prescribed.

## 2017-02-27 NOTE — PROGRESS NOTE ADULT - SUBJECTIVE AND OBJECTIVE BOX
Patient is a 87y old  Male who presents with a chief complaint of Fever (27 Feb 2017 14:28)      HPI:  88 YO M h/o ESRD (HD MWF, last HD Friday 2/17), Alzheimer's disease, CAD, COPD, angina, anemia, CHF, depression, HTN, HLD, OA, PVD, polyneuropathy, blindness sent from [facility] after he was noted to be febrile to 101, desaturating with increased oral secretions. Upon questioning, "patient does not know why he is here". Patient is a poor historian.  At baseline, pt is alert and oriented, wheelchair bound, and requires assistance with ADLs. Patient was recently  here in december 2016 for hypotension related to vomiting 2/2 to his Zenker diverticulum. Patient was scheduled to follow up with GI as an outpatient for his condition.   In the ED, VS T 101.5 HR 66-79 BP 63-93/35-52 RR 18-29 SPO2 97-99%. Pt was placed on BiPAP for work of breathing, received Vancomycin 1 g, Zosyn 3.375 g,  cc bolus x2, Tylenol 650 mg. (19 Feb 2017 17:26)    INTERVAL HPI/OVERNIGHT EVENTS:::    HEALTH ISSUES - PROBLEM Dx:  HAP (hospital-acquired pneumonia): HAP (hospital-acquired pneumonia)  Prophylactic measure: Prophylactic measure  Nutrition, metabolism, and development symptoms: Nutrition, metabolism, and development symptoms  Seizures: Seizures  Diabetes: Diabetes  CAD (coronary artery disease): CAD (coronary artery disease)  End-stage renal disease: End-stage renal disease  Aspiration precautions: Aspiration precautions  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  Anemia due to chronic kidney disease: Anemia due to chronic kidney disease  ESRD (end stage renal disease) on dialysis: ESRD (end stage renal disease) on dialysis  Anemia, unspecified type: Anemia, unspecified type  Motility disorder of intestine: Motility disorder of intestine  Pneumonia: Pneumonia  ESRD (end stage renal disease): ESRD (end stage renal disease)  Sepsis: Sepsis          PAST MEDICAL & SURGICAL HISTORY:  AV graft thrombosis  Osteoarthritis  PVD (peripheral vascular disease)  COPD (chronic obstructive pulmonary disease)  Heart failure  Angina pectoris  ESRD (end stage renal disease)  Seizures: post traumatic  CAD (coronary artery disease)  HTN (hypertension)  Polyneuropathy  Hyperlipidemia  Dysphagia  Hypotension  Alzheimers disease  Osteoarthritis: Osteoarthritis  Chronic obstructive pulmonary disease: Chronic obstructive pulmonary disease  Anemia: Anemia  Dementia without behavioral disturbance: Dementia  Atherosclerosis of coronary artery: CAD (coronary artery disease)  Nondependent alcohol abuse: ETOH abuse  Depressive disorder: Depression  End-stage renal disease: ESRD on hemodialysis  Essential hypertension: HTN (hypertension)  Deep venous embolism and thrombosis of lower extremity: DVT (deep venous thrombosis)  Congestive heart failure: CHF (congestive heart failure)  Legal blindness: Legally blind  Hemodialysis access, AV graft: LUE loop AVG  Acquired arteriovenous fistula: AV fistula          Consultant NOTE  REVIEWED  (   )    REVIEW OF SYSTEMS:  [x] As per HPI  CONSTITUTIONAL: No fever, weight loss, or fatigue  RESPIRATORY: No cough, wheezing, chills or hemoptysis; No Shortness of Breath  CARDIOVASCULAR: No chest pain, palpitations, dizziness, or leg swelling  GASTROINTESTINAL: No abdominal or epigastric pain. No nausea, vomiting, or hematemesis; No diarrhea or constipation. No melena or hematochezia.  MUSCULOSKELETAL: No joint pain or swelling; No muscle, back, or extremity pain  PSYCH    awake, alert       [x] All others negative	  [ ] Unable to obtain          Vital Signs Last 24 Hrs  T(C): 36.7, Max: 36.7 (02-27 @ 14:43)  T(F): 98.1, Max: 98.1 (02-27 @ 14:43)  HR: 74 (66 - 82)  BP: 137/65 (115/77 - 151/70)  BP(mean): --  RR: 20 (16 - 20)  SpO2: 97% (97% - 100%)      I & Os for 24h ending 02-27 @ 07:00  =============================================  IN: 200 ml / OUT: 0 ml / NET: 200 ml    I & Os for current day (as of 02-27 @ 15:11)  =============================================  IN: 0 ml / OUT: 2600 ml / NET: -2600 ml    PHYSICAL EXAMINATION:                                    (    )  NO CHANGE  Appearance: Normal	  HEENT:   Normal oral mucosa, PERRL, EOMI	  Neck: Supple, + JVD/ - JVD; Carotid Bruit   Cardiovascular: Normal S1 S2, No JVD, No murmurs,   Respiratory: Lungs clear to auscultation/Decreased Breath Sounds/No Rales, Rhonchi, Wheezing	  Gastrointestinal:  Soft, Non-tender, + BS	  Skin: No rashes, No ecchymoses, No cyanosis  Extremities: Normal range of motion, No clubbing, cyanosis or edema  Vascular: Peripheral pulses palpable 2+ bilaterally  Neurologic: Non-focal  Psychiatry: A & O x 3, Mood & affect appropriate    fluticasone / salmeterol 250-50 MICROgram(s) Diskus 1Dose(s) Inhalation two times a day  aspirin enteric coated 81milliGRAM(s) Oral daily  docusate sodium 100milliGRAM(s) Oral daily  heparin  Injectable 5000Unit(s) SubCutaneous every 8 hours  folic acid 1milliGRAM(s) Oral daily  artificial  tears Solution 1Drop(s) Both EYES every 6 hours  mirtazapine 15milliGRAM(s) Oral at bedtime  gabapentin 100milliGRAM(s) Oral two times a day  metoclopramide 10milliGRAM(s) Oral three times a day with meals  simvastatin 20milliGRAM(s) Oral at bedtime  ALBUTerol/ipratropium for Nebulization 3milliLiter(s) Nebulizer every 4 hours  levETIRAcetam  IVPB 250milliGRAM(s) IV Intermittent every 12 hours  levETIRAcetam  IVPB 250milliGRAM(s) IV Intermittent <User Schedule>  insulin lispro (HumaLOG) corrective regimen sliding scale  SubCutaneous Before meals and at bedtime  midodrine 5milliGRAM(s) Oral every 8 hours  acetylcysteine 20% Inhalation 5milliLiter(s) Inhalation every 4 hours  acetaminophen  Suppository 650milliGRAM(s) Rectal every 6 hours PRN  pantoprazole  Injectable 40milliGRAM(s) IV Push daily  epoetin nicki Injectable 7000Unit(s) IV Push <User Schedule>  heparin -  Bolus     heparin -  Bolus 500Unit(s) IV Bolus every 1 hour  heparin -  Bolus  IV Bolus   vancomycin  IVPB 750milliGRAM(s) IV Intermittent once                                      7.3    9.9   )-----------( 256      ( 27 Feb 2017 06:18 )             22.5     27 Feb 2017 06:18    133    |  94     |  31     ----------------------------<  95     3.3     |  26     |  5.96     Ca    7.8        27 Feb 2017 06:18  Mg     1.6       27 Feb 2017 06:18        CAPILLARY BLOOD GLUCOSE  107 (27 Feb 2017 12:47)  105 (27 Feb 2017 07:31)  90 (26 Feb 2017 22:13)  178 (26 Feb 2017 17:27) Patient is a 87y old  Male who presents with a chief complaint of Fever (27 Feb 2017 14:28)      HPI:  88 YO M h/o ESRD (HD MWF, last HD Friday 2/17), Alzheimer's disease, CAD, COPD, angina, anemia, CHF, depression, HTN, HLD, OA, PVD, polyneuropathy, blindness sent from [facility] after he was noted to be febrile to 101, desaturating with increased oral secretions. Upon questioning, "patient does not know why he is here". Patient is a poor historian.  At baseline, pt is alert and oriented, wheelchair bound, and requires assistance with ADLs. Patient was recently  here in december 2016 for hypotension related to vomiting 2/2 to his Zenker diverticulum. Patient was scheduled to follow up with GI as an outpatient for his condition.   In the ED, VS T 101.5 HR 66-79 BP 63-93/35-52 RR 18-29 SPO2 97-99%. Pt was placed on BiPAP for work of breathing, received Vancomycin 1 g, Zosyn 3.375 g,  cc bolus x2, Tylenol 650 mg. (19 Feb 2017 17:26)    INTERVAL HPI/OVERNIGHT EVENTS:::chr ill.  Comfortable    HEALTH ISSUES - PROBLEM Dx:  HAP (hospital-acquired pneumonia): HAP (hospital-acquired pneumonia)  Prophylactic measure: Prophylactic measure  Nutrition, metabolism, and development symptoms: Nutrition, metabolism, and development symptoms  Seizures: Seizures  Diabetes: Diabetes  CAD (coronary artery disease): CAD (coronary artery disease)  End-stage renal disease: End-stage renal disease  Aspiration precautions: Aspiration precautions  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  Anemia due to chronic kidney disease: Anemia due to chronic kidney disease  ESRD (end stage renal disease) on dialysis: ESRD (end stage renal disease) on dialysis  Anemia, unspecified type: Anemia, unspecified type  Motility disorder of intestine: Motility disorder of intestine  Pneumonia: Pneumonia  ESRD (end stage renal disease): ESRD (end stage renal disease)  Sepsis: Sepsis          PAST MEDICAL & SURGICAL HISTORY:  AV graft thrombosis  Osteoarthritis  PVD (peripheral vascular disease)  COPD (chronic obstructive pulmonary disease)  Heart failure  Angina pectoris  ESRD (end stage renal disease)  Seizures: post traumatic  CAD (coronary artery disease)  HTN (hypertension)  Polyneuropathy  Hyperlipidemia  Dysphagia  Hypotension  Alzheimers disease  Osteoarthritis: Osteoarthritis  Chronic obstructive pulmonary disease: Chronic obstructive pulmonary disease  Anemia: Anemia  Dementia without behavioral disturbance: Dementia  Atherosclerosis of coronary artery: CAD (coronary artery disease)  Nondependent alcohol abuse: ETOH abuse  Depressive disorder: Depression  End-stage renal disease: ESRD on hemodialysis  Essential hypertension: HTN (hypertension)  Deep venous embolism and thrombosis of lower extremity: DVT (deep venous thrombosis)  Congestive heart failure: CHF (congestive heart failure)  Legal blindness: Legally blind  Hemodialysis access, AV graft: LUE loop AVG  Acquired arteriovenous fistula: AV fistula          Consultant NOTE  REVIEWED  (   )    REVIEW OF SYSTEMS:  [x] As per HPI  CONSTITUTIONAL: No fever, weight loss, or fatigue++++[ ] Unable to obtain          Vital Signs Last 24 Hrs  T(C): 36.7, Max: 36.7 (02-27 @ 14:43)  T(F): 98.1, Max: 98.1 (02-27 @ 14:43)  HR: 74 (66 - 82)  BP: 137/65 (115/77 - 151/70)  BP(mean): --  RR: 20 (16 - 20)  SpO2: 97% (97% - 100%)      I & Os for 24h ending 02-27 @ 07:00  =============================================  IN: 200 ml / OUT: 0 ml / NET: 200 ml    I & Os for current day (as of 02-27 @ 15:11)  =============================================  IN: 0 ml / OUT: 2600 ml / NET: -2600 ml    PHYSICAL EXAMINATION:                                    (    +++)  NO CHANGE  Appearance: Normal	  HEENT:   Normal oral mucosa, PERRL, EOMI	  Neck: Supple, + JVD/ - JVD; Carotid Bruit   Cardiovascular: Normal S1 S2, No JVD,  Respiratory: bilat rhonchi  Gastrointestinal:  Soft, Non-tender, + BS	  Skin: No rashes, No ecchymoses, No cyanosis  Extremities: Normal range of motion, No clubbing, cyanosis or edema  Vascular: dimished  Neurologic: Non-focal  Psychiatry: dementia    fluticasone / salmeterol 250-50 MICROgram(s) Diskus 1Dose(s) Inhalation two times a day  aspirin enteric coated 81milliGRAM(s) Oral daily  docusate sodium 100milliGRAM(s) Oral daily  heparin  Injectable 5000Unit(s) SubCutaneous every 8 hours  folic acid 1milliGRAM(s) Oral daily  artificial  tears Solution 1Drop(s) Both EYES every 6 hours  mirtazapine 15milliGRAM(s) Oral at bedtime  gabapentin 100milliGRAM(s) Oral two times a day  metoclopramide 10milliGRAM(s) Oral three times a day with meals  simvastatin 20milliGRAM(s) Oral at bedtime  ALBUTerol/ipratropium for Nebulization 3milliLiter(s) Nebulizer every 4 hours  levETIRAcetam  IVPB 250milliGRAM(s) IV Intermittent every 12 hours  levETIRAcetam  IVPB 250milliGRAM(s) IV Intermittent <User Schedule>  insulin lispro (HumaLOG) corrective regimen sliding scale  SubCutaneous Before meals and at bedtime  midodrine 5milliGRAM(s) Oral every 8 hours  acetylcysteine 20% Inhalation 5milliLiter(s) Inhalation every 4 hours  acetaminophen  Suppository 650milliGRAM(s) Rectal every 6 hours PRN  pantoprazole  Injectable 40milliGRAM(s) IV Push daily  epoetin nicki Injectable 7000Unit(s) IV Push <User Schedule>  heparin -  Bolus     heparin -  Bolus 500Unit(s) IV Bolus every 1 hour  heparin -  Bolus  IV Bolus   vancomycin  IVPB 750milliGRAM(s) IV Intermittent once                                      7.3    9.9   )-----------( 256      ( 27 Feb 2017 06:18 )             22.5     27 Feb 2017 06:18    133    |  94     |  31     ----------------------------<  95     3.3     |  26     |  5.96     Ca    7.8        27 Feb 2017 06:18  Mg     1.6       27 Feb 2017 06:18        CAPILLARY BLOOD GLUCOSE  107 (27 Feb 2017 12:47)  105 (27 Feb 2017 07:31)  90 (26 Feb 2017 22:13)  178 (26 Feb 2017 17:27)

## 2017-02-27 NOTE — PROGRESS NOTE ADULT - ATTENDING COMMENTS
BP on midodrine adequate. Can consider downtitration as outpt  Will discuss blood transfusion prior to discharge given Hg  HypoK: Unusual for dialysis pt; outpt w/u

## 2017-02-27 NOTE — PROGRESS NOTE ADULT - SUBJECTIVE AND OBJECTIVE BOX
CC/ HPI 88yo male with chronic obstructive pulmonary disease, heart failure, end stage renal disease, dementia, hypertension, admitted with pneumonia complicated by septic shock today resting in bed without acute respiratory complaint.    PAST MEDICAL & SURGICAL HISTORY:  AV graft thrombosis  PVD (peripheral vascular disease)  Seizures: post traumatic  CAD (coronary artery disease)  Dysphagia  Anemia: Anemia  Dementia without behavioral disturbance: Dementia  Nondependent alcohol abuse: ETOH abuse  Depressive disorder: Depression  End-stage renal disease: ESRD on hemodialysis  Essential hypertension: HTN (hypertension)  Deep venous embolism and thrombosis of lower extremity: DVT (deep venous thrombosis)  Congestive heart failure: CHF (congestive heart failure)  Legal blindness: Legally blind  Acquired arteriovenous fistula: AV fistula    SOCHX:  + tobacco,  -  alcohol    FMHX: FA/MO  - contributory     ROS reviewed below with positive findings marked (+) :  GEN:  fever, chills ENT: tracheostomy,   epistaxis,  sinusitis COR: +CAD, +CHF,  +HTN, dysrhythmia PUL: +COPD, ILD, asthma, pneumonia GI: PEG, +dysphagia, hemorrhage, other IVON: +kidney disease, electrolyte disorder HEM:  +anemia, +thrombus, coagulopathy, cancer ENDO:  thyroid disease, diabetes mellitus CNS:  +dementia, stroke, +seizure, PSY:  +depression, anxiety, other      MEDICATIONS  (STANDING):  fluticasone / salmeterol 250-50 MICROgram(s) Diskus 1Dose(s) Inhalation two times a day  aspirin enteric coated 81milliGRAM(s) Oral daily  docusate sodium 100milliGRAM(s) Oral daily  heparin  Injectable 5000Unit(s) SubCutaneous every 8 hours  folic acid 1milliGRAM(s) Oral daily  artificial  tears Solution 1Drop(s) Both EYES every 6 hours  mirtazapine 15milliGRAM(s) Oral at bedtime  gabapentin 100milliGRAM(s) Oral two times a day  metoclopramide 10milliGRAM(s) Oral three times a day with meals  simvastatin 20milliGRAM(s) Oral at bedtime  ALBUTerol/ipratropium for Nebulization 3milliLiter(s) Nebulizer every 4 hours  levETIRAcetam  IVPB 250milliGRAM(s) IV Intermittent every 12 hours  levETIRAcetam  IVPB 250milliGRAM(s) IV Intermittent <User Schedule>  insulin lispro (HumaLOG) corrective regimen sliding scale  SubCutaneous Before meals and at bedtime  midodrine 5milliGRAM(s) Oral every 8 hours  acetylcysteine 20% Inhalation 5milliLiter(s) Inhalation every 4 hours  pantoprazole  Injectable 40milliGRAM(s) IV Push daily  epoetin nicki Injectable 7000Unit(s) IV Push <User Schedule>  heparin -  Bolus     heparin -  Bolus 1000Unit(s) IV Bolus once  heparin -  Bolus 500Unit(s) IV Bolus every 1 hour  heparin -  Bolus  IV Bolus   magnesium sulfate  IVPB 2Gram(s) IV Intermittent once  potassium chloride  10 mEq/100 mL IVPB 10milliEquivalent(s) IV Intermittent every 1 hour  potassium chloride   Powder 40milliEquivalent(s) Oral once    MEDICATIONS  (PRN):  acetaminophen  Suppository 650milliGRAM(s) Rectal every 6 hours PRN For Temp greater than 38 C (100.4 F)      Vital Signs Last 24 Hrs  T(C): 36.5, Max: 36.5 (02-27 @ 05:22)  T(F): 97.7, Max: 97.7 (02-27 @ 05:22)  HR: 66 (66 - 67)  BP: 144/62 (112/58 - 151/70)  BP(mean): --  RR: 17 (16 - 18)  SpO2: 100% (100% - 100%)    GENERAL:         comfortable,  - distress.  HEENT:            - trauma,  - icterus,  - injection,  - nasal discharge.  NECK:              - jugular venous distention, - thyromegaly.  LYMPH:           - lymphadenopathy, - masses.  RESP:              + crackles,   + rhonchi,   - wheezes.   COR:                S1S2 RRR  - murmurs,  - gallops,  - rubs.  ABD:                bowel sounds,   soft, - tender, - distended, - organomegaly.  EXT/MSC:         - cyanosis,  + clubbing,  - edema.    NEURO:             alert,   responds to stimuli.                            7.3    9.9   )-----------( 256      ( 27 Feb 2017 06:18 )             22.5     CXR (2/24)  Elevated left hemidiaphragm, bilateral basilar opacity    ASSESSMENT/PLAN    1)  Status post sepsis  2)  Pneumonia  3)  Chronic obstructive pulmonary disease  4)  Esophageal motility disorder   5)  End stage renal disease  6)  Alzheimer's dementia      Bronchodilators:  Atrovent/ albuterol q 4 – 6 hours as needed  LABA/ICS  ID/Antibiotics: status post Vanco/Zosyn  Cardiac/HTN: hemodynamically stable  GI: Rx/ prophylaxis c PPI/H2B  Heme: Rx/VT prophylaxis c SQH/SCD  Discuss with medical team

## 2017-02-27 NOTE — PROGRESS NOTE ADULT - ASSESSMENT
COPD  stable  CV stable  ESRD--HD  spoke w resident HAND ?? OSTEO on xr--clinically negative, ortho to eval

## 2017-02-27 NOTE — DISCHARGE NOTE ADULT - CARE PLAN
Principal Discharge DX:	HAP (hospital-acquired pneumonia)  Goal:	complete final dose of vancomycin 750mg following dialysis on 3/1/17  Instructions for follow-up, activity and diet:	You were treated for hospital acquired pneumonia due to the organism enteroccocus with a 7 day course of the antibiotic zosyn. You were also treated with vancomycin. Following discharge, you will be given one more dose of vancomycin after dialysis on 3/1/17 to complete your course of antibiotics. You will follow up with Dr. Haq for continued management of your condition.  Secondary Diagnosis:	Motility disorder of intestine  Goal:	continue metoclopramide, sit up 2 hours prior to meals and eat small frequent meals  Instructions for follow-up, activity and diet:	You have a history of chronic esophageal dysmotility as per endoscopy, manometry and radiography.  This puts you at a high risk for regurgitation and aspiration. You will be continued on Comfort feed as per discussion w/ palliative care. You are advised to Sit up for at least 2hrs after eating to prevent aspiration and to have Small frequent meals.  Secondary Diagnosis:	Aspiration precautions  Goal:	sit up 2 hours prior to meals and eat small frequent meals  Instructions for follow-up, activity and diet:	You are a high risk for aspiration due to your comorbidities, You are advised to Sit up for at least 2hrs after eating to prevent aspiration and to have Small frequent meals.  Secondary Diagnosis:	ESRD (end stage renal disease)  Goal:	continue hemodialysis  Instructions for follow-up, activity and diet:	You have a history of ESRD. You are advised to continue hemodialysis three times a week  Secondary Diagnosis:	CAD (coronary artery disease)  Goal:	continue medications as previously prescribed. follow up with Dr. Haq  Instructions for follow-up, activity and diet:	You have a known history of coronary artery disease prior to your admission. Coronary artery disease can cause damage to your heart’s blood vessels and increases your risk of heart attack. To avoid this, It is important that you continue to take this medication when you are discharged so that you can continue to control this condition. Additionally be sure to follow up with Dr. Haq on a regular basis to make sure no additional medications or medication changes need to be made.  Secondary Diagnosis:	Diabetes  Goal:	continue medications as previously prescribed. follow up with Dr. Haq  Instructions for follow-up, activity and diet:	You have a known history of diabetes mellitus prior to your admission. This condition results from blood sugar levels getting too high and because your body is more resistant to insulin. Uncontrolled blood sugar levels can lead to kidney and heart damage, pain/numbness/paralysis in your hands and feet, and increased rates of infections.  It is important that you continue to take this medication when you are discharged so that you can continue to control your blood sugar levels. Additionally be sure to follow up with Dr. Haq, a podiatrist, a ophthalmologist, and nephrologist on a regular basis to make sure your blood sugar continues to be well controlled.  Secondary Diagnosis:	Seizures  Goal:	continue Keppra as previously prescribed  Instructions for follow-up, activity and diet:	You have a history of seizure disorder. Following discharge you are advised to continue your Keppra as previously prescribed. Principal Discharge DX:	HAP (hospital-acquired pneumonia)  Goal:	complete final dose of vancomycin 750mg following dialysis on 3/1/17  Instructions for follow-up, activity and diet:	You were treated for hospital acquired pneumonia due to the organism enteroccocus with a 7 day course of the antibiotic zosyn. You were also treated with vancomycin. Following discharge, you will be given one more dose of vancomycin after dialysis on 3/1/17 to complete your course of antibiotics. You will follow up with Dr. aHq for continued management of your condition.  Secondary Diagnosis:	Motility disorder of intestine  Goal:	continue metoclopramide, sit up 2 hours prior to meals and eat small frequent meals  Instructions for follow-up, activity and diet:	You have a history of chronic esophageal dysmotility as per endoscopy, manometry and radiography.  This puts you at a high risk for regurgitation and aspiration. You will be continued on Comfort feed as per discussion w/ palliative care. You are advised to Sit up for at least 2hrs after eating to prevent aspiration and to have Small frequent meals.  Secondary Diagnosis:	Aspiration precautions  Goal:	sit up 2 hours prior to meals and eat small frequent meals  Instructions for follow-up, activity and diet:	You are a high risk for aspiration due to your comorbidities, You are advised to Sit up for at least 2hrs after eating to prevent aspiration and to have Small frequent meals.  Secondary Diagnosis:	ESRD (end stage renal disease)  Goal:	continue hemodialysis  Instructions for follow-up, activity and diet:	You have a history of ESRD. You are advised to continue hemodialysis three times a week  Secondary Diagnosis:	CAD (coronary artery disease)  Goal:	continue medications as previously prescribed. follow up with Dr. Haq  Instructions for follow-up, activity and diet:	You have a known history of coronary artery disease prior to your admission. Coronary artery disease can cause damage to your heart’s blood vessels and increases your risk of heart attack. To avoid this, It is important that you continue to take this medication when you are discharged so that you can continue to control this condition. Additionally be sure to follow up with Dr. Haq on a regular basis to make sure no additional medications or medication changes need to be made.  Secondary Diagnosis:	Diabetes  Goal:	continue medications as previously prescribed. follow up with Dr. Haq  Instructions for follow-up, activity and diet:	You have a known history of diabetes mellitus prior to your admission. This condition results from blood sugar levels getting too high and because your body is more resistant to insulin. Uncontrolled blood sugar levels can lead to kidney and heart damage, pain/numbness/paralysis in your hands and feet, and increased rates of infections.  It is important that you continue to take this medication when you are discharged so that you can continue to control your blood sugar levels. Additionally be sure to follow up with Dr. Haq, a podiatrist, a ophthalmologist, and nephrologist on a regular basis to make sure your blood sugar continues to be well controlled.  Secondary Diagnosis:	Seizures  Goal:	continue Keppra as previously prescribed  Instructions for follow-up, activity and diet:	You have a history of seizure disorder. Following discharge you are advised to continue your Keppra as previously prescribed.

## 2017-02-27 NOTE — CHART NOTE - NSCHARTNOTEFT_GEN_A_CORE
Informed at 3:18 by Dr. Ruiz that XR wrist from 2/21 was read today and showed:  1.3 cm irregular erosion along the distal radius with overlying soft tissue swelling could represent osteomyelitis. Probable displaced pathologic fracture fragment from the adjacent radial stylus. Patient has been afebrile and without leukocytosis; completed 7 day course zosyn for HCAP and has one dose of vancomycin remaining for total of 7 days on 3/1/17.     ICU Vital Signs Last 24 Hrs  T(C): 36.1, Max: 36.7 (02-27 @ 14:43)  T(F): 97, Max: 98.1 (02-27 @ 14:43)  HR: 79 (66 - 82)  BP: 131/62 (119/65 - 151/70)  BP(mean): --  ABP: --  ABP(mean): --  RR: 17 (17 - 20)  SpO2: 98% (97% - 100%)    VS within normal limits. On exam, left wrist non-erythematous, non-tender with full range of motion. New finding discussed with Dr. Haq who recommended orthopedics consult. MRI left arm without contrast (given ESRD) ordered. Orthopedics consult placed. Will f/u ortho recs and MRI. low threshold for starting antibiotics for possible osteo if spikes a fever or develops pain / focal symptoms in left upper extremity.

## 2017-02-27 NOTE — DISCHARGE NOTE ADULT - PATIENT PORTAL LINK FT
“You can access the [facility] Patient Portal, offered by [facility], by registering with the following website: [email or web address]”

## 2017-02-28 VITALS
RESPIRATION RATE: 24 BRPM | HEART RATE: 69 BPM | SYSTOLIC BLOOD PRESSURE: 116 MMHG | DIASTOLIC BLOOD PRESSURE: 53 MMHG | TEMPERATURE: 99 F | OXYGEN SATURATION: 93 %

## 2017-02-28 LAB
HCT VFR BLD CALC: 22.6 % — LOW (ref 39–50)
HGB BLD-MCNC: 7.3 G/DL — LOW (ref 13–17)
MCHC RBC-ENTMCNC: 30.5 PG — SIGNIFICANT CHANGE UP (ref 27–34)
MCHC RBC-ENTMCNC: 32.3 G/DL — SIGNIFICANT CHANGE UP (ref 32–36)
MCV RBC AUTO: 94.6 FL — SIGNIFICANT CHANGE UP (ref 80–100)
PLATELET # BLD AUTO: 284 K/UL — SIGNIFICANT CHANGE UP (ref 150–400)
RBC # BLD: 2.39 M/UL — LOW (ref 4.2–5.8)
RBC # FLD: 16.6 % — SIGNIFICANT CHANGE UP (ref 10.3–16.9)
WBC # BLD: 12.8 K/UL — HIGH (ref 3.8–10.5)
WBC # FLD AUTO: 12.8 K/UL — HIGH (ref 3.8–10.5)

## 2017-02-28 PROCEDURE — 85027 COMPLETE CBC AUTOMATED: CPT

## 2017-02-28 PROCEDURE — 82803 BLOOD GASES ANY COMBINATION: CPT

## 2017-02-28 PROCEDURE — 94660 CPAP INITIATION&MGMT: CPT

## 2017-02-28 PROCEDURE — 84484 ASSAY OF TROPONIN QUANT: CPT

## 2017-02-28 PROCEDURE — 87070 CULTURE OTHR SPECIMN AEROBIC: CPT

## 2017-02-28 PROCEDURE — 84132 ASSAY OF SERUM POTASSIUM: CPT

## 2017-02-28 PROCEDURE — 82607 VITAMIN B-12: CPT

## 2017-02-28 PROCEDURE — 80048 BASIC METABOLIC PNL TOTAL CA: CPT

## 2017-02-28 PROCEDURE — 84295 ASSAY OF SERUM SODIUM: CPT

## 2017-02-28 PROCEDURE — 90935 HEMODIALYSIS ONE EVALUATION: CPT

## 2017-02-28 PROCEDURE — 73130 X-RAY EXAM OF HAND: CPT

## 2017-02-28 PROCEDURE — 94640 AIRWAY INHALATION TREATMENT: CPT

## 2017-02-28 PROCEDURE — 85045 AUTOMATED RETICULOCYTE COUNT: CPT

## 2017-02-28 PROCEDURE — 86923 COMPATIBILITY TEST ELECTRIC: CPT

## 2017-02-28 PROCEDURE — 93306 TTE W/DOPPLER COMPLETE: CPT

## 2017-02-28 PROCEDURE — 93005 ELECTROCARDIOGRAM TRACING: CPT

## 2017-02-28 PROCEDURE — 82550 ASSAY OF CK (CPK): CPT

## 2017-02-28 PROCEDURE — 83550 IRON BINDING TEST: CPT

## 2017-02-28 PROCEDURE — 86900 BLOOD TYPING SEROLOGIC ABO: CPT

## 2017-02-28 PROCEDURE — 82728 ASSAY OF FERRITIN: CPT

## 2017-02-28 PROCEDURE — 36415 COLL VENOUS BLD VENIPUNCTURE: CPT

## 2017-02-28 PROCEDURE — 83036 HEMOGLOBIN GLYCOSYLATED A1C: CPT

## 2017-02-28 PROCEDURE — 86803 HEPATITIS C AB TEST: CPT

## 2017-02-28 PROCEDURE — 80202 ASSAY OF VANCOMYCIN: CPT

## 2017-02-28 PROCEDURE — 82553 CREATINE MB FRACTION: CPT

## 2017-02-28 PROCEDURE — 96374 THER/PROPH/DIAG INJ IV PUSH: CPT

## 2017-02-28 PROCEDURE — 87040 BLOOD CULTURE FOR BACTERIA: CPT

## 2017-02-28 PROCEDURE — 92526 ORAL FUNCTION THERAPY: CPT

## 2017-02-28 PROCEDURE — 81003 URINALYSIS AUTO W/O SCOPE: CPT

## 2017-02-28 PROCEDURE — 86901 BLOOD TYPING SEROLOGIC RH(D): CPT

## 2017-02-28 PROCEDURE — 82746 ASSAY OF FOLIC ACID SERUM: CPT

## 2017-02-28 PROCEDURE — 84100 ASSAY OF PHOSPHORUS: CPT

## 2017-02-28 PROCEDURE — 87581 M.PNEUMON DNA AMP PROBE: CPT

## 2017-02-28 PROCEDURE — 86850 RBC ANTIBODY SCREEN: CPT

## 2017-02-28 PROCEDURE — 84466 ASSAY OF TRANSFERRIN: CPT

## 2017-02-28 PROCEDURE — 71045 X-RAY EXAM CHEST 1 VIEW: CPT

## 2017-02-28 PROCEDURE — P9047: CPT

## 2017-02-28 PROCEDURE — 83880 ASSAY OF NATRIURETIC PEPTIDE: CPT

## 2017-02-28 PROCEDURE — 87798 DETECT AGENT NOS DNA AMP: CPT

## 2017-02-28 PROCEDURE — 87633 RESP VIRUS 12-25 TARGETS: CPT

## 2017-02-28 PROCEDURE — 92610 EVALUATE SWALLOWING FUNCTION: CPT

## 2017-02-28 PROCEDURE — 85025 COMPLETE CBC W/AUTO DIFF WBC: CPT

## 2017-02-28 PROCEDURE — 85730 THROMBOPLASTIN TIME PARTIAL: CPT

## 2017-02-28 PROCEDURE — 80053 COMPREHEN METABOLIC PANEL: CPT

## 2017-02-28 PROCEDURE — 83605 ASSAY OF LACTIC ACID: CPT

## 2017-02-28 PROCEDURE — 99291 CRITICAL CARE FIRST HOUR: CPT | Mod: 25

## 2017-02-28 PROCEDURE — 87486 CHLMYD PNEUM DNA AMP PROBE: CPT

## 2017-02-28 PROCEDURE — 82330 ASSAY OF CALCIUM: CPT

## 2017-02-28 PROCEDURE — 96375 TX/PRO/DX INJ NEW DRUG ADDON: CPT

## 2017-02-28 PROCEDURE — 83735 ASSAY OF MAGNESIUM: CPT

## 2017-02-28 PROCEDURE — 87186 SC STD MICRODIL/AGAR DIL: CPT

## 2017-02-28 PROCEDURE — 81001 URINALYSIS AUTO W/SCOPE: CPT

## 2017-02-28 PROCEDURE — 87340 HEPATITIS B SURFACE AG IA: CPT

## 2017-02-28 PROCEDURE — 85610 PROTHROMBIN TIME: CPT

## 2017-02-28 PROCEDURE — 86706 HEP B SURFACE ANTIBODY: CPT

## 2017-02-28 RX ADMIN — GABAPENTIN 100 MILLIGRAM(S): 400 CAPSULE ORAL at 16:34

## 2017-02-28 RX ADMIN — GABAPENTIN 100 MILLIGRAM(S): 400 CAPSULE ORAL at 06:30

## 2017-02-28 RX ADMIN — Medication 3 MILLILITER(S): at 06:29

## 2017-02-28 RX ADMIN — Medication 3 MILLILITER(S): at 09:54

## 2017-02-28 RX ADMIN — Medication 5 MILLILITER(S): at 11:06

## 2017-02-28 RX ADMIN — Medication 3 MILLILITER(S): at 16:34

## 2017-02-28 RX ADMIN — Medication 100 MILLIGRAM(S): at 13:06

## 2017-02-28 RX ADMIN — Medication 5 MILLILITER(S): at 14:13

## 2017-02-28 RX ADMIN — Medication 81 MILLIGRAM(S): at 13:06

## 2017-02-28 RX ADMIN — Medication 2: at 13:06

## 2017-02-28 RX ADMIN — Medication 1 DROP(S): at 09:54

## 2017-02-28 RX ADMIN — Medication 1 DROP(S): at 05:23

## 2017-02-28 RX ADMIN — Medication 3 MILLILITER(S): at 14:13

## 2017-02-28 RX ADMIN — HEPARIN SODIUM 5000 UNIT(S): 5000 INJECTION INTRAVENOUS; SUBCUTANEOUS at 06:30

## 2017-02-28 RX ADMIN — PANTOPRAZOLE SODIUM 40 MILLIGRAM(S): 20 TABLET, DELAYED RELEASE ORAL at 13:06

## 2017-02-28 RX ADMIN — HEPARIN SODIUM 5000 UNIT(S): 5000 INJECTION INTRAVENOUS; SUBCUTANEOUS at 14:13

## 2017-02-28 RX ADMIN — Medication 1 DROP(S): at 16:34

## 2017-02-28 RX ADMIN — Medication 10 MILLIGRAM(S): at 08:49

## 2017-02-28 RX ADMIN — Medication 5 MILLILITER(S): at 02:13

## 2017-02-28 RX ADMIN — Medication 3 MILLILITER(S): at 02:05

## 2017-02-28 RX ADMIN — Medication 10 MILLIGRAM(S): at 16:34

## 2017-02-28 RX ADMIN — Medication 4: at 17:20

## 2017-02-28 RX ADMIN — MIDODRINE HYDROCHLORIDE 5 MILLIGRAM(S): 2.5 TABLET ORAL at 06:30

## 2017-02-28 RX ADMIN — FLUTICASONE PROPIONATE AND SALMETEROL 1 DOSE(S): 50; 250 POWDER ORAL; RESPIRATORY (INHALATION) at 06:28

## 2017-02-28 RX ADMIN — MIDODRINE HYDROCHLORIDE 5 MILLIGRAM(S): 2.5 TABLET ORAL at 14:13

## 2017-02-28 RX ADMIN — Medication 5 MILLILITER(S): at 06:30

## 2017-02-28 RX ADMIN — LEVETIRACETAM 410 MILLIGRAM(S): 250 TABLET, FILM COATED ORAL at 06:27

## 2017-02-28 RX ADMIN — FLUTICASONE PROPIONATE AND SALMETEROL 1 DOSE(S): 50; 250 POWDER ORAL; RESPIRATORY (INHALATION) at 16:34

## 2017-02-28 RX ADMIN — LEVETIRACETAM 410 MILLIGRAM(S): 250 TABLET, FILM COATED ORAL at 16:34

## 2017-02-28 RX ADMIN — Medication 5 MILLILITER(S): at 16:34

## 2017-02-28 RX ADMIN — Medication 10 MILLIGRAM(S): at 13:06

## 2017-02-28 RX ADMIN — Medication 1 MILLIGRAM(S): at 13:06

## 2017-02-28 NOTE — PROGRESS NOTE ADULT - PROBLEM SELECTOR PLAN 1
Patient admitted with shock 2/2 asp PNA vs. UTI. Now with no signs of sepsis, not requiring pressors. Currently HD stable on midodrine. UA positive on admission, but patient is usually anuric, no culture sent; + pus found on penis previously. Patient w/ RLL infiltrate on CXR in setting of chronic aspirations now LLL suspicious for infiltrate.   - Empiric Abx - completed 7 day course zosyn today; enterococcus sensitive to vanc - Dosed vanc s/p HD 7d course started 2/23 to be dosed with HD  - All BCx NGTD, Sputum - nl resp soraida; Penile pus cx growing enterococcus raffinosus  -C/w Midodrine 5mg po TID
Patient admitted with shock 2/2 asp PNA vs. UTI. Now with no signs of sepsis, not requiring pressors. Currently HD stable on midodrine. UA positive on admission, but patient is usually anuric, no culture sent; + pus found on penis previously. Patient w/ RLL infiltrate on CXR in setting of chronic aspirations now LLL suspicious for infiltrate.   - Empiric Abx - completed 7 day course zosyn today; enterococcus sensitive to vanc - Dosed vanc s/p HD 7d course started 2/23 to be dosed with HD  - All BCx NGTD, Sputum - nl resp soraida; Penile pus cx growing enterococcus raffinosus  -C/w Midodrine 5mg po TID
Patient admitted with shock 2/2 asp PNA vs. UTI. Now with no signs of sepsis, not requiring pressors. Currently HD stable on midodrine. UA positive on admission, but patient is usually anuric, no culture sent; + pus found on penis previously. Patient w/ RLL infiltrate on CXR in setting of chronic aspirations now LLL suspicious for infiltrate.   - Empiric Abx - completed 7 day course zosyn today; enterococcus sensitive to vanc - Dosed vanc s/p HD 7d course started 2/23 to be dosed with HD  - All BCx NGTD, Sputum - nl resp soraida; Penile pus cx growing enterococcus raffinosus  -C/w Midodrine 5mg po TID    #Per radiology attending notification 2/27: Left distal radius XR concerning for osteomyelitis  -patient not able to lie still for MRI without contrast  -will f/u ortho recs
Septic shock (POA), now improved. Hemodynamically stable.  * C/w Midodrine 5mg po TID  * Need to complete Zosyn for 7 days.  * Ok to stop Vancomycin.
Septic shock (POA), now improved. Hemodynamically stable. Still with increasing WBC.  * C/w Midodrine 5mg po TID  * C/w Vanco and Zosyn, last day today.
Patient admitted with shock 2/2 asp PNA vs. UTI. Now with no signs of sepsis, not requiring pressors. Currently HD stable on midodrine. UA positive on admission, but patient is usually anuric, no culture sent; + pus found on penis previously. Patient w/ RLL infiltrate on CXR in setting of chronic aspirations now LLL suspicious for infiltrate.   - Empiric Abx - c/w zosyn 7d course (last dose 7/25;) enterococcus sensitive to vanc - Dosed vanc s/p HD 7d course started 2/23 to be dosed with HD  - All BCx NGTD, Sputum - nl resp soraida; Penile pus cx growing enterococcus raffinosus  -C/w Midodrine 5mg po TID
continue HD TIW  next HD on  Monday
hemodialysis tomorrow  clarify whether will need Vanco dosing
tolerated dialysis.  Volume status acceptable.  Next dialysis 2/22
Next routine HD will be on 3/1
HD today on 2/27  Optiflux 180 2K bath   duration 3.5 hours UF 2.5kg net as tolerated
Next HD on 2/24  Will assess volume status and ultrafiltration requirements at time of HD
HD today on 2/24  In light of low blood pressure, appropriate oxygenation and absence of overt hypervolemia and desire to prevent vasopresor use again, will perform net zero UF at present for clearance only.
Due for HD today  Volume status appears to be hypovolemic in light of hypotension. But more likely reflects decreased effective arterial volume rather than intravascular depletion.   This is supported by his clinical exam as well as by his CXR and his echocardiographic findings.     In light of persistent need for NS boluses to maintain his perfusion, will perform HD primarily for clearance today and set to net 0kg ultrafiltration

## 2017-02-28 NOTE — CONSULT NOTE ADULT - SUBJECTIVE AND OBJECTIVE BOX
Pt Name: LUIS GARDNER  MRN: [id number]    88yo m consulted by Medicine for xray showing possible left wrist erosion. Pt. is not a great historian. A&O x 1. Pt. was seen while laying in bed. Per medicine resident pt. was admitted to MICU for sepsis 2/2 to aspiration pneumonia on 2/2/17. Pt. was just admitted to medicine for the past week and considered stable ready for dc yesterday when they were called by radiology about left wrist xray. Per medicine there was no communication as to why the xray was ordered. Pt. denies any pain in left ue and stated he had left wrist mass "for years."  Denies any numbness or tingling. Unable to get any history/origin of left wrist mass or any injury in the past. Pt. does has left UE AV fistula 2/2 ESRD/ dialysis. Pt. is right hand dominant.     Pt is a 88yo Male with PMHx of UTI URINARY TRACT INFECTION SEPSIS  H/o or current diagnosis of HF- Contraindication to ACEI/ARBs  H/o or current diagnosis of HF- ACEI/ARB contraindication unknown  H/o or current diagnosis of HF- ACEI/ARB contraindication unknown  Yes  Family history unknown  Handoff  MEWS Score  AV graft thrombosis  Osteoarthritis  PVD (peripheral vascular disease)  COPD (chronic obstructive pulmonary disease)  Heart failure  Angina pectoris  ESRD (end stage renal disease)  Seizures  CAD (coronary artery disease)  HTN (hypertension)  Polyneuropathy  Hyperlipidemia  Dysphagia  Hypotension  Alzheimers disease  Osteoarthritis  Chronic obstructive pulmonary disease  Anemia  Dementia without behavioral disturbance  Atherosclerosis of coronary artery  Nondependent alcohol abuse  Depressive disorder  End-stage renal disease  Essential hypertension  Deep venous embolism and thrombosis of lower extremity  Congestive heart failure  Legal blindness  HAP (hospital-acquired pneumonia)  UTI (urinary tract infection)  HAP (hospital-acquired pneumonia)  Prophylactic measure  Nutrition, metabolism, and development symptoms  Seizures  Diabetes  CAD (coronary artery disease)  End-stage renal disease  Aspiration precautions  Chronic kidney disease-mineral and bone disorder  Anemia due to chronic kidney disease  ESRD (end stage renal disease) on dialysis  Anemia, unspecified type  Motility disorder of intestine  Pneumonia  ESRD (end stage renal disease)  Sepsis  Hemodialysis access, AV graft  Acquired arteriovenous fistula  SOB  64  Seizures  Diabetes  CAD (coronary artery disease)  ESRD (end stage renal disease)  Aspiration precautions  Motility disorder of intestine  Sepsis   admitted with       ROS is otherwise negative.    PAST MEDICAL & SURGICAL HISTORY:PAST MEDICAL & SURGICAL HISTORY:  AV graft thrombosis  Osteoarthritis  PVD (peripheral vascular disease)  COPD (chronic obstructive pulmonary disease)  Heart failure  Angina pectoris  ESRD (end stage renal disease)  Seizures: post traumatic  CAD (coronary artery disease)  HTN (hypertension)  Polyneuropathy  Hyperlipidemia  Dysphagia  Hypotension  Alzheimers disease  Osteoarthritis: Osteoarthritis  Chronic obstructive pulmonary disease: Chronic obstructive pulmonary disease  Anemia: Anemia  Dementia without behavioral disturbance: Dementia  Atherosclerosis of coronary artery: CAD (coronary artery disease)  Nondependent alcohol abuse: ETOH abuse  Depressive disorder: Depression  End-stage renal disease: ESRD on hemodialysis  Essential hypertension: HTN (hypertension)  Deep venous embolism and thrombosis of lower extremity: DVT (deep venous thrombosis)  Congestive heart failure: CHF (congestive heart failure)  Legal blindness: Legally blind  Hemodialysis access, AV graft: LUE loop AVG  Acquired arteriovenous fistula: AV fistula      Allergies    Intolerances      Medications:  fluticasone / salmeterol 250-50 MICROgram(s) Diskus 1Dose(s) Inhalation two times a day  aspirin enteric coated 81milliGRAM(s) Oral daily  docusate sodium 100milliGRAM(s) Oral daily  heparin  Injectable 5000Unit(s) SubCutaneous every 8 hours  folic acid 1milliGRAM(s) Oral daily  artificial  tears Solution 1Drop(s) Both EYES every 6 hours  mirtazapine 15milliGRAM(s) Oral at bedtime  gabapentin 100milliGRAM(s) Oral two times a day  metoclopramide 10milliGRAM(s) Oral three times a day with meals  simvastatin 20milliGRAM(s) Oral at bedtime  ALBUTerol/ipratropium for Nebulization 3milliLiter(s) Nebulizer every 4 hours  levETIRAcetam  IVPB 250milliGRAM(s) IV Intermittent every 12 hours  levETIRAcetam  IVPB 250milliGRAM(s) IV Intermittent <User Schedule>  insulin lispro (HumaLOG) corrective regimen sliding scale  SubCutaneous Before meals and at bedtime  midodrine 5milliGRAM(s) Oral every 8 hours  acetylcysteine 20% Inhalation 5milliLiter(s) Inhalation every 4 hours  acetaminophen  Suppository 650milliGRAM(s) Rectal every 6 hours PRN  pantoprazole  Injectable 40milliGRAM(s) IV Push daily  epoetin nicki Injectable 7000Unit(s) IV Push <User Schedule>  heparin -  Bolus         Social History:  Ambulation: Walking independently [ ] With Cane [ ] With Walker [ ]  Bedbound [ ]     PHYSICAL EXAM:    T(C): 36.4, Max: 37.7 (02-27 @ 22:30)  HR: 70 (70 - 79)  BP: 128/60 (112/56 - 137/65)  RR: 19 (17 - 20)  SpO2: 100% (97% - 100%)  Wt(kg): --    Gen: Pt. sitting up in bed, A&0 1. Pt. knows he is in the hospital but unaware why.   Neurological: no focal deficits  Skin: Left arm AV fistula in place with multiple bandages on left arm   Musculoskeletal: Left wrist mass in distal radius region on volar surface. No pain with palpation. No erythema.  Mass is mobile. slt intact,  brachial/radial pulses 2+, biceps/triceps/delts,  3+/5, finger int 4/5. AROM/PROM wnl with no pain at left elbow/hand/shoulder. PIN intact, hard for pt to follow command for AIN testing.   Pt. afebrile WBC 12.8, post abx for sepsis     Imaging Studies:EXAM:  XR HAND-LEFT MIN 3 VIEWS                           PROCEDURE DATE:  02/21/2017              INTERPRETATION:  XR HAND-LEFT MIN 3 VIEWS Date 2/21/2017 8:32:53 AM     INDICATION: wrist pain    FINDINGS: This films have just been presented tome for interpretation   today 2/27/2017.    No prior studies are available for comparison. 3 views of the left hand   the reveal a 1.3 cm juxta articular erosion along the fold aspect of the   distal radius. There is a soft tissue calcification or displaced fracture   fragment from the radial styloid of undetermined age. There are   osteolytic changes noted in multiple bones including the lunate, hamate,   bases of second third possibly fourth metacarpals there is probable   widening of the scapholunate joint. There is a probable exostosis arising   from the base of the gallbladder aspect of the fourth metacarpal.   Degenerative arthritis of the first carpometacarpal joint.. On the   lateral view there is calcification along the dorsal aspectof the   radiocarpal joint. Diffuse soft tissue swelling.    IMPRESSION: 1.3 cm irregular erosion along the distal radius with   overlying soft tissue swelling could represent osteomyelitis. Probable   displaced pathologic fracture fragment from the adjacent radial styloid.   Multiple additional findings as noted above. An MRI of the wrist is   recommended for further evaluation.    Discussed with Dr. Conner from [facility] at 3:18 PM on 2/27/2017.      A/P:  Pt is a 88yo Male  with left wrist mass/ r/o osteomyelitis  - will D/W Dr. Reza  -rec MRI of left wrist no contrast but pt. can not tolerate procedure  -   - Pt Name: LUIS GARDNER  MRN: [id number]    88yo m consulted by Medicine for xray showing possible left wrist erosion. Pt. is not a great historian. A&O x 1. Pt. was seen while laying in bed. Per medicine resident pt. was admitted to MICU for sepsis 2/2 to aspiration pneumonia on 2/2/17. Pt. was just admitted to medicine for the past week and considered stable ready for dc yesterday when they were called by radiology about left wrist xray. Per medicine there was no communication as to why the xray was ordered. Pt. denies any pain in left ue and stated he had left wrist mass "for years."  Denies any numbness or tingling. Unable to get any history/origin of left wrist mass or any injury in the past. Pt. does has left UE AV fistula 2/2 ESRD/ dialysis. Pt. is right hand dominant.     Pt is a 88yo Male with PMHx of UTI URINARY TRACT INFECTION SEPSIS  H/o or current diagnosis of HF- Contraindication to ACEI/ARBs  H/o or current diagnosis of HF- ACEI/ARB contraindication unknown  H/o or current diagnosis of HF- ACEI/ARB contraindication unknown  Yes  Family history unknown  Handoff  MEWS Score  AV graft thrombosis  Osteoarthritis  PVD (peripheral vascular disease)  COPD (chronic obstructive pulmonary disease)  Heart failure  Angina pectoris  ESRD (end stage renal disease)  Seizures  CAD (coronary artery disease)  HTN (hypertension)  Polyneuropathy  Hyperlipidemia  Dysphagia  Hypotension  Alzheimers disease  Osteoarthritis  Chronic obstructive pulmonary disease  Anemia  Dementia without behavioral disturbance  Atherosclerosis of coronary artery  Nondependent alcohol abuse  Depressive disorder  End-stage renal disease  Essential hypertension  Deep venous embolism and thrombosis of lower extremity  Congestive heart failure  Legal blindness  HAP (hospital-acquired pneumonia)  UTI (urinary tract infection)  HAP (hospital-acquired pneumonia)  Prophylactic measure  Nutrition, metabolism, and development symptoms  Seizures  Diabetes  CAD (coronary artery disease)  End-stage renal disease  Aspiration precautions  Chronic kidney disease-mineral and bone disorder  Anemia due to chronic kidney disease  ESRD (end stage renal disease) on dialysis  Anemia, unspecified type  Motility disorder of intestine  Pneumonia  ESRD (end stage renal disease)  Sepsis  Hemodialysis access, AV graft  Acquired arteriovenous fistula  SOB  64  Seizures  Diabetes  CAD (coronary artery disease)  ESRD (end stage renal disease)  Aspiration precautions  Motility disorder of intestine  Sepsis   admitted with       ROS is otherwise negative.    PAST MEDICAL & SURGICAL HISTORY:PAST MEDICAL & SURGICAL HISTORY:  AV graft thrombosis  Osteoarthritis  PVD (peripheral vascular disease)  COPD (chronic obstructive pulmonary disease)  Heart failure  Angina pectoris  ESRD (end stage renal disease)  Seizures: post traumatic  CAD (coronary artery disease)  HTN (hypertension)  Polyneuropathy  Hyperlipidemia  Dysphagia  Hypotension  Alzheimers disease  Osteoarthritis: Osteoarthritis  Chronic obstructive pulmonary disease: Chronic obstructive pulmonary disease  Anemia: Anemia  Dementia without behavioral disturbance: Dementia  Atherosclerosis of coronary artery: CAD (coronary artery disease)  Nondependent alcohol abuse: ETOH abuse  Depressive disorder: Depression  End-stage renal disease: ESRD on hemodialysis  Essential hypertension: HTN (hypertension)  Deep venous embolism and thrombosis of lower extremity: DVT (deep venous thrombosis)  Congestive heart failure: CHF (congestive heart failure)  Legal blindness: Legally blind  Hemodialysis access, AV graft: LUE loop AVG  Acquired arteriovenous fistula: AV fistula      Allergies    Intolerances      Medications:  fluticasone / salmeterol 250-50 MICROgram(s) Diskus 1Dose(s) Inhalation two times a day  aspirin enteric coated 81milliGRAM(s) Oral daily  docusate sodium 100milliGRAM(s) Oral daily  heparin  Injectable 5000Unit(s) SubCutaneous every 8 hours  folic acid 1milliGRAM(s) Oral daily  artificial  tears Solution 1Drop(s) Both EYES every 6 hours  mirtazapine 15milliGRAM(s) Oral at bedtime  gabapentin 100milliGRAM(s) Oral two times a day  metoclopramide 10milliGRAM(s) Oral three times a day with meals  simvastatin 20milliGRAM(s) Oral at bedtime  ALBUTerol/ipratropium for Nebulization 3milliLiter(s) Nebulizer every 4 hours  levETIRAcetam  IVPB 250milliGRAM(s) IV Intermittent every 12 hours  levETIRAcetam  IVPB 250milliGRAM(s) IV Intermittent <User Schedule>  insulin lispro (HumaLOG) corrective regimen sliding scale  SubCutaneous Before meals and at bedtime  midodrine 5milliGRAM(s) Oral every 8 hours  acetylcysteine 20% Inhalation 5milliLiter(s) Inhalation every 4 hours  acetaminophen  Suppository 650milliGRAM(s) Rectal every 6 hours PRN  pantoprazole  Injectable 40milliGRAM(s) IV Push daily  epoetin nicki Injectable 7000Unit(s) IV Push <User Schedule>  heparin -  Bolus         Social History:  Ambulation: Walking independently [ ] With Cane [ ] With Walker [ ]  Bedbound [ ]     PHYSICAL EXAM:    T(C): 36.4, Max: 37.7 (02-27 @ 22:30)  HR: 70 (70 - 79)  BP: 128/60 (112/56 - 137/65)  RR: 19 (17 - 20)  SpO2: 100% (97% - 100%)  Wt(kg): --    Gen: Pt. sitting up in bed, A&0 1. Pt. knows he is in the hospital but unaware why.   Neurological: no focal deficits  Skin: Left arm AV fistula in place with multiple bandages on left arm   Musculoskeletal: Left wrist mass in distal radius region on volar surface. No pain with palpation. No erythema.  Mass is mobile. slt intact,  brachial/radial pulses 2+, biceps/triceps/delts,  3+/5, finger int 4/5. AROM/PROM wnl with no pain at left elbow/hand/shoulder. PIN intact, hard for pt to follow command for AIN testing.   Pt. afebrile WBC 12.8, post abx for sepsis     Imaging Studies:EXAM:  XR HAND-LEFT MIN 3 VIEWS                           PROCEDURE DATE:  02/21/2017              INTERPRETATION:  XR HAND-LEFT MIN 3 VIEWS Date 2/21/2017 8:32:53 AM     INDICATION: wrist pain    FINDINGS: This films have just been presented tome for interpretation   today 2/27/2017.    No prior studies are available for comparison. 3 views of the left hand   the reveal a 1.3 cm juxta articular erosion along the fold aspect of the   distal radius. There is a soft tissue calcification or displaced fracture   fragment from the radial styloid of undetermined age. There are   osteolytic changes noted in multiple bones including the lunate, hamate,   bases of second third possibly fourth metacarpals there is probable   widening of the scapholunate joint. There is a probable exostosis arising   from the base of the gallbladder aspect of the fourth metacarpal.   Degenerative arthritis of the first carpometacarpal joint.. On the   lateral view there is calcification along the dorsal aspectof the   radiocarpal joint. Diffuse soft tissue swelling.    IMPRESSION: 1.3 cm irregular erosion along the distal radius with   overlying soft tissue swelling could represent osteomyelitis. Probable   displaced pathologic fracture fragment from the adjacent radial styloid.   Multiple additional findings as noted above. An MRI of the wrist is   recommended for further evaluation.    Discussed with Dr. Conner from [facility] at 3:18 PM on 2/27/2017.      A/P:  Pt is a 88yo Male  with left wrist mass/ r/o osteomyelitis  - D/W Dr. Reza  -rec MRI of left wrist no contrast but pt. can not tolerate procedure  - f/u as outpt no orthopedic intervention needed, no signs of infection/osteomyelitis

## 2017-02-28 NOTE — PROGRESS NOTE ADULT - PROBLEM SELECTOR PLAN 4
HD M/W/F Last session today.   - Renal following, appreciate recs  - Vanc Dosed w/ HD  -Cont epogen
HD M/W/F Last session today.   - Renal following, appreciate recs  - Vanc Dosed w/ HD  -Cont epogen
HD M/W/F Last session today.   - Renal following, appreciate recs  - Vanc Dosed w/ HD  -Cont epogen  #per Renal will transfuse 1U PRBC today for anemia in ESRD patient
as above
as above
HD M/W/F Last session today.   - Renal following, appreciate recs  - Vanc Dosed w/ HD  -Cont epogen
patient has been seen by GI as outpatient, will need re-evaluation here- and decide on plan

## 2017-02-28 NOTE — PROGRESS NOTE ADULT - PROBLEM SELECTOR PROBLEM 8
Nutrition, metabolism, and development symptoms
Seizures
Seizures
Nutrition, metabolism, and development symptoms

## 2017-02-28 NOTE — PROGRESS NOTE ADULT - PROBLEM SELECTOR PROBLEM 2
HAP (hospital-acquired pneumonia)
HAP (hospital-acquired pneumonia)
Motility disorder of intestine
Anemia due to chronic kidney disease
Motility disorder of intestine
Nutrition, metabolism, and development symptoms
Pneumonia
Anemia due to chronic kidney disease

## 2017-02-28 NOTE — PROGRESS NOTE ADULT - PROBLEM SELECTOR PLAN 2
Also UTI.  * Please, complete 7-day course of abx.
Also UTI.  * completing course of abx: vanco and zosyn, last day today.
Patient w/ chronic esophageal dysmotility as per endoscopy, manometry and radiography. 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  - Comfort feed as per discussion w/ palliative care   - GI following appreciate recs  - Sit up for at least 2hrs after eating to prevent aspiration  - Small freq meals
comfort feeds  s/p Botox injection
continue EPOGEN 7000 U IV with HD
clinically improving.  c/w IV Abx  to d/w MICU team
%Sat 53  KEV on HD  Hold IV iron    On rounding, Dr. Mejia is recommending 1U of pRBC transfusion. Can be done today on his off HD today.  Discussed with housestaff
%Sat 53  KEV on HD  Hold IV iron

## 2017-02-28 NOTE — PROGRESS NOTE ADULT - PROBLEM SELECTOR PROBLEM 1
Sepsis
ESRD (end stage renal disease)
End-stage renal disease
Sepsis
ESRD (end stage renal disease)
ESRD (end stage renal disease) on dialysis

## 2017-02-28 NOTE — PROGRESS NOTE ADULT - PROBLEM SELECTOR PLAN 8
* c/w home meds
* c/w home meds
Diet:  puree diet w/ nectar thin liquids as per S&S recs. Comfort feeding as per discussion w/ palliative care. Monitor Lytes- HD

## 2017-02-28 NOTE — PROGRESS NOTE ADULT - PROVIDER SPECIALTY LIST ADULT
Gastroenterology
Gastroenterology
Hospitalist
Internal Medicine
MICU
MICU
Nephrology
Neurology
Palliative Care
Pulmonology
Hospitalist
Pulmonology

## 2017-02-28 NOTE — PROGRESS NOTE ADULT - SUBJECTIVE AND OBJECTIVE BOX
Patient is a 87y old  Male who presents with a chief complaint of Fever (27 Feb 2017 14:28)      HPI:  86 YO M h/o ESRD (HD MWF, last HD Friday 2/17), Alzheimer's disease, CAD, COPD, angina, anemia, CHF, depression, HTN, HLD, OA, PVD, polyneuropathy, blindness sent from [facility] after he was noted to be febrile to 101, desaturating with increased oral secretions. Upon questioning, "patient does not know why he is here". Patient is a poor historian.  At baseline, pt is alert and oriented, wheelchair bound, and requires assistance with ADLs. Patient was recently  here in december 2016 for hypotension related to vomiting 2/2 to his Zenker diverticulum. Patient was scheduled to follow up with GI as an outpatient for his condition.   In the ED, VS T 101.5 HR 66-79 BP 63-93/35-52 RR 18-29 SPO2 97-99%. Pt was placed on BiPAP for work of breathing, received Vancomycin 1 g, Zosyn 3.375 g,  cc bolus x2, Tylenol 650 mg. (19 Feb 2017 17:26)    INTERVAL HPI/OVERNIGHT EVENTS:::    HEALTH ISSUES - PROBLEM Dx:  HAP (hospital-acquired pneumonia): HAP (hospital-acquired pneumonia)  Prophylactic measure: Prophylactic measure  Nutrition, metabolism, and development symptoms: Nutrition, metabolism, and development symptoms  Seizures: Seizures  Diabetes: Diabetes  CAD (coronary artery disease): CAD (coronary artery disease)  End-stage renal disease: End-stage renal disease  Aspiration precautions: Aspiration precautions  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  Anemia due to chronic kidney disease: Anemia due to chronic kidney disease  ESRD (end stage renal disease) on dialysis: ESRD (end stage renal disease) on dialysis  Anemia, unspecified type: Anemia, unspecified type  Motility disorder of intestine: Motility disorder of intestine  Pneumonia: Pneumonia  ESRD (end stage renal disease): ESRD (end stage renal disease)  Sepsis: Sepsis          PAST MEDICAL & SURGICAL HISTORY:  AV graft thrombosis  Osteoarthritis  PVD (peripheral vascular disease)  COPD (chronic obstructive pulmonary disease)  Heart failure  Angina pectoris  ESRD (end stage renal disease)  Seizures: post traumatic  CAD (coronary artery disease)  HTN (hypertension)  Polyneuropathy  Hyperlipidemia  Dysphagia  Hypotension  Alzheimers disease  Osteoarthritis: Osteoarthritis  Chronic obstructive pulmonary disease: Chronic obstructive pulmonary disease  Anemia: Anemia  Dementia without behavioral disturbance: Dementia  Atherosclerosis of coronary artery: CAD (coronary artery disease)  Nondependent alcohol abuse: ETOH abuse  Depressive disorder: Depression  End-stage renal disease: ESRD on hemodialysis  Essential hypertension: HTN (hypertension)  Deep venous embolism and thrombosis of lower extremity: DVT (deep venous thrombosis)  Congestive heart failure: CHF (congestive heart failure)  Legal blindness: Legally blind  Hemodialysis access, AV graft: LUE loop AVG  Acquired arteriovenous fistula: AV fistula          Consultant NOTE  REVIEWED  (   )    REVIEW OF SYSTEMS:  [x] As per HPI  CONSTITUTIONAL: No fever, weight loss, or fatigue  RESPIRATORY: No cough, wheezing, chills or hemoptysis; No Shortness of Breath  CARDIOVASCULAR: No chest pain, palpitations, dizziness, or leg swelling  GASTROINTESTINAL: No abdominal or epigastric pain. No nausea, vomiting, or hematemesis; No diarrhea or constipation. No melena or hematochezia.  MUSCULOSKELETAL: No joint pain or swelling; No muscle, back, or extremity pain  PSYCH    awake, alert       [x] All others negative	  [ ] Unable to obtain          Vital Signs Last 24 Hrs  T(C): 36.6, Max: 37.7 (02-27 @ 22:30)  T(F): 97.9, Max: 99.8 (02-27 @ 22:30)  HR: 72 (71 - 82)  BP: 128/65 (112/56 - 138/58)  BP(mean): --  RR: 18 (17 - 20)  SpO2: 98% (97% - 100%)        I & Os for current day (as of 02-28 @ 08:39)  =============================================  IN: 100 ml / OUT: 2600 ml / NET: -2500 ml    PHYSICAL EXAMINATION:                                    (    )  NO CHANGE  Appearance: Normal	  HEENT:   Normal oral mucosa, PERRL, EOMI	  Neck: Supple, + JVD/ - JVD; Carotid Bruit   Cardiovascular: Normal S1 S2, No JVD, No murmurs,   Respiratory: Lungs clear to auscultation/Decreased Breath Sounds/No Rales, Rhonchi, Wheezing	  Gastrointestinal:  Soft, Non-tender, + BS	  Skin: No rashes, No ecchymoses, No cyanosis  Extremities: Normal range of motion, No clubbing, cyanosis or edema  Vascular: Peripheral pulses palpable 2+ bilaterally  Neurologic: Non-focal  Psychiatry: A & O x 3, Mood & affect appropriate    fluticasone / salmeterol 250-50 MICROgram(s) Diskus 1Dose(s) Inhalation two times a day  aspirin enteric coated 81milliGRAM(s) Oral daily  docusate sodium 100milliGRAM(s) Oral daily  heparin  Injectable 5000Unit(s) SubCutaneous every 8 hours  folic acid 1milliGRAM(s) Oral daily  artificial  tears Solution 1Drop(s) Both EYES every 6 hours  mirtazapine 15milliGRAM(s) Oral at bedtime  gabapentin 100milliGRAM(s) Oral two times a day  metoclopramide 10milliGRAM(s) Oral three times a day with meals  simvastatin 20milliGRAM(s) Oral at bedtime  ALBUTerol/ipratropium for Nebulization 3milliLiter(s) Nebulizer every 4 hours  levETIRAcetam  IVPB 250milliGRAM(s) IV Intermittent every 12 hours  levETIRAcetam  IVPB 250milliGRAM(s) IV Intermittent <User Schedule>  insulin lispro (HumaLOG) corrective regimen sliding scale  SubCutaneous Before meals and at bedtime  midodrine 5milliGRAM(s) Oral every 8 hours  acetylcysteine 20% Inhalation 5milliLiter(s) Inhalation every 4 hours  acetaminophen  Suppository 650milliGRAM(s) Rectal every 6 hours PRN  pantoprazole  Injectable 40milliGRAM(s) IV Push daily  epoetin nicki Injectable 7000Unit(s) IV Push <User Schedule>  heparin -  Bolus                                         7.3    12.8  )-----------( 284      ( 28 Feb 2017 08:04 )             22.6     27 Feb 2017 06:18    133    |  94     |  31     ----------------------------<  95     3.3     |  26     |  5.96     Ca    7.8        27 Feb 2017 06:18  Mg     1.6       27 Feb 2017 06:18        CAPILLARY BLOOD GLUCOSE  130 (28 Feb 2017 07:57)  152 (27 Feb 2017 20:51)  161 (27 Feb 2017 17:29)  107 (27 Feb 2017 12:47) Patient is a 87y old  Male who presents with a chief complaint of Fever (27 Feb 2017 14:28)      HPI:  88 YO M h/o ESRD (HD MWF, last HD Friday 2/17), Alzheimer's disease, CAD, COPD, angina, anemia, CHF, depression, HTN, HLD, OA, PVD, polyneuropathy, blindness sent from [facility] after he was noted to be febrile to 101, desaturating with increased oral secretions. Upon questioning, "patient does not know why he is here". Patient is a poor historian.  At baseline, pt is alert and oriented, wheelchair bound, and requires assistance with ADLs. Patient was recently  here in december 2016 for hypotension related to vomiting 2/2 to his Zenker diverticulum. Patient was scheduled to follow up with GI as an outpatient for his condition.   In the ED, VS T 101.5 HR 66-79 BP 63-93/35-52 RR 18-29 SPO2 97-99%. Pt was placed on BiPAP for work of breathing, received Vancomycin 1 g, Zosyn 3.375 g,  cc bolus x2, Tylenol 650 mg. (19 Feb 2017 17:26)    INTERVAL HPI/OVERNIGHT EVENTS:::ess no change    HEALTH ISSUES - PROBLEM Dx:  HAP (hospital-acquired pneumonia): HAP (hospital-acquired pneumonia)  Prophylactic measure: Prophylactic measure  Nutrition, metabolism, and development symptoms: Nutrition, metabolism, and development symptoms  Seizures: Seizures  Diabetes: Diabetes  CAD (coronary artery disease): CAD (coronary artery disease)  End-stage renal disease: End-stage renal disease  Aspiration precautions: Aspiration precautions  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  Anemia due to chronic kidney disease: Anemia due to chronic kidney disease  ESRD (end stage renal disease) on dialysis: ESRD (end stage renal disease) on dialysis  Anemia, unspecified type: Anemia, unspecified type  Motility disorder of intestine: Motility disorder of intestine  Pneumonia: Pneumonia  ESRD (end stage renal disease): ESRD (end stage renal disease)  Sepsis: Sepsis          PAST MEDICAL & SURGICAL HISTORY:  AV graft thrombosis  Osteoarthritis  PVD (peripheral vascular disease)  COPD (chronic obstructive pulmonary disease)  Heart failure  Angina pectoris  ESRD (end stage renal disease)  Seizures: post traumatic  CAD (coronary artery disease)  HTN (hypertension)  Polyneuropathy  Hyperlipidemia  Dysphagia  Hypotension  Alzheimers disease  Osteoarthritis: Osteoarthritis  Chronic obstructive pulmonary disease: Chronic obstructive pulmonary disease  Anemia: Anemia  Dementia without behavioral disturbance: Dementia  Atherosclerosis of coronary artery: CAD (coronary artery disease)  Nondependent alcohol abuse: ETOH abuse  Depressive disorder: Depression  End-stage renal disease: ESRD on hemodialysis  Essential hypertension: HTN (hypertension)  Deep venous embolism and thrombosis of lower extremity: DVT (deep venous thrombosis)  Congestive heart failure: CHF (congestive heart failure)  Legal blindness: Legally blind  Hemodialysis access, AV graft: LUE loop AVG  Acquired arteriovenous fistula: AV fistula          Consultant NOTE  REVIEWED  (+++   )    REVIEW OF SYSTEMS:  [x] As per HPI  CONSTITUTIONAL: No fever, weight loss, or fatigue  RESPIRATORY: cough  CARDIOVASCULAR: No chest pain, palpitations, dizziness, or leg swelling  GASTROINTESTINAL: No abdominal or epigastric pain. No nausea, vomiting, or hematemesis; No diarrhea or constipation. No melena or hematochezia.  MUSCULOSKELETAL: No joint pain or swelling; No muscle, back, or extremity pain  decr str contracture  PSYCH    awake,  [x] All others negative	  [+++++++ ] Unable to obtain          Vital Signs Last 24 Hrs  T(C): 36.6, Max: 37.7 (02-27 @ 22:30)  T(F): 97.9, Max: 99.8 (02-27 @ 22:30)  HR: 72 (71 - 82)  BP: 128/65 (112/56 - 138/58)  BP(mean): --  RR: 18 (17 - 20)  SpO2: 98% (97% - 100%)        I & Os for current day (as of 02-28 @ 08:39)  =============================================  IN: 100 ml / OUT: 2600 ml / NET: -2500 ml    PHYSICAL EXAMINATION:                                    (   ++ )  NO CHANGE  Appearance: Normal	  HEENT:   Normal oral mucosa, PERRL, EOMI	  Neck: Supple, + JVD/ - JVD; Carotid Bruit   Cardiovascular: Normal S1 S2, No JVD, No murmurs,   Respiratory: Lungs clear to auscultation/Decreased Breath Sounds/No Rales, Rhonchi, Wheezing	  Gastrointestinal:  Soft, Non-tender, + BS	  Skin: No rashes, No ecchymoses, No cyanosis  Extremities: Normal range of motion, No clubbing, cyanosis or edemaHAND stable  Vascular: Peripheral pulses decr  Neurologic: Non-focal  Psychiatry: Awake    fluticasone / salmeterol 250-50 MICROgram(s) Diskus 1Dose(s) Inhalation two times a day  aspirin enteric coated 81milliGRAM(s) Oral daily  docusate sodium 100milliGRAM(s) Oral daily  heparin  Injectable 5000Unit(s) SubCutaneous every 8 hours  folic acid 1milliGRAM(s) Oral daily  artificial  tears Solution 1Drop(s) Both EYES every 6 hours  mirtazapine 15milliGRAM(s) Oral at bedtime  gabapentin 100milliGRAM(s) Oral two times a day  metoclopramide 10milliGRAM(s) Oral three times a day with meals  simvastatin 20milliGRAM(s) Oral at bedtime  ALBUTerol/ipratropium for Nebulization 3milliLiter(s) Nebulizer every 4 hours  levETIRAcetam  IVPB 250milliGRAM(s) IV Intermittent every 12 hours  levETIRAcetam  IVPB 250milliGRAM(s) IV Intermittent <User Schedule>  insulin lispro (HumaLOG) corrective regimen sliding scale  SubCutaneous Before meals and at bedtime  midodrine 5milliGRAM(s) Oral every 8 hours  acetylcysteine 20% Inhalation 5milliLiter(s) Inhalation every 4 hours  acetaminophen  Suppository 650milliGRAM(s) Rectal every 6 hours PRN  pantoprazole  Injectable 40milliGRAM(s) IV Push daily  epoetin nicki Injectable 7000Unit(s) IV Push <User Schedule>  heparin -  Bolus                                         7.3    12.8  )-----------( 284      ( 28 Feb 2017 08:04 )             22.6     27 Feb 2017 06:18    133    |  94     |  31     ----------------------------<  95     3.3     |  26     |  5.96     Ca    7.8        27 Feb 2017 06:18  Mg     1.6       27 Feb 2017 06:18        CAPILLARY BLOOD GLUCOSE  130 (28 Feb 2017 07:57)  152 (27 Feb 2017 20:51)  161 (27 Feb 2017 17:29)  107 (27 Feb 2017 12:47)

## 2017-02-28 NOTE — PROGRESS NOTE ADULT - PROBLEM SELECTOR PLAN 5
HD M/W/F   * Renal following, appreciate recs  * Next session on Monday.  * Cont epogen
HD M/W/F Last session yesterday  * Renal following, appreciate recs  * Vanc Dosed w/ HD  * Cont epogen
Stable  C/w ASA daily
give epogen 10624 units BIW  if hgb drops further may benefit from Tx PRBC's

## 2017-02-28 NOTE — PROGRESS NOTE ADULT - SUBJECTIVE AND OBJECTIVE BOX
INTERVAL HPI/OVERNIGHT EVENTS:    SUBJECTIVE: Patient seen and examined at bedside.     OBJECTIVE:    VITAL SIGNS:  ICU Vital Signs Last 24 Hrs  T(C): 36.4, Max: 37.7 (02-27 @ 22:30)  T(F): 97.5, Max: 99.8 (02-27 @ 22:30)  HR: 70 (70 - 79)  BP: 128/60 (112/56 - 137/65)  BP(mean): --  ABP: --  ABP(mean): --  RR: 19 (17 - 20)  SpO2: 100% (97% - 100%)        I & Os for current day (as of 02-28 @ 11:42)  =============================================  IN: 100 ml / OUT: 2600 ml / NET: -2500 ml    CAPILLARY BLOOD GLUCOSE  130 (28 Feb 2017 07:57)      PHYSICAL EXAM:    Constitutional: Awake Alert, Frail   Eyes: Left eye round reactive to light. 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   ENMT: Dry MM  Neck: no JVD. Dressing on R neck (s/p Central line removal) clean dry intact   Respiratory: +rhonchi b/l no wheezing  Cardiovascular: RRR no murmurs appreciated  Gastrointestinal: Soft NTND  Extremities: WWP +dp pulses. Left AV graft    MEDICATIONS:  MEDICATIONS  (STANDING):  fluticasone / salmeterol 250-50 MICROgram(s) Diskus 1Dose(s) Inhalation two times a day  aspirin enteric coated 81milliGRAM(s) Oral daily  docusate sodium 100milliGRAM(s) Oral daily  heparin  Injectable 5000Unit(s) SubCutaneous every 8 hours  folic acid 1milliGRAM(s) Oral daily  artificial  tears Solution 1Drop(s) Both EYES every 6 hours  mirtazapine 15milliGRAM(s) Oral at bedtime  gabapentin 100milliGRAM(s) Oral two times a day  metoclopramide 10milliGRAM(s) Oral three times a day with meals  simvastatin 20milliGRAM(s) Oral at bedtime  ALBUTerol/ipratropium for Nebulization 3milliLiter(s) Nebulizer every 4 hours  levETIRAcetam  IVPB 250milliGRAM(s) IV Intermittent every 12 hours  levETIRAcetam  IVPB 250milliGRAM(s) IV Intermittent <User Schedule>  insulin lispro (HumaLOG) corrective regimen sliding scale  SubCutaneous Before meals and at bedtime  midodrine 5milliGRAM(s) Oral every 8 hours  acetylcysteine 20% Inhalation 5milliLiter(s) Inhalation every 4 hours  pantoprazole  Injectable 40milliGRAM(s) IV Push daily  epoetin nicki Injectable 7000Unit(s) IV Push <User Schedule>  heparin -  Bolus       MEDICATIONS  (PRN):  acetaminophen  Suppository 650milliGRAM(s) Rectal every 6 hours PRN For Temp greater than 38 C (100.4 F)      ALLERGIES:  Allergies    clonidine (Unknown)    Intolerances        LABS:                        7.3    12.8  )-----------( 284      ( 28 Feb 2017 08:04 )             22.6     27 Feb 2017 06:18    133    |  94     |  31     ----------------------------<  95     3.3     |  26     |  5.96     Ca    7.8        27 Feb 2017 06:18  Mg     1.6       27 Feb 2017 06:18            RADIOLOGY & ADDITIONAL TESTS: Reviewed.

## 2017-02-28 NOTE — PROGRESS NOTE ADULT - PROBLEM SELECTOR PROBLEM 4
Motility disorder of intestine
Aspiration precautions
Aspiration precautions
End-stage renal disease

## 2017-02-28 NOTE — PROGRESS NOTE ADULT - ASSESSMENT
Pulm-- stable  ESRD-- HD  psych-- stable  HAND--ortho eval  p Pulm-- stable  ESRD-- HD  psych-- stable  HAND--ortho eval  p  Anemia outpt f/u

## 2017-02-28 NOTE — PROGRESS NOTE ADULT - PROBLEM SELECTOR PROBLEM 9
Nutrition, metabolism, and development symptoms
Nutrition, metabolism, and development symptoms
Prophylactic measure

## 2017-02-28 NOTE — PROGRESS NOTE ADULT - PROBLEM SELECTOR PLAN 7
No episodes of seizure activity.   Continue home meds
Stable. C/s ISS
Stable. C/s ISS
No episodes of seizure activity.   Continue home meds

## 2017-02-28 NOTE — PROGRESS NOTE ADULT - PROBLEM SELECTOR PROBLEM 3
Aspiration precautions
Motility disorder of intestine
Motility disorder of intestine
Sepsis
Anemia, unspecified type
Aspiration precautions
Motility disorder of intestine
Chronic kidney disease-mineral and bone disorder

## 2017-02-28 NOTE — PROGRESS NOTE ADULT - PROBLEM SELECTOR PLAN 6
Monitor FSG  ISS
Stable  C/w ASA daily
Stable  C/w ASA daily
Stable. C/s ISS

## 2017-02-28 NOTE — PROGRESS NOTE ADULT - PROBLEM SELECTOR PROBLEM 5
CAD (coronary artery disease)
End-stage renal disease
End-stage renal disease
CAD (coronary artery disease)
Anemia, unspecified type

## 2017-02-28 NOTE — PROGRESS NOTE ADULT - PROBLEM SELECTOR PLAN 3
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  Very high risk for regurgitation and aspiration.  * Comfort feed as per discussion w/ palliative care -- Palliative and ethics following, appreciate recs  * GI following appreciate recs  * Sit up for at least 2hrs after eating to prevent aspiration  * Small freq meals
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  Very high risk for regurgitation and aspiration.  * Comfort feed as per discussion w/ palliative care -- Palliative and ethics following, appreciate recs  * GI following appreciate recs  * Sit up for at least 2hrs after eating to prevent aspiration  * Small freq meals
Patient tachypneic on admission with secretions, patient now stable on NC. Patient high aspiration risk. As per discussion with palliative care, patient requests comfort feeding and is aware of risks.   - Comfort feed as per patient's wishes  - Sit up for at least 2hrs after eating to prevent aspiration  - Small freq meals  - Palliative and ethics following, appreciate recs  - completed Zosyn today for likely aspiration PNA (bibasilar infiltrate on most recent CXR)
EPOGEN 7000 U IV with HD
Patient tachypneic on admission with secretions, patient now stable on NC. Patient high aspiration risk. As per discussion with palliative care, patient requests comfort feeding and is aware of risks.   - Comfort feed as per patient's wishes  - Sit up for at least 2hrs after eating to prevent aspiration  - Small freq meals  - Palliative and ethics following, appreciate recs  - C/w Zosyn for likely aspiration PNA (bibasilar infiltrate on most recent CXR)
aperistaltic achalasia, causing high esophageal pressure and resulting in multiple diverticula which then cause his high risk for regurgitation and aspiration.  - Comfort feed as per discussion w/ palliative care
low dose pressors.  c/w present management
Phosphorus appropriate  Renal Diet
Phosphorus appropriate  Likely reflecting NPO status   If enteral nutrition resumes, please place patient on a renal diet or Nepro tube feed  Can hold phosphorus binder at present
Phosphorus appropriate  Renal Diet
Phosphorus appropriate  Likely reflecting NPO status   If enteral nutrition resumes, please place patient on a renal diet or Nepro tube feed  Can hold phosphorus binder at present
Phosphorus appropriate  Likely reflecting NPO status   If enteral nutrition resumes, please place patient on a renal diet or Nepro tube feed  Can hold phosphorus binder at present

## 2017-02-28 NOTE — PROGRESS NOTE ADULT - SUBJECTIVE AND OBJECTIVE BOX
CC/ HPI 86yo male with chronic obstructive pulmonary disease, heart failure, end stage renal disease, dementia, hypertension, admitted with pneumonia complicated by septic shock today resting in bed without acute respiratory complaint.    PAST MEDICAL & SURGICAL HISTORY:  AV graft thrombosis  PVD (peripheral vascular disease)  Seizures: post traumatic  CAD (coronary artery disease)  Dysphagia  Anemia: Anemia  Dementia without behavioral disturbance: Dementia  Nondependent alcohol abuse: ETOH abuse  Depressive disorder: Depression  End-stage renal disease: ESRD on hemodialysis  Essential hypertension: HTN (hypertension)  Deep venous embolism and thrombosis of lower extremity: DVT (deep venous thrombosis)  Congestive heart failure: CHF (congestive heart failure)  Legal blindness: Legally blind  Acquired arteriovenous fistula: AV fistula    SOCHX:  + tobacco,  -  alcohol    FMHX: FA/MO  - contributory     ROS reviewed below with positive findings marked (+) :  GEN:  fever, chills ENT: tracheostomy,   epistaxis,  sinusitis COR: +CAD, +CHF,  +HTN, dysrhythmia PUL: +COPD, ILD, asthma, pneumonia GI: PEG, +dysphagia, hemorrhage, other IVON: +kidney disease, electrolyte disorder HEM:  +anemia, +thrombus, coagulopathy, cancer ENDO:  thyroid disease, diabetes mellitus CNS:  +dementia, stroke, +seizure, PSY:  +depression, anxiety, other     MEDICATIONS  (STANDING):  fluticasone / salmeterol 250-50 MICROgram(s) Diskus 1Dose(s) Inhalation two times a day  aspirin enteric coated 81milliGRAM(s) Oral daily  docusate sodium 100milliGRAM(s) Oral daily  heparin  Injectable 5000Unit(s) SubCutaneous every 8 hours  folic acid 1milliGRAM(s) Oral daily  artificial  tears Solution 1Drop(s) Both EYES every 6 hours  mirtazapine 15milliGRAM(s) Oral at bedtime  gabapentin 100milliGRAM(s) Oral two times a day  metoclopramide 10milliGRAM(s) Oral three times a day with meals  simvastatin 20milliGRAM(s) Oral at bedtime  ALBUTerol/ipratropium for Nebulization 3milliLiter(s) Nebulizer every 4 hours  levETIRAcetam  IVPB 250milliGRAM(s) IV Intermittent every 12 hours  levETIRAcetam  IVPB 250milliGRAM(s) IV Intermittent <User Schedule>  insulin lispro (HumaLOG) corrective regimen sliding scale  SubCutaneous Before meals and at bedtime  midodrine 5milliGRAM(s) Oral every 8 hours  acetylcysteine 20% Inhalation 5milliLiter(s) Inhalation every 4 hours  pantoprazole  Injectable 40milliGRAM(s) IV Push daily  epoetin nicki Injectable 7000Unit(s) IV Push <User Schedule>  heparin -  Bolus       MEDICATIONS  (PRN):  acetaminophen  Suppository 650milliGRAM(s) Rectal every 6 hours PRN For Temp greater than 38 C (100.4 F)      Vital Signs Last 24 Hrs  T(C): 36.6, Max: 37.7 (02-27 @ 22:30)  T(F): 97.9, Max: 99.8 (02-27 @ 22:30)  HR: 72 (71 - 79)  BP: 128/65 (112/56 - 137/65)  BP(mean): --  RR: 18 (17 - 20)  SpO2: 98% (97% - 100%)    GENERAL:         comfortable,  - distress.  HEENT:            - trauma,  - icterus,  - injection,  - nasal discharge.  NECK:              - jugular venous distention, - thyromegaly.  LYMPH:           - lymphadenopathy, - masses.  RESP:              + crackles,   - rales,   - rhonchi,   - wheezes.   COR:                S1S2,  - gallops,  - rubs.  ABD:                bowel sounds,   soft, - tender, - distended, - organomegaly.  EXT/MSC:         - cyanosis,  clubbing,  - edema.    NEURO:             alert,   responds to stimuli.                       7.3    12.8  )-----------( 284      ( 28 Feb 2017 08:04 )             22.6     CXR (2/24)  Elevated left hemidiaphragm, bilateral basilar opacity    ASSESSMENT/PLAN    1)  Status post sepsis  2)  Pneumonia  3)  Chronic obstructive pulmonary disease  4)  Esophageal motility disorder   5)  End stage renal disease  6)  Alzheimer's dementia      Bronchodilators:  Atrovent/ albuterol q 4 – 6 hours as needed  LABA/ICS  ID/Antibiotics: status post Vanco/Zosyn  Cardiac/HTN: hemodynamically stable  GI: Rx/ prophylaxis c PPI/H2B  Heme: Rx/VT prophylaxis c SQH/SCD  Discuss with medical team

## 2017-02-28 NOTE — PROGRESS NOTE ADULT - SUBJECTIVE AND OBJECTIVE BOX
Neurology Follow up note    Name  LUIS GARDNER    HPI:  88 YO M h/o ESRD (HD MWF, last HD Friday 2/17), Alzheimer's disease, CAD, COPD, angina, anemia, CHF, depression, HTN, HLD, OA, PVD, polyneuropathy, blindness sent from [facility] after he was noted to be febrile to 101, desaturating with increased oral secretions. Upon questioning, "patient does not know why he is here". Patient is a poor historian.  At baseline, pt is alert and oriented, wheelchair bound, and requires assistance with ADLs. Patient was recently  here in december 2016 for hypotension related to vomiting 2/2 to his Zenker diverticulum. Patient was scheduled to follow up with GI as an outpatient for his condition.   In the ED, VS T 101.5 HR 66-79 BP 63-93/35-52 RR 18-29 SPO2 97-99%. Pt was placed on BiPAP for work of breathing, received Vancomycin 1 g, Zosyn 3.375 g,  cc bolus x2, Tylenol 650 mg. (19 Feb 2017 17:26)      Interval History - slight more alert- no new focal weakness - no clinical seizures        REVIEW OF SYSTEMS    Vital Signs Last 24 Hrs  T(C): 36.6, Max: 37.7 (02-27 @ 22:30)  T(F): 97.9, Max: 99.8 (02-27 @ 22:30)  HR: 72 (71 - 82)  BP: 128/65 (112/56 - 138/58)  BP(mean): --  RR: 18 (17 - 20)  SpO2: 98% (97% - 100%)    Physical Exam-     Mental Status- lethargic- answers simple commands    Cranial Nerves- full lateral movements    Gait and station- n/a    Motor- moves all 4 extremities    Reflexes- decreased    Sensation- no sensory loss    Coordination- no tremors    Vascular -no bruits    Medications  fluticasone / salmeterol 250-50 MICROgram(s) Diskus 1Dose(s) Inhalation two times a day  aspirin enteric coated 81milliGRAM(s) Oral daily  docusate sodium 100milliGRAM(s) Oral daily  heparin  Injectable 5000Unit(s) SubCutaneous every 8 hours  folic acid 1milliGRAM(s) Oral daily  artificial  tears Solution 1Drop(s) Both EYES every 6 hours  mirtazapine 15milliGRAM(s) Oral at bedtime  gabapentin 100milliGRAM(s) Oral two times a day  metoclopramide 10milliGRAM(s) Oral three times a day with meals  simvastatin 20milliGRAM(s) Oral at bedtime  ALBUTerol/ipratropium for Nebulization 3milliLiter(s) Nebulizer every 4 hours  levETIRAcetam  IVPB 250milliGRAM(s) IV Intermittent every 12 hours  levETIRAcetam  IVPB 250milliGRAM(s) IV Intermittent <User Schedule>  insulin lispro (HumaLOG) corrective regimen sliding scale  SubCutaneous Before meals and at bedtime  midodrine 5milliGRAM(s) Oral every 8 hours  acetylcysteine 20% Inhalation 5milliLiter(s) Inhalation every 4 hours  acetaminophen  Suppository 650milliGRAM(s) Rectal every 6 hours PRN  pantoprazole  Injectable 40milliGRAM(s) IV Push daily  epoetin nicki Injectable 7000Unit(s) IV Push <User Schedule>  heparin -  Bolus         Lab      Radiology    Assessment- Alteration of mental status    Plan Ortho for the x-ray right hand  No new neuro studies indicated

## 2017-02-28 NOTE — PROGRESS NOTE ADULT - PROBLEM SELECTOR PLAN 9
Aspiration precautions- patient to sit upright for 2 hours after eating. HSQ     Dispo: DNR/DNI, comfort feeding Palliative care following, appreciate recs
Diet:  puree diet w/ nectar thin liquids as per S&S recs. Comfort feeding as per discussion w/ palliative care.
Diet:  puree diet w/ nectar thin liquids as per S&S recs. Comfort feeding as per discussion w/ palliative care.
Aspiration precautions- patient to sit upright for 2 hours after eating. HSQ     Dispo: DNR/DNI, comfort feeding Palliative care following, appreciate recs

## 2017-02-28 NOTE — PROGRESS NOTE ADULT - SUBJECTIVE AND OBJECTIVE BOX
Patient seen and examined at bedside.   Tolerated HD well yesterday  Tolerated 2.6kg of net UF     Resting comfortably this morning     fluticasone / salmeterol 250-50 MICROgram(s) Diskus 1Dose(s) two times a day  aspirin enteric coated 81milliGRAM(s) daily  docusate sodium 100milliGRAM(s) daily  heparin  Injectable 5000Unit(s) every 8 hours  folic acid 1milliGRAM(s) daily  artificial  tears Solution 1Drop(s) every 6 hours  mirtazapine 15milliGRAM(s) at bedtime  gabapentin 100milliGRAM(s) two times a day  metoclopramide 10milliGRAM(s) three times a day with meals  simvastatin 20milliGRAM(s) at bedtime  ALBUTerol/ipratropium for Nebulization 3milliLiter(s) every 4 hours  levETIRAcetam  IVPB 250milliGRAM(s) every 12 hours  levETIRAcetam  IVPB 250milliGRAM(s) <User Schedule>  insulin lispro (HumaLOG) corrective regimen sliding scale  Before meals and at bedtime  midodrine 5milliGRAM(s) every 8 hours  acetylcysteine 20% Inhalation 5milliLiter(s) every 4 hours  acetaminophen  Suppository 650milliGRAM(s) every 6 hours PRN  pantoprazole  Injectable 40milliGRAM(s) daily  epoetin nicki Injectable 7000Unit(s) <User Schedule>  heparin -  Bolus        Allergies    clonidine (Unknown)    Intolerances        T(C): , Max: 37.7 (02-27 @ 22:30)  T(F): , Max: 99.8 (02-27 @ 22:30)  HR: 70  BP: 128/60  BP(mean): --  RR: 19  SpO2: 100%  Wt(kg): --    I & Os for current day (as of 02-28 @ 10:51)  =============================================  IN:    IV PiggyBack: 100 ml    Total IN: 100 ml  ---------------------------------------------  OUT:    Other: 2600 ml    Total OUT: 2600 ml  ---------------------------------------------  Total NET: -2500 ml          LABS:                        7.3    12.8  )-----------( 284      ( 28 Feb 2017 08:04 )             22.6     27 Feb 2017 06:18    133    |  94     |  31     ----------------------------<  95     3.3     |  26     |  5.96     Ca    7.8        27 Feb 2017 06:18  Mg     1.6       27 Feb 2017 06:18                  RADIOLOGY & ADDITIONAL STUDIES:

## 2017-03-02 DIAGNOSIS — Z99.2 DEPENDENCE ON RENAL DIALYSIS: ICD-10-CM

## 2017-03-02 DIAGNOSIS — H54.8 LEGAL BLINDNESS, AS DEFINED IN USA: ICD-10-CM

## 2017-03-02 DIAGNOSIS — Z79.4 LONG TERM (CURRENT) USE OF INSULIN: ICD-10-CM

## 2017-03-02 DIAGNOSIS — D63.1 ANEMIA IN CHRONIC KIDNEY DISEASE: ICD-10-CM

## 2017-03-02 DIAGNOSIS — F02.80 DEMENTIA IN OTHER DISEASES CLASSIFIED ELSEWHERE, UNSPECIFIED SEVERITY, WITHOUT BEHAVIORAL DISTURBANCE, PSYCHOTIC DISTURBANCE, MOOD DISTURBANCE, AND ANXIETY: ICD-10-CM

## 2017-03-02 DIAGNOSIS — M06.9 RHEUMATOID ARTHRITIS, UNSPECIFIED: ICD-10-CM

## 2017-03-02 DIAGNOSIS — M19.90 UNSPECIFIED OSTEOARTHRITIS, UNSPECIFIED SITE: ICD-10-CM

## 2017-03-02 DIAGNOSIS — J18.9 PNEUMONIA, UNSPECIFIED ORGANISM: ICD-10-CM

## 2017-03-02 DIAGNOSIS — I13.2 HYPERTENSIVE HEART AND CHRONIC KIDNEY DISEASE WITH HEART FAILURE AND WITH STAGE 5 CHRONIC KIDNEY DISEASE, OR END STAGE RENAL DISEASE: ICD-10-CM

## 2017-03-02 DIAGNOSIS — J69.0 PNEUMONITIS DUE TO INHALATION OF FOOD AND VOMIT: ICD-10-CM

## 2017-03-02 DIAGNOSIS — K21.9 GASTRO-ESOPHAGEAL REFLUX DISEASE WITHOUT ESOPHAGITIS: ICD-10-CM

## 2017-03-02 DIAGNOSIS — A41.9 SEPSIS, UNSPECIFIED ORGANISM: ICD-10-CM

## 2017-03-02 DIAGNOSIS — R13.10 DYSPHAGIA, UNSPECIFIED: ICD-10-CM

## 2017-03-02 DIAGNOSIS — G30.9 ALZHEIMER'S DISEASE, UNSPECIFIED: ICD-10-CM

## 2017-03-02 DIAGNOSIS — E11.22 TYPE 2 DIABETES MELLITUS WITH DIABETIC CHRONIC KIDNEY DISEASE: ICD-10-CM

## 2017-03-02 DIAGNOSIS — Z66 DO NOT RESUSCITATE: ICD-10-CM

## 2017-03-02 DIAGNOSIS — G40.909 EPILEPSY, UNSPECIFIED, NOT INTRACTABLE, WITHOUT STATUS EPILEPTICUS: ICD-10-CM

## 2017-03-02 DIAGNOSIS — I50.9 HEART FAILURE, UNSPECIFIED: ICD-10-CM

## 2017-03-02 DIAGNOSIS — K22.4 DYSKINESIA OF ESOPHAGUS: ICD-10-CM

## 2017-03-02 DIAGNOSIS — I73.9 PERIPHERAL VASCULAR DISEASE, UNSPECIFIED: ICD-10-CM

## 2017-03-02 DIAGNOSIS — E78.5 HYPERLIPIDEMIA, UNSPECIFIED: ICD-10-CM

## 2017-03-02 DIAGNOSIS — I25.10 ATHEROSCLEROTIC HEART DISEASE OF NATIVE CORONARY ARTERY WITHOUT ANGINA PECTORIS: ICD-10-CM

## 2017-03-02 DIAGNOSIS — N18.6 END STAGE RENAL DISEASE: ICD-10-CM

## 2017-03-02 DIAGNOSIS — G62.9 POLYNEUROPATHY, UNSPECIFIED: ICD-10-CM

## 2017-03-02 DIAGNOSIS — F32.9 MAJOR DEPRESSIVE DISORDER, SINGLE EPISODE, UNSPECIFIED: ICD-10-CM

## 2017-03-02 DIAGNOSIS — N39.0 URINARY TRACT INFECTION, SITE NOT SPECIFIED: ICD-10-CM

## 2017-03-02 DIAGNOSIS — R65.21 SEVERE SEPSIS WITH SEPTIC SHOCK: ICD-10-CM

## 2017-03-26 ENCOUNTER — INPATIENT (INPATIENT)
Facility: HOSPITAL | Age: 82
LOS: 3 days | Discharge: NURSING, REHAB OR LONG-TERM CARE | DRG: 871 | End: 2017-03-30
Attending: SPECIALIST | Admitting: SPECIALIST
Payer: MEDICARE

## 2017-03-26 DIAGNOSIS — Z99.2 DEPENDENCE ON RENAL DIALYSIS: Chronic | ICD-10-CM

## 2017-03-26 PROCEDURE — 93010 ELECTROCARDIOGRAM REPORT: CPT

## 2017-03-26 PROCEDURE — 99291 CRITICAL CARE FIRST HOUR: CPT | Mod: 25

## 2017-03-26 PROCEDURE — 99283 EMERGENCY DEPT VISIT LOW MDM: CPT | Mod: GC

## 2017-03-27 VITALS
RESPIRATION RATE: 19 BRPM | DIASTOLIC BLOOD PRESSURE: 76 MMHG | TEMPERATURE: 101 F | HEART RATE: 90 BPM | SYSTOLIC BLOOD PRESSURE: 148 MMHG | OXYGEN SATURATION: 91 %

## 2017-03-27 DIAGNOSIS — R94.5 ABNORMAL RESULTS OF LIVER FUNCTION STUDIES: ICD-10-CM

## 2017-03-27 DIAGNOSIS — R41.82 ALTERED MENTAL STATUS, UNSPECIFIED: ICD-10-CM

## 2017-03-27 DIAGNOSIS — R09.02 HYPOXEMIA: ICD-10-CM

## 2017-03-27 DIAGNOSIS — I10 ESSENTIAL (PRIMARY) HYPERTENSION: ICD-10-CM

## 2017-03-27 DIAGNOSIS — A41.9 SEPSIS, UNSPECIFIED ORGANISM: ICD-10-CM

## 2017-03-27 DIAGNOSIS — N18.6 END STAGE RENAL DISEASE: ICD-10-CM

## 2017-03-27 DIAGNOSIS — I50.9 HEART FAILURE, UNSPECIFIED: ICD-10-CM

## 2017-03-27 DIAGNOSIS — Z41.8 ENCOUNTER FOR OTHER PROCEDURES FOR PURPOSES OTHER THAN REMEDYING HEALTH STATE: ICD-10-CM

## 2017-03-27 DIAGNOSIS — D64.9 ANEMIA, UNSPECIFIED: ICD-10-CM

## 2017-03-27 DIAGNOSIS — N18.9 CHRONIC KIDNEY DISEASE, UNSPECIFIED: ICD-10-CM

## 2017-03-27 DIAGNOSIS — J44.9 CHRONIC OBSTRUCTIVE PULMONARY DISEASE, UNSPECIFIED: ICD-10-CM

## 2017-03-27 LAB
ALBUMIN SERPL ELPH-MCNC: 2.8 G/DL — LOW (ref 3.4–5)
ALP SERPL-CCNC: 411 U/L — HIGH (ref 40–120)
ALT FLD-CCNC: 169 U/L — HIGH (ref 12–42)
ANION GAP SERPL CALC-SCNC: 11 MMOL/L — SIGNIFICANT CHANGE UP (ref 9–16)
ANION GAP SERPL CALC-SCNC: 9 MMOL/L — SIGNIFICANT CHANGE UP (ref 9–16)
APTT BLD: 28.3 SEC — SIGNIFICANT CHANGE UP (ref 27.5–37.4)
AST SERPL-CCNC: 270 U/L — HIGH (ref 15–37)
BASE EXCESS BLDA CALC-SCNC: 5.3 MMOL/L — HIGH (ref -2–3)
BILIRUB SERPL-MCNC: 0.9 MG/DL — SIGNIFICANT CHANGE UP (ref 0.2–1.2)
BUN SERPL-MCNC: 52 MG/DL — HIGH (ref 7–23)
BUN SERPL-MCNC: 53 MG/DL — HIGH (ref 7–23)
CALCIUM SERPL-MCNC: 8.5 MG/DL — SIGNIFICANT CHANGE UP (ref 8.5–10.5)
CALCIUM SERPL-MCNC: 8.6 MG/DL — SIGNIFICANT CHANGE UP (ref 8.5–10.5)
CHLORIDE SERPL-SCNC: 95 MMOL/L — LOW (ref 96–108)
CHLORIDE SERPL-SCNC: 97 MMOL/L — SIGNIFICANT CHANGE UP (ref 96–108)
CO2 SERPL-SCNC: 31 MMOL/L — SIGNIFICANT CHANGE UP (ref 22–31)
CO2 SERPL-SCNC: 32 MMOL/L — HIGH (ref 22–31)
CREAT SERPL-MCNC: 5.51 MG/DL — HIGH (ref 0.5–1.3)
CREAT SERPL-MCNC: 5.77 MG/DL — HIGH (ref 0.5–1.3)
EOSINOPHIL NFR BLD AUTO: 1 % — SIGNIFICANT CHANGE UP (ref 0–6)
GAS PNL BLDA: SIGNIFICANT CHANGE UP
GAS PNL BLDV: SIGNIFICANT CHANGE UP
GLUCOSE SERPL-MCNC: 114 MG/DL — HIGH (ref 70–99)
GLUCOSE SERPL-MCNC: 98 MG/DL — SIGNIFICANT CHANGE UP (ref 70–99)
GRAM STN FLD: SIGNIFICANT CHANGE UP
HBV CORE AB SER-ACNC: SIGNIFICANT CHANGE UP
HBV SURFACE AB SER-ACNC: SIGNIFICANT CHANGE UP
HBV SURFACE AG SER-ACNC: SIGNIFICANT CHANGE UP
HCO3 BLDA-SCNC: 31 MMOL/L — HIGH (ref 21–28)
HCT VFR BLD CALC: 25.4 % — LOW (ref 39–50)
HCT VFR BLD CALC: 27.8 % — LOW (ref 39–50)
HCV AB S/CO SERPL IA: 0.29 S/CO — SIGNIFICANT CHANGE UP
HCV AB SERPL-IMP: SIGNIFICANT CHANGE UP
HGB BLD-MCNC: 8.2 G/DL — LOW (ref 13–17)
HGB BLD-MCNC: 9 G/DL — LOW (ref 13–17)
INR BLD: 1.07 — SIGNIFICANT CHANGE UP (ref 0.88–1.16)
LACTATE SERPL-SCNC: 1.7 MMOL/L — SIGNIFICANT CHANGE UP (ref 0.5–2)
LYMPHOCYTES # BLD AUTO: 2 % — LOW (ref 13–44)
MAGNESIUM SERPL-MCNC: 2 MG/DL — SIGNIFICANT CHANGE UP (ref 1.6–2.4)
MCHC RBC-ENTMCNC: 30.9 PG — SIGNIFICANT CHANGE UP (ref 27–34)
MCHC RBC-ENTMCNC: 31.1 PG — SIGNIFICANT CHANGE UP (ref 27–34)
MCHC RBC-ENTMCNC: 32.3 G/DL — SIGNIFICANT CHANGE UP (ref 32–36)
MCHC RBC-ENTMCNC: 32.4 G/DL — SIGNIFICANT CHANGE UP (ref 32–36)
MCV RBC AUTO: 95.5 FL — SIGNIFICANT CHANGE UP (ref 80–100)
MCV RBC AUTO: 96.2 FL — SIGNIFICANT CHANGE UP (ref 80–100)
MONOCYTES NFR BLD AUTO: 2 % — SIGNIFICANT CHANGE UP (ref 2–14)
NEUTROPHILS NFR BLD AUTO: 89 % — HIGH (ref 43–77)
NT-PROBNP SERPL-SCNC: HIGH PG/ML
PCO2 BLDA: 53 MMHG — HIGH (ref 35–48)
PH BLDA: 7.38 — SIGNIFICANT CHANGE UP (ref 7.35–7.45)
PHOSPHATE SERPL-MCNC: 1.1 MG/DL — LOW (ref 2.5–4.5)
PLATELET # BLD AUTO: 199 K/UL — SIGNIFICANT CHANGE UP (ref 150–400)
PLATELET # BLD AUTO: 203 K/UL — SIGNIFICANT CHANGE UP (ref 150–400)
PO2 BLDA: 143 MMHG — HIGH (ref 83–108)
POTASSIUM SERPL-MCNC: 3.1 MMOL/L — LOW (ref 3.5–5.3)
POTASSIUM SERPL-MCNC: 3.5 MMOL/L — SIGNIFICANT CHANGE UP (ref 3.5–5.3)
POTASSIUM SERPL-SCNC: 3.1 MMOL/L — LOW (ref 3.5–5.3)
POTASSIUM SERPL-SCNC: 3.5 MMOL/L — SIGNIFICANT CHANGE UP (ref 3.5–5.3)
PROT SERPL-MCNC: 7.4 G/DL — SIGNIFICANT CHANGE UP (ref 6.4–8.2)
PROTHROM AB SERPL-ACNC: 11.9 SEC — SIGNIFICANT CHANGE UP (ref 9.8–12.7)
RBC # BLD: 2.64 M/UL — LOW (ref 4.2–5.8)
RBC # BLD: 2.91 M/UL — LOW (ref 4.2–5.8)
RBC # FLD: 18.2 % — HIGH (ref 10.3–16.9)
RBC # FLD: 18.6 % — HIGH (ref 10.3–16.9)
SAO2 % BLDA: 99 % — SIGNIFICANT CHANGE UP (ref 95–100)
SODIUM SERPL-SCNC: 135 MMOL/L — SIGNIFICANT CHANGE UP (ref 135–145)
SODIUM SERPL-SCNC: 140 MMOL/L — SIGNIFICANT CHANGE UP (ref 135–145)
SPECIMEN SOURCE: SIGNIFICANT CHANGE UP
VANCOMYCIN TROUGH SERPL-MCNC: 11.7 UG/ML — SIGNIFICANT CHANGE UP (ref 10–20)
VANCOMYCIN TROUGH SERPL-MCNC: 14.5 UG/ML — SIGNIFICANT CHANGE UP (ref 10–20)
WBC # BLD: 22.4 K/UL — HIGH (ref 3.8–10.5)
WBC # BLD: 24.3 K/UL — HIGH (ref 3.8–10.5)
WBC # FLD AUTO: 22.4 K/UL — HIGH (ref 3.8–10.5)
WBC # FLD AUTO: 24.3 K/UL — HIGH (ref 3.8–10.5)

## 2017-03-27 PROCEDURE — 90935 HEMODIALYSIS ONE EVALUATION: CPT | Mod: GC

## 2017-03-27 PROCEDURE — 71010: CPT | Mod: 26

## 2017-03-27 PROCEDURE — 76705 ECHO EXAM OF ABDOMEN: CPT | Mod: 26

## 2017-03-27 RX ORDER — HEPARIN SODIUM 5000 [USP'U]/ML
INJECTION INTRAVENOUS; SUBCUTANEOUS
Qty: 0 | Refills: 0 | Status: COMPLETED | OUTPATIENT
Start: 2017-03-27 | End: 2017-03-27

## 2017-03-27 RX ORDER — VANCOMYCIN HCL 1 G
1000 VIAL (EA) INTRAVENOUS ONCE
Qty: 0 | Refills: 0 | Status: COMPLETED | OUTPATIENT
Start: 2017-03-27 | End: 2017-03-27

## 2017-03-27 RX ORDER — SODIUM POLYSTYRENE SULFONATE 4.1 MEQ/G
60 POWDER, FOR SUSPENSION ORAL DAILY
Qty: 0 | Refills: 0 | Status: DISCONTINUED | OUTPATIENT
Start: 2017-03-27 | End: 2017-03-27

## 2017-03-27 RX ORDER — ERYTHROPOIETIN 10000 [IU]/ML
7000 INJECTION, SOLUTION INTRAVENOUS; SUBCUTANEOUS ONCE
Qty: 0 | Refills: 0 | Status: COMPLETED | OUTPATIENT
Start: 2017-03-27 | End: 2017-03-27

## 2017-03-27 RX ORDER — IPRATROPIUM/ALBUTEROL SULFATE 18-103MCG
3 AEROSOL WITH ADAPTER (GRAM) INHALATION EVERY 6 HOURS
Qty: 0 | Refills: 0 | Status: DISCONTINUED | OUTPATIENT
Start: 2017-03-27 | End: 2017-03-30

## 2017-03-27 RX ORDER — PIPERACILLIN AND TAZOBACTAM 4; .5 G/20ML; G/20ML
2.25 INJECTION, POWDER, LYOPHILIZED, FOR SOLUTION INTRAVENOUS EVERY 8 HOURS
Qty: 0 | Refills: 0 | Status: DISCONTINUED | OUTPATIENT
Start: 2017-03-27 | End: 2017-03-29

## 2017-03-27 RX ORDER — SODIUM CHLORIDE 9 MG/ML
500 INJECTION INTRAMUSCULAR; INTRAVENOUS; SUBCUTANEOUS ONCE
Qty: 0 | Refills: 0 | Status: COMPLETED | OUTPATIENT
Start: 2017-03-27 | End: 2017-03-27

## 2017-03-27 RX ORDER — PIPERACILLIN AND TAZOBACTAM 4; .5 G/20ML; G/20ML
3.38 INJECTION, POWDER, LYOPHILIZED, FOR SOLUTION INTRAVENOUS ONCE
Qty: 0 | Refills: 0 | Status: COMPLETED | OUTPATIENT
Start: 2017-03-27 | End: 2017-03-27

## 2017-03-27 RX ORDER — HEPARIN SODIUM 5000 [USP'U]/ML
1000 INJECTION INTRAVENOUS; SUBCUTANEOUS ONCE
Qty: 0 | Refills: 0 | Status: COMPLETED | OUTPATIENT
Start: 2017-03-27 | End: 2017-03-27

## 2017-03-27 RX ORDER — HEPARIN SODIUM 5000 [USP'U]/ML
5000 INJECTION INTRAVENOUS; SUBCUTANEOUS EVERY 8 HOURS
Qty: 0 | Refills: 0 | Status: DISCONTINUED | OUTPATIENT
Start: 2017-03-27 | End: 2017-03-30

## 2017-03-27 RX ORDER — SODIUM CHLORIDE 9 MG/ML
1000 INJECTION INTRAMUSCULAR; INTRAVENOUS; SUBCUTANEOUS
Qty: 0 | Refills: 0 | Status: DISCONTINUED | OUTPATIENT
Start: 2017-03-27 | End: 2017-03-27

## 2017-03-27 RX ORDER — HEPARIN SODIUM 5000 [USP'U]/ML
500 INJECTION INTRAVENOUS; SUBCUTANEOUS
Qty: 0 | Refills: 0 | Status: COMPLETED | OUTPATIENT
Start: 2017-03-27 | End: 2017-03-27

## 2017-03-27 RX ORDER — ACETAMINOPHEN 500 MG
650 TABLET ORAL ONCE
Qty: 0 | Refills: 0 | Status: COMPLETED | OUTPATIENT
Start: 2017-03-27 | End: 2017-03-27

## 2017-03-27 RX ORDER — PIPERACILLIN AND TAZOBACTAM 4; .5 G/20ML; G/20ML
2.25 INJECTION, POWDER, LYOPHILIZED, FOR SOLUTION INTRAVENOUS EVERY 12 HOURS
Qty: 0 | Refills: 0 | Status: DISCONTINUED | OUTPATIENT
Start: 2017-03-27 | End: 2017-03-27

## 2017-03-27 RX ORDER — ACETAMINOPHEN 500 MG
650 TABLET ORAL EVERY 6 HOURS
Qty: 0 | Refills: 0 | Status: DISCONTINUED | OUTPATIENT
Start: 2017-03-27 | End: 2017-03-30

## 2017-03-27 RX ADMIN — HEPARIN SODIUM 0.1 UNIT(S): 5000 INJECTION INTRAVENOUS; SUBCUTANEOUS at 12:30

## 2017-03-27 RX ADMIN — HEPARIN SODIUM 0.1 UNIT(S): 5000 INJECTION INTRAVENOUS; SUBCUTANEOUS at 13:30

## 2017-03-27 RX ADMIN — HEPARIN SODIUM 5000 UNIT(S): 5000 INJECTION INTRAVENOUS; SUBCUTANEOUS at 21:53

## 2017-03-27 RX ADMIN — SODIUM CHLORIDE 1000 MILLILITER(S): 9 INJECTION INTRAMUSCULAR; INTRAVENOUS; SUBCUTANEOUS at 02:46

## 2017-03-27 RX ADMIN — SODIUM CHLORIDE 1000 MILLILITER(S): 9 INJECTION INTRAMUSCULAR; INTRAVENOUS; SUBCUTANEOUS at 01:33

## 2017-03-27 RX ADMIN — PIPERACILLIN AND TAZOBACTAM 200 GRAM(S): 4; .5 INJECTION, POWDER, LYOPHILIZED, FOR SOLUTION INTRAVENOUS at 01:53

## 2017-03-27 RX ADMIN — SODIUM CHLORIDE 75 MILLILITER(S): 9 INJECTION INTRAMUSCULAR; INTRAVENOUS; SUBCUTANEOUS at 05:56

## 2017-03-27 RX ADMIN — PIPERACILLIN AND TAZOBACTAM 200 GRAM(S): 4; .5 INJECTION, POWDER, LYOPHILIZED, FOR SOLUTION INTRAVENOUS at 18:36

## 2017-03-27 RX ADMIN — Medication 63.75 MILLIMOLE(S): at 19:18

## 2017-03-27 RX ADMIN — Medication 250 MILLIGRAM(S): at 17:49

## 2017-03-27 RX ADMIN — Medication 250 MILLIGRAM(S): at 02:33

## 2017-03-27 RX ADMIN — PIPERACILLIN AND TAZOBACTAM 200 GRAM(S): 4; .5 INJECTION, POWDER, LYOPHILIZED, FOR SOLUTION INTRAVENOUS at 09:55

## 2017-03-27 RX ADMIN — HEPARIN SODIUM 0.1 UNIT(S): 5000 INJECTION INTRAVENOUS; SUBCUTANEOUS at 11:30

## 2017-03-27 RX ADMIN — ERYTHROPOIETIN 7000 UNIT(S): 10000 INJECTION, SOLUTION INTRAVENOUS; SUBCUTANEOUS at 14:01

## 2017-03-27 RX ADMIN — Medication 650 MILLIGRAM(S): at 01:45

## 2017-03-27 RX ADMIN — HEPARIN SODIUM 5000 UNIT(S): 5000 INJECTION INTRAVENOUS; SUBCUTANEOUS at 06:29

## 2017-03-27 NOTE — ED PROVIDER NOTE - MEDICAL DECISION MAKING DETAILS
Here with complex medical history and recent MICU stay 1 month ago for both UTI and PNA here from NH with hypoxia and fevers. Concern for recurrent PNA. Will check labs, UA, CXR. Due for HD tomorrow so will go gently on fluids despite sepsis protocol because pt is DNR/DNI and is already hypoxic. Will give in 500 cc increments while keeping pt on monitor to avoid worsening respiratory status though pt does look somewhat dry. Here with complex medical history and recent MICU stay 1 month ago for both UTI and PNA here from NH with hypoxia and fevers. Concern for recurrent PNA. Will check labs, UA, CXR. Due for HD tomorrow so will go gently on fluids despite sepsis protocol because pt is DNR/DNI and is already hypoxic. Will give in 500 cc increments while keeping pt on monitor to avoid worsening respiratory status though pt does look somewhat dry.  Labs/fluids/antibiotics delayed as pt w/ very difficult access

## 2017-03-27 NOTE — H&P ADULT - PROBLEM SELECTOR PLAN 6
last ef 65-70 in feb.  currently not in exacerbation as no jvd crackles or edema. [place] 11145 however pt is ESRD and cannot clear the enzyme.  - no need to repeat echp last ef 65-70 in feb.  currently not in exacerbation as no jvd crackles or edema. BNP [place] 1949[address] however pt is ESRD and cannot clear the enzyme.  - no need to repeat echo

## 2017-03-27 NOTE — ED ADULT TRIAGE NOTE - CHIEF COMPLAINT QUOTE
pt was [facility]  from [facility] with fever that ha been ongoing fro a few days

## 2017-03-27 NOTE — CONSULT NOTE ADULT - PROBLEM SELECTOR RECOMMENDATION 2
Currently normotensive  In setting of active infection, would opt to hold his cardiomyopathy medications at present at present

## 2017-03-27 NOTE — ED PROVIDER NOTE - CRITICAL CARE PROVIDED
conducted a detailed discussion of DNR status/interpretation of diagnostic studies/consultation with other physicians/documentation/additional history taking/direct patient care (not related to procedure)/consult w/ pt's family directly relating to pts condition

## 2017-03-27 NOTE — H&P ADULT - PROBLEM SELECTOR PLAN 2
per NH patient was hypoxic to 60s. etiology unclear but likely 2/2 asp with resulting PNA. unlikely copd exacerbation as pt does not have wheezing on exam and is moving good air. unlikely chf exacerbation as no jvd crackles or LE edema.   - c/w treatment as above for presumed PNA  - monitor spO2 per NH patient was hypoxic to 60s. etiology unclear but likely 2/2 asp with resulting PNA. unlikely copd exacerbation as pt does not have wheezing on exam and is moving good air. unlikely chf exacerbation as no jvd crackles or LE edema.   - c/w treatment as above for presumed PNA  - monitor spO2  - obtain ABG while on nonrebreather per NH patient was hypoxic to 60s. etiology unclear but likely 2/2 asp with resulting PNA. unlikely copd exacerbation as pt does not have wheezing on exam and is moving good air. unlikely chf exacerbation as no jvd crackles or LE edema.   - c/w treatment as above for presumed PNA  - monitor spO2  - can obtain ABG while on nonrebreather per NH patient was hypoxic to 60s. etiology unclear but likely 2/2 asp with resulting PNA. unlikely copd exacerbation as pt does not have wheezing on exam and is currently saturating well on nonrebreather and then on NC at 2L. moving good air. unlikely chf exacerbation as no jvd crackles or LE edema.   - c/w treatment as above for presumed PNA  - monitor spO2

## 2017-03-27 NOTE — CONSULT NOTE ADULT - PROBLEM SELECTOR RECOMMENDATION 9
Although patient is MWF and he is due for HD, his volume status is acceptable (likely some degree of pulmonary edema based on CXR) and chemistries are appropriate. Pending his obtundation workup by primary team, will then subsequently order for HD once he is clinically improved. Although patient is MWF and he is due for HD, his volume status is acceptable (likely some degree of pulmonary edema based on CXR) and chemistries are appropriate. Pending his obtundation workup by primary team, will then subsequently order for HD once he is clinically improved.    Addendum: 3/27 0947: Discussed with Dr. Child. Will proceed with HD session today in an attempt to alleviate some of his underlying pulmonary edema and attain clearance of uremic solutes. Given his chemistry profile and likely reduced eating, his encephalopathy is less likely to be solely attributed to uremia. Although patient is MWF and he is due for HD, his volume status is acceptable (likely some degree of pulmonary edema based on CXR) and chemistries are appropriate. Pending his obtundation workup by primary team, will then subsequently order for HD once he is clinically improved.    Addendum: 3/27 0947: Discussed with Dr. Child. Will proceed with HD session today in an attempt to alleviate some of his underlying pulmonary edema and attain clearance of uremic solutes. Given his chemistry profile and likely reduced eating, his encephalopathy is less likely to be solely attributed to uremia.    Addendum: will set UF goal to target 71kg. His bedscale weight is 73kg at present  Given the weight inaccuracies, will seek to attain 2kg to 2.5kg of net UF

## 2017-03-27 NOTE — CONSULT NOTE ADULT - SUBJECTIVE AND OBJECTIVE BOX
Patient is a 87y Male admitted for sepsis.    Patient known to Dr. Child and the renal service.     Briefly, has a history of ESRD on HD M/W/F via left arm AV graft goes to [facility] , anemia of CKD, CKDMBD, as well as medical history of Alzheimer's dementia, CAD, COPD, angina, anemia, CHF, depression, HTN, HLD, OA, PVD, polyneuropathy, blindness, seizures, and IDDM. Recent admission for septic shock due to aspiration pneumonia. Currently sent in from nursing home due to fever and hypoxia. Workup to date has demonstrated patient has sepsis at present likely from aspiration pneumonia again and on broad spectrum antibiotics at present.     The patient does not respond to verbal or tactile stimuli at present on clinical examination. Unable to attain history.  Findings relayed to primary team who are aware of this and performing workup at present.    Flowsheets from [facility] have been requested at present.     PAST MEDICAL & SURGICAL HISTORY:  AV graft thrombosis  Osteoarthritis  PVD (peripheral vascular disease)  COPD (chronic obstructive pulmonary disease)  Heart failure  Angina pectoris  ESRD (end stage renal disease)  Seizures: post traumatic  CAD (coronary artery disease)  HTN (hypertension)  Polyneuropathy  Hyperlipidemia  Dysphagia  Hypotension  Alzheimers disease  Osteoarthritis: Osteoarthritis  Chronic obstructive pulmonary disease: Chronic obstructive pulmonary disease  Anemia: Anemia  Dementia without behavioral disturbance: Dementia  Atherosclerosis of coronary artery: CAD (coronary artery disease)  Nondependent alcohol abuse: ETOH abuse  Depressive disorder: Depression  End-stage renal disease: ESRD on hemodialysis  Essential hypertension: HTN (hypertension)  Deep venous embolism and thrombosis of lower extremity: DVT (deep venous thrombosis)  Congestive heart failure: CHF (congestive heart failure)  Legal blindness: Legally blind  Hemodialysis access, AV graft: LUE loop AVG  Acquired arteriovenous fistula: AV fistula      MEDICATIONS  (STANDING):  heparin  Injectable 5000Unit(s) SubCutaneous every 8 hours  piperacillin/tazobactam IVPB. 2.25Gram(s) IV Intermittent every 8 hours    MEDICATIONS  (PRN):  acetaminophen  Suppository 650milliGRAM(s) Rectal every 6 hours PRN For Temp greater than 38 C (100.4 F)  ALBUTerol/ipratropium for Nebulization 3milliLiter(s) Nebulizer every 6 hours PRN Shortness of Breath and/or Wheezing      Allergies    clonidine (Unknown)    Intolerances        SOCIAL HISTORY:    FAMILY HISTORY:  Family history unknown      T(C): , Max: 39.1 (03-27 @ 00:15)  T(F): , Max: 102.4 (03-27 @ 00:15)  HR: 60  BP: 142/70  BP(mean): --  RR: 18  SpO2: 98%  Wt(kg): --    Height (cm): 182.9 (03-27 @ 09:19)  Weight (kg): 72 (03-27 @ 09:19)  BMI (kg/m2): 21.5 (03-27 @ 09:19)  BSA (m2): 1.93 (03-27 @ 09:19)      LABS:                        9.0    22.4  )-----------( 203      ( 27 Mar 2017 01:13 )             27.8     27 Mar 2017 01:13    140    |  97     |  52     ----------------------------<  98     3.5     |  32     |  5.51     Ca    8.6        27 Mar 2017 01:13    TPro  7.4    /  Alb  2.8    /  TBili  0.9    /  DBili  x      /  AST  270    /  ALT  169    /  AlkPhos  411    27 Mar 2017 01:13      PT/INR - ( 27 Mar 2017 01:16 )   PT: 11.9 sec;   INR: 1.07          PTT - ( 27 Mar 2017 01:16 )  PTT:28.3 sec          RADIOLOGY & ADDITIONAL STUDIES: Patient is a 87y Male admitted for sepsis.    Patient known to Dr. Child and the renal service.     Briefly, has a history of ESRD on HD M/W/F via left arm AV graft goes to [facility] , anemia of CKD, CKDMBD, as well as medical history of Alzheimer's dementia, CAD, COPD, angina, anemia, CHF, depression, HTN, HLD, OA, PVD, polyneuropathy, blindness, seizures, and IDDM. Recent admission for septic shock due to aspiration pneumonia. Currently sent in from nursing home due to fever and hypoxia. Workup to date has demonstrated patient has sepsis at present likely from aspiration pneumonia again and on broad spectrum antibiotics at present.     The patient does not respond to verbal or tactile stimuli at present on clinical examination. Unable to attain history.  Findings relayed to primary team who are aware of this and performing workup at present.    [facility] from [facility] have been requested at present.     Addendum: Review of his UES flowsheets indicate his last HD was on 3/24  Furthermore, the patient has an EDW of 71kg     PAST MEDICAL & SURGICAL HISTORY:  AV graft thrombosis  Osteoarthritis  PVD (peripheral vascular disease)  COPD (chronic obstructive pulmonary disease)  Heart failure  Angina pectoris  ESRD (end stage renal disease)  Seizures: post traumatic  CAD (coronary artery disease)  HTN (hypertension)  Polyneuropathy  Hyperlipidemia  Dysphagia  Hypotension  Alzheimers disease  Osteoarthritis: Osteoarthritis  Chronic obstructive pulmonary disease: Chronic obstructive pulmonary disease  Anemia: Anemia  Dementia without behavioral disturbance: Dementia  Atherosclerosis of coronary artery: CAD (coronary artery disease)  Nondependent alcohol abuse: ETOH abuse  Depressive disorder: Depression  End-stage renal disease: ESRD on hemodialysis  Essential hypertension: HTN (hypertension)  Deep venous embolism and thrombosis of lower extremity: DVT (deep venous thrombosis)  Congestive heart failure: CHF (congestive heart failure)  Legal blindness: Legally blind  Hemodialysis access, AV graft: LUE loop AVG  Acquired arteriovenous fistula: AV fistula      MEDICATIONS  (STANDING):  heparin  Injectable 5000Unit(s) SubCutaneous every 8 hours  piperacillin/tazobactam IVPB. 2.25Gram(s) IV Intermittent every 8 hours    MEDICATIONS  (PRN):  acetaminophen  Suppository 650milliGRAM(s) Rectal every 6 hours PRN For Temp greater than 38 C (100.4 F)  ALBUTerol/ipratropium for Nebulization 3milliLiter(s) Nebulizer every 6 hours PRN Shortness of Breath and/or Wheezing      Allergies    clonidine (Unknown)    Intolerances        SOCIAL HISTORY:    FAMILY HISTORY:  Family history unknown      T(C): , Max: 39.1 (03-27 @ 00:15)  T(F): , Max: 102.4 (03-27 @ 00:15)  HR: 60  BP: 142/70  BP(mean): --  RR: 18  SpO2: 98%  Wt(kg): --    Height (cm): 182.9 (03-27 @ 09:19)  Weight (kg): 72 (03-27 @ 09:19)  BMI (kg/m2): 21.5 (03-27 @ 09:19)  BSA (m2): 1.93 (03-27 @ 09:19)      LABS:                        9.0    22.4  )-----------( 203      ( 27 Mar 2017 01:13 )             27.8     27 Mar 2017 01:13    140    |  97     |  52     ----------------------------<  98     3.5     |  32     |  5.51     Ca    8.6        27 Mar 2017 01:13    TPro  7.4    /  Alb  2.8    /  TBili  0.9    /  DBili  x      /  AST  270    /  ALT  169    /  AlkPhos  411    27 Mar 2017 01:13      PT/INR - ( 27 Mar 2017 01:16 )   PT: 11.9 sec;   INR: 1.07          PTT - ( 27 Mar 2017 01:16 )  PTT:28.3 sec          RADIOLOGY & ADDITIONAL STUDIES:

## 2017-03-27 NOTE — PROGRESS NOTE ADULT - SUBJECTIVE AND OBJECTIVE BOX
Coverage for Dr. Haq    Patient is an 87 yr old  male poor historian at baseline with PMHx ESRD, Alzheimer's dementia, CAD, COPD, angina, anemia, CHF, depression, HTN, HLD, OA, PVD, polyneuropathy, blindness, seizures, and IDDM sent from Nursing Home for fever to Tmax 101.5 and hypoxia to 60s. Unable to obtain history from patient as he was minimally responsive. History obtained from chart and prior records. Patient was hospitalized last month in the ICU for septic shock 2/2 PNA and UTI with enterococcus raffinosus/ecoli. Patient finished course of antibx (Vancomycin and Zosyn) and was d/c to NH. During that admission he was seen by palliative and refused PEG. 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 AN EGD was done , suctioned diverticulum, BOTOX injection to achalasia site, and insertion of NGT.     REVIEW OF SYSTEMS:  Constitutional: No fever, weight loss or fatigue  ENMT:  No difficulty hearing, tinnitus, vertigo; No sinus or throat pain  Respiratory: No cough, wheezing, chills or hemoptysis  Cardiovascular: No chest pain, palpitations, dizziness or leg swelling  Gastrointestinal: No abdominal or epigastric pain. No nausea, vomiting or hematemesis; No diarrhea or constipation. No melena or hematochezia.  Skin: No itching, burning, rashes or lesions   Musculoskeletal: No joint pain or swelling; No muscle, back or extremity pain    PAST MEDICAL & SURGICAL HISTORY:  AV graft thrombosis  Osteoarthritis  PVD (peripheral vascular disease)  COPD (chronic obstructive pulmonary disease)  Heart failure  Angina pectoris  ESRD (end stage renal disease)  Seizures: post traumatic  CAD (coronary artery disease)  HTN (hypertension)  Polyneuropathy  Hyperlipidemia  Dysphagia  Hypotension  Alzheimers disease  Osteoarthritis: Osteoarthritis  Chronic obstructive pulmonary disease: Chronic obstructive pulmonary disease  Anemia: Anemia  Dementia without behavioral disturbance: Dementia  Atherosclerosis of coronary artery: CAD (coronary artery disease)  Nondependent alcohol abuse: ETOH abuse  Depressive disorder: Depression  End-stage renal disease: ESRD on hemodialysis  Essential hypertension: HTN (hypertension)  Deep venous embolism and thrombosis of lower extremity: DVT (deep venous thrombosis)  Congestive heart failure: CHF (congestive heart failure)  Legal blindness: Legally blind  Hemodialysis access, AV graft: LUE loop AVG  Acquired arteriovenous fistula: AV fistula      FAMILY HISTORY:  Family history unknown      SOCIAL HISTORY:  Smoking Status: [ ] Current, [ ] Former, [ ] Never  Pack Years:    MEDICATIONS:  MEDICATIONS  (STANDING):  heparin  Injectable 5000Unit(s) SubCutaneous every 8 hours  piperacillin/tazobactam IVPB. 2.25Gram(s) IV Intermittent every 8 hours  sodium phosphate IVPB 15milliMole(s) IV Intermittent once    MEDICATIONS  (PRN):  acetaminophen  Suppository 650milliGRAM(s) Rectal every 6 hours PRN For Temp greater than 38 C (100.4 F)  ALBUTerol/ipratropium for Nebulization 3milliLiter(s) Nebulizer every 6 hours PRN Shortness of Breath and/or Wheezing      Allergies    clonidine (Unknown)    Intolerances        Vital Signs Last 24 Hrs  T(C): 36.8, Max: 39.1 (03-27 @ 00:15)  T(F): 98.3, Max: 102.4 (03-27 @ 00:15)  HR: 63 (55 - 90)  BP: 134/62 (117/56 - 148/76)  BP(mean): --  RR: 18 (16 - 20)  SpO2: 100% (91% - 100%)    I & Os for current day (as of 03-27 @ 18:06)  =============================================  IN: 0 ml / OUT: 2100 ml / NET: -2100 ml        PHYSICAL EXAM:    General: Well developed; well nourished; in no acute distress Dementia  HEENT: MMM, conjunctiva and sclera clear, Gurgling  Lungs: clear  Heart: regular  Gastrointestinal: Soft, non-tender non-distended; Normal bowel sounds; No rebound or guarding  Extremities: Normal range of motion, No clubbing, cyanosis or edema  Neurological: Alert and oriented x3  Skin: Warm and dry. No obvious rash      LABS:                        8.2    24.3  )-----------( 199      ( 27 Mar 2017 11:13 )             25.4     27 Mar 2017 11:13    135    |  95     |  53     ----------------------------<  114    3.1     |  31     |  5.77     Ca    8.5        27 Mar 2017 11:13  Phos  1.1       27 Mar 2017 11:13  Mg     2.0       27 Mar 2017 11:13    TPro  7.4    /  Alb  2.8    /  TBili  0.9    /  DBili  x      /  AST  270    /  ALT  169    /  AlkPhos  411    27 Mar 2017 01:13          RADIOLOGY & ADDITIONAL STUDIES:

## 2017-03-27 NOTE — H&P ADULT - ASSESSMENT
Patient is an 87 yr old male with extensive PMHx and recent admission and d/c for septic shock 2/2 asp pna vs uti who presents today from NH with AMS and fever/hypoxia.

## 2017-03-27 NOTE — H&P ADULT - PROBLEM SELECTOR PLAN 5
Does not seem to be in exacerbation. No wheezing and spo2 improved with nonrebreather.   - c/w duoneb prn Does not seem to be in exacerbation. No wheezing and spo2 improved with nonrebreather.   - c/w Duoneb prn

## 2017-03-27 NOTE — H&P ADULT - PROBLEM SELECTOR PLAN 10
HSQ    NPO   replete lytes cautiously as ESRD  DNR DNI  Palliative care c/s HSQ    NPO as pt has AMS  replete lytes cautiously as ESRD  DNR DNI  palliative/ethics consult for GOC and in help establishing a proxy for the patient's health decisions given current mental state.

## 2017-03-27 NOTE — ED PROVIDER NOTE - OBJECTIVE STATEMENT
87M PMH ESRD, Epilepsy, Alzheimer's dementia, HTN, HLD, RA, GERD, Seizure disorder, IDDM was admitted to MICU 1 month ago for septic shock 2/2  PNA seen on CXR bibasilar infiltrates and UA positive UTI w/ enterococcus raffinosus sens to vanc. Finished course vanc/zosyn and was doing okay until today, spiked temp 101.5 and hypoxic at [facility], so sent in. Pt with [facility], is DNR/DNI. 87M PMH ESRD, Epilepsy, Alzheimer's dementia, HTN, HLD, RA, GERD, Seizure disorder, IDDM was admitted to MICU 1 month ago for septic shock 2/2  PNA seen on CXR bibasilar infiltrates and UA positive UTI w/ enterococcus raffinosus sens to vanc. Finished course vanc/zosyn and was doing okay until today, spiked temp 101.5 and hypoxic at [facility], lowest 67%, so sent in. Pt with [facility], is DNR/DNI. Pts emergency contact is Isaac Land, is a friend of the patient's, but hasn't spoken to patient in 10 months and lives in [place]. Can't really given any helpful information other than that the pt has no known family.

## 2017-03-27 NOTE — ED ADULT NURSE NOTE - OBJECTIVE STATEMENT
86 y/o male BIBEMS from nursing home with a chief c/o fever. upon assessment pt, awake with eyes closed, but responsive to verbal commands. Pt febrile on triage, (See VS). IV line started, IVF initiated as per MD Arce. EKG done, and Ramey placed, no urine output, MD Crooks notified. Medications give as per ordered. Pt has a dialysis fistula to the left upper arm, bruit and thrill present. Pt has a right buttocks first degree pressure ulcer. Pt kept on cardiac monitor. Will continue to monitor.

## 2017-03-27 NOTE — ED PROVIDER NOTE - CARE PLAN
Principal Discharge DX:	Respiratory distress Principal Discharge DX:	Respiratory distress  Secondary Diagnosis:	COPD (chronic obstructive pulmonary disease)  Secondary Diagnosis:	ESRD (end stage renal disease)

## 2017-03-27 NOTE — H&P ADULT - PROBLEM SELECTOR PLAN 8
normocytic. likely ACD. no reports of melena/hematochezia and VSS.   pts Hb seems to be around baseline.  - continue to monitor

## 2017-03-27 NOTE — H&P ADULT - PROBLEM SELECTOR PLAN 9
ALK phosp 411  . abdominal exam benign. etiology unclear possibly in setting of sepsis.   - can obtain RUQ sono ALK phosp 411  . abdominal exam benign. etiology unclear possibly in setting of sepsis.   - can obtain RUQ sono.

## 2017-03-27 NOTE — H&P ADULT - PROBLEM SELECTOR PLAN 4
patient has hx of esrd with HD MWF. his creatinine seems to be around baseline.   - renal c/s in AM (his nephrologist is Dr. Child) and f/u recs

## 2017-03-27 NOTE — H&P ADULT - PROBLEM SELECTOR PLAN 3
patient reportedly Alert and oritneted to person place at baseline., currently he is barely alert and minimally arousable. likely 2/2 sepsis.   - holding all PO meds in light of this  - will continue to reassess MS patient reportedly Alert and oriented to person place at baseline., currently he is barely alert and minimally arousable. likely 2/2 sepsis.   - holding all PO meds in light of this  - will continue to reassess MS

## 2017-03-27 NOTE — CONSULT NOTE ADULT - ATTENDING COMMENTS
87M dementia ESRD/HD HTN COPD chrnoic aspiration pna due to esophageal achalasia with extensive diverticuli, who previously refused PEG and received comfort feeds in spite of risk of a spiration, DNR DNI, readmitted with oxygen desaturation from NH. improved with supplemental O2. he is a poor candidate for BIPAP in view of risk of aspiration. hemodynamically stable. no indication for tele/icu at this time. agree with abx, IVF hydration gentle.would give humidified O2 such as HiFlo O2 to avoid drying secretions
obtunded, febrile.  But BP is acceptable- to proceed with dialysis for UF and clearances

## 2017-03-27 NOTE — CONSULT NOTE ADULT - SUBJECTIVE AND OBJECTIVE BOX
ICU CONSULT    Patient is a 87y old legally blind male with PMH ESRD (MWF at [facility]), Epilepsy, Alzheimer's dementia, HTN, HLD, RA, GERD, IDDM, CAD, COPD, AOCD, GERD, Aperistaltic achalasia and dysphagia with multiple diverticulum and botox injection on last admission 2/2017 with comfort feeds now who presents from [facility] with sepsis- respiratory distress,         87yMale    PMHx -   PSx -   Meds -   Allergies -   FHx -   Sx -       PHYSICAL EXAM   Vital Signs Last 24 Hrs  T(C): 38.3, Max: 38.3 (03-27 @ 00:09)  T(F): 101, Max: 101 (03-27 @ 00:09)  HR: 70 (70 - 90)  BP: 147/61 (147/61 - 148/76)  BP(mean): --  RR: 18 (18 - 19)  SpO2: 98% (91% - 98%)      General -   HEENT -   CV -   Resp -   Abdomen -   Extremities -   Skin -       LABS                        9.0    22.4  )-----------( 203      ( 27 Mar 2017 01:13 )             27.8     27 Mar 2017 01:13    140    |  97     |  52     ----------------------------<  98     3.5     |  32     |  5.51     Ca    8.6        27 Mar 2017 01:13    TPro  7.4    /  Alb  2.8    /  TBili  0.9    /  DBili  x      /  AST  270    /  ALT  169    /  AlkPhos  411    27 Mar 2017 01:13    PT/INR - ( 27 Mar 2017 01:16 )   PT: 11.9 sec;   INR: 1.07          PTT - ( 27 Mar 2017 01:16 )  PTT:28.3 sec  Lactate, Blood: 1.7 mmoL/L (03-27 @ 01:15)            IMAGING   CXR -   EKG - ICU CONSULT    Patient is a 87y old legally blind male with PMH ESRD (MWF at [facility]), Epilepsy, Alzheimer's dementia, HTN, HLD, RA, GERD, IDDM, CAD, COPD, AOCD, GERD, Aperistaltic achalasia and dysphagia with multiple diverticulum and botox injection on last admission 2/2017 with comfort feeds now who [facility] with respiratory distress and hypoxia to 67% on pulse ox and fever of 101.5. The patient was recently admitted to MICU in February 2017 for septic shock 2/2 aspiration PNA and UTI which grew enterococcus raffinosus sensitive to vancomycin and s/p 7 day tx and discharged to [facility] rehab on 2/28. During the hospitalization he was seen by GI for esophageal dysmotility and aperistaltic achalasia with EGD and palliative botox injection done. The patient was seen by palliative and ethics during the admission as well with waxing and waning mental status - at times having capacity for decisions and other times not. There was difficulty finding a HCP during that time and no family was contacted either. ER contact in chart is Isaac Land ([phone]) who was contacted in the ER lives in [place] and has not seen the patient in 10 months and during previous documentation, Mr. Land refused to be HCP and is unaware of any family. The patient signed a MOLST on 3/20/17 DNR/DNI implying capacity and change in mental status today as well.     87yMale    PMHx -   PSx -   Meds -   Allergies -   FHx -   Sx -       PHYSICAL EXAM   Vital Signs Last 24 Hrs  T(C): 38.3, Max: 38.3 (03-27 @ 00:09)  T(F): 101, Max: 101 (03-27 @ 00:09)  HR: 70 (70 - 90)  BP: 147/61 (147/61 - 148/76)  BP(mean): --  RR: 18 (18 - 19)  SpO2: 98% (91% - 98%)      General -   HEENT -   CV -   Resp -   Abdomen -   Extremities -   Skin -       LABS                        9.0    22.4  )-----------( 203      ( 27 Mar 2017 01:13 )             27.8     27 Mar 2017 01:13    140    |  97     |  52     ----------------------------<  98     3.5     |  32     |  5.51     Ca    8.6        27 Mar 2017 01:13    TPro  7.4    /  Alb  2.8    /  TBili  0.9    /  DBili  x      /  AST  270    /  ALT  169    /  AlkPhos  411    27 Mar 2017 01:13    PT/INR - ( 27 Mar 2017 01:16 )   PT: 11.9 sec;   INR: 1.07          PTT - ( 27 Mar 2017 01:16 )  PTT:28.3 sec  Lactate, Blood: 1.7 mmoL/L (03-27 @ 01:15)            IMAGING   CXR -   EKG - ICU CONSULT    The patient is a 87y old legally blind male with PMH ESRD (MWF at [facility]), Epilepsy, Alzheimer's dementia, HTN, HLD, RA, GERD, IDDM, CAD, COPD, AOCD, GERD, Aperistaltic achalasia and dysphagia with multiple diverticulum and botox injection on last admission 2/2017 with comfort feeds now who presents from [facility] rehab with respiratory distress and hypoxia to 67% on pulse ox and fever of 101.5. The patient was recently admitted to [facility] MICU in February 2017 for septic shock 2/2 aspiration PNA and UTI which grew enterococcus raffinosus, sensitive to vancomycin and s/p 7 day tx and discharged to [facility] rehab on 2/28. During the hospitalization he was seen by GI for esophageal dysmotility and aperistaltic achalasia with EGD and palliative botox injection performed. The patient was seen by palliative and ethics during the admission as well with waxing and waning mental status - at times having capacity for decisions and other times not. He opted for palliative comfort feeds despite known risk of aspiration and has been on feeds while at [facility] rehab. There was difficulty finding a HCP during the last hospitalization and no family was able to be contacted either. ER contact in chart is Isaac Land ([phone]) who was contacted in the ER today, and lives in [place]. He has not seen the patient in 10 months and during previous documentation, Mr. Land refused to be HCP and is unaware of any family. The patient signed a MOLST on 3/20/17 DNR/DNI implying capacity and change in mental status today as well.  Today, the patient was found at rehab hypoxic to 67%, in respiratory distress, altered mental status, febrile 101.5, BP was normotensive. In the ER, vital signs initially Temp 101, HR 90, /76, RR 19, O2 sat on RA was 91% and subsequently placed on a NRB. ICU was consulted for respiratory status and closer clinical monitoring.          PMHx - extensive, see above    Surg hx- unknown   Meds - extensive - see chart  Allergies - clonidine  FHx - unable to obtain   Sx - unknown; no family known. Currently in [facility] rehab.        PHYSICAL EXAM   Vital Signs Last 24 Hrs  T(C): 38.3, Max: 38.3 (03-27 @ 00:09)  T(F): 101, Max: 101 (03-27 @ 00:09)  HR: 70 (70 - 90)  BP: 147/61 (147/61 - 148/76)  BP(mean): --  RR: 18 (18 - 19)  SpO2: 98% (91% - 98%)      General - minimally verbal, lethargic but arousable, follows basic commands   HEENT - legally blind, dry mucous membranes   CV - S1, S2 RRR no murmurs   Resp - decreased breath sounds at bases b/l, diffuse rhonchi anteriorly, crackles at R posterior base.    Abdomen - soft, nontender, non distended, no organomegaly +BS.   Extremities - wwp, no edema, moving all four extremities. strength 3/5.    Skin - no rashes        LABS                        9.0    22.4  )-----------( 203      ( 27 Mar 2017 01:13 )             27.8     27 Mar 2017 01:13    140    |  97     |  52     ----------------------------<  98     3.5     |  32     |  5.51     Ca    8.6        27 Mar 2017 01:13    TPro  7.4    /  Alb  2.8    /  TBili  0.9    /  DBili  x      /  AST  270    /  ALT  169    /  AlkPhos  411    27 Mar 2017 01:13    PT/INR - ( 27 Mar 2017 01:16 )   PT: 11.9 sec;   INR: 1.07          PTT - ( 27 Mar 2017 01:16 )  PTT:28.3 sec  Lactate, Blood: 1.7 mmoL/L (03-27 @ 01:15)            IMAGING   CXR - poor inspiratory effort, opacification of L base, ?infiltrate at RLL   EKG - NSR, no twi or std. prolonged .

## 2017-03-27 NOTE — H&P ADULT - PROBLEM SELECTOR PLAN 1
Patient presents today from NH with fever hypoxia and AMS. He has leokocytosis and suspected source being the lungs. Etiology unclear - HCAP vs less likely (superimposed) viral uri. patient has hx of Zenker's diverticulum and chronic asp as a result so he is high risk for PNA. His CXR is slightly worse than prior one from last admission but with asp PNA CXR findings can lag.   His VS have been stable and his LA is normal.   he received Tylenol 1 liter IVF and Vanc / Zosyn in ER.   He was seen by ICU and deemed stable for RMF.   - c/w vancomycin and zosyn for broad coverage of HCAP  - f/u blood cx  - f/u RVP and sputum cx  - tylenol prn Patient presents today from NH with fever hypoxia and AMS. He has leokocytosis and suspected source being the lungs. Etiology unclear - HCAP vs less likely (superimposed) viral uri. patient has hx of Zenker's diverticulum and chronic asp as a result so he is high risk for PNA. His CXR is slightly worse than prior one from last admission but with asp PNA CXR findings can lag.   His VS have been stable and his LA is normal.   he received Tylenol 1 liter IVF and Vanc / Zosyn in ER.   He was seen by ICU and deemed stable for RMF.   - c/w vancomycin and zosyn (renally dosed) for broad coverage of HCAP  - f/u blood cx  - f/u RVP and sputum cx  - tylenol prn  - difficult to obtain u/a and urine cx as pt mostly esrd and anuric.

## 2017-03-27 NOTE — H&P ADULT - HISTORY OF PRESENT ILLNESS
Patient is an 87 yr old  male poor historian at baseline with PMHx ESRD, Alzheimer's dementia, CAD, COPD, angina, anemia, CHF, depression, HTN, HLD, OA, PVD, polyneuropathy, blindness, seizures, and IDDM sent from Nursing Home for fever to Tmax 101.5 and hypoxia to 60s. Unable to obtain history from patient as he was minimally responsive. History obtained from chart and prior records. Patient was hospitalized last month in the ICU for septic shock 2/2 PNA and UTI with enterococcus raffinosus sensitive to vancomycin. Patient finished course of antibx (Vancomycin and Zosyn) and was d/c to NH. During that admission he was seen by palliative and refused PEG. 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 AN EGD was done by GI, suctioned diverticulum, BOTOX injection to achalasia site, and insertion of NGT. Patient arrived to [facility] with a MOLST form which was filled out before patient became sick. Patient's emergency contact Isaac Land was contacted by ER physician but could not provide much history.   In ED pt received Vanc/Zosyn, 1 L IVF, and Tylenol Patient is an 87 yr old  male poor historian at baseline with PMHx ESRD, Alzheimer's dementia, CAD, COPD, angina, anemia, CHF, depression, HTN, HLD, OA, PVD, polyneuropathy, blindness, seizures, and IDDM sent from Nursing Home for fever to Tmax 101.5 and hypoxia to 60s. Unable to obtain history from patient as he was minimally responsive. History obtained from chart and prior records. Patient was hospitalized last month in the ICU for septic shock 2/2 PNA and UTI with enterococcus raffinosus/ecoli. Patient finished course of antibx (Vancomycin and Zosyn) and was d/c to NH. During that admission he was seen by palliative and refused PEG. 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 AN EGD was done by GI, suctioned diverticulum, BOTOX injection to achalasia site, and insertion of NGT. Patient arrived to [facility] with a MOLST form which was filled out before patient became sick. Patient's emergency contact Isaac Land was contacted by ER physician but could not provide much history.   In ED pt received Vanc/Zosyn, 1 L IVF, and Tylenol Patient is an 87 yr old  male poor historian at baseline with PMHx ESRD, Alzheimer's dementia, CAD, COPD, angina, anemia, CHF, depression, HTN, HLD, OA, PVD, polyneuropathy, blindness, seizures, and IDDM sent from Nursing Home for fever to Tmax 101.5 and hypoxia to 60s. Unable to obtain history from patient as he was minimally responsive. History obtained from chart and prior records. Patient was hospitalized last month in the ICU for septic shock 2/2 PNA and UTI with enterococcus raffinosus/ecoli. Patient finished course of antibx (Vancomycin and Zosyn) and was d/c to NH. During that admission he was seen by palliative and refused PEG. 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 AN EGD was done by GI, suctioned diverticulum, BOTOX injection to achalasia site, and insertion of NGT. Patient arrived to [facility] with a MOLST form which was filled out before patient became sick. Patient's emergency contact Isaac Land was contacted by ER physician but could not provide much history.   In the ED his VS were stable except he was febrile to 102.5.   In ED pt received Vanc/Zosyn, 1 L IVF, and Tylenol

## 2017-03-27 NOTE — H&P ADULT - NSHPLABSRESULTS_GEN_ALL_CORE
.  LABS:                         9.0    22.4  )-----------( 203      ( 27 Mar 2017 01:13 )             27.8     27 Mar 2017 01:13    140    |  97     |  52     ----------------------------<  98     3.5     |  32     |  5.51     Ca    8.6        27 Mar 2017 01:13    TPro  7.4    /  Alb  2.8    /  TBili  0.9    /  DBili  x      /  AST  270    /  ALT  169    /  AlkPhos  411    27 Mar 2017 01:13    PT/INR - ( 27 Mar 2017 01:16 )   PT: 11.9 sec;   INR: 1.07          PTT - ( 27 Mar 2017 01:16 )  PTT:28.3 sec        Serum Pro-Brain Natriuretic Peptide: 08016 pg/mL (03-27 @ 01:13)    Lactate, Blood: 1.7 mmoL/L (03-27 @ 01:15)      RADIOLOGY, EKG & ADDITIONAL TESTS: Reviewed.

## 2017-03-27 NOTE — H&P ADULT - NSHPPHYSICALEXAM_GEN_ALL_CORE
.  VITAL SIGNS:  T(C): 37.9, Max: 39.1 (03-27 @ 00:15)  T(F): 100.3, Max: 102.4 (03-27 @ 00:15)  HR: 82 (70 - 90)  BP: 145/65 (145/65 - 148/76)  BP(mean): --  RR: 20 (18 - 20)  SpO2: 100% (91% - 100%)  Wt(kg): --    PHYSICAL EXAM:    Constitutional: AAOx0-1 (self), unable to respond. minimally arousable and lethargic.   Head: NC/AT  Eyes: PERRL, EOMI, anicteric sclera  ENT: no nasal discharge; uvula midline, no oropharyngeal erythema or exudates; dry MM  Neck: supple; no JVD or thyromegaly  Respiratory: rhonchi throughout lung fields L>R. poor resp effort decreased BS left base.   Cardiac: +S1/S2; RRR; no M/R/G; PMI non-displaced  Gastrointestinal: soft, NT/ND; no rebound or guarding; +BSx4  Extremities: WWP, no clubbing or cyanosis; no peripheral edema  Musculoskeletal: NROM x4; no joint swelling, tenderness or erythema  Vascular: 2+ radial, femoral, DP/PT pulses B/L  Dermatologic: skin warm, dry and intact; no rashes, wounds, or scars  Lymphatic: no submandibular or cervical LAD  Neurologic: AAOx0-1 (self)

## 2017-03-27 NOTE — CHART NOTE - NSCHARTNOTEFT_GEN_A_CORE
Pt admitted overnight.    Addendum to H&P:  Pt seen at bedside this AM. Pt Pt admitted overnight.    Addendum to H&P:  Pt seen at bedside this AM. Pt somnolent this AM, arousable to sternal rub with grimacing. VSS, regular respiratory rate and satting well on NC O2 supp @2L. NAD. ABG without significant hypercarbia. Pt DNI and with poor mental status/aspiration risk (BIPAP contraindicated). Continued broad coverage for aspiration PNA with Vancomycin (dosed post-HD based on pre-HD concentrations) and Zosyn (renally dosed). NPO with aspiration precautions and head at 30 degree elevation. Received dialysis today, euvolemic. Seen by speech and swallow who indicated they have seen patient in the past - w/ chronic aspiration 2/2 Zenker's diverticulum who refused PEG on prior admission, instead elected for comfort feeds. Currently awaiting palliative recs. Pt admitted overnight for likely sepsis 2/2 aspiration PNA 2/2 chronic aspiration 2/2 Zenker's diverticulum.     Addendum to H&P:  Pt seen at bedside this AM. Pt somnolent this AM, arousable to sternal rub with grimacing. VSS, regular respiratory rate and satting well on NC O2 supp @2L. NAD. ABG without significant hypercarbia. Pt DNI and with poor mental status/aspiration risk (BIPAP contraindicated). Continued broad coverage for aspiration PNA with Vancomycin (dosed post-HD based on pre-HD concentrations) and Zosyn (renally dosed). NPO with aspiration precautions and head at 30 degree elevation. Received dialysis today, euvolemic. Seen by speech and swallow who indicated they have seen patient in the past - w/ chronic aspiration 2/2 Zenker's diverticulum who refused PEG on prior admission, instead elected for comfort feeds. Currently awaiting palliative recs.

## 2017-03-28 LAB
ALBUMIN SERPL ELPH-MCNC: 2.3 G/DL — LOW (ref 3.4–5)
ALP SERPL-CCNC: 312 U/L — HIGH (ref 40–120)
ALT FLD-CCNC: 105 U/L — HIGH (ref 12–42)
ANION GAP SERPL CALC-SCNC: 10 MMOL/L — SIGNIFICANT CHANGE UP (ref 9–16)
AST SERPL-CCNC: 76 U/L — HIGH (ref 15–37)
BILIRUB DIRECT SERPL-MCNC: 1.18 MG/DL — HIGH
BILIRUB INDIRECT FLD-MCNC: 0.6 MG/DL — SIGNIFICANT CHANGE UP (ref 0.2–1)
BILIRUB SERPL-MCNC: 1.8 MG/DL — HIGH (ref 0.2–1.2)
BUN SERPL-MCNC: 23 MG/DL — SIGNIFICANT CHANGE UP (ref 7–23)
CALCIUM SERPL-MCNC: 8.4 MG/DL — LOW (ref 8.5–10.5)
CHLORIDE SERPL-SCNC: 99 MMOL/L — SIGNIFICANT CHANGE UP (ref 96–108)
CO2 SERPL-SCNC: 27 MMOL/L — SIGNIFICANT CHANGE UP (ref 22–31)
CREAT SERPL-MCNC: 3.16 MG/DL — HIGH (ref 0.5–1.3)
GLUCOSE SERPL-MCNC: 82 MG/DL — SIGNIFICANT CHANGE UP (ref 70–99)
GRAM STN FLD: SIGNIFICANT CHANGE UP
HCT VFR BLD CALC: 24.7 % — LOW (ref 39–50)
HGB BLD-MCNC: 7.9 G/DL — LOW (ref 13–17)
MAGNESIUM SERPL-MCNC: 1.9 MG/DL — SIGNIFICANT CHANGE UP (ref 1.6–2.4)
MCHC RBC-ENTMCNC: 31.5 PG — SIGNIFICANT CHANGE UP (ref 27–34)
MCHC RBC-ENTMCNC: 32 G/DL — SIGNIFICANT CHANGE UP (ref 32–36)
MCV RBC AUTO: 98.4 FL — SIGNIFICANT CHANGE UP (ref 80–100)
PLATELET # BLD AUTO: 161 K/UL — SIGNIFICANT CHANGE UP (ref 150–400)
POTASSIUM SERPL-MCNC: 3.2 MMOL/L — LOW (ref 3.5–5.3)
POTASSIUM SERPL-SCNC: 3.2 MMOL/L — LOW (ref 3.5–5.3)
PROT SERPL-MCNC: 6.8 G/DL — SIGNIFICANT CHANGE UP (ref 6.4–8.2)
RBC # BLD: 2.51 M/UL — LOW (ref 4.2–5.8)
RBC # FLD: 18.5 % — HIGH (ref 10.3–16.9)
SODIUM SERPL-SCNC: 136 MMOL/L — SIGNIFICANT CHANGE UP (ref 135–145)
WBC # BLD: 14.2 K/UL — HIGH (ref 3.8–10.5)
WBC # FLD AUTO: 14.2 K/UL — HIGH (ref 3.8–10.5)

## 2017-03-28 PROCEDURE — 99223 1ST HOSP IP/OBS HIGH 75: CPT

## 2017-03-28 PROCEDURE — 99231 SBSQ HOSP IP/OBS SF/LOW 25: CPT | Mod: GC

## 2017-03-28 RX ORDER — POTASSIUM CHLORIDE 20 MEQ
40 PACKET (EA) ORAL ONCE
Qty: 0 | Refills: 0 | Status: COMPLETED | OUTPATIENT
Start: 2017-03-28 | End: 2017-03-28

## 2017-03-28 RX ORDER — POTASSIUM CHLORIDE 20 MEQ
10 PACKET (EA) ORAL
Qty: 0 | Refills: 0 | Status: COMPLETED | OUTPATIENT
Start: 2017-03-28 | End: 2017-03-28

## 2017-03-28 RX ADMIN — HEPARIN SODIUM 5000 UNIT(S): 5000 INJECTION INTRAVENOUS; SUBCUTANEOUS at 05:08

## 2017-03-28 RX ADMIN — HEPARIN SODIUM 5000 UNIT(S): 5000 INJECTION INTRAVENOUS; SUBCUTANEOUS at 14:08

## 2017-03-28 RX ADMIN — PIPERACILLIN AND TAZOBACTAM 200 GRAM(S): 4; .5 INJECTION, POWDER, LYOPHILIZED, FOR SOLUTION INTRAVENOUS at 14:09

## 2017-03-28 RX ADMIN — Medication 100 MILLIEQUIVALENT(S): at 09:05

## 2017-03-28 RX ADMIN — PIPERACILLIN AND TAZOBACTAM 200 GRAM(S): 4; .5 INJECTION, POWDER, LYOPHILIZED, FOR SOLUTION INTRAVENOUS at 01:13

## 2017-03-28 RX ADMIN — Medication 40 MILLIEQUIVALENT(S): at 09:52

## 2017-03-28 RX ADMIN — HEPARIN SODIUM 5000 UNIT(S): 5000 INJECTION INTRAVENOUS; SUBCUTANEOUS at 22:46

## 2017-03-28 RX ADMIN — Medication 100 MILLIEQUIVALENT(S): at 07:38

## 2017-03-28 RX ADMIN — PIPERACILLIN AND TAZOBACTAM 200 GRAM(S): 4; .5 INJECTION, POWDER, LYOPHILIZED, FOR SOLUTION INTRAVENOUS at 22:46

## 2017-03-28 RX ADMIN — Medication 100 MILLIEQUIVALENT(S): at 10:14

## 2017-03-28 NOTE — PROGRESS NOTE ADULT - PROBLEM SELECTOR PLAN 1
Patient presents today from NH with fever hypoxia and AMS, labs notable for WBC 24. Etiology likely HAP vs. aspiration PNA. Patient has hx of Zenker's diverticulum and chronic asp as a result so he is high risk for PNA. His CXR is slightly worse than one from last admission but with asp PNA CXR findings can lag.   Blood cx +GNR. Difficult to obtain u/a and urine cx as pt mostly ESRD and anuric. Patient is currently afebrile and HD stable, WBC downtrending w/ vanc/zosyn.   - c/w vancomycin and zosyn (renally dosed) for broad coverage of HCAP. Consider discontinuing vanc as cultures w/ GNR   - f/u blood cx final results   -will repeat blood cultures today   - tylenol prn

## 2017-03-28 NOTE — PROGRESS NOTE ADULT - ASSESSMENT
ESRD M/W/F ESRD M/W/F   88 yo man admitted with fevers and obtundation 3/27- found to be bacteremia mostly likely from aspiration PNA- clinically improving with IV Abx

## 2017-03-28 NOTE — PROGRESS NOTE ADULT - PROBLEM SELECTOR PLAN 4
Renal Diet  Check phosphorus; was low yesterday but repleted  Can hold off phosphorus binders at present  Will dose Calcijex on HD next HD  Please also check 25 Vit D and PTH at next routine blood draw

## 2017-03-28 NOTE — PROGRESS NOTE ADULT - SUBJECTIVE AND OBJECTIVE BOX
Patient is a 87y old  Male who presents with a chief complaint of AMS/hypoxia (27 Mar 2017 04:04)      HPI:  Patient is an 87 yr old  male poor historian at baseline with PMHx ESRD, Alzheimer's dementia, CAD, COPD, angina, anemia, CHF, depression, HTN, HLD, OA, PVD, polyneuropathy, blindness, seizures, and IDDM sent from Nursing Home for fever to Tmax 101.5 and hypoxia to 60s. Unable to obtain history from patient as he was minimally responsive. History obtained from chart and prior records. Patient was hospitalized last month in the ICU for septic shock 2/2 PNA and UTI with enterococcus raffinosus/ecoli. Patient finished course of antibx (Vancomycin and Zosyn) and was d/c to NH. During that admission he was seen by palliative and refused PEG. 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 AN EGD was done by GI, suctioned diverticulum, BOTOX injection to achalasia site, and insertion of NGT. Patient arrived to [facility] with a MOLST form which was filled out before patient became sick. Patient's emergency contact Isaac Land was contacted by ER physician but could not provide much history.   In the ED his VS were stable except he was febrile to 102.5.   In ED pt received Vanc/Zosyn, 1 L IVF, and Tylenol (27 Mar 2017 04:04)    INTERVAL HPI/OVERNIGHT EVENTS:::    HEALTH ISSUES - PROBLEM Dx:  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  Anemia due to chronic kidney disease: Anemia due to chronic kidney disease  Hypertension: Hypertension  ESRD (end stage renal disease) on dialysis: ESRD (end stage renal disease) on dialysis  Need for prophylactic measure: Need for prophylactic measure  Elevated liver function tests: Elevated liver function tests  Anemia: Anemia  HTN (hypertension): HTN (hypertension)  Congestive heart failure: Congestive heart failure  COPD (chronic obstructive pulmonary disease): COPD (chronic obstructive pulmonary disease)  ESRD (end stage renal disease): ESRD (end stage renal disease)  Altered mental status: Altered mental status  Hypoxia: Hypoxia  Sepsis: Sepsis          PAST MEDICAL & SURGICAL HISTORY:  AV graft thrombosis  Osteoarthritis  PVD (peripheral vascular disease)  COPD (chronic obstructive pulmonary disease)  Heart failure  Angina pectoris  ESRD (end stage renal disease)  Seizures: post traumatic  CAD (coronary artery disease)  HTN (hypertension)  Polyneuropathy  Hyperlipidemia  Dysphagia  Hypotension  Alzheimers disease  Osteoarthritis: Osteoarthritis  Chronic obstructive pulmonary disease: Chronic obstructive pulmonary disease  Anemia: Anemia  Dementia without behavioral disturbance: Dementia  Atherosclerosis of coronary artery: CAD (coronary artery disease)  Nondependent alcohol abuse: ETOH abuse  Depressive disorder: Depression  End-stage renal disease: ESRD on hemodialysis  Essential hypertension: HTN (hypertension)  Deep venous embolism and thrombosis of lower extremity: DVT (deep venous thrombosis)  Congestive heart failure: CHF (congestive heart failure)  Legal blindness: Legally blind  Hemodialysis access, AV graft: LUE loop AVG  Acquired arteriovenous fistula: AV fistula          Consultant NOTE  REVIEWED  (   )    REVIEW OF SYSTEMS:  [x] As per HPI  CONSTITUTIONAL: No fever, weight loss, or fatigue  RESPIRATORY: No cough, wheezing, chills or hemoptysis; No Shortness of Breath  CARDIOVASCULAR: No chest pain, palpitations, dizziness, or leg swelling  GASTROINTESTINAL: No abdominal or epigastric pain. No nausea, vomiting, or hematemesis; No diarrhea or constipation. No melena or hematochezia.  MUSCULOSKELETAL: No joint pain or swelling; No muscle, back, or extremity pain  PSYCH    awake, alert       [x] All others negative	  [ ] Unable to obtain          Vital Signs Last 24 Hrs  T(C): 36.3, Max: 36.8 (03-27 @ 09:19)  T(F): 97.4, Max: 98.3 (03-27 @ 17:50)  HR: 66 (55 - 82)  BP: 106/56 (106/56 - 146/68)  BP(mean): --  RR: 16 (16 - 18)  SpO2: 100% (96% - 100%)        I & Os for current day (as of 03-28 @ 07:53)  =============================================  IN: 350 ml / OUT: 2100 ml / NET: -1750 ml    PHYSICAL EXAMINATION:                                    (    )  NO CHANGE  Appearance: Normal	  HEENT:   Normal oral mucosa, PERRL, EOMI	  Neck: Supple, + JVD/ - JVD; Carotid Bruit   Cardiovascular: Normal S1 S2, No JVD, No murmurs,   Respiratory: Lungs clear to auscultation/Decreased Breath Sounds/No Rales, Rhonchi, Wheezing	  Gastrointestinal:  Soft, Non-tender, + BS	  Skin: No rashes, No ecchymoses, No cyanosis  Extremities: Normal range of motion, No clubbing, cyanosis or edema  Vascular: Peripheral pulses palpable 2+ bilaterally  Neurologic: Non-focal  Psychiatry: A & O x 3, Mood & affect appropriate    heparin  Injectable 5000Unit(s) SubCutaneous every 8 hours  acetaminophen  Suppository 650milliGRAM(s) Rectal every 6 hours PRN  ALBUTerol/ipratropium for Nebulization 3milliLiter(s) Nebulizer every 6 hours PRN  piperacillin/tazobactam IVPB. 2.25Gram(s) IV Intermittent every 8 hours  potassium chloride  10 mEq/100 mL IVPB 10milliEquivalent(s) IV Intermittent every 1 hour        PT/INR - ( 27 Mar 2017 01:16 )   PT: 11.9 sec;   INR: 1.07          PTT - ( 27 Mar 2017 01:16 )  PTT:28.3 sec                              7.9    14.2  )-----------( 161      ( 28 Mar 2017 07:05 )             24.7     28 Mar 2017 07:05    136    |  99     |  23     ----------------------------<  82     3.2     |  27     |  3.16     Ca    8.4        28 Mar 2017 07:05  Phos  1.1       27 Mar 2017 11:13  Mg     1.9       28 Mar 2017 07:05    TPro  7.4    /  Alb  2.8    /  TBili  0.9    /  DBili  x      /  AST  270    /  ALT  169    /  AlkPhos  411    27 Mar 2017 01:13      CAPILLARY BLOOD GLUCOSE  122 (27 Mar 2017 21:17) Patient is a 87y old  Male who presents with a chief complaint of AMS/hypoxia (27 Mar 2017 04:04)      HPI:  Patient is an 87 yr old  male poor historian at baseline with PMHx ESRD, Alzheimer's dementia, CAD, COPD, angina, anemia, CHF, depression, HTN, HLD, OA, PVD, polyneuropathy, blindness, seizures, and IDDM sent from Nursing Home for fever to Tmax 101.5 and hypoxia to 60s. Unable to obtain history from patient as he was minimally responsive. History obtained from chart and prior records. Patient was hospitalized last month in the ICU for septic shock 2/2 PNA and UTI with enterococcus raffinosus/ecoli. Patient finished course of antibx (Vancomycin and Zosyn) and was d/c to NH. During that admission he was seen by palliative and refused PEG. 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 AN EGD was done by GI, suctioned diverticulum, BOTOX injection to achalasia site, and insertion of NGT. Patient arrived to [facility] with a MOLST form which was filled out before patient became sick. Patient's emergency contact Isaac Land was contacted by ER physician but could not provide much history.   In the ED his VS were stable except he was febrile to 102.5.   In ED pt received Vanc/Zosyn, 1 L IVF, and Tylenol (27 Mar 2017 04:04)    INTERVAL HPI/OVERNIGHT EVENTS:::chart rev on IV ab.  ESRD--HD    HEALTH ISSUES - PROBLEM Dx:  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  Anemia due to chronic kidney disease: Anemia due to chronic kidney disease  Hypertension: Hypertension  ESRD (end stage renal disease) on dialysis: ESRD (end stage renal disease) on dialysis  Need for prophylactic measure: Need for prophylactic measure  Elevated liver function tests: Elevated liver function tests  Anemia: Anemia  HTN (hypertension): HTN (hypertension)  Congestive heart failure: Congestive heart failure  COPD (chronic obstructive pulmonary disease): COPD (chronic obstructive pulmonary disease)  ESRD (end stage renal disease): ESRD (end stage renal disease)  Altered mental status: Altered mental status  Hypoxia: Hypoxia  Sepsis: Sepsis          PAST MEDICAL & SURGICAL HISTORY:  AV graft thrombosis  Osteoarthritis  PVD (peripheral vascular disease)  COPD (chronic obstructive pulmonary disease)  Heart failure  Angina pectoris  ESRD (end stage renal disease)  Seizures: post traumatic  CAD (coronary artery disease)  HTN (hypertension)  Polyneuropathy  Hyperlipidemia  Dysphagia  Hypotension  Alzheimers disease  Osteoarthritis: Osteoarthritis  Chronic obstructive pulmonary disease: Chronic obstructive pulmonary disease  Anemia: Anemia  Dementia without behavioral disturbance: Dementia  Atherosclerosis of coronary artery: CAD (coronary artery disease)  Nondependent alcohol abuse: ETOH abuse  Depressive disorder: Depression  End-stage renal disease: ESRD on hemodialysis  Essential hypertension: HTN (hypertension)  Deep venous embolism and thrombosis of lower extremity: DVT (deep venous thrombosis)  Congestive heart failure: CHF (congestive heart failure)  Legal blindness: Legally blind  Hemodialysis access, AV graft: LUE loop AVG  Acquired arteriovenous fistula: AV fistula          Consultant NOTE  REVIEWED  (   )    REVIEW OF SYSTEMS:  [x] As per HPI  CONSTITUTIONAL: weakness  RESPIRATORY:sob  CARDIOVASCULAR: No chest pain, palpitations, dizziness, or leg swelling  GASTROINTESTINAL: No abdominal or epigastric pain. No nausea, vomiting, or hematemesis; No diarrhea or constipation. No melena or hematochezia.  MUSCULOSKELETAL: No joint pain or swelling; No muscle, back, or extremity pain  PSYCH    awake, alert   dementia    [x] All others negative	  [ ++++++] Unable to obtain          Vital Signs Last 24 Hrs  T(C): 36.3, Max: 36.8 (03-27 @ 09:19)  T(F): 97.4, Max: 98.3 (03-27 @ 17:50)  HR: 66 (55 - 82)  BP: 106/56 (106/56 - 146/68)  BP(mean): --  RR: 16 (16 - 18)  SpO2: 100% (96% - 100%)        I & Os for current day (as of 03-28 @ 07:53)  =============================================  IN: 350 ml / OUT: 2100 ml / NET: -1750 ml    PHYSICAL EXAMINATION:                                    (  ++  )  NO CHANGE  Appearance: Normal	  HEENT:   Normal oral mucosa, PERRL, EOMI	  Neck: Supple, + JVD/ - JVD; Carotid Bruit   Cardiovascular: Normal S1 S2, No JVD, No murmurs,   Respiratory: bilat rhonchi	  Gastrointestinal:  Soft, Non-tender, + BS	  Skin: No rashes, No ecchymoses, No cyanosis  Extremities: Normal range of motion, No clubbing, cyanosis or edema  Vascular: Peripheral pulses dimished  Neurologic: Non-focal  Psychiatry: Awake    heparin  Injectable 5000Unit(s) SubCutaneous every 8 hours  acetaminophen  Suppository 650milliGRAM(s) Rectal every 6 hours PRN  ALBUTerol/ipratropium for Nebulization 3milliLiter(s) Nebulizer every 6 hours PRN  piperacillin/tazobactam IVPB. 2.25Gram(s) IV Intermittent every 8 hours  potassium chloride  10 mEq/100 mL IVPB 10milliEquivalent(s) IV Intermittent every 1 hour        PT/INR - ( 27 Mar 2017 01:16 )   PT: 11.9 sec;   INR: 1.07          PTT - ( 27 Mar 2017 01:16 )  PTT:28.3 sec                              7.9    14.2  )-----------( 161      ( 28 Mar 2017 07:05 )             24.7     28 Mar 2017 07:05    136    |  99     |  23     ----------------------------<  82     3.2     |  27     |  3.16     Ca    8.4        28 Mar 2017 07:05  Phos  1.1       27 Mar 2017 11:13  Mg     1.9       28 Mar 2017 07:05    TPro  7.4    /  Alb  2.8    /  TBili  0.9    /  DBili  x      /  AST  270    /  ALT  169    /  AlkPhos  411    27 Mar 2017 01:13      CAPILLARY BLOOD GLUCOSE  122 (27 Mar 2017 21:17)

## 2017-03-28 NOTE — PROGRESS NOTE ADULT - SUBJECTIVE AND OBJECTIVE BOX
Patient seen and examined at bedside.     Patient had last HD on 3/27  Tolerated well   Attained 2.1kg net UF  Although it was a bedscale measurement, 70.9kg post HD bedscale reflects being at his outpatient EDW of 71kg.     This morning he is awake and responsive. Reports no dyspnea, no orthopnea, no new leg swelling. Has not been eating much at present.     heparin  Injectable 5000Unit(s) every 8 hours  acetaminophen  Suppository 650milliGRAM(s) every 6 hours PRN  ALBUTerol/ipratropium for Nebulization 3milliLiter(s) every 6 hours PRN  piperacillin/tazobactam IVPB. 2.25Gram(s) every 8 hours  potassium chloride  10 mEq/100 mL IVPB 10milliEquivalent(s) every 1 hour  potassium chloride   Powder 40milliEquivalent(s) once      Allergies    clonidine (Unknown)    Intolerances        T(C): , Max: 36.8 (03-27 @ 17:50)  T(F): , Max: 98.3 (03-27 @ 17:50)  HR: 66  BP: 106/56  BP(mean): --  RR: 16  SpO2: 100%  Wt(kg): --    I & Os for current day (as of 03-28 @ 09:24)  =============================================  IN:    IV PiggyBack: 350 ml    Total IN: 350 ml  ---------------------------------------------  OUT:    Other: 2100 ml    Total OUT: 2100 ml  ---------------------------------------------  Total NET: -1750 ml          LABS:                        7.9    14.2  )-----------( 161      ( 28 Mar 2017 07:05 )             24.7     28 Mar 2017 07:05    136    |  99     |  23     ----------------------------<  82     3.2     |  27     |  3.16     Ca    8.4        28 Mar 2017 07:05  Phos  1.1       27 Mar 2017 11:13  Mg     1.9       28 Mar 2017 07:05    TPro  6.8    /  Alb  2.3    /  TBili  1.8    /  DBili  1.18   /  AST  76     /  ALT  105    /  AlkPhos  312    28 Mar 2017 07:05      PT/INR - ( 27 Mar 2017 01:16 )   PT: 11.9 sec;   INR: 1.07          PTT - ( 27 Mar 2017 01:16 )  PTT:28.3 sec          RADIOLOGY & ADDITIONAL STUDIES:

## 2017-03-28 NOTE — PROGRESS NOTE ADULT - SUBJECTIVE AND OBJECTIVE BOX
INTERVAL HPI/OVERNIGHT EVENTS:  Patient seen and examined at bedside. No o/n events, patient resting comfortably. No complaints at this time. Patient denies fever, chills, dizziness, weakness, CP, palpitations, SOB, cough, N/V/D/C, dysuria, changes in bowel movements, LE edema.    VITAL SIGNS:  T(F): 97.4  HR: 66  BP: 106/56  RR: 16  SpO2: 100%  Wt(kg): --    PHYSICAL EXAM:    Constitutional: WDWN, NAD,   Eyes: PERRL, EOMI, sclera non-icteric  Neck: supple, no masses, no JVD  Respiratory: CTA b/l, good air entry b/l, no wheezing, rhonchi, rales, with normal respiratory effort and no intercostal retractions  Cardiovascular: RRR, normal S1S2, no M/R/G  Gastrointestinal: soft, NTND, no masses palpable, BS normal in all four quadrants, no HSM  Extremities: WWM, no c/c/e  Neurological: AAOx3  Skin: Normal temperature    MEDICATIONS  (STANDING):  heparin  Injectable 5000Unit(s) SubCutaneous every 8 hours  piperacillin/tazobactam IVPB. 2.25Gram(s) IV Intermittent every 8 hours  potassium chloride  10 mEq/100 mL IVPB 10milliEquivalent(s) IV Intermittent every 1 hour    MEDICATIONS  (PRN):  acetaminophen  Suppository 650milliGRAM(s) Rectal every 6 hours PRN For Temp greater than 38 C (100.4 F)  ALBUTerol/ipratropium for Nebulization 3milliLiter(s) Nebulizer every 6 hours PRN Shortness of Breath and/or Wheezing      Allergies    clonidine (Unknown)    Intolerances        LABS:                        7.9    14.2  )-----------( 161      ( 28 Mar 2017 07:05 )             24.7     28 Mar 2017 07:05    136    |  99     |  23     ----------------------------<  82     3.2     |  27     |  3.16     Ca    8.4        28 Mar 2017 07:05  Phos  1.1       27 Mar 2017 11:13  Mg     1.9       28 Mar 2017 07:05    TPro  7.4    /  Alb  2.8    /  TBili  0.9    /  DBili  x      /  AST  270    /  ALT  169    /  AlkPhos  411    27 Mar 2017 01:13    PT/INR - ( 27 Mar 2017 01:16 )   PT: 11.9 sec;   INR: 1.07          PTT - ( 27 Mar 2017 01:16 )  PTT:28.3 sec      RADIOLOGY & ADDITIONAL TESTS: INTERVAL HPI/OVERNIGHT EVENTS:  Patient seen and examined at bedside. No o/n events, patient resting comfortably. More awake than yesterday. No complaints at this time. Patient denies fever, chills, dyspnea or chest pain.     VITAL SIGNS:  T(F): 97.4  HR: 66  BP: 106/56  RR: 16  SpO2: 100%  Wt(kg): --    PHYSICAL EXAM:    Constitutional: sleepy but arousable, NAD, comfortable on nasal cannula    Head: NC/AT  Eyes: PERRL, EOMI, anicteric sclera  ENT: no nasal discharge; uvula midline, no oropharyngeal erythema or exudates; dry MM  Neck: supple  Respiratory: poor respiratory effort, bibasilar crackles   Cardiac: +S1/S2; RRR; no M/R/G  Gastrointestinal: soft, NT/ND; no rebound or guarding; +BSx4  Extremities: WWP, no clubbing or cyanosis; no peripheral edema  Musculoskeletal: NROM x4; no joint swelling, tenderness or erythema  Vascular: 2+ radial, femoral, DP/PT pulses B/L  Dermatologic: skin warm, dry and intact; no rashes, wounds, or scars  Lymphatic: no submandibular or cervical LAD  Neurologic: AAOx0-1 (self)    MEDICATIONS  (STANDING):  heparin  Injectable 5000Unit(s) SubCutaneous every 8 hours  piperacillin/tazobactam IVPB. 2.25Gram(s) IV Intermittent every 8 hours  potassium chloride  10 mEq/100 mL IVPB 10milliEquivalent(s) IV Intermittent every 1 hour    MEDICATIONS  (PRN):  acetaminophen  Suppository 650milliGRAM(s) Rectal every 6 hours PRN For Temp greater than 38 C (100.4 F)  ALBUTerol/ipratropium for Nebulization 3milliLiter(s) Nebulizer every 6 hours PRN Shortness of Breath and/or Wheezing      Allergies    clonidine (Unknown)    Intolerances        LABS:                        7.9    14.2  )-----------( 161      ( 28 Mar 2017 07:05 )             24.7     28 Mar 2017 07:05    136    |  99     |  23     ----------------------------<  82     3.2     |  27     |  3.16     Ca    8.4        28 Mar 2017 07:05  Phos  1.1       27 Mar 2017 11:13  Mg     1.9       28 Mar 2017 07:05    TPro  7.4    /  Alb  2.8    /  TBili  0.9    /  DBili  x      /  AST  270    /  ALT  169    /  AlkPhos  411    27 Mar 2017 01:13    PT/INR - ( 27 Mar 2017 01:16 )   PT: 11.9 sec;   INR: 1.07          PTT - ( 27 Mar 2017 01:16 )  PTT:28.3 sec      RADIOLOGY & ADDITIONAL TESTS: INTERVAL HPI/OVERNIGHT EVENTS:  Patient seen and examined at bedside. No o/n events, patient resting comfortably. More awake than yesterday. No complaints at this time. Patient denies fever, chills, dyspnea or chest pain.     VITAL SIGNS:  T(F): 97.4  HR: 66  BP: 106/56  RR: 16  SpO2: 100%  Wt(kg): --    PHYSICAL EXAM:    Constitutional: sleepy but arousable, NAD, comfortable on nasal cannula    Head: NC/AT  Eyes: PERRL, EOMI, anicteric sclera  ENT: no nasal discharge; uvula midline, no oropharyngeal erythema or exudates; dry MM  Neck: supple  Respiratory: poor respiratory effort, bibasilar crackles   Cardiac: +S1/S2; RRR; no M/R/G  Gastrointestinal: soft, NT/ND; +BS  Extremities: WWP, no clubbing or cyanosis; no peripheral edema  Vascular: 2+ radial and DP pulses bilaterally   Lymphatic: no submandibular or cervical LAD  Neurologic: A&Ox3 today, knows he came from [facility], responds to questions slowly but appropriately, follows commands.    MEDICATIONS  (STANDING):  heparin  Injectable 5000Unit(s) SubCutaneous every 8 hours  piperacillin/tazobactam IVPB. 2.25Gram(s) IV Intermittent every 8 hours  potassium chloride  10 mEq/100 mL IVPB 10milliEquivalent(s) IV Intermittent every 1 hour    MEDICATIONS  (PRN):  acetaminophen  Suppository 650milliGRAM(s) Rectal every 6 hours PRN For Temp greater than 38 C (100.4 F)  ALBUTerol/ipratropium for Nebulization 3milliLiter(s) Nebulizer every 6 hours PRN Shortness of Breath and/or Wheezing      Allergies    clonidine (Unknown)    Intolerances        LABS:                        7.9    14.2  )-----------( 161      ( 28 Mar 2017 07:05 )             24.7     28 Mar 2017 07:05    136    |  99     |  23     ----------------------------<  82     3.2     |  27     |  3.16     Ca    8.4        28 Mar 2017 07:05  Phos  1.1       27 Mar 2017 11:13  Mg     1.9       28 Mar 2017 07:05    TPro  7.4    /  Alb  2.8    /  TBili  0.9    /  DBili  x      /  AST  270    /  ALT  169    /  AlkPhos  411    27 Mar 2017 01:13    PT/INR - ( 27 Mar 2017 01:16 )   PT: 11.9 sec;   INR: 1.07          PTT - ( 27 Mar 2017 01:16 )  PTT:28.3 sec

## 2017-03-28 NOTE — PROGRESS NOTE ADULT - PROBLEM SELECTOR PLAN 4
Patient has hx of esrd with HD MWF. received dialysis yesterday. Follows w/ Dr. Chidl.   - f/u renal recs

## 2017-03-28 NOTE — PROGRESS NOTE ADULT - PROBLEM SELECTOR PLAN 8
normocytic. likely ACD. no reports of melena/hematochezia and VSS.   pts Hb seems to be around baseline.  - continue to monitor Normocytic, downtrending, likely 2/2 CKD. No reports of melena/hematochezia and VSS.   -would transfuse to hgb>8 as pt w/ CAD, however patient refusing blood transfusion this AM  - continue to monitor

## 2017-03-28 NOTE — PROGRESS NOTE ADULT - PROBLEM SELECTOR PLAN 9
ALK [facility]  . abdominal exam benign. etiology unclear possibly in setting of sepsis.   - can obtain RUQ sono. ALK [facility] 411   on admission, now downtrending. Abdominal exam benign. Abd US revealed cholelithiasis with small pericholecystic fluid.  - likely elevated 2/2 sepsis as not downtrending w/ fluids and abx. will continue to monitor.

## 2017-03-28 NOTE — PHYSICAL THERAPY INITIAL EVALUATION ADULT - PERTINENT HX OF CURRENT PROBLEM, REHAB EVAL
Patient is an 87 yr old  male poor historian at baseline with PMHx ESRD, Alzheimer's dementia, CAD, COPD, angina, anemia, CHF, depression, HTN, HLD, OA, PVD, polyneuropathy, blindness, seizures, and IDDM sent from Nursing Home for fever to Tmax 101.5 and hypoxia to 60s.

## 2017-03-28 NOTE — PROGRESS NOTE ADULT - PROBLEM SELECTOR PLAN 10
HSQ    Dysphagia screen today, was previously NPO w/ AMS.    DNR DNI  Palliative/ethics consult for GOC as pt w/ recurrent aspiration PNA and frequent hospitalizations. Patient does not have HCP

## 2017-03-28 NOTE — PROGRESS NOTE ADULT - PROBLEM SELECTOR PLAN 4
Patient has hx of esrd with HD MWF. received dialysis yesterday. Follows w/ Dr. Child.   - f/u renal recs

## 2017-03-28 NOTE — PROGRESS NOTE ADULT - PROBLEM SELECTOR PLAN 9
ALK [facility] 411   on admission, now downtrending. Abdominal exam benign. Abd US revealed cholelithiasis with small pericholecystic fluid.  - likely elevated 2/2 sepsis as not downtrending w/ fluids and abx. will continue to monitor.

## 2017-03-28 NOTE — CONSULT NOTE ADULT - SUBJECTIVE AND OBJECTIVE BOX
LUIS GARDNER   [id number]     :[id number]    HPI:  Patient is an 87 yr old  male poor historian at baseline with PMHx ESRD, Alzheimer's dementia, CAD, COPD, angina, anemia, CHF, depression, HTN, HLD, OA, PVD, polyneuropathy, blindness, seizures, and IDDM known to this service from previous admits and recently for pna, septic shock, Ecoli UTI.  He is readmitted this time for fevers and hypoxia, rx for recurrent sepsis likely secondary to asp pna    PAST MEDICAL & SURGICAL HISTORY:  AV graft thrombosis  Osteoarthritis  PVD (peripheral vascular disease)  COPD (chronic obstructive pulmonary disease)  Heart failure  Angina pectoris  ESRD (end stage renal disease)  Seizures: post traumatic  CAD (coronary artery disease)  HTN (hypertension)  Polyneuropathy  Hyperlipidemia  Dysphagia  Hypotension  Alzheimers disease  Osteoarthritis: Osteoarthritis  Chronic obstructive pulmonary disease: Chronic obstructive pulmonary disease  Anemia: Anemia  Dementia without behavioral disturbance: Dementia  Atherosclerosis of coronary artery: CAD (coronary artery disease)  Nondependent alcohol abuse: ETOH abuse  Depressive disorder: Depression  End-stage renal disease: ESRD on hemodialysis  Essential hypertension: HTN (hypertension)  Deep venous embolism and thrombosis of lower extremity: DVT (deep venous thrombosis)  Congestive heart failure: CHF (congestive heart failure)  Legal blindness: Legally blind  Hemodialysis access, AV graft: LUE loop AVG  Acquired arteriovenous fistula: AV fistula  achalasia s/p botox     FAMILY HISTORY:  Family history unknown    SOC HX: long term NH resident, never , no family, ex     ROS:    Unable to attain due to:                      DYSPNEA (Charanjit 0-10):n                        N/V (Y/N): n                             SECRETIONS (Y/N): n               AGITATION(Y/N): n  PAIN (Y/N): n         -Provocation/Palliation:     -Quality/Quantity:     -Radiating:     -Severity:     -Timing/Frequency:     -Impact on ADLs:    GENERAL: feels ok.  Wished he was better  HEENT: blind in one eye  NECK: denies  CVS: denies  RESP: denies  GI: denies  : been on HD for many yrs  MS: gets into w/c at times with assist   NEURO: denies  PSYCH: denies  SKIN: denies  LYMPH: denies    ALLERGIES:  Allergies    clonidine (Unknown)    Intolerances    OPIATE NAIVE (Y/N): y  -ISTOP REVIEWED(Y/N):n    MEDICATIONS:      MEDICATIONS  (STANDING):  heparin  Injectable 5000Unit(s) SubCutaneous every 8 hours  piperacillin/tazobactam IVPB. 2.25Gram(s) IV Intermittent every 8 hours    MEDICATIONS  (PRN):  acetaminophen  Suppository 650milliGRAM(s) Rectal every 6 hours PRN For Temp greater than 38 C (100.4 F)  ALBUTerol/ipratropium for Nebulization 3milliLiter(s) Nebulizer every 6 hours PRN Shortness of Breath and/or Wheezing    PHYSICAL EXAM:  Vital Signs Last 24 Hrs  T(C): 37.4, Max: 37.4 ( @ 10:55)  T(F): 99.3, Max: 99.3 ( @ 10:55)  HR: 64 (60 - 82)  BP: 125/74 (106/56 - 134/62)  BP(mean): --  RR: 18 (16 - 18)  SpO2: 100% (97% - 100%)  Daily     Daily Weight in k.9 (27 Mar 2017 15:30)  CAPILLARY BLOOD GLUCOSE  122 (27 Mar 2017 21:17)    I&O's Summary    I & Os for current day (as of 28 Mar 2017 14:35)  =============================================  IN: 350 ml / OUT: 2100 ml / NET: -1750 ml      GENERAL: Awake and alert, appears tired  HEENT: OD ptosis  NECK: normal  CVS: normal S1S2  RESP: fine basilar crackles  GI:  normal   : left AV graft with +thrill/bruit    MS:  MAEx4, LE 2/5 MS  NEURO: awake and alert, but slow to react  PSYCH: normal  SKIN: dry throughout  LYMPH: no cervical LAD  KARNOFSKY: PRE-ADMIT= 40 %               CURRENT= 30 %  CACHEXIA (Y/N): n  BMI: 21.5    LABS:    CBC:                        7.9    14.2  )-----------( 161      ( 28 Mar 2017 07:05 )             24.7     CMP:    28 Mar 2017 07:05    136    |  99     |  23     ----------------------------<  82     3.2     |  27     |  3.16     Ca    8.4        28 Mar 2017 07:05  Phos  1.1       27 Mar 2017 11:13  Mg     1.9       28 Mar 2017 07:05    TPro  6.8    /  Alb  2.3    /  TBili  1.8    /  DBili  1.18   /  AST  76     /  ALT  105    /  AlkPhos  312    28 Mar 2017 07:05    PT/INR - ( 27 Mar 2017 01:16 )   PT: 11.9 sec;   INR: 1.07          PTT - ( 27 Mar 2017 01:16 )  PTT:28.3 sec  LIVER FUNCTIONS - ( 28 Mar 2017 07:05 )  Alb: 2.3 g/dL / Pro: 6.8 g/dL / ALK PHOS: 312 U/L / ALT: 105 U/L / AST: 76 U/L / GGT: x      Culture - Blood (17 @ 13:27)    Gram Stain:   Aerobic Bottle: Gram Negative Rods  Result called to and read back byNilsa Church RN 2017 08:48:44    Specimen Source: .Blood Blood-Venous    Culture Results:   Culture in progress    IMAGING:  Reviewed    ADVANCED DIRECTIVES:     DNR/DNI     MOLST      DECISION MAKER: pt  LEGAL SURROGATE:    GOALS OF CARE DISCUSSION       Palliative care info/counseling provided	           Advanced Directives addressed(*please see Advance Care Planning Note)	           See previous Palliative Medicine Note       Documentation of GOC: HCP election readdressed from prior discussions.  Pt states his only friend Estrellita Butler does not want to be his HCP although he wants her to be.  He reports remote contact with friend Isaac Land	          REFERRALS	        Unit        Music Therapy

## 2017-03-28 NOTE — PROGRESS NOTE ADULT - PROBLEM SELECTOR PLAN 1
Volume status and chemistries are appropriate at present  Next routine HD on 3/29   Dose vancomycin based on preHD trough levels

## 2017-03-28 NOTE — PROGRESS NOTE ADULT - PROBLEM SELECTOR PLAN 10
HSQ    NPO as pt has AMS  replete lytes cautiously as ESRD  DNR DNI  palliative/ethics consult for GOC and in help establishing a proxy for the patient's health decisions given current mental state. HSQ    Dysphagia screen today, was previously NPO w/ AMS.    DNR DNI  Palliative/ethics consult for GOC as pt w/ recurrent aspiration PNA and frequent hospitalizations. Patient does not have HCP

## 2017-03-28 NOTE — PROGRESS NOTE ADULT - ASSESSMENT
Patient is an 87 yr old male with extensive PMHx and recent admission and d/c for septic shock 2/2 asp pna vs uti who presented from nursing home with AMS and fever/hypoxia, admitted for sepsis 2/2 likely aspiration PNA

## 2017-03-28 NOTE — PHYSICAL THERAPY INITIAL EVALUATION ADULT - DIAGNOSIS, PT EVAL
5A: Primary prevention/risk reduction for LOB and falling; 6B: Impaired aerobic capacity/endurance associated w/ deconditioning

## 2017-03-28 NOTE — PHYSICAL THERAPY INITIAL EVALUATION ADULT - CRITERIA FOR SKILLED THERAPEUTIC INTERVENTIONS
functional limitations in following categories/rehab potential/impairments found/risk reduction/prevention/anticipated discharge recommendation/therapy frequency

## 2017-03-29 ENCOUNTER — TRANSCRIPTION ENCOUNTER (OUTPATIENT)
Age: 82
End: 2017-03-29

## 2017-03-29 LAB
-  AMIKACIN: SIGNIFICANT CHANGE UP
-  AMPICILLIN/SULBACTAM: SIGNIFICANT CHANGE UP
-  AMPICILLIN: SIGNIFICANT CHANGE UP
-  CEFAZOLIN: SIGNIFICANT CHANGE UP
-  CEFTRIAXONE: SIGNIFICANT CHANGE UP
-  CIPROFLOXACIN: SIGNIFICANT CHANGE UP
-  GENTAMICIN: SIGNIFICANT CHANGE UP
-  PIPERACILLIN/TAZOBACTAM: SIGNIFICANT CHANGE UP
-  TOBRAMYCIN: SIGNIFICANT CHANGE UP
-  TRIMETHOPRIM/SULFAMETHOXAZOLE: SIGNIFICANT CHANGE UP
ALBUMIN SERPL ELPH-MCNC: 2.3 G/DL — LOW (ref 3.4–5)
ALP SERPL-CCNC: 257 U/L — HIGH (ref 40–120)
ALT FLD-CCNC: 63 U/L — HIGH (ref 12–42)
ANION GAP SERPL CALC-SCNC: 9 MMOL/L — SIGNIFICANT CHANGE UP (ref 9–16)
AST SERPL-CCNC: 31 U/L — SIGNIFICANT CHANGE UP (ref 15–37)
BILIRUB SERPL-MCNC: 0.8 MG/DL — SIGNIFICANT CHANGE UP (ref 0.2–1.2)
BUN SERPL-MCNC: 39 MG/DL — HIGH (ref 7–23)
BUN SERPL-MCNC: 9 MG/DL — SIGNIFICANT CHANGE UP (ref 7–23)
CALCIUM SERPL-MCNC: 8.5 MG/DL — SIGNIFICANT CHANGE UP (ref 8.5–10.5)
CHLORIDE SERPL-SCNC: 102 MMOL/L — SIGNIFICANT CHANGE UP (ref 96–108)
CO2 SERPL-SCNC: 29 MMOL/L — SIGNIFICANT CHANGE UP (ref 22–31)
CREAT SERPL-MCNC: 4.94 MG/DL — HIGH (ref 0.5–1.3)
GLUCOSE SERPL-MCNC: 112 MG/DL — HIGH (ref 70–99)
HCT VFR BLD CALC: 22.9 % — LOW (ref 39–50)
HGB BLD-MCNC: 7.6 G/DL — LOW (ref 13–17)
MAGNESIUM SERPL-MCNC: 1.8 MG/DL — SIGNIFICANT CHANGE UP (ref 1.6–2.4)
MCHC RBC-ENTMCNC: 31.4 PG — SIGNIFICANT CHANGE UP (ref 27–34)
MCHC RBC-ENTMCNC: 33.2 G/DL — SIGNIFICANT CHANGE UP (ref 32–36)
MCV RBC AUTO: 94.6 FL — SIGNIFICANT CHANGE UP (ref 80–100)
METHOD TYPE: SIGNIFICANT CHANGE UP
PLATELET # BLD AUTO: 206 K/UL — SIGNIFICANT CHANGE UP (ref 150–400)
POTASSIUM SERPL-MCNC: 3.6 MMOL/L — SIGNIFICANT CHANGE UP (ref 3.5–5.3)
POTASSIUM SERPL-SCNC: 3.6 MMOL/L — SIGNIFICANT CHANGE UP (ref 3.5–5.3)
PROT SERPL-MCNC: 6.8 G/DL — SIGNIFICANT CHANGE UP (ref 6.4–8.2)
RBC # BLD: 2.42 M/UL — LOW (ref 4.2–5.8)
RBC # FLD: 18.7 % — HIGH (ref 10.3–16.9)
SODIUM SERPL-SCNC: 140 MMOL/L — SIGNIFICANT CHANGE UP (ref 135–145)
WBC # BLD: 11.1 K/UL — HIGH (ref 3.8–10.5)
WBC # FLD AUTO: 11.1 K/UL — HIGH (ref 3.8–10.5)

## 2017-03-29 PROCEDURE — 99233 SBSQ HOSP IP/OBS HIGH 50: CPT

## 2017-03-29 PROCEDURE — 90935 HEMODIALYSIS ONE EVALUATION: CPT | Mod: GC

## 2017-03-29 RX ORDER — MIRTAZAPINE 45 MG/1
15 TABLET, ORALLY DISINTEGRATING ORAL AT BEDTIME
Qty: 0 | Refills: 0 | Status: DISCONTINUED | OUTPATIENT
Start: 2017-03-29 | End: 2017-03-30

## 2017-03-29 RX ORDER — SIMVASTATIN 20 MG/1
20 TABLET, FILM COATED ORAL AT BEDTIME
Qty: 0 | Refills: 0 | Status: DISCONTINUED | OUTPATIENT
Start: 2017-03-29 | End: 2017-03-30

## 2017-03-29 RX ORDER — METOCLOPRAMIDE HCL 10 MG
10 TABLET ORAL
Qty: 0 | Refills: 0 | Status: DISCONTINUED | OUTPATIENT
Start: 2017-03-29 | End: 2017-03-30

## 2017-03-29 RX ORDER — DOCUSATE SODIUM 100 MG
100 CAPSULE ORAL AT BEDTIME
Qty: 0 | Refills: 0 | Status: DISCONTINUED | OUTPATIENT
Start: 2017-03-29 | End: 2017-03-30

## 2017-03-29 RX ORDER — HEPARIN SODIUM 5000 [USP'U]/ML
500 INJECTION INTRAVENOUS; SUBCUTANEOUS
Qty: 0 | Refills: 0 | Status: COMPLETED | OUTPATIENT
Start: 2017-03-29 | End: 2017-03-29

## 2017-03-29 RX ORDER — CALCITRIOL 0.5 UG/1
2 CAPSULE ORAL ONCE
Qty: 0 | Refills: 0 | Status: COMPLETED | OUTPATIENT
Start: 2017-03-29 | End: 2017-03-29

## 2017-03-29 RX ORDER — INSULIN LISPRO 100/ML
VIAL (ML) SUBCUTANEOUS
Qty: 0 | Refills: 0 | Status: DISCONTINUED | OUTPATIENT
Start: 2017-03-29 | End: 2017-03-30

## 2017-03-29 RX ORDER — PANTOPRAZOLE SODIUM 20 MG/1
40 TABLET, DELAYED RELEASE ORAL
Qty: 0 | Refills: 0 | Status: DISCONTINUED | OUTPATIENT
Start: 2017-03-29 | End: 2017-03-30

## 2017-03-29 RX ORDER — HEPARIN SODIUM 5000 [USP'U]/ML
1000 INJECTION INTRAVENOUS; SUBCUTANEOUS ONCE
Qty: 0 | Refills: 0 | Status: COMPLETED | OUTPATIENT
Start: 2017-03-29 | End: 2017-03-29

## 2017-03-29 RX ORDER — ERYTHROPOIETIN 10000 [IU]/ML
7000 INJECTION, SOLUTION INTRAVENOUS; SUBCUTANEOUS ONCE
Qty: 0 | Refills: 0 | Status: COMPLETED | OUTPATIENT
Start: 2017-03-29 | End: 2017-03-29

## 2017-03-29 RX ORDER — HEPARIN SODIUM 5000 [USP'U]/ML
INJECTION INTRAVENOUS; SUBCUTANEOUS
Qty: 0 | Refills: 0 | Status: COMPLETED | OUTPATIENT
Start: 2017-03-29 | End: 2017-03-29

## 2017-03-29 RX ORDER — CEFTRIAXONE 500 MG/1
2 INJECTION, POWDER, FOR SOLUTION INTRAMUSCULAR; INTRAVENOUS EVERY 24 HOURS
Qty: 0 | Refills: 0 | Status: DISCONTINUED | OUTPATIENT
Start: 2017-03-29 | End: 2017-03-30

## 2017-03-29 RX ORDER — LEVETIRACETAM 250 MG/1
250 TABLET, FILM COATED ORAL
Qty: 0 | Refills: 0 | Status: DISCONTINUED | OUTPATIENT
Start: 2017-03-29 | End: 2017-03-30

## 2017-03-29 RX ADMIN — Medication 10 MILLIGRAM(S): at 19:36

## 2017-03-29 RX ADMIN — PIPERACILLIN AND TAZOBACTAM 200 GRAM(S): 4; .5 INJECTION, POWDER, LYOPHILIZED, FOR SOLUTION INTRAVENOUS at 06:11

## 2017-03-29 RX ADMIN — CALCITRIOL 2 MICROGRAM(S): 0.5 CAPSULE ORAL at 14:50

## 2017-03-29 RX ADMIN — HEPARIN SODIUM 5000 UNIT(S): 5000 INJECTION INTRAVENOUS; SUBCUTANEOUS at 13:29

## 2017-03-29 RX ADMIN — MIRTAZAPINE 15 MILLIGRAM(S): 45 TABLET, ORALLY DISINTEGRATING ORAL at 21:58

## 2017-03-29 RX ADMIN — HEPARIN SODIUM 0.1 UNIT(S): 5000 INJECTION INTRAVENOUS; SUBCUTANEOUS at 15:20

## 2017-03-29 RX ADMIN — Medication 100 MILLIGRAM(S): at 21:58

## 2017-03-29 RX ADMIN — HEPARIN SODIUM 0.1 UNIT(S): 5000 INJECTION INTRAVENOUS; SUBCUTANEOUS at 14:20

## 2017-03-29 RX ADMIN — CEFTRIAXONE 100 GRAM(S): 500 INJECTION, POWDER, FOR SOLUTION INTRAMUSCULAR; INTRAVENOUS at 18:37

## 2017-03-29 RX ADMIN — HEPARIN SODIUM 5000 UNIT(S): 5000 INJECTION INTRAVENOUS; SUBCUTANEOUS at 21:58

## 2017-03-29 RX ADMIN — SIMVASTATIN 20 MILLIGRAM(S): 20 TABLET, FILM COATED ORAL at 21:58

## 2017-03-29 RX ADMIN — ERYTHROPOIETIN 7000 UNIT(S): 10000 INJECTION, SOLUTION INTRAVENOUS; SUBCUTANEOUS at 14:50

## 2017-03-29 RX ADMIN — HEPARIN SODIUM 5000 UNIT(S): 5000 INJECTION INTRAVENOUS; SUBCUTANEOUS at 06:11

## 2017-03-29 RX ADMIN — LEVETIRACETAM 250 MILLIGRAM(S): 250 TABLET, FILM COATED ORAL at 19:36

## 2017-03-29 NOTE — PROGRESS NOTE ADULT - PROBLEM SELECTOR PLAN 8
Normocytic, likely 2/2 CKD. No reports of melena/hematochezia and VSS.   -would transfuse to hgb>8 as pt w/ CAD, however patient refused transfusion yesterday  -will f/u hgb today

## 2017-03-29 NOTE — PROGRESS NOTE ADULT - SUBJECTIVE AND OBJECTIVE BOX
CC/ HPI  Patient is an 87 yr old male with chronic obstructive pulmonary disease, congestive heart failure, admitted for pneumonia complicated by sepsis, resting in bed today without acute respiratory complaint.    PAST MEDICAL & SURGICAL HISTORY:  AV graft thrombosis  Osteoarthritis  PVD (peripheral vascular disease)  COPD (chronic obstructive pulmonary disease)  Heart failure  Angina pectoris  ESRD (end stage renal disease)  Seizures: post traumatic  CAD (coronary artery disease)  HTN (hypertension)  Polyneuropathy  Hyperlipidemia  Dysphagia  Alzheimers disease  Osteoarthritis: Osteoarthritis  Chronic obstructive pulmonary disease: Chronic obstructive pulmonary disease  Anemia: Anemia  Dementia without behavioral disturbance: Dementia  Atherosclerosis of coronary artery: CAD (coronary artery disease)  Nondependent alcohol abuse: ETOH abuse  Depressive disorder: Depression  End-stage renal disease: ESRD on hemodialysis  Essential hypertension: HTN (hypertension)  Deep venous embolism and thrombosis of lower extremity: DVT (deep venous thrombosis)  Congestive heart failure: CHF (congestive heart failure)  Legal blindness: Legally blind  Hemodialysis access, AV graft: LUE loop AVG  Acquired arteriovenous fistula: AV fistula    SOCHX:   + tobacco,  -  alcohol    FMHX: FA/MO  - contributory     ROS reviewed below with positive findings marked (+) :  GEN:  fever, chills ENT: tracheostomy,   epistaxis,  sinusitis COR: +CAD, +CHF,  +HTN, dysrhythmia PUL: +COPD, ILD, asthma, pneumonia GI: PEG, dysphagia, hemorrhage, other IVON: +kidney disease, electrolyte disorder HEM:  anemia, thrombus, coagulopathy, cancer ENDO:  thyroid disease, diabetes mellitus CNS:  +dementia, stroke, seizure, PSY:  depression, anxiety, other      MEDICATIONS  (STANDING):  heparin  Injectable 5000Unit(s) SubCutaneous every 8 hours  piperacillin/tazobactam IVPB. 2.25Gram(s) IV Intermittent every 8 hours  heparin -  Bolus 1000Unit(s) IV Bolus once  heparin -  Bolus 500Unit(s) IV Bolus every 1 hour  epoetin nicki Injectable 7000Unit(s) IV Push once  heparin -  Bolus       MEDICATIONS  (PRN):  acetaminophen  Suppository 650milliGRAM(s) Rectal every 6 hours PRN For Temp greater than 38 C (100.4 F)  ALBUTerol/ipratropium for Nebulization 3milliLiter(s) Nebulizer every 6 hours PRN Shortness of Breath and/or Wheezing      Vital Signs Last 24 Hrs  T(C): 36.8, Max: 37.4 (03-28 @ 10:55)  T(F): 98.3, Max: 99.3 (03-28 @ 10:55)  HR: 67 (61 - 67)  BP: 122/60 (90/43 - 125/74)  BP(mean): --  RR: 15 (15 - 18)  SpO2: 95% (94% - 100%)    GENERAL:         comfortable,  - distress.  HEENT:            - trauma,  - icterus,  - injection,  - nasal discharge.  NECK:              - jugular venous distention, - thyromegaly.  LYMPH:           - lymphadenopathy, - masses.  RESP:              + crackles,   - rhonchi,   - wheezes.   COR:                S1S2 RRR  - murmurs,  - gallops,  - rubs.  ABD:                bowel sounds,   soft, - tender, - distended, - organomegaly.  EXT/MSC:         - cyanosis,  - clubbing,  - edema.    NEURO:             alert,   responds to stimuli.    ABG - ( 27 Mar 2017 11:53 )  pH: 7.38  /  pCO2: 53    /  pO2: 143   / HCO3: 31    / Base Excess: 5.3   /  SaO2: 99                      7.9    14.2  )-----------( 161      ( 28 Mar 2017 07:05 )             24.7     28 Mar 2017 07:05    136    |  99     |  23     ----------------------------<  82     3.2     |  27     |  3.16         CXR (3/27)  Bibasilar infiltrates and left pleural effusion compatible with pneumonia. Small right upper lobe infiltrate. Mild pulmonary venous   congestion.        ASSESSMENT/PLAN    1)  Pneumonia  2)  Chronic obstructive pulmonary disease  3)  Congestive heart failure  4)  Dementia  5)  Chronic kidney disease  6)  Anemia    Bronchodilators:  Atrovent/ albuterol q 4 – 6 hours as needed  Corticosteroids: Add budesonide  ID/Antibiotics:  Zosyn follow up cultures  Cardiac/HTN:  BP stable  GI: Rx/ prophylaxis c PPI/H2B  Heme: Rx/VT prophylaxis c SQH  Discuss with medical team

## 2017-03-29 NOTE — PROGRESS NOTE ADULT - PROBLEM SELECTOR PLAN 4
Patient has hx of ESRD with HD MWF. Follows w/ Dr. Child.   - dialysis today   - f/u additional renal recs

## 2017-03-29 NOTE — PROGRESS NOTE ADULT - PROBLEM SELECTOR PLAN 10
HSQ    Pureed diet w/ thickened liquids, as pt was approved for this on last admission.    DNR DNI  Palliative/ethics consult for GOC as pt w/ recurrent aspiration PNA and frequent hospitalizations. Patient does not have HCP

## 2017-03-29 NOTE — PROGRESS NOTE ADULT - PROBLEM SELECTOR PLAN 5
Does not seem to be in exacerbation. No wheezing and spo2 improved with nonrebreather.   - c/w Duoneb prn

## 2017-03-29 NOTE — PROGRESS NOTE ADULT - SUBJECTIVE AND OBJECTIVE BOX
Patient is a 87y old  Male who presents with a chief complaint of AMS/hypoxia (27 Mar 2017 04:04)      HPI:  Patient is an 87 yr old  male poor historian at baseline with PMHx ESRD, Alzheimer's dementia, CAD, COPD, angina, anemia, CHF, depression, HTN, HLD, OA, PVD, polyneuropathy, blindness, seizures, and IDDM sent from Nursing Home for fever to Tmax 101.5 and hypoxia to 60s. Unable to obtain history from patient as he was minimally responsive. History obtained from chart and prior records. Patient was hospitalized last month in the ICU for septic shock 2/2 PNA and UTI with enterococcus raffinosus/ecoli. Patient finished course of antibx (Vancomycin and Zosyn) and was d/c to NH. During that admission he was seen by palliative and refused PEG. 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 AN EGD was done by GI, suctioned diverticulum, BOTOX injection to achalasia site, and insertion of NGT. Patient arrived [facility] with a MOLST form which was filled out before patient became sick. Patient's emergency contact Isaac Land was contacted by ER physician but could not provide much history.   In the ED his VS were stable except he was febrile to 102.5.   In ED pt received Vanc/Zosyn, 1 L IVF, and Tylenol (27 Mar 2017 04:04)    INTERVAL HPI/OVERNIGHT EVENTS:::    HEALTH ISSUES - PROBLEM Dx:  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  Anemia due to chronic kidney disease: Anemia due to chronic kidney disease  Hypertension: Hypertension  ESRD (end stage renal disease) on dialysis: ESRD (end stage renal disease) on dialysis  Need for prophylactic measure: Need for prophylactic measure  Elevated liver function tests: Elevated liver function tests  Anemia: Anemia  HTN (hypertension): HTN (hypertension)  Congestive heart failure: Congestive heart failure  COPD (chronic obstructive pulmonary disease): COPD (chronic obstructive pulmonary disease)  ESRD (end stage renal disease): ESRD (end stage renal disease)  Altered mental status: Altered mental status  Hypoxia: Hypoxia  Sepsis: Sepsis          PAST MEDICAL & SURGICAL HISTORY:  AV graft thrombosis  Osteoarthritis  PVD (peripheral vascular disease)  COPD (chronic obstructive pulmonary disease)  Heart failure  Angina pectoris  ESRD (end stage renal disease)  Seizures: post traumatic  CAD (coronary artery disease)  HTN (hypertension)  Polyneuropathy  Hyperlipidemia  Dysphagia  Hypotension  Alzheimers disease  Osteoarthritis: Osteoarthritis  Chronic obstructive pulmonary disease: Chronic obstructive pulmonary disease  Anemia: Anemia  Dementia without behavioral disturbance: Dementia  Atherosclerosis of coronary artery: CAD (coronary artery disease)  Nondependent alcohol abuse: ETOH abuse  Depressive disorder: Depression  End-stage renal disease: ESRD on hemodialysis  Essential hypertension: HTN (hypertension)  Deep venous embolism and thrombosis of lower extremity: DVT (deep venous thrombosis)  Congestive heart failure: CHF (congestive heart failure)  Legal blindness: Legally blind  Hemodialysis access, AV graft: LUE loop AVG  Acquired arteriovenous fistula: AV fistula          Consultant NOTE  REVIEWED  (   )    REVIEW OF SYSTEMS:  [x] As per HPI  CONSTITUTIONAL: No fever, weight loss, or fatigue  RESPIRATORY: No cough, wheezing, chills or hemoptysis; No Shortness of Breath  CARDIOVASCULAR: No chest pain, palpitations, dizziness, or leg swelling  GASTROINTESTINAL: No abdominal or epigastric pain. No nausea, vomiting, or hematemesis; No diarrhea or constipation. No melena or hematochezia.  MUSCULOSKELETAL: No joint pain or swelling; No muscle, back, or extremity pain  PSYCH    awake, alert       [x] All others negative	  [ ] Unable to obtain          Vital Signs Last 24 Hrs  T(C): 36.8, Max: 37.4 (03-28 @ 10:55)  T(F): 98.3, Max: 99.3 (03-28 @ 10:55)  HR: 67 (61 - 67)  BP: 122/60 (90/43 - 125/74)  BP(mean): --  RR: 15 (15 - 18)  SpO2: 95% (94% - 100%)        I & Os for current day (as of 03-29 @ 08:35)  =============================================  IN: 100 ml / OUT: 0 ml / NET: 100 ml    PHYSICAL EXAMINATION:                                    (    )  NO CHANGE  Appearance: Normal	  HEENT:   Normal oral mucosa, PERRL, EOMI	  Neck: Supple, + JVD/ - JVD; Carotid Bruit   Cardiovascular: Normal S1 S2, No JVD, No murmurs,   Respiratory: Lungs clear to auscultation/Decreased Breath Sounds/No Rales, Rhonchi, Wheezing	  Gastrointestinal:  Soft, Non-tender, + BS	  Skin: No rashes, No ecchymoses, No cyanosis  Extremities: Normal range of motion, No clubbing, cyanosis or edema  Vascular: Peripheral pulses palpable 2+ bilaterally  Neurologic: Non-focal  Psychiatry: A & O x 3, Mood & affect appropriate    heparin  Injectable 5000Unit(s) SubCutaneous every 8 hours  acetaminophen  Suppository 650milliGRAM(s) Rectal every 6 hours PRN  ALBUTerol/ipratropium for Nebulization 3milliLiter(s) Nebulizer every 6 hours PRN  piperacillin/tazobactam IVPB. 2.25Gram(s) IV Intermittent every 8 hours  heparin -  Bolus 1000Unit(s) IV Bolus once  heparin -  Bolus 500Unit(s) IV Bolus every 1 hour  epoetin nicki Injectable 7000Unit(s) IV Push once  heparin -  Bolus                                         7.9    14.2  )-----------( 161      ( 28 Mar 2017 07:05 )             24.7     28 Mar 2017 07:05    136    |  99     |  23     ----------------------------<  82     3.2     |  27     |  3.16     Ca    8.4        28 Mar 2017 07:05  Phos  1.1       27 Mar 2017 11:13  Mg     1.9       28 Mar 2017 07:05    TPro  6.8    /  Alb  2.3    /  TBili  1.8    /  DBili  1.18   /  AST  76     /  ALT  105    /  AlkPhos  312    28 Mar 2017 07:05      CAPILLARY BLOOD GLUCOSE Patient is a 87y old  Male who presents with a chief complaint of AMS/hypoxia (27 Mar 2017 04:04)      HPI:  Patient is an 87 yr old  male poor historian at baseline with PMHx ESRD, Alzheimer's dementia, CAD, COPD, angina, anemia, CHF, depression, HTN, HLD, OA, PVD, polyneuropathy, blindness, seizures, and IDDM sent from Nursing Home for fever to Tmax 101.5 and hypoxia to 60s. Unable to obtain history from patient as he was minimally responsive. History obtained from chart and prior records. Patient was hospitalized last month in the ICU for septic shock 2/2 PNA and UTI with enterococcus raffinosus/ecoli. Patient finished course of antibx (Vancomycin and Zosyn) and was d/c to NH. During that admission he was seen by palliative and refused PEG. 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 AN EGD was done by GI, suctioned diverticulum, BOTOX injection to achalasia site, and insertion of NGT. Patient arrived to [facility] with a MOLST form which was filled out before patient became sick. Patient's emergency contact Isaac Land was contacted by ER physician but could not provide much history.   In the ED his VS were stable except he was febrile to 102.5.   In ED pt received Vanc/Zosyn, 1 L IVF, and Tylenol (27 Mar 2017 04:04)    INTERVAL HPI/OVERNIGHT EVENTS:::improved, near baseline    HEALTH ISSUES - PROBLEM Dx:  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  Anemia due to chronic kidney disease: Anemia due to chronic kidney disease  Hypertension: Hypertension  ESRD (end stage renal disease) on dialysis: ESRD (end stage renal disease) on dialysis  Need for prophylactic measure: Need for prophylactic measure  Elevated liver function tests: Elevated liver function tests  Anemia: Anemia  HTN (hypertension): HTN (hypertension)  Congestive heart failure: Congestive heart failure  COPD (chronic obstructive pulmonary disease): COPD (chronic obstructive pulmonary disease)  ESRD (end stage renal disease): ESRD (end stage renal disease)  Altered mental status: Altered mental status  Hypoxia: Hypoxia  Sepsis: Sepsis          PAST MEDICAL & SURGICAL HISTORY:  AV graft thrombosis  Osteoarthritis  PVD (peripheral vascular disease)  COPD (chronic obstructive pulmonary disease)  Heart failure  Angina pectoris  ESRD (end stage renal disease)  Seizures: post traumatic  CAD (coronary artery disease)  HTN (hypertension)  Polyneuropathy  Hyperlipidemia  Dysphagia  Hypotension  Alzheimers disease  Osteoarthritis: Osteoarthritis  Chronic obstructive pulmonary disease: Chronic obstructive pulmonary disease  Anemia: Anemia  Dementia without behavioral disturbance: Dementia  Atherosclerosis of coronary artery: CAD (coronary artery disease)  Nondependent alcohol abuse: ETOH abuse  Depressive disorder: Depression  End-stage renal disease: ESRD on hemodialysis  Essential hypertension: HTN (hypertension)  Deep venous embolism and thrombosis of lower extremity: DVT (deep venous thrombosis)  Congestive heart failure: CHF (congestive heart failure)  Legal blindness: Legally blind  Hemodialysis access, AV graft: LUE loop AVG  Acquired arteriovenous fistula: AV fistula          Consultant NOTE  REVIEWED  (   )    REVIEW OF SYSTEMS:  [x] As per HPI  CONSTITUTIONAL: No fever, weight loss, or fatigue  visual loss  RESPIRATORY:cough CARDIOVASCULAR: No chest pain, palpitations, dizziness, or leg swelling  GASTROINTESTINAL: No abdominal or epigastric pain. No nausea, vomiting, or hematemesis; No diarrhea or constipation. No melena or hematochezia.  MUSCULOSKELETAL: No joint pain or swelling; No muscle, back, or extremity pain  PSYCH    awake, alert     dementia  [x] All others negative	  [ ] Unable to obtain          Vital Signs Last 24 Hrs  T(C): 36.8, Max: 37.4 (03-28 @ 10:55)  T(F): 98.3, Max: 99.3 (03-28 @ 10:55)  HR: 67 (61 - 67)  BP: 122/60 (90/43 - 125/74)  BP(mean): --  RR: 15 (15 - 18)  SpO2: 95% (94% - 100%)        I & Os for current day (as of 03-29 @ 08:35)  =============================================  IN: 100 ml / OUT: 0 ml / NET: 100 ml    PHYSICAL EXAMINATION:                                    (    )  NO CHANGE  Appearance: Normal	  HEENT:   Normal oral mucosa, PERRL, EOMI	  Neck: Supple, + JVD/ - JVD; Carotid Bruit   Cardiovascular: Normal S1 S2, No JVD  mur   Respiratory: Lungs clear to auscultation/Decreased Breath Sounds/No Rales, Rhonchi, Wheezing	  Gastrointestinal:  Soft, Non-tender, + BS	  Skin: No rashes, No ecchymoses, No cyanosis  Extremities: Normal range of motion, No clubbing, cyanosis or edema  Vascular: Peripheral pulses decr  Neurologic:   Psychiatry: A & O     heparin  Injectable 5000Unit(s) SubCutaneous every 8 hours  acetaminophen  Suppository 650milliGRAM(s) Rectal every 6 hours PRN  ALBUTerol/ipratropium for Nebulization 3milliLiter(s) Nebulizer every 6 hours PRN  piperacillin/tazobactam IVPB. 2.25Gram(s) IV Intermittent every 8 hours  heparin -  Bolus 1000Unit(s) IV Bolus once  heparin -  Bolus 500Unit(s) IV Bolus every 1 hour  epoetin nicki Injectable 7000Unit(s) IV Push once  heparin -  Bolus                                         7.9    14.2  )-----------( 161      ( 28 Mar 2017 07:05 )             24.7     28 Mar 2017 07:05    136    |  99     |  23     ----------------------------<  82     3.2     |  27     |  3.16     Ca    8.4        28 Mar 2017 07:05  Phos  1.1       27 Mar 2017 11:13  Mg     1.9       28 Mar 2017 07:05    TPro  6.8    /  Alb  2.3    /  TBili  1.8    /  DBili  1.18   /  AST  76     /  ALT  105    /  AlkPhos  312    28 Mar 2017 07:05      asilar infiltrates and left pleural effusion compatible with   pneumonia. Small right upper lobe infiltrate. Mild pulmonary venous   congestion.

## 2017-03-29 NOTE — DISCHARGE NOTE ADULT - HOSPITAL COURSE
Patient is an 87 yr old  male poor historian at baseline with PMHx ESRD, Alzheimer's dementia, CAD, COPD, angina, anemia, CHF, depression, HTN, HLD, OA, PVD, polyneuropathy, blindness, seizures, IDDM, and recurrent aspiration PNA 2/2 Zenker's diverticulum presenting from Nursing Home for fever to Tmax 101.5 and hypoxia to 60s at nursing home. Of note, patient was hospitalized last month in the ICU for septic shock 2/2 PNA and UTI with enterococcus raffinosus/ecoli. Patient finished course of antibx (Vancomycin and Zosyn) and was d/c to NH. In the ED his VS were stable except he was febrile to 102.5. In ED pt received Vanc/Zosyn, 1 L IVF, and Tylenol. On the day of admission, patient was somnolent and only arousable to sternal rub. VSS. On second day of hospitalization patient was A&Ox3, afebrile, HD stable, breathing comfortably on room air. He passed bedside dysphagia and was put on pureed diect with nectar thickened liquids (diet he was on during last admission). Blood cultures grew E. coli. Abx changed to ceftriaxone. Repeat blood cultures have been negative. Unable to obtain UA/UCx as patient anuric. Patient is an 87 yr old  male poor historian at baseline with PMHx ESRD, Alzheimer's dementia, CAD, COPD, angina, anemia, CHF, depression, HTN, HLD, OA, PVD, polyneuropathy, blindness, seizures, IDDM, and recurrent aspiration PNA 2/2 Zenker's diverticulum presenting from Nursing Home for fever to Tmax 101.5 and hypoxia to 60s at nursing home. Of note, patient was hospitalized last month in the ICU for septic shock 2/2 PNA and UTI with enterococcus raffinosus/ecoli. Patient finished course of antibx (Vancomycin and Zosyn) and was d/c to NH. In the ED his VS were stable except he was febrile to 102.5. In ED pt received Vanc/Zosyn, 1 L IVF, and Tylenol. On the day of admission, patient was somnolent and only arousable to sternal rub. VSS. On second day of hospitalization patient was A&Ox3, afebrile, HD stable, breathing comfortably on room air. He passed bedside dysphagia and was put on pureed diect with nectar thickened liquids (diet he was on during last admission). Blood cultures grew E. coli. Abx changed to ceftriaxone. Repeat blood cultures have been negative. Unable to obtain UA/UCx as patient anuric.  Patient will be discharged with 10 days worth of Bactrim DS to complete 14 day course.

## 2017-03-29 NOTE — DISCHARGE NOTE ADULT - PATIENT PORTAL LINK FT
“You can access the [employer] Patient Portal, offered by [facility], by registering with the following website: [email or web address]”

## 2017-03-29 NOTE — PROGRESS NOTE ADULT - SUBJECTIVE AND OBJECTIVE BOX
INTERVAL HPI/OVERNIGHT EVENTS:  Patient seen and examined at bedside. No o/n events, patient resting comfortably. No complaints at this time. Patient denies fever, chills, dizziness, weakness, CP, palpitations, SOB, cough, N/V/D/C, dysuria, changes in bowel movements, LE edema.    VITAL SIGNS:  T(F): 98.3  HR: 67  BP: 122/60  RR: 159  SpO2: 96%  Wt(kg): --    PHYSICAL EXAM:    Constitutional: sleepy but arousable, NAD, comfortable on nasal cannula    Head: NC/AT  Eyes: PERRL, EOMI, anicteric sclera  ENT: no nasal discharge; uvula midline, no oropharyngeal erythema or exudates; dry MM  Neck: supple  Respiratory: poor respiratory effort, bibasilar crackles   Cardiac: +S1/S2; RRR; no M/R/G  Gastrointestinal: soft, NT/ND; +BS  Extremities: WWP, no clubbing or cyanosis; no peripheral edema  Vascular: 2+ radial and DP pulses bilaterally   Lymphatic: no submandibular or cervical LAD  Neurologic: A&Ox3 today, knows he came from [facility], responds to questions slowly but appropriately, follows commands.    MEDICATIONS  (STANDING):  heparin  Injectable 5000Unit(s) SubCutaneous every 8 hours  piperacillin/tazobactam IVPB. 2.25Gram(s) IV Intermittent every 8 hours  heparin -  Bolus 1000Unit(s) IV Bolus once  heparin -  Bolus 500Unit(s) IV Bolus every 1 hour  epoetin nicki Injectable 7000Unit(s) IV Push once  heparin -  Bolus       MEDICATIONS  (PRN):  acetaminophen  Suppository 650milliGRAM(s) Rectal every 6 hours PRN For Temp greater than 38 C (100.4 F)  ALBUTerol/ipratropium for Nebulization 3milliLiter(s) Nebulizer every 6 hours PRN Shortness of Breath and/or Wheezing      Allergies    clonidine (Unknown)    Intolerances        LABS:                        7.9    14.2  )-----------( 161      ( 28 Mar 2017 07:05 )             24.7     28 Mar 2017 07:05    136    |  99     |  23     ----------------------------<  82     3.2     |  27     |  3.16     Ca    8.4        28 Mar 2017 07:05  Phos  1.1       27 Mar 2017 11:13  Mg     1.9       28 Mar 2017 07:05    TPro  6.8    /  Alb  2.3    /  TBili  1.8    /  DBili  1.18   /  AST  76     /  ALT  105    /  AlkPhos  312    28 Mar 2017 07:05          RADIOLOGY & ADDITIONAL TESTS: INTERVAL HPI/OVERNIGHT EVENTS:  Patient seen and examined at bedside. No o/n events, patient resting comfortably. No complaints at this time. Denies cough, SOB, chest pain, or abdominal pain.    VITAL SIGNS:  T(F): 98.3  HR: 67  BP: 122/60  RR: 159  SpO2: 96%  Wt(kg): --    PHYSICAL EXAM:    Constitutional: sleepy but arousable, NAD  Head: NC/AT  Eyes: PERRL, EOMI, anicteric sclera  ENT: no nasal discharge; uvula midline, no oropharyngeal erythema or exudates; MMM  Neck: supple  Respiratory: bibasilar crackles   Cardiac: +S1/S2; RRR; no M/R/G  Gastrointestinal: soft, NT/ND; +BS; no suprapubic tenderness  Extremities: WWP, no clubbing or cyanosis; no peripheral edema  Vascular: 2+ radial and DP pulses bilaterally   Lymphatic: no submandibular or cervical LAD  Neurologic: A&Ox3 today again today responds to questions slowly but appropriately, follows commands.    MEDICATIONS  (STANDING):  heparin  Injectable 5000Unit(s) SubCutaneous every 8 hours  piperacillin/tazobactam IVPB. 2.25Gram(s) IV Intermittent every 8 hours  heparin -  Bolus 1000Unit(s) IV Bolus once  heparin -  Bolus 500Unit(s) IV Bolus every 1 hour  epoetin nicki Injectable 7000Unit(s) IV Push once  heparin -  Bolus       MEDICATIONS  (PRN):  acetaminophen  Suppository 650milliGRAM(s) Rectal every 6 hours PRN For Temp greater than 38 C (100.4 F)  ALBUTerol/ipratropium for Nebulization 3milliLiter(s) Nebulizer every 6 hours PRN Shortness of Breath and/or Wheezing      Allergies    clonidine (Unknown)    Intolerances        LABS:                        7.9    14.2  )-----------( 161      ( 28 Mar 2017 07:05 )             24.7     28 Mar 2017 07:05    136    |  99     |  23     ----------------------------<  82     3.2     |  27     |  3.16     Ca    8.4        28 Mar 2017 07:05  Phos  1.1       27 Mar 2017 11:13  Mg     1.9       28 Mar 2017 07:05    TPro  6.8    /  Alb  2.3    /  TBili  1.8    /  DBili  1.18   /  AST  76     /  ALT  105    /  AlkPhos  312    28 Mar 2017 07:05

## 2017-03-29 NOTE — PROGRESS NOTE ADULT - SUBJECTIVE AND OBJECTIVE BOX
Patient was seen and evaluated on dialysis.   No complaints  HR: 63  BP: 137/71  Wt(kg): --  29 Mar 2017 14:57    140    |  102    |  39     ----------------------------<  112    3.6     |  29     |  4.94     Ca    8.5        29 Mar 2017 14:57  Mg     1.8       29 Mar 2017 14:57    TPro  6.8    /  Alb  2.3    /  TBili  0.8    /  DBili  x      /  AST  31     /  ALT  63     /  AlkPhos  257    29 Mar 2017 14:57    Continue dialysis:   Dialyzer:         QB: 400 ml/min  K bath: 2K  Goal UF:2-2.4 L  Duration: 3.5 hours  Patient is tolerating the procedure well.   Continue full hemodialysis treatment as prescribed.

## 2017-03-29 NOTE — DISCHARGE NOTE ADULT - CARE PLAN
Principal Discharge DX:	Sepsis  Secondary Diagnosis:	ESRD (end stage renal disease)  Secondary Diagnosis:	COPD (chronic obstructive pulmonary disease)  Secondary Diagnosis:	Seizures Principal Discharge DX:	Sepsis  Secondary Diagnosis:	ESRD (end stage renal disease)  Instructions for follow-up, activity and diet:	Please continue dialysis Monday, Wednesday and Friday.  Secondary Diagnosis:	COPD (chronic obstructive pulmonary disease)  Instructions for follow-up, activity and diet:	Please continue home doses of advair and your nebulizer.  Secondary Diagnosis:	Seizures  Instructions for follow-up, activity and diet:	Please continue your home Keppra regimen. Principal Discharge DX:	Sepsis  Goal:	Treatment of infection.  Instructions for follow-up, activity and diet:	You came in with altered mental status, fever, and hypoxia. Your infection was likely due to pneumonia, and you were treated with IV antibiotics. Your blood cultures grew E. coli. Please take  Secondary Diagnosis:	ESRD (end stage renal disease)  Instructions for follow-up, activity and diet:	Please continue dialysis Monday, Wednesday and Friday.  Secondary Diagnosis:	COPD (chronic obstructive pulmonary disease)  Instructions for follow-up, activity and diet:	Please continue home doses of Advair and your nebulizer.  Secondary Diagnosis:	Seizures  Instructions for follow-up, activity and diet:	Please continue your home Keppra regimen. Principal Discharge DX:	Sepsis  Goal:	Treatment of infection.  Instructions for follow-up, activity and diet:	You came in with altered mental status, fever, and hypoxia. Your infection was likely due to pneumonia, and you were treated with IV antibiotics. Your blood cultures grew E. coli. Please take  Secondary Diagnosis:	ESRD (end stage renal disease)  Instructions for follow-up, activity and diet:	Please continue dialysis Monday, Wednesday and Friday. Continue midodrine at home. Continue PhosLo tablets three time daily with meals.  Secondary Diagnosis:	COPD (chronic obstructive pulmonary disease)  Instructions for follow-up, activity and diet:	Please continue home doses of Advair and your nebulizer.  Secondary Diagnosis:	Seizures  Instructions for follow-up, activity and diet:	Please continue your home Keppra regimen. Principal Discharge DX:	Sepsis  Goal:	Treatment of infection.  Instructions for follow-up, activity and diet:	You came in with altered mental status, fever, and hypoxia. Your infection was likely due to pneumonia, and you were treated with IV antibiotics. Your blood cultures grew E. coli. Subsequent blood cultures drawn after antibiotics were started were negative. Starting tomorrow, please take 2 tabs Bactrim DS every M/W/F after dialysis, last day Monday 04/10/17 (to complete 14 day course of antibiotics).  Secondary Diagnosis:	ESRD (end stage renal disease)  Instructions for follow-up, activity and diet:	Please continue dialysis Monday, Wednesday and Friday. Continue midodrine at home. Continue PhosLo tablets three time daily with meals, and sodium polystyrene sulfonate on days of dialysis.  Secondary Diagnosis:	COPD (chronic obstructive pulmonary disease)  Instructions for follow-up, activity and diet:	Please continue home doses of Advair and your nebulizer.  Secondary Diagnosis:	Seizures  Instructions for follow-up, activity and diet:	Please continue your home Keppra regimen.  Secondary Diagnosis:	Diabetes  Instructions for follow-up, activity and diet:	Please continue your home insulin regimen.

## 2017-03-29 NOTE — PROGRESS NOTE ADULT - SUBJECTIVE AND OBJECTIVE BOX
Patient seen and examined at bedside.     No significant events developed  Patient is awake and reports he is breathing well. But unable to attain further history from the patient.   Last HD was on 3/27  He had reached a bedscale weight of 70.9kg bedscale      heparin  Injectable 5000Unit(s) every 8 hours  acetaminophen  Suppository 650milliGRAM(s) every 6 hours PRN  ALBUTerol/ipratropium for Nebulization 3milliLiter(s) every 6 hours PRN  piperacillin/tazobactam IVPB. 2.25Gram(s) every 8 hours  heparin -  Bolus 1000Unit(s) once  heparin -  Bolus 500Unit(s) every 1 hour  epoetin nicki Injectable 7000Unit(s) once  heparin -  Bolus        Allergies    clonidine (Unknown)    Intolerances        T(C): , Max: 37.2 (03-28 @ 15:06)  T(F): , Max: 98.9 (03-28 @ 15:06)  HR: 67  BP: 122/60  BP(mean): --  RR: 15  SpO2: 95%  Wt(kg): --    I & Os for current day (as of 03-29 @ 11:04)  =============================================  IN:    IV PiggyBack: 100 ml    Total IN: 100 ml  ---------------------------------------------  OUT:    Total OUT: 0 ml  ---------------------------------------------  Total NET: 100 ml          LABS:                        7.9    14.2  )-----------( 161      ( 28 Mar 2017 07:05 )             24.7     28 Mar 2017 07:05    136    |  99     |  23     ----------------------------<  82     3.2     |  27     |  3.16     Ca    8.4        28 Mar 2017 07:05  Phos  1.1       27 Mar 2017 11:13  Mg     1.9       28 Mar 2017 07:05    TPro  6.8    /  Alb  2.3    /  TBili  1.8    /  DBili  1.18   /  AST  76     /  ALT  105    /  AlkPhos  312    28 Mar 2017 07:05                RADIOLOGY & ADDITIONAL STUDIES:

## 2017-03-29 NOTE — DISCHARGE NOTE ADULT - MEDICATION SUMMARY - MEDICATIONS TO TAKE
I will START or STAY ON the medications listed below when I get home from the hospital:    aspirin 81 mg oral tablet  -- 1 tab(s) by mouth once a day  -- Indication: For Prophylaxis     meloxicam 7.5 mg oral tablet  -- 1 tab(s) by mouth once a day  -- Indication: For Pain     nitroglycerin 0.1 mg/hr transdermal film, extended release  -- 1 patch by transdermal patch 3 times a week, As Needed only during dialysis  -- Indication: For Dialysis     Neurontin 100 mg oral capsule  -- 1 cap(s) by mouth 2 times a day 2pm and 6pm  -- Indication: For Pain     levETIRAcetam 500 mg oral tablet  -- 1 tab(s) by mouth 2 times a day  -- Indication: For Seizures    Remeron 15 mg oral tablet  -- 1 tab(s) by mouth once a day (at bedtime)  -- Indication: For Sleep    insulin lispro 100 units/mL subcutaneous solution  --  subcutaneous 4 times a day (before meals and at bedtime); 2 Unit(s) if Glucose 151 - 200  4 Unit(s) if Glucose 201 - 250  6 Unit(s) if Glucose 251 - 300  8 Unit(s) if Glucose 301 - 350  10 Unit(s) if Glucose 351 - 400  12 Unit(s) if Glucose Greater Than 400  -- Indication: For Diabetes    metoclopramide 10 mg oral tablet  -- 1 tab(s) by mouth 3 times a day (with meals)  -- Indication: For Antiemetic     simvastatin 20 mg oral tablet  -- 1 tab(s) by mouth once a day (at bedtime)  -- Indication: For Hyperlipidemia     Advair Diskus 250 mcg-50 mcg inhalation powder  -- 1 puff(s) inhaled 2 times a day  -- Indication: For COPD (chronic obstructive pulmonary disease)    albuterol-ipratropium 2.5 mg-0.5 mg/3 mL inhalation solution  -- 3 milliliter(s) inhaled every 4 hours  -- Indication: For COPD (chronic obstructive pulmonary disease)    epoetin nicki  --     -- Indication: For Anemia due to chronic kidney disease    Colace 100 mg oral capsule  -- 1 cap(s) by mouth once (at bedtime)  -- Indication: For Constipation     sulfamethoxazole-trimethoprim 800 mg-160 mg oral tablet  -- 2 tab(s) by mouth   -- Indication: For E. coli bacteremia     midodrine 5 mg oral tablet  -- 1 tab(s) by mouth every 8 hours  -- Indication: For Hypotension during dialysis     sodium polystyrene sulfonate 15 g/60 mL oral and rectal suspension  -- 60 milliliter(s) oral once a day every M,W,F for ESRD  -- Indication: For ESRD (end stage renal disease)    Artificial Tears ophthalmic solution  --  to each affected eye 4 times a day  -- Indication: For Dry Eye     PhosLo Gelcap 667 mg oral capsule  -- 1 cap(s) by mouth 3 times a day  -- Indication: For ESRD (end stage renal disease)    folic acid 1 mg oral tablet  -- 1 tab(s) by mouth once a day  -- Indication: For Vitamin

## 2017-03-29 NOTE — PROGRESS NOTE ADULT - SUBJECTIVE AND OBJECTIVE BOX
LUIS GARDNER   MRN-[id number]         CC: Patient is a 87y old  Male who presents with a chief complaint of AMS/hypoxia (27 Mar 2017 04:04).  No call back from friend Estrellita Butler yet. Pt without any complaints today    ROS:  UNABLE TO OBTAIN  due to:    DYSPNEA (Y/N): n	  N/V (Y/N):n	  SECRETIONS (Y/N):n	  AGITATION (Y/N):n  PAIN(Y/N): n       -Provocation/Palliation:     -Quality/Quantity:     -Radiating:     -Severity:     -Timing/Frequency:     -Impact on ADLs:     OTHER REVIEW OF SYSTEMS:  ALLERGIES:  clonidine (Unknown)    OPIATE NAÏVE (Y/N): y  iSTOP REVIEWED (Y/N):n     MEDICATIONS: reviewed  MEDICATIONS  (STANDING):  heparin  Injectable 5000Unit(s) SubCutaneous every 8 hours  piperacillin/tazobactam IVPB. 2.25Gram(s) IV Intermittent every 8 hours  heparin -  Bolus 1000Unit(s) IV Bolus once  heparin -  Bolus 500Unit(s) IV Bolus every 1 hour  epoetin nicki Injectable 7000Unit(s) IV Push once  heparin -  Bolus       MEDICATIONS  (PRN):  acetaminophen  Suppository 650milliGRAM(s) Rectal every 6 hours PRN For Temp greater than 38 C (100.4 F)  ALBUTerol/ipratropium for Nebulization 3milliLiter(s) Nebulizer every 6 hours PRN Shortness of Breath and/or Wheezing      PHYSICAL EXAM:  T(C): 36.8, Max: 37.4 (03-28 @ 10:55)  T(F): 98.3, Max: 99.3 (03-28 @ 10:55)  HR: 67 (61 - 67)  BP: 122/60 (90/43 - 125/74)  RR: 15 (15 - 18)  SpO2: 95% (94% - 100%)    GENERAL: Elderly gentleman more awake and alert today than yesterday   HEENT: OD ptosis, edentulous     	  CVS: normal S1S2  RESP: RA, resp even and not labored, fine basilar crackles  GI:  normal   : left AV graft with +thrill/bruit    MS:  MAEx4, LE 2/5 MS  NEURO: awake and alert, no apparent focal deficits  PSYCH: normal, appropriate affect  SKIN: dry throughout  LYMPH: no cervical LAD    LABS: reviewed                        7.9    14.2  )-----------( 161      ( 28 Mar 2017 07:05 )             24.7     28 Mar 2017 07:05    136    |  99     |  23     ----------------------------<  82     3.2     |  27     |  3.16     Ca    8.4        28 Mar 2017 07:05  Phos  1.1       27 Mar 2017 11:13  Mg     1.9       28 Mar 2017 07:05    TPro  6.8    /  Alb  2.3    /  TBili  1.8    /  DBili  1.18   /  AST  76     /  ALT  105    /  AlkPhos  312    28 Mar 2017 07:05    LIVER FUNCTIONS - ( 28 Mar 2017 07:05 )  Alb: 2.3 g/dL / Pro: 6.8 g/dL / ALK PHOS: 312 U/L / ALT: 105 U/L / AST: 76 U/L / GGT: x           IMAGING: reviewed  none new    ADVANCED DIRECTIVES:     DNR/DNI     MOLST      DECISION MAKER: pt  LEGAL SURROGATE:    GOALS OF CARE DISCUSSION       Palliative care info/counseling provided	           See previous Palliative Medicine Note       Documentation of GOC:     REFERRALS	        Unit        Music Therapy    [ ]CRITICAL CARE TIME PROVIDED TO UNSTABLE PT W/ ORGAN FAILURE    Start:               End:  	       Minutes:          [x]> 50% OF THE TIME SPENT IN COUNSELING AND COORDINATING CARE   Start:               End:  	       Minutes:  [ ]PROLONGED SERVICE             FACE TO FACE: [x]PT     [ ]PT & FAMILY   Start:               End:  	       Minutes:

## 2017-03-29 NOTE — CONSULT NOTE ADULT - SUBJECTIVE AND OBJECTIVE BOX
Patient is a 87y old  Male who presents with a chief complaint of AMS/hypoxia (27 Mar 2017 04:04)      HPI:  Patient is an 87 yr old  male poor historian at baseline with PMHx ESRD, Alzheimer's dementia, CAD, COPD, angina, anemia, CHF, depression, HTN, HLD, OA, PVD, polyneuropathy, blindness, seizures, and IDDM sent from Nursing Home for fever to Tmax 101.5 and hypoxia to 60s. Unable to obtain history from patient as he was minimally responsive. History obtained from chart and prior records. Patient was hospitalized last month in the ICU for septic shock 2/2 PNA and UTI with enterococcus raffinosus/ecoli. Patient finished course of antibx (Vancomycin and Zosyn) and was d/c to NH. During that admission he was seen by palliative and refused PEG. 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 AN EGD was done by GI, suctioned diverticulum, BOTOX injection to achalasia site, and insertion of NGT. Patient arrived to [facility] with a MOLST form which was filled out before patient became sick. Patient's emergency contact Isaac Land was contacted by ER physician but could not provide much history.   In the ED his VS were stable except he was febrile to 102.5.   In ED pt received Vanc/Zosyn, 1 L IVF, and Tylenol (27 Mar 2017 04:04)      PAST MEDICAL & SURGICAL HISTORY:  AV graft thrombosis  Osteoarthritis  PVD (peripheral vascular disease)  COPD (chronic obstructive pulmonary disease)  Heart failure  Angina pectoris  ESRD (end stage renal disease)  Seizures: post traumatic  CAD (coronary artery disease)  HTN (hypertension)  Polyneuropathy  Hyperlipidemia  Dysphagia  Hypotension  Alzheimers disease  Osteoarthritis: Osteoarthritis  Chronic obstructive pulmonary disease: Chronic obstructive pulmonary disease  Anemia: Anemia  Dementia without behavioral disturbance: Dementia  Atherosclerosis of coronary artery: CAD (coronary artery disease)  Nondependent alcohol abuse: ETOH abuse  Depressive disorder: Depression  End-stage renal disease: ESRD on hemodialysis  Essential hypertension: HTN (hypertension)  Deep venous embolism and thrombosis of lower extremity: DVT (deep venous thrombosis)  Congestive heart failure: CHF (congestive heart failure)  Legal blindness: Legally blind  Hemodialysis access, AV graft: LUE loop AVG  Acquired arteriovenous fistula: AV fistula      MEDICATIONS  (STANDING):  heparin  Injectable 5000Unit(s) SubCutaneous every 8 hours  piperacillin/tazobactam IVPB. 2.25Gram(s) IV Intermittent every 8 hours  heparin -  Bolus 1000Unit(s) IV Bolus once  heparin -  Bolus 500Unit(s) IV Bolus every 1 hour  epoetin nicki Injectable 7000Unit(s) IV Push once  heparin -  Bolus       MEDICATIONS  (PRN):  acetaminophen  Suppository 650milliGRAM(s) Rectal every 6 hours PRN For Temp greater than 38 C (100.4 F)  ALBUTerol/ipratropium for Nebulization 3milliLiter(s) Nebulizer every 6 hours PRN Shortness of Breath and/or Wheezing      Social History: resident at [facility]    Functional Level Prior to Admission: unknown patient with dementia    FAMILY HISTORY:  Family history unknown      CBC Full  -  ( 28 Mar 2017 07:05 )  WBC Count : 14.2 K/uL  Hemoglobin : 7.9 g/dL  Hematocrit : 24.7 %  Platelet Count - Automated : 161 K/uL  Mean Cell Volume : 98.4 fL  Mean Cell Hemoglobin : 31.5 pg  Mean Cell Hemoglobin Concentration : 32.0 g/dL  Auto Neutrophil # : x  Auto Lymphocyte # : x  Auto Monocyte # : x  Auto Eosinophil # : x  Auto Basophil # : x  Auto Neutrophil % : x  Auto Lymphocyte % : x  Auto Monocyte % : x  Auto Eosinophil % : x  Auto Basophil % : x      28 Mar 2017 07:05    136    |  99     |  23     ----------------------------<  82     3.2     |  27     |  3.16     Ca    8.4        28 Mar 2017 07:05  Phos  1.1       27 Mar 2017 11:13  Mg     1.9       28 Mar 2017 07:05    TPro  6.8    /  Alb  2.3    /  TBili  1.8    /  DBili  1.18   /  AST  76     /  ALT  105    /  AlkPhos  312    28 Mar 2017 07:05        Radiology:    EXAM:  XR CHEST 1 VIEW PORT URGENT                          PROCEDURE DATE:  03/27/2017                     INTERPRETATION:  Portable Chest X-Ray dated 3/27/2017 1:13 AM    Indication: sepsis    An AP portable view of the chest is compared to 2/24/2017. Worsening   infiltrates and left lower lobe. No significant change in the right basal   infiltrate. New small patchy right upper lobe infiltrate. New mild   pulmonary venous congestion. Small left pleural effusion. NG tube and   right central venous catheter has been removed. Deformity of the right   humeral neck and calcifications in the right axillary pouch are seen.      IMPRESSION:  Bibasilar infiltrates and left pleural effusion compatible with   pneumonia. Small right upper lobe infiltrate. Mild pulmonary venous   congestion.            Vital Signs Last 24 Hrs  T(C): 36.8, Max: 37.4 (03-28 @ 10:55)  T(F): 98.3, Max: 99.3 (03-28 @ 10:55)  HR: 67 (61 - 67)  BP: 122/60 (90/43 - 125/74)  BP(mean): --  RR: 15 (15 - 18)  SpO2: 95% (94% - 100%)    REVIEW OF SYSTEMS:    CONSTITUTIONAL: No fever, weight loss, or fatigue  EYES: No eye pain, visual disturbances, or discharge  ENMT:  No difficulty hearing, tinnitus, vertigo; No sinus or throat pain  NECK: No pain or stiffness  BREASTS: No pain, masses, or nipple discharge  RESPIRATORY: No cough, wheezing, chills or hemoptysis; No shortness of breath  CARDIOVASCULAR: No chest pain, palpitations, dizziness, or leg swelling  GASTROINTESTINAL: No abdominal or epigastric pain. No nausea, vomiting, or hematemesis; No diarrhea or constipation. No melena or hematochezia.  GENITOURINARY: No dysuria, frequency, hematuria, or incontinence  NEUROLOGICAL: No headaches, memory loss, loss of strength, numbness, or tremors  SKIN: No itching, burning, rashes, or lesions   LYMPH NODES: No enlarged glands  ENDOCRINE: No heat or cold intolerance; No hair loss  MUSCULOSKELETAL: No joint pain or swelling; No muscle, back, or extremity pain  PSYCHIATRIC: No depression, anxiety, mood swings, or difficulty sleeping  HEME/LYMPH: No easy bruising, or bleeding gums  ALLERGY AND IMMUNOLOGIC: No hives or eczema      Physical Exam: WDWN AA male, lying in bed, somnolent, easily arousable    HEENT: normocephalic/ atraumatic, anicteric    Neck: supple, negative JVD, negative carotid bruits,    Chest: bilateral rhonchi    Cardiovascular: regular rate and rhythm, neg murmurs/rubs/gallops    Abdomen: soft, non tender, negative rebound/guarding, non distended, normal bowel sounds, neg hepatosplenomegaly    Extremities: WWP, neg cyanosis/clubbing/edema, negative calf tenderness to palpation, negative Chasity's sign    Neurologic Exam:    Alert and oriented x 2 to person, place, 2001 date/year, speech fluent w/o dysarthria, follows commands    Cranial Nerves:     II:                       pupils equal, round and reactive to light, dec acuity bilaterally   III/ IV/VI:             extraocular movements intact, neg nystagmus, ptosis  V:                      facial sensation intact, V1-3 normal  VII:                     face symmetric, no droop, normal eye closure and smile  VIII:                    hearing intact to finger rub bilaterally  IX/ X:                  soft palate rise symmetrical  XI:                      head turning, shoulder shrug normal  XII:                     tongue midline    Motor Exam:    Bilateral UE:        4+/5 /intrinsics                            4+/5 biceps/triceps/wrist extensors-flexors/deltoid                            negative pronator drift      Bilateral LE:        4/5 hip flexors/adductors/abductors                            4/5 quadriceps/hamstrings                            5/5 dorsiflexors/plantar flexors/invertors-evertors    Sensory: intact to LT/PP in all UE/LE dermatomes    DTR:       = biceps/     triceps/     brachioradialis                 = patella/   medial hamstring/    ankle                 neg clonus                 neg Babinski                 neg Hoffmans    Finger to Nose: wnl    Heel to Shin:      wnl    Rapid Alternating movements:  wnl    Joint Position Sense:  intact    Gait:  NT        PM&R Impression:    1) deconditioned  2) gait dysfunction        Recommendations: return to [facility]    1) Physical therapy focusing on therapeutic exercises, bed mobility/transfer out of bed evaluation, progressive ambulation with assistive devices.    2) Disposition Plan:

## 2017-03-29 NOTE — DISCHARGE NOTE ADULT - MEDICATION SUMMARY - MEDICATIONS TO CHANGE
I will SWITCH the dose or number of times a day I take the medications listed below when I get home from the hospital:    levETIRAcetam 250 mg oral tablet  -- 1 tab(s) by mouth 2 times a day    levETIRAcetam 250 mg oral tablet  -- 1 tab(s) by mouth on MWF after dialysis

## 2017-03-29 NOTE — PROGRESS NOTE ADULT - PROBLEM SELECTOR PLAN 9
ALK phosp 411   on admission, now downtrending. Abdominal exam benign. Abd US revealed cholelithiasis with small pericholecystic fluid.  - likely elevated 2/2 sepsis as not downtrending w/ fluids and abx. will continue to monitor. F/u LFTs today

## 2017-03-29 NOTE — PROGRESS NOTE ADULT - PROBLEM SELECTOR PLAN 1
Due for HD today  In light of reported low oral intake, will have to tailor his UF based on his   Dose vancomycin based on preHD trough levels    Initial order is set for 2K bath  Low potassium yesterday and patient has reported low oral intake.   However he is still ESRD and therefore will start at 2K bath but may raise the bath concentration depending on his preHD BMP values

## 2017-03-29 NOTE — CONSULT NOTE ADULT - ASSESSMENT
87 y.o gentleman with h/o ESRD on HD, CHF, CAD, COPD, OA, polyneuropathy, PVD, HLP, dysphagia, achalasia s/p botox, depression, HTN, DVT, Alzheimer's, debility, hypoalbuminemia, recurrent sepsis, UTI, and aspiration pna readmitted again with fevers and hypoxia Rx for recurrent sepsis likely secondary to asp pna seen for goals of care     -msg left for friend Estrellita Butler at [phone] who I've spoke to previously, to discuss further.  Ethics informed of pt's readmit.  Pt still opts for comfort feeds, refusing any cpr/intubation/peg as outlined in previous MOLST.  Will cont. to follow
Patient is an 87 yr old male with extensive PMHx and recent admission and d/c for septic shock 2/2 asp pna vs uti who presented from nursing home with AMS and fever/hypoxia, admitted for sepsis 2/2 likely aspiration PNA     Problem/Plan - 1:  ·  Problem: Sepsis.  Plan: Patient presents today from NH with fever hypoxia and AMS, labs notable for WBC 24. Etiology likely HAP vs. aspiration PNA. Patient has hx of Zenker's diverticulum and chronic asp as a result so he is high risk for PNA. His CXR is slightly worse than one from last admission but with asp PNA CXR findings can lag.   Blood cx +GNR. Difficult to obtain u/a and urine cx as pt mostly ESRD and anuric. Patient is currently afebrile and HD stable, WBC downtrending w/ vanc/zosyn.   - c/w vancomycin and zosyn (renally dosed) for broad coverage of HCAP. Consider discontinuing vanc as cultures w/ GNR   - f/u blood cx final results   -will repeat blood cultures today   - tylenol prn.     Problem/Plan - 2:  ·  Problem: Hypoxia.  Plan: Per NH patient was hypoxic to 60s. Etiology unclear but likely 2/2 asp with resulting PNA. Unlikely COPD exacerbation as pt did not have wheezing on exam and was moving good air.   - c/w treatment as above for presumed PNA  - monitor spO2, supplemental oxygen as needed.     Problem/Plan - 3:  ·  Problem: Altered mental status.  Plan: Improved. Patient minimally arousable on admission but is AAOx3 today AMS likely 2/2 sepsis.   - can restart home meds as patient now awake.
87y old legally blind male with PM[facility] Epilepsy, Alzheimer's dementia, HTN, HLD, RA, GERD, IDDM, CAD, COPD, AOCD, GERD, Aperistaltic achalasia and dysphagia with multiple diverticulum and botox injection on last admission 2/2017 with comfort feeds now who presents from [facility] rehab with sepsis (febrile, respiratory distress, leukocytosis) 2/2 likely aspiration pna given strong risk factors for aspiration (multiple esophageal diverticula and on comfort feeds despite known risk). Ramey catheter in place but not draining any urine to obtain UTI though pt with previous admission Feb 2017 with Ucx+ enterococcus rafinosis sensitive to vancomycin. Labs significant for WBC 22K with neutrophilic predominance and significant new transaminitis. CXR with b/l infiltrates and blunting of costophrenic angle on the left. The pt is DNR/DNI with MOLST signed by himself on 3/20 but no mention of his wishes re: additional treatments (pressors, IVF, antibiotics). He is currently normotensive, hemodynamically stable. There is no indication for tele at this time and can be managed on RMF. He is not a candidate for BiPAP with current mental status and likely aspiration pna with multiple known diverticula increasing his aspiration risk.  We recommend:   -admit to RMF, keep NPO until clinical improvement   -f/u bl cx, obtain ucx if urine able to be obtained.    -c/w vanc+zosyn (renally dosed for ESRD MWF) for HCAP which will cover aspiration as well as previous UCx   -IVF for hydration   -Hiflo O2 to prevent drying of secretions   -palliative/ethics consult for goals of care and in help establishing a decision maker for the patient's health decisions in the setting of previous hospitalization with waxing/waning mental status and his current clinical state.
ESRD M/W/F

## 2017-03-29 NOTE — PROGRESS NOTE ADULT - PROBLEM SELECTOR PLAN 4
Renal Diet  Can hold off phosphorus binders at present  Will dose Calcijex on HD next HD  Please also check 25 Vit D and PTH at next routine blood draw

## 2017-03-29 NOTE — DISCHARGE NOTE ADULT - MEDICATION SUMMARY - MEDICATIONS TO STOP TAKING
I will STOP taking the medications listed below when I get home from the hospital:    vancomycin 750 mg intravenous injection  -- 750 milligram(s) intravenous once to be given after dialysis 3/1/17    acetylcysteine 20% inhalation solution  -- 5 milliliter(s) inhaled every 4 hours    pantoprazole 40 mg intravenous injection  -- 40 milligram(s) intravenous once a day

## 2017-03-29 NOTE — PROGRESS NOTE ADULT - PROBLEM SELECTOR PLAN 6
EF 65-70 in 02/2017. BNP 21008 on admission, however pt is ESRD and cannot clear the enzyme.  -no acute issues
EF 65-70 in 02/2017. BNP is 86429 however pt is ESRD and cannot clear the enzyme.  - no need to repeat echo at this time
EF 65-70 in 02/2017. BNP is 02137 however pt is ESRD and cannot clear the enzyme.  - no need to repeat echo at this time

## 2017-03-29 NOTE — PROGRESS NOTE ADULT - PROBLEM SELECTOR PLAN 1
Patient presents today from NH with fever hypoxia and AMS, labs notable for WBC 24. Etiology likely HAP vs. aspiration PNA vs. UTI. Patient has hx of Zenker's diverticulum and chronic asp as a result so he is high risk for PNA. His CXR is slightly worse than one from last admission. Blood cx +E. coli. Difficult to obtain u/a and urine cx as pt ESRD and anuric. Patient has benign abdominal exam. Patient is currently afebrile and HD stable, WBC downtrending on abx.  - Initially on vancomycin and zosyn for broad coverage of for possible HAP. Will discontinue vanc now that blood cultures growing E. coli. Consider switching to Bactrim on discharge.   - tylenol prn Patient presents today from NH with fever hypoxia and AMS, labs notable for WBC 24. Etiology likely HAP vs. aspiration PNA vs. UTI. Patient has hx of Zenker's diverticulum and chronic asp as a result so he is high risk for PNA. His CXR is slightly worse than one from last admission. Blood cx +E. coli. Difficult to obtain u/a and urine cx as pt ESRD and anuric. Patient has benign abdominal exam. Patient is currently afebrile and HD stable, WBC downtrending on abx.  - Initially on vancomycin and zosyn for broad coverage of for possible HAP. Will discontinue vanc now that blood cultures growing E. coli. Will de-escalate to CTX today. Consider switching to Bactrim on discharge.   - tylenol prn

## 2017-03-29 NOTE — DISCHARGE NOTE ADULT - CARE PROVIDERS DIRECT ADDRESSES
,DirectAddress_Unknown,[email or web address],DirectAddress_Unknown

## 2017-03-29 NOTE — PROGRESS NOTE ADULT - PROBLEM SELECTOR PROBLEM 5
COPD (chronic obstructive pulmonary disease)

## 2017-03-29 NOTE — DISCHARGE NOTE ADULT - CARE PROVIDER_API CALL
Oliver Haq), Internal Medicine  [address] [address]  Phone: [phone]  Fax: [phone]    Hailey Baker), Internal Medicine; Nephrology  [address] [address]  Phone: [phone]  Fax: [phone]

## 2017-03-29 NOTE — PROGRESS NOTE ADULT - PROBLEM SELECTOR PLAN 7
Currently normotensive.   - held anti-HTN meds in setting of sepsis, will add back if needed.
Currently normotensive.   - would hold anti-HTN meds in setting of sepsis
Currently normotensive.   - would hold anti-HTN meds in setting of sepsis

## 2017-03-29 NOTE — PROGRESS NOTE ADULT - ASSESSMENT
87 y.o gentleman with h/o ESRD on HD, CHF, CAD, COPD, OA, polyneuropathy, PVD, HLP, dysphagia, achalasia s/p botox, depression, HTN, DVT, Alzheimer's, debility, hypoalbuminemia, recurrent sepsis, UTI, and aspiration pna readmitted again with fevers and hypoxia Rx for recurrent sepsis likely secondary to asp pna    -potential causes of his recurrent asp pna discussed along with hospice services briefly, ICU, recurrent hospitalizations, and the possibly of foregoing HD in the future.  Plan is to cont. current treatment plan, rehospitalize with abx use, ICU care if needed, and cont. HD for now, which is very reasonable, pt views his current quality of life as acceptable.  D/c back to UES when cleared by primary team.  Pt is DNR/DNI.  Will cont. to reach out to pt's only reported friend Estrellita Butler who he still has contact with

## 2017-03-29 NOTE — DISCHARGE NOTE ADULT - PLAN OF CARE
Please continue your home Keppra regimen. Please continue home doses of advair and your nebulizer. Please continue dialysis Monday, Wednesday and Friday. Treatment of infection. You came in with altered mental status, fever, and hypoxia. Your infection was likely due to pneumonia, and you were treated with IV antibiotics. Your blood cultures grew E. coli. Please take Please continue home doses of Advair and your nebulizer. Please continue dialysis Monday, Wednesday and Friday. Continue midodrine at home. Continue PhosLo tablets three time daily with meals. Please continue your home insulin regimen. You came in with altered mental status, fever, and hypoxia. Your infection was likely due to pneumonia, and you were treated with IV antibiotics. Your blood cultures grew E. coli. Subsequent blood cultures drawn after antibiotics were started were negative. Starting tomorrow, please take 2 tabs Bactrim DS every M/W/F after dialysis, last day Monday 04/10/17 (to complete 14 day course of antibiotics). Please continue dialysis Monday, Wednesday and Friday. Continue midodrine at home. Continue PhosLo tablets three time daily with meals, and sodium polystyrene sulfonate on days of dialysis.

## 2017-03-30 VITALS
DIASTOLIC BLOOD PRESSURE: 71 MMHG | TEMPERATURE: 98 F | RESPIRATION RATE: 17 BRPM | SYSTOLIC BLOOD PRESSURE: 130 MMHG | HEART RATE: 69 BPM | OXYGEN SATURATION: 97 %

## 2017-03-30 LAB
CALCIUM SERPL-MCNC: 8.8 MG/DL — SIGNIFICANT CHANGE UP (ref 8.4–10.5)
PTH-INTACT FLD-MCNC: 224 PG/ML — HIGH (ref 15–65)

## 2017-03-30 PROCEDURE — 80053 COMPREHEN METABOLIC PANEL: CPT

## 2017-03-30 PROCEDURE — 86704 HEP B CORE ANTIBODY TOTAL: CPT

## 2017-03-30 PROCEDURE — 51702 INSERT TEMP BLADDER CATH: CPT

## 2017-03-30 PROCEDURE — 80048 BASIC METABOLIC PNL TOTAL CA: CPT

## 2017-03-30 PROCEDURE — 90935 HEMODIALYSIS ONE EVALUATION: CPT

## 2017-03-30 PROCEDURE — 82330 ASSAY OF CALCIUM: CPT

## 2017-03-30 PROCEDURE — 87186 SC STD MICRODIL/AGAR DIL: CPT

## 2017-03-30 PROCEDURE — 86706 HEP B SURFACE ANTIBODY: CPT

## 2017-03-30 PROCEDURE — 83605 ASSAY OF LACTIC ACID: CPT

## 2017-03-30 PROCEDURE — 82306 VITAMIN D 25 HYDROXY: CPT

## 2017-03-30 PROCEDURE — 93005 ELECTROCARDIOGRAM TRACING: CPT | Mod: XU

## 2017-03-30 PROCEDURE — 84520 ASSAY OF UREA NITROGEN: CPT

## 2017-03-30 PROCEDURE — 85027 COMPLETE CBC AUTOMATED: CPT

## 2017-03-30 PROCEDURE — 87040 BLOOD CULTURE FOR BACTERIA: CPT

## 2017-03-30 PROCEDURE — 80202 ASSAY OF VANCOMYCIN: CPT

## 2017-03-30 PROCEDURE — 96375 TX/PRO/DX INJ NEW DRUG ADDON: CPT | Mod: XU

## 2017-03-30 PROCEDURE — 84132 ASSAY OF SERUM POTASSIUM: CPT

## 2017-03-30 PROCEDURE — 82310 ASSAY OF CALCIUM: CPT

## 2017-03-30 PROCEDURE — 96374 THER/PROPH/DIAG INJ IV PUSH: CPT | Mod: XU

## 2017-03-30 PROCEDURE — 99285 EMERGENCY DEPT VISIT HI MDM: CPT | Mod: 25

## 2017-03-30 PROCEDURE — 84100 ASSAY OF PHOSPHORUS: CPT

## 2017-03-30 PROCEDURE — 97530 THERAPEUTIC ACTIVITIES: CPT

## 2017-03-30 PROCEDURE — 99231 SBSQ HOSP IP/OBS SF/LOW 25: CPT | Mod: GC

## 2017-03-30 PROCEDURE — 80076 HEPATIC FUNCTION PANEL: CPT

## 2017-03-30 PROCEDURE — 97161 PT EVAL LOW COMPLEX 20 MIN: CPT

## 2017-03-30 PROCEDURE — 85025 COMPLETE CBC W/AUTO DIFF WBC: CPT

## 2017-03-30 PROCEDURE — 94640 AIRWAY INHALATION TREATMENT: CPT

## 2017-03-30 PROCEDURE — 71045 X-RAY EXAM CHEST 1 VIEW: CPT

## 2017-03-30 PROCEDURE — 83735 ASSAY OF MAGNESIUM: CPT

## 2017-03-30 PROCEDURE — 83880 ASSAY OF NATRIURETIC PEPTIDE: CPT

## 2017-03-30 PROCEDURE — 84295 ASSAY OF SERUM SODIUM: CPT

## 2017-03-30 PROCEDURE — 87340 HEPATITIS B SURFACE AG IA: CPT

## 2017-03-30 PROCEDURE — 76705 ECHO EXAM OF ABDOMEN: CPT

## 2017-03-30 PROCEDURE — 82803 BLOOD GASES ANY COMBINATION: CPT

## 2017-03-30 PROCEDURE — 83970 ASSAY OF PARATHORMONE: CPT

## 2017-03-30 PROCEDURE — 85610 PROTHROMBIN TIME: CPT

## 2017-03-30 PROCEDURE — 36415 COLL VENOUS BLD VENIPUNCTURE: CPT

## 2017-03-30 PROCEDURE — 86803 HEPATITIS C AB TEST: CPT

## 2017-03-30 PROCEDURE — 85730 THROMBOPLASTIN TIME PARTIAL: CPT

## 2017-03-30 RX ORDER — FLUTICASONE PROPIONATE AND SALMETEROL 50; 250 UG/1; UG/1
1 POWDER ORAL; RESPIRATORY (INHALATION)
Qty: 0 | Refills: 0 | Status: DISCONTINUED | OUTPATIENT
Start: 2017-03-30 | End: 2017-03-30

## 2017-03-30 RX ORDER — LEVETIRACETAM 250 MG/1
500 TABLET, FILM COATED ORAL
Qty: 0 | Refills: 0 | Status: DISCONTINUED | OUTPATIENT
Start: 2017-03-30 | End: 2017-03-30

## 2017-03-30 RX ORDER — CEFTRIAXONE 500 MG/1
2 INJECTION, POWDER, FOR SOLUTION INTRAMUSCULAR; INTRAVENOUS ONCE
Qty: 0 | Refills: 0 | Status: COMPLETED | OUTPATIENT
Start: 2017-03-30 | End: 2017-03-30

## 2017-03-30 RX ORDER — CALCIUM ACETATE 667 MG
667 TABLET ORAL
Qty: 0 | Refills: 0 | Status: DISCONTINUED | OUTPATIENT
Start: 2017-03-30 | End: 2017-03-30

## 2017-03-30 RX ADMIN — HEPARIN SODIUM 5000 UNIT(S): 5000 INJECTION INTRAVENOUS; SUBCUTANEOUS at 05:37

## 2017-03-30 RX ADMIN — PANTOPRAZOLE SODIUM 40 MILLIGRAM(S): 20 TABLET, DELAYED RELEASE ORAL at 05:37

## 2017-03-30 RX ADMIN — FLUTICASONE PROPIONATE AND SALMETEROL 1 DOSE(S): 50; 250 POWDER ORAL; RESPIRATORY (INHALATION) at 09:44

## 2017-03-30 RX ADMIN — Medication 10 MILLIGRAM(S): at 07:39

## 2017-03-30 RX ADMIN — LEVETIRACETAM 250 MILLIGRAM(S): 250 TABLET, FILM COATED ORAL at 05:37

## 2017-03-30 RX ADMIN — CEFTRIAXONE 100 GRAM(S): 500 INJECTION, POWDER, FOR SOLUTION INTRAMUSCULAR; INTRAVENOUS at 09:44

## 2017-03-30 NOTE — PROGRESS NOTE ADULT - PROBLEM SELECTOR PLAN 3
Improved. Patient minimally arousable on admission but is AAOx3 again today. AMS likely 2/2 sepsis.
Improved. Patient minimally arousable on admission but is AAOx3 today AMS likely 2/2 sepsis.   - can restart home meds as patient now awake
Improved. Patient minimally arousable on admission but is AAOx3 today AMS likely 2/2 sepsis.   - can restart home meds as patient now awake
KEV on HD  Hold IV iron in light of bacteremia

## 2017-03-30 NOTE — PROGRESS NOTE ADULT - SUBJECTIVE AND OBJECTIVE BOX
Patient seen and examined at bedside.     Patient had last HD on 3/29  Achieved a weight change of 71.4kg bedscale to 69.4kg bedscale. This was a higher ultrafiltration goal set after the patient had demonstrated he was tolerating the hemodynamic changes intradialysis well.     This morning, he is seen eating breakfast and lucid. He reports no dyspnea, no leg swelling, no nausea/vomiting. Does not report any AV graft complications.     heparin  Injectable 5000Unit(s) every 8 hours  acetaminophen  Suppository 650milliGRAM(s) every 6 hours PRN  ALBUTerol/ipratropium for Nebulization 3milliLiter(s) every 6 hours PRN  simvastatin 20milliGRAM(s) at bedtime  insulin lispro (HumaLOG) corrective regimen sliding scale  Before meals and at bedtime  mirtazapine 15milliGRAM(s) at bedtime  docusate sodium 100milliGRAM(s) at bedtime  metoclopramide 10milliGRAM(s) three times a day with meals  pantoprazole    Tablet 40milliGRAM(s) before breakfast  fluticasone / salmeterol 250-50 MICROgram(s) Diskus 1Dose(s) two times a day  calcium acetate 667milliGRAM(s) three times a day with meals  levETIRAcetam 500milliGRAM(s) two times a day  cefTRIAXone   IVPB 2Gram(s) once      Allergies    clonidine (Unknown)    Intolerances        T(C): , Max: 36.7 (03-29 @ 21:09)  T(F): , Max: 98 (03-29 @ 21:09)  HR: 62  BP: 162/70  BP(mean): --  RR: 116  SpO2: 98%  Wt(kg): --          LABS:                        7.6    11.1  )-----------( 206      ( 29 Mar 2017 14:57 )             22.9     29 Mar 2017 18:06    x      |  x      |  9      ----------------------------<  x      x       |  x      |  x        Ca    8.5        29 Mar 2017 14:57  Mg     1.8       29 Mar 2017 14:57    TPro  6.8    /  Alb  2.3    /  TBili  0.8    /  DBili  x      /  AST  31     /  ALT  63     /  AlkPhos  257    29 Mar 2017 14:57                RADIOLOGY & ADDITIONAL STUDIES:

## 2017-03-30 NOTE — PROGRESS NOTE ADULT - PROBLEM SELECTOR PLAN 4
Renal Diet  As his phosphorus level was 1.1 a few days ago, would hold off phosphorus binders right now until a repeat level is checked   Will dose Calcijex on HD next HD  Please also check 25 Vit D and PTH at next routine blood draw Renal Diet  As his phosphorus level was 1.1 a few days ago, would hold off phosphorus binders right now until a repeat level is checked   Will dose Calcijex on HD next HD  PTH is 224; this is an appropriate level of PTH for a patient with ESRD

## 2017-03-30 NOTE — PROGRESS NOTE ADULT - ASSESSMENT
ASSESSMENT/PLAN    1)  Pneumonia  2)  Chronic obstructive pulmonary disease  3)  Congestive heart failure  4)  Dementia  5)  Chronic kidney disease, ESRD  6)  Anemia    Bronchodilators:  Atrovent/ albuterol q 4 – 6 hours as needed  Corticosteroids: Add budesonide  ID/Antibiotics:  Zosyn follow up cultures  Cardiac/HTN:  BP stable  GI: Rx/ prophylaxis c PPI/H2B  Heme: Rx/VT prophylaxis c SQH  Discuss with medical team

## 2017-03-30 NOTE — PROGRESS NOTE ADULT - PROBLEM SELECTOR PLAN 1
Next HD on Friday  Volume status and chemistries are appropriate Next HD on Friday  Volume status and chemistries are appropriate.  URR is excellent at 76.9%

## 2017-03-30 NOTE — PROGRESS NOTE ADULT - PROBLEM SELECTOR PROBLEM 1
Sepsis
ESRD (end stage renal disease) on dialysis

## 2017-03-30 NOTE — PROGRESS NOTE ADULT - PROBLEM SELECTOR PLAN 2
Per NH patient was hypoxic to 60s. Etiology unclear but likely 2/2 asp with resulting PNA. Unlikely COPD exacerbation as pt did not have wheezing on exam and was moving good air.   - c/w treatment as above for possible PNA  - Patient statting in high 90s off NC
Per NH patient was hypoxic to 60s. Etiology unclear but likely 2/2 asp with resulting PNA. Unlikely COPD exacerbation as pt did not have wheezing on exam and was moving good air.   - c/w treatment as above for presumed PNA  - monitor spO2, supplemental oxygen as needed
Per NH patient was hypoxic to 60s. Etiology unclear but likely 2/2 asp with resulting PNA. Unlikely COPD exacerbation as pt did not have wheezing on exam and was moving good air.   - c/w treatment as above for presumed PNA  - monitor spO2, supplemental oxygen as needed
If BP remains elevated persistently during the interdialytic period, then may consider addition of a pharmacologic agent. Will make further recommendations if this occurs. Otherwise, BP control via stepwise ultrafiltration at dialysis
Normotensive at present  Would continue to hold his cardiomyopathy medications in light of bacteremia and normotension
Normotensive at present  Would continue to hold his cardiomyopathy medications in light of bacteremia and normotension

## 2017-03-30 NOTE — PROGRESS NOTE ADULT - ATTENDING COMMENTS
Supportive RX
seen and evaluated while on dialysis.  Tolerating treatment well.  Continue full treatment as prescribed
HD  con Ab  diet  comfortable
for dialysis today  Bacteremia most likely from recurrent PNA- improving
tolerated dialysis well yesterday  Clinically improving from Bacteremia- most likely from recurrent PNA- c/w Abx
awake and alert.  Calls me by name.  Tolerated dialysis well yesterday.  Volume status, BP and electrolytes stable and acceptable today.  Next dialysis 3/29

## 2017-03-30 NOTE — PROGRESS NOTE ADULT - PROBLEM SELECTOR PROBLEM 3
Altered mental status
Anemia due to chronic kidney disease

## 2017-03-30 NOTE — PROGRESS NOTE ADULT - PROBLEM SELECTOR PROBLEM 4
ESRD (end stage renal disease)
Chronic kidney disease-mineral and bone disorder

## 2017-03-30 NOTE — PROGRESS NOTE ADULT - SUBJECTIVE AND OBJECTIVE BOX
Interventional, Pulmonary, Critical, Chest Special Procedures.    Pt was seen and fully examined by myself.     Time spent with patient in minutes:37    Patient is a 87y old  Male who presents with a chief complaint of AMS/hypoxia (29 Mar 2017 15:37)The patient ill appearing, reports pain free when seen, eupneic now on RA    HPI:  Patient is an 87 yr old  male poor historian at baseline with PMHx ESRD, Alzheimer's dementia, CAD, COPD, angina, anemia, CHF, depression, HTN, HLD, OA, PVD, polyneuropathy, blindness, seizures, and IDDM sent from Nursing Home for fever to Tmax 101.5 and hypoxia to 60s. Unable to obtain history from patient as he was minimally responsive. History obtained from chart and prior records. Patient was hospitalized last month in the ICU for septic shock 2/2 PNA and UTI with enterococcus raffinosus/ecoli. Patient finished course of antibx (Vancomycin and Zosyn) and was d/c to NH. During that admission he was seen by palliative and refused PEG. 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 AN EGD was done by GI, suctioned diverticulum, BOTOX injection to achalasia site, and insertion of NGT. Patient arrived to [facility] with a MOLST form which was filled out before patient became sick. Patient's emergency contact Isaac Land was contacted by ER physician but could not provide much history.   In the ED his VS were stable except he was febrile to 102.5.   In ED pt received Vanc/Zosyn, 1 L IVF, and Tylenol (27 Mar 2017 04:04)    REVIEW OF SYSTEMS:  Constitutional: No fever, weight loss, chills or fatigue  Eyes: No eye pain, visual disturbances, or discharge  ENMT:  No difficulty hearing, tinnitus, vertigo; No sinus or throat pain. No epistaxis, dysphagia, dysphonia, hoarseness or odynophagia  Neck: No pain, stiffness or neck swelling.  No masses or deformities  Respiratory: No cough, wheezing, chills or hemoptysis  - COPD  - ILD   - PE   - ASTHMA     - PNEUMONIA  Cardiovascular: No chest pain, dysrhythmia, palpitations, dizziness or edema   - COPD     - CAD   - CHF   - HTN  Gastrointestinal: No abdominal or epigastric pain. No nausea, vomiting or hematemesis; No diarrhea or constipation. No melena or hematochezia. No dysphagia or Icterus.          Genitourinary: No dysuria, frequency, hematuria or incontinence   - CKD/SUZETTE      - ESRD  Neurological: No headaches, memory loss, loss of strength, numbness or tremors      -DEMENTIA     - STROKE    - SEIZURE  Skin: No itching, burning, rashes or lesions   Lymph Nodes: No enlarged glands  Endocrine: No heat or cold intolerance; No hair loss       - DM     - THYROID DISORDER  Musculoskeletal: No joint pain or swelling; No muscle, back or extremity pain  Psychiatric: No depression, anxiety, mood swings or difficulty sleeping  Heme/Lymph: No easy bruising or bleeding gums         - ANEMIA      - CANCER   -COAGULOPATHY  Allergy and Immunologic: No hives or eczema    PAST MEDICAL & SURGICAL HISTORY:  AV graft thrombosis  Osteoarthritis  PVD (peripheral vascular disease)  COPD (chronic obstructive pulmonary disease)  Heart failure  Angina pectoris  ESRD (end stage renal disease)  Seizures: post traumatic  CAD (coronary artery disease)  HTN (hypertension)  Polyneuropathy  Hyperlipidemia  Dysphagia  Hypotension  Alzheimers disease  Osteoarthritis: Osteoarthritis  Chronic obstructive pulmonary disease: Chronic obstructive pulmonary disease  Anemia: Anemia  Dementia without behavioral disturbance: Dementia  Atherosclerosis of coronary artery: CAD (coronary artery disease)  Nondependent alcohol abuse: ETOH abuse  Depressive disorder: Depression  End-stage renal disease: ESRD on hemodialysis  Essential hypertension: HTN (hypertension)  Deep venous embolism and thrombosis of lower extremity: DVT (deep venous thrombosis)  Congestive heart failure: CHF (congestive heart failure)  Legal blindness: Legally blind  Hemodialysis access, AV graft: LUE loop AVG  Acquired arteriovenous fistula: AV fistula    FAMILY HISTORY:  Family history unknown    SOCIAL HISTORY:      - Tobacco     - ETOH    Allergies    clonidine (Unknown)    Intolerances      Vital Signs Last 24 Hrs  T(C): 36.4, Max: 36.7 (03-29 @ 21:09)  T(F): 97.5, Max: 98 (03-29 @ 21:09)  HR: 69 (62 - 69)  BP: 130/71 (128/70 - 162/70)  BP(mean): --  RR: 17 (16 - 116)  SpO2: 97% (97% - 98%)      PHYSICAL EXAM:  Un Comfortable, no distress  Eyes: PERRL, EOM intact; conjunctiva and sclera clear  Head: Normocephalic;  No Trauma  ENMT: No nasal discharge, +hoarseness, minimal cough     Neck: Supple; non tender; no masses or deformities.    No JVD  Respiratory: - WHEEZING   - RHONCHI  - RALES  + CRACKLES.  Diminished breath sounds  BILATERAL  RIGHT  LEFT bases  Cardiovascular: Regular rate and rhythm. S1 and S2 Normal; No murmurs, gallops or rubs     - PPM/AICD  Gastrointestinal: Soft non-tender, non-distended; Normal bowel sounds; No hepatosplenomegaly.     -PEG    -  GT   - MELÉNDEZ  Genitourinary: No costovertebral angle tenderness. No dysuria  Extremities: AROM, No clubbing, cyanosis or edema    Vascular: Peripheral pulses palpable 2+ bilaterally  Neurological: Alert and responisve to stimuli   Skin: Warm and dry. No obvious rash  Lymph Nodes: No acute cervical or supraclavicular adenopathy  Psychiatric: Cooperative and appropriate mood    DEVICES:  - DENTURES   +IV R / L     - ETUBE   -TRACH   -CTUBE  R / L      LABS:                          7.6    11.1  )-----------( 206      ( 29 Mar 2017 14:57 )             22.9     29 Mar 2017 18:06    x      |  x      |  9      ----------------------------<  x      x       |  x      |  x        Ca    8.5        29 Mar 2017 14:57  Mg     1.8       29 Mar 2017 14:57    TPro  6.8    /  Alb  2.3    /  TBili  0.8    /  DBili  x      /  AST  31     /  ALT  63     /  AlkPhos  257    29 Mar 2017 14:57      EXAM:  XR CHEST 1 VIEW PORT URGENT                          PROCEDURE DATE:  03/27/2017                     INTERPRETATION:  Portable Chest X-Ray dated 3/27/2017 1:13 AM    Indication: sepsis    An AP portable view of the chest is compared to 2/24/2017. Worsening   infiltrates and left lower lobe. No significant change in the right basal   infiltrate. New small patchy right upper lobe infiltrate. New mild   pulmonary venous congestion. Small left pleural effusion. NG tube and   right central venous catheter has been removed. Deformity of the right   humeral neck and calcifications in the right axillary pouch are seen.      IMPRESSION:  Bibasilar infiltrates and left pleural effusion compatible with   pneumonia. Small right upper lobe infiltrate. Mild pulmonary venous   congestion.  RADIOLOGY & ADDITIONAL STUDIES (The following images were personally reviewed):

## 2017-03-30 NOTE — PROGRESS NOTE ADULT - VASCULAR DETAILS
left AVF positive thrill and bruit
left AVG positive thrill and bruit
left AVF positive thrill and bruit

## 2017-03-31 LAB
-  AMIKACIN: SIGNIFICANT CHANGE UP
-  AMPICILLIN/SULBACTAM: SIGNIFICANT CHANGE UP
-  AMPICILLIN: SIGNIFICANT CHANGE UP
-  CEFAZOLIN: <=8 — SIGNIFICANT CHANGE UP
-  CEFAZOLIN: SIGNIFICANT CHANGE UP
-  CEFTRIAXONE: <=1 — SIGNIFICANT CHANGE UP
-  CEFTRIAXONE: SIGNIFICANT CHANGE UP
-  CIPROFLOXACIN: SIGNIFICANT CHANGE UP
-  GENTAMICIN: SIGNIFICANT CHANGE UP
-  PIPERACILLIN/TAZOBACTAM: <=16 — SIGNIFICANT CHANGE UP
-  PIPERACILLIN/TAZOBACTAM: SIGNIFICANT CHANGE UP
-  TOBRAMYCIN: SIGNIFICANT CHANGE UP
-  TRIMETHOPRIM/SULFAMETHOXAZOLE: SIGNIFICANT CHANGE UP
METHOD TYPE: SIGNIFICANT CHANGE UP

## 2017-04-01 LAB — 24R-OH-CALCIDIOL SERPL-MCNC: 26.1 NG/ML — LOW (ref 30–100)

## 2017-04-02 LAB
CULTURE RESULTS: SIGNIFICANT CHANGE UP
CULTURE RESULTS: SIGNIFICANT CHANGE UP
SPECIMEN SOURCE: SIGNIFICANT CHANGE UP
SPECIMEN SOURCE: SIGNIFICANT CHANGE UP

## 2017-04-03 DIAGNOSIS — G30.9 ALZHEIMER'S DISEASE, UNSPECIFIED: ICD-10-CM

## 2017-04-03 DIAGNOSIS — K22.0 ACHALASIA OF CARDIA: ICD-10-CM

## 2017-04-03 DIAGNOSIS — I25.10 ATHEROSCLEROTIC HEART DISEASE OF NATIVE CORONARY ARTERY WITHOUT ANGINA PECTORIS: ICD-10-CM

## 2017-04-03 DIAGNOSIS — K21.9 GASTRO-ESOPHAGEAL REFLUX DISEASE WITHOUT ESOPHAGITIS: ICD-10-CM

## 2017-04-03 DIAGNOSIS — F32.9 MAJOR DEPRESSIVE DISORDER, SINGLE EPISODE, UNSPECIFIED: ICD-10-CM

## 2017-04-03 DIAGNOSIS — R13.10 DYSPHAGIA, UNSPECIFIED: ICD-10-CM

## 2017-04-03 DIAGNOSIS — M06.9 RHEUMATOID ARTHRITIS, UNSPECIFIED: ICD-10-CM

## 2017-04-03 DIAGNOSIS — I50.9 HEART FAILURE, UNSPECIFIED: ICD-10-CM

## 2017-04-03 DIAGNOSIS — N18.6 END STAGE RENAL DISEASE: ICD-10-CM

## 2017-04-03 DIAGNOSIS — J44.9 CHRONIC OBSTRUCTIVE PULMONARY DISEASE, UNSPECIFIED: ICD-10-CM

## 2017-04-03 DIAGNOSIS — Z99.2 DEPENDENCE ON RENAL DIALYSIS: ICD-10-CM

## 2017-04-03 DIAGNOSIS — Z51.5 ENCOUNTER FOR PALLIATIVE CARE: ICD-10-CM

## 2017-04-03 DIAGNOSIS — A41.9 SEPSIS, UNSPECIFIED ORGANISM: ICD-10-CM

## 2017-04-03 DIAGNOSIS — R41.82 ALTERED MENTAL STATUS, UNSPECIFIED: ICD-10-CM

## 2017-04-03 DIAGNOSIS — G62.9 POLYNEUROPATHY, UNSPECIFIED: ICD-10-CM

## 2017-04-03 DIAGNOSIS — J69.0 PNEUMONITIS DUE TO INHALATION OF FOOD AND VOMIT: ICD-10-CM

## 2017-04-03 DIAGNOSIS — Z66 DO NOT RESUSCITATE: ICD-10-CM

## 2017-04-03 DIAGNOSIS — D63.1 ANEMIA IN CHRONIC KIDNEY DISEASE: ICD-10-CM

## 2017-04-03 DIAGNOSIS — Z79.4 LONG TERM (CURRENT) USE OF INSULIN: ICD-10-CM

## 2017-04-03 DIAGNOSIS — F02.80 DEMENTIA IN OTHER DISEASES CLASSIFIED ELSEWHERE, UNSPECIFIED SEVERITY, WITHOUT BEHAVIORAL DISTURBANCE, PSYCHOTIC DISTURBANCE, MOOD DISTURBANCE, AND ANXIETY: ICD-10-CM

## 2017-04-03 DIAGNOSIS — Z79.82 LONG TERM (CURRENT) USE OF ASPIRIN: ICD-10-CM

## 2017-04-03 DIAGNOSIS — I73.9 PERIPHERAL VASCULAR DISEASE, UNSPECIFIED: ICD-10-CM

## 2017-04-03 DIAGNOSIS — I13.2 HYPERTENSIVE HEART AND CHRONIC KIDNEY DISEASE WITH HEART FAILURE AND WITH STAGE 5 CHRONIC KIDNEY DISEASE, OR END STAGE RENAL DISEASE: ICD-10-CM

## 2017-04-03 DIAGNOSIS — H54.8 LEGAL BLINDNESS, AS DEFINED IN USA: ICD-10-CM

## 2017-04-03 DIAGNOSIS — R09.02 HYPOXEMIA: ICD-10-CM

## 2017-04-03 DIAGNOSIS — R56.1 POST TRAUMATIC SEIZURES: ICD-10-CM

## 2017-04-03 DIAGNOSIS — B96.20 UNSPECIFIED ESCHERICHIA COLI [E. COLI] AS THE CAUSE OF DISEASES CLASSIFIED ELSEWHERE: ICD-10-CM

## 2017-04-03 DIAGNOSIS — E78.5 HYPERLIPIDEMIA, UNSPECIFIED: ICD-10-CM

## 2017-04-03 LAB
CULTURE RESULTS: SIGNIFICANT CHANGE UP
CULTURE RESULTS: SIGNIFICANT CHANGE UP
ORGANISM # SPEC MICROSCOPIC CNT: SIGNIFICANT CHANGE UP
SPECIMEN SOURCE: SIGNIFICANT CHANGE UP
SPECIMEN SOURCE: SIGNIFICANT CHANGE UP

## 2017-04-20 ENCOUNTER — APPOINTMENT (OUTPATIENT)
Dept: GASTROENTEROLOGY | Facility: CLINIC | Age: 82
End: 2017-04-20

## 2017-06-19 ENCOUNTER — INPATIENT (INPATIENT)
Facility: HOSPITAL | Age: 82
LOS: 13 days | Discharge: HOSPICE | DRG: 871 | End: 2017-07-03
Attending: INTERNAL MEDICINE | Admitting: INTERNAL MEDICINE
Payer: MEDICARE

## 2017-06-19 VITALS
SYSTOLIC BLOOD PRESSURE: 104 MMHG | HEART RATE: 60 BPM | OXYGEN SATURATION: 99 % | TEMPERATURE: 101 F | RESPIRATION RATE: 20 BRPM | DIASTOLIC BLOOD PRESSURE: 64 MMHG

## 2017-06-19 DIAGNOSIS — J96.00 ACUTE RESPIRATORY FAILURE, UNSPECIFIED WHETHER WITH HYPOXIA OR HYPERCAPNIA: ICD-10-CM

## 2017-06-19 DIAGNOSIS — Z99.2 DEPENDENCE ON RENAL DIALYSIS: Chronic | ICD-10-CM

## 2017-06-19 DIAGNOSIS — I50.9 HEART FAILURE, UNSPECIFIED: ICD-10-CM

## 2017-06-19 DIAGNOSIS — A41.9 SEPSIS, UNSPECIFIED ORGANISM: ICD-10-CM

## 2017-06-19 DIAGNOSIS — Z29.9 ENCOUNTER FOR PROPHYLACTIC MEASURES, UNSPECIFIED: ICD-10-CM

## 2017-06-19 DIAGNOSIS — R63.8 OTHER SYMPTOMS AND SIGNS CONCERNING FOOD AND FLUID INTAKE: ICD-10-CM

## 2017-06-19 DIAGNOSIS — D64.9 ANEMIA, UNSPECIFIED: ICD-10-CM

## 2017-06-19 DIAGNOSIS — Z51.5 ENCOUNTER FOR PALLIATIVE CARE: ICD-10-CM

## 2017-06-19 DIAGNOSIS — I10 ESSENTIAL (PRIMARY) HYPERTENSION: ICD-10-CM

## 2017-06-19 DIAGNOSIS — E11.9 TYPE 2 DIABETES MELLITUS WITHOUT COMPLICATIONS: ICD-10-CM

## 2017-06-19 DIAGNOSIS — J44.9 CHRONIC OBSTRUCTIVE PULMONARY DISEASE, UNSPECIFIED: ICD-10-CM

## 2017-06-19 DIAGNOSIS — J18.9 PNEUMONIA, UNSPECIFIED ORGANISM: ICD-10-CM

## 2017-06-19 DIAGNOSIS — N18.9 CHRONIC KIDNEY DISEASE, UNSPECIFIED: ICD-10-CM

## 2017-06-19 DIAGNOSIS — E87.6 HYPOKALEMIA: ICD-10-CM

## 2017-06-19 DIAGNOSIS — N18.6 END STAGE RENAL DISEASE: ICD-10-CM

## 2017-06-19 LAB
ALBUMIN SERPL ELPH-MCNC: 3.3 G/DL — SIGNIFICANT CHANGE UP (ref 3.3–5)
ALBUMIN SERPL ELPH-MCNC: 3.5 G/DL — SIGNIFICANT CHANGE UP (ref 3.3–5)
ALP SERPL-CCNC: 83 U/L — SIGNIFICANT CHANGE UP (ref 40–120)
ALP SERPL-CCNC: 92 U/L — SIGNIFICANT CHANGE UP (ref 40–120)
ALT FLD-CCNC: 10 U/L — SIGNIFICANT CHANGE UP (ref 10–45)
ALT FLD-CCNC: 10 U/L — SIGNIFICANT CHANGE UP (ref 10–45)
ANION GAP SERPL CALC-SCNC: 20 MMOL/L — HIGH (ref 5–17)
ANION GAP SERPL CALC-SCNC: 22 MMOL/L — HIGH (ref 5–17)
ANION GAP SERPL CALC-SCNC: 24 MMOL/L — HIGH (ref 5–17)
APTT BLD: 28.5 SEC — SIGNIFICANT CHANGE UP (ref 27.5–37.4)
AST SERPL-CCNC: 18 U/L — SIGNIFICANT CHANGE UP (ref 10–40)
AST SERPL-CCNC: 26 U/L — SIGNIFICANT CHANGE UP (ref 10–40)
BASE EXCESS BLDV CALC-SCNC: -0.3 MMOL/L — SIGNIFICANT CHANGE UP
BASE EXCESS BLDV CALC-SCNC: 1.5 MMOL/L — SIGNIFICANT CHANGE UP
BASOPHILS NFR BLD AUTO: 0.3 % — SIGNIFICANT CHANGE UP (ref 0–2)
BILIRUB SERPL-MCNC: 0.5 MG/DL — SIGNIFICANT CHANGE UP (ref 0.2–1.2)
BILIRUB SERPL-MCNC: 0.5 MG/DL — SIGNIFICANT CHANGE UP (ref 0.2–1.2)
BUN SERPL-MCNC: 39 MG/DL — HIGH (ref 7–23)
BUN SERPL-MCNC: 68 MG/DL — HIGH (ref 7–23)
BUN SERPL-MCNC: 71 MG/DL — HIGH (ref 7–23)
CA-I SERPL-SCNC: 1.1 MMOL/L — LOW (ref 1.12–1.3)
CALCIUM SERPL-MCNC: 8.5 MG/DL — SIGNIFICANT CHANGE UP (ref 8.4–10.5)
CALCIUM SERPL-MCNC: 8.6 MG/DL — SIGNIFICANT CHANGE UP (ref 8.4–10.5)
CALCIUM SERPL-MCNC: 9 MG/DL — SIGNIFICANT CHANGE UP (ref 8.4–10.5)
CHLORIDE SERPL-SCNC: 89 MMOL/L — LOW (ref 96–108)
CHLORIDE SERPL-SCNC: 92 MMOL/L — LOW (ref 96–108)
CHLORIDE SERPL-SCNC: 95 MMOL/L — LOW (ref 96–108)
CO2 SERPL-SCNC: 23 MMOL/L — SIGNIFICANT CHANGE UP (ref 22–31)
CO2 SERPL-SCNC: 24 MMOL/L — SIGNIFICANT CHANGE UP (ref 22–31)
CO2 SERPL-SCNC: 24 MMOL/L — SIGNIFICANT CHANGE UP (ref 22–31)
CREAT SERPL-MCNC: 4.6 MG/DL — HIGH (ref 0.5–1.3)
CREAT SERPL-MCNC: 7.3 MG/DL — HIGH (ref 0.5–1.3)
CREAT SERPL-MCNC: 7.5 MG/DL — HIGH (ref 0.5–1.3)
EOSINOPHIL NFR BLD AUTO: 0.1 % — SIGNIFICANT CHANGE UP (ref 0–6)
GAS PNL BLDV: 137 MMOL/L — LOW (ref 138–146)
GAS PNL BLDV: SIGNIFICANT CHANGE UP
GLUCOSE SERPL-MCNC: 107 MG/DL — HIGH (ref 70–99)
GLUCOSE SERPL-MCNC: 125 MG/DL — HIGH (ref 70–99)
GLUCOSE SERPL-MCNC: 129 MG/DL — HIGH (ref 70–99)
HCO3 BLDV-SCNC: 27 MMOL/L — SIGNIFICANT CHANGE UP (ref 20–27)
HCO3 BLDV-SCNC: 28 MMOL/L — HIGH (ref 20–27)
HCT VFR BLD CALC: 23.9 % — LOW (ref 39–50)
HGB BLD-MCNC: 8.1 G/DL — LOW (ref 13–17)
INR BLD: 1.25 — HIGH (ref 0.88–1.16)
LACTATE SERPL-SCNC: 1.1 MMOL/L — SIGNIFICANT CHANGE UP (ref 0.5–2)
LYMPHOCYTES # BLD AUTO: 24.1 % — SIGNIFICANT CHANGE UP (ref 13–44)
MAGNESIUM SERPL-MCNC: 1.8 MG/DL — SIGNIFICANT CHANGE UP (ref 1.6–2.6)
MCHC RBC-ENTMCNC: 31.8 PG — SIGNIFICANT CHANGE UP (ref 27–34)
MCHC RBC-ENTMCNC: 33.9 G/DL — SIGNIFICANT CHANGE UP (ref 32–36)
MCV RBC AUTO: 93.7 FL — SIGNIFICANT CHANGE UP (ref 80–100)
MONOCYTES NFR BLD AUTO: 5.2 % — SIGNIFICANT CHANGE UP (ref 2–14)
NEUTROPHILS NFR BLD AUTO: 70.3 % — SIGNIFICANT CHANGE UP (ref 43–77)
PCO2 BLDV: 53 MMHG — HIGH (ref 41–51)
PCO2 BLDV: 58 MMHG — HIGH (ref 41–51)
PH BLDV: 7.3 — LOW (ref 7.32–7.43)
PH BLDV: 7.34 — SIGNIFICANT CHANGE UP (ref 7.32–7.43)
PHOSPHATE SERPL-MCNC: 2.5 MG/DL — SIGNIFICANT CHANGE UP (ref 2.5–4.5)
PLATELET # BLD AUTO: 172 K/UL — SIGNIFICANT CHANGE UP (ref 150–400)
PO2 BLDV: 30 MMHG — SIGNIFICANT CHANGE UP
PO2 BLDV: 44 MMHG — SIGNIFICANT CHANGE UP
POTASSIUM BLDV-SCNC: 2.3 MMOL/L — CRITICAL LOW (ref 3.5–4.9)
POTASSIUM SERPL-MCNC: 2.5 MMOL/L — CRITICAL LOW (ref 3.5–5.3)
POTASSIUM SERPL-MCNC: 2.6 MMOL/L — CRITICAL LOW (ref 3.5–5.3)
POTASSIUM SERPL-MCNC: 2.6 MMOL/L — CRITICAL LOW (ref 3.5–5.3)
POTASSIUM SERPL-SCNC: 2.5 MMOL/L — CRITICAL LOW (ref 3.5–5.3)
POTASSIUM SERPL-SCNC: 2.6 MMOL/L — CRITICAL LOW (ref 3.5–5.3)
POTASSIUM SERPL-SCNC: 2.6 MMOL/L — CRITICAL LOW (ref 3.5–5.3)
PROT SERPL-MCNC: 7.2 G/DL — SIGNIFICANT CHANGE UP (ref 6–8.3)
PROT SERPL-MCNC: 8 G/DL — SIGNIFICANT CHANGE UP (ref 6–8.3)
PROTHROM AB SERPL-ACNC: 13.9 SEC — HIGH (ref 9.8–12.7)
RBC # BLD: 2.55 M/UL — LOW (ref 4.2–5.8)
RBC # FLD: 18 % — HIGH (ref 10.3–16.9)
SAO2 % BLDV: 30 % — SIGNIFICANT CHANGE UP
SAO2 % BLDV: 74 % — SIGNIFICANT CHANGE UP
SODIUM SERPL-SCNC: 135 MMOL/L — SIGNIFICANT CHANGE UP (ref 135–145)
SODIUM SERPL-SCNC: 139 MMOL/L — SIGNIFICANT CHANGE UP (ref 135–145)
SODIUM SERPL-SCNC: 139 MMOL/L — SIGNIFICANT CHANGE UP (ref 135–145)
WBC # BLD: 14.5 K/UL — HIGH (ref 3.8–10.5)
WBC # FLD AUTO: 14.5 K/UL — HIGH (ref 3.8–10.5)

## 2017-06-19 PROCEDURE — 99285 EMERGENCY DEPT VISIT HI MDM: CPT | Mod: 25

## 2017-06-19 PROCEDURE — 71010: CPT | Mod: 26

## 2017-06-19 PROCEDURE — 99223 1ST HOSP IP/OBS HIGH 75: CPT

## 2017-06-19 PROCEDURE — 93010 ELECTROCARDIOGRAM REPORT: CPT | Mod: 76

## 2017-06-19 PROCEDURE — 90935 HEMODIALYSIS ONE EVALUATION: CPT | Mod: GC

## 2017-06-19 PROCEDURE — 93010 ELECTROCARDIOGRAM REPORT: CPT

## 2017-06-19 RX ORDER — PIPERACILLIN AND TAZOBACTAM 4; .5 G/20ML; G/20ML
2.25 INJECTION, POWDER, LYOPHILIZED, FOR SOLUTION INTRAVENOUS EVERY 8 HOURS
Qty: 0 | Refills: 0 | Status: DISCONTINUED | OUTPATIENT
Start: 2017-06-19 | End: 2017-06-23

## 2017-06-19 RX ORDER — VANCOMYCIN HCL 1 G
1000 VIAL (EA) INTRAVENOUS ONCE
Qty: 0 | Refills: 0 | Status: COMPLETED | OUTPATIENT
Start: 2017-06-19 | End: 2017-06-19

## 2017-06-19 RX ORDER — PIPERACILLIN AND TAZOBACTAM 4; .5 G/20ML; G/20ML
2.25 INJECTION, POWDER, LYOPHILIZED, FOR SOLUTION INTRAVENOUS ONCE
Qty: 0 | Refills: 0 | Status: DISCONTINUED | OUTPATIENT
Start: 2017-06-19 | End: 2017-06-19

## 2017-06-19 RX ORDER — PIPERACILLIN AND TAZOBACTAM 4; .5 G/20ML; G/20ML
3.38 INJECTION, POWDER, LYOPHILIZED, FOR SOLUTION INTRAVENOUS ONCE
Qty: 0 | Refills: 0 | Status: COMPLETED | OUTPATIENT
Start: 2017-06-19 | End: 2017-06-19

## 2017-06-19 RX ORDER — HEPARIN SODIUM 5000 [USP'U]/ML
INJECTION INTRAVENOUS; SUBCUTANEOUS
Qty: 0 | Refills: 0 | Status: COMPLETED | OUTPATIENT
Start: 2017-06-19 | End: 2017-06-19

## 2017-06-19 RX ORDER — ASPIRIN/CALCIUM CARB/MAGNESIUM 324 MG
81 TABLET ORAL DAILY
Qty: 0 | Refills: 0 | Status: DISCONTINUED | OUTPATIENT
Start: 2017-06-19 | End: 2017-07-03

## 2017-06-19 RX ORDER — CALCITRIOL 0.5 UG/1
2 CAPSULE ORAL ONCE
Qty: 0 | Refills: 0 | Status: COMPLETED | OUTPATIENT
Start: 2017-06-19 | End: 2017-06-19

## 2017-06-19 RX ORDER — INSULIN LISPRO 100/ML
VIAL (ML) SUBCUTANEOUS
Qty: 0 | Refills: 0 | Status: DISCONTINUED | OUTPATIENT
Start: 2017-06-19 | End: 2017-06-27

## 2017-06-19 RX ORDER — MIDODRINE HYDROCHLORIDE 2.5 MG/1
5 TABLET ORAL EVERY 8 HOURS
Qty: 0 | Refills: 0 | Status: DISCONTINUED | OUTPATIENT
Start: 2017-06-19 | End: 2017-06-29

## 2017-06-19 RX ORDER — GABAPENTIN 400 MG/1
100 CAPSULE ORAL
Qty: 0 | Refills: 0 | Status: DISCONTINUED | OUTPATIENT
Start: 2017-06-19 | End: 2017-07-03

## 2017-06-19 RX ORDER — NITROGLYCERIN 6.5 MG
1 CAPSULE, EXTENDED RELEASE ORAL DAILY
Qty: 0 | Refills: 0 | Status: DISCONTINUED | OUTPATIENT
Start: 2017-06-19 | End: 2017-06-26

## 2017-06-19 RX ORDER — ERYTHROPOIETIN 10000 [IU]/ML
7000 INJECTION, SOLUTION INTRAVENOUS; SUBCUTANEOUS ONCE
Qty: 0 | Refills: 0 | Status: COMPLETED | OUTPATIENT
Start: 2017-06-19 | End: 2017-06-19

## 2017-06-19 RX ORDER — HEPARIN SODIUM 5000 [USP'U]/ML
1000 INJECTION INTRAVENOUS; SUBCUTANEOUS ONCE
Qty: 0 | Refills: 0 | Status: COMPLETED | OUTPATIENT
Start: 2017-06-19 | End: 2017-06-19

## 2017-06-19 RX ORDER — LEVETIRACETAM 250 MG/1
500 TABLET, FILM COATED ORAL
Qty: 0 | Refills: 0 | Status: DISCONTINUED | OUTPATIENT
Start: 2017-06-19 | End: 2017-07-03

## 2017-06-19 RX ORDER — METOCLOPRAMIDE HCL 10 MG
10 TABLET ORAL
Qty: 0 | Refills: 0 | Status: DISCONTINUED | OUTPATIENT
Start: 2017-06-19 | End: 2017-06-22

## 2017-06-19 RX ORDER — MIRTAZAPINE 45 MG/1
15 TABLET, ORALLY DISINTEGRATING ORAL AT BEDTIME
Qty: 0 | Refills: 0 | Status: DISCONTINUED | OUTPATIENT
Start: 2017-06-19 | End: 2017-06-26

## 2017-06-19 RX ORDER — SODIUM CHLORIDE 9 MG/ML
500 INJECTION INTRAMUSCULAR; INTRAVENOUS; SUBCUTANEOUS ONCE
Qty: 0 | Refills: 0 | Status: COMPLETED | OUTPATIENT
Start: 2017-06-19 | End: 2017-06-19

## 2017-06-19 RX ORDER — POTASSIUM CHLORIDE 20 MEQ
10 PACKET (EA) ORAL
Qty: 0 | Refills: 0 | Status: COMPLETED | OUTPATIENT
Start: 2017-06-19 | End: 2017-06-19

## 2017-06-19 RX ORDER — HEPARIN SODIUM 5000 [USP'U]/ML
500 INJECTION INTRAVENOUS; SUBCUTANEOUS
Qty: 0 | Refills: 0 | Status: COMPLETED | OUTPATIENT
Start: 2017-06-19 | End: 2017-06-19

## 2017-06-19 RX ORDER — HEPARIN SODIUM 5000 [USP'U]/ML
5000 INJECTION INTRAVENOUS; SUBCUTANEOUS EVERY 8 HOURS
Qty: 0 | Refills: 0 | Status: DISCONTINUED | OUTPATIENT
Start: 2017-06-19 | End: 2017-06-29

## 2017-06-19 RX ORDER — CALCIUM ACETATE 667 MG
667 TABLET ORAL
Qty: 0 | Refills: 0 | Status: DISCONTINUED | OUTPATIENT
Start: 2017-06-19 | End: 2017-06-21

## 2017-06-19 RX ORDER — ACETAMINOPHEN 500 MG
650 TABLET ORAL ONCE
Qty: 0 | Refills: 0 | Status: COMPLETED | OUTPATIENT
Start: 2017-06-19 | End: 2017-06-19

## 2017-06-19 RX ORDER — FOLIC ACID 0.8 MG
1 TABLET ORAL DAILY
Qty: 0 | Refills: 0 | Status: DISCONTINUED | OUTPATIENT
Start: 2017-06-19 | End: 2017-06-26

## 2017-06-19 RX ORDER — SIMVASTATIN 20 MG/1
20 TABLET, FILM COATED ORAL AT BEDTIME
Qty: 0 | Refills: 0 | Status: DISCONTINUED | OUTPATIENT
Start: 2017-06-19 | End: 2017-06-26

## 2017-06-19 RX ADMIN — HEPARIN SODIUM 0.1 UNIT(S): 5000 INJECTION INTRAVENOUS; SUBCUTANEOUS at 16:30

## 2017-06-19 RX ADMIN — Medication 100 MILLIEQUIVALENT(S): at 09:21

## 2017-06-19 RX ADMIN — HEPARIN SODIUM 0.1 UNIT(S): 5000 INJECTION INTRAVENOUS; SUBCUTANEOUS at 15:30

## 2017-06-19 RX ADMIN — HEPARIN SODIUM 5000 UNIT(S): 5000 INJECTION INTRAVENOUS; SUBCUTANEOUS at 13:07

## 2017-06-19 RX ADMIN — ERYTHROPOIETIN 7000 UNIT(S): 10000 INJECTION, SOLUTION INTRAVENOUS; SUBCUTANEOUS at 18:22

## 2017-06-19 RX ADMIN — PIPERACILLIN AND TAZOBACTAM 200 GRAM(S): 4; .5 INJECTION, POWDER, LYOPHILIZED, FOR SOLUTION INTRAVENOUS at 23:15

## 2017-06-19 RX ADMIN — CALCITRIOL 2 MICROGRAM(S): 0.5 CAPSULE ORAL at 18:23

## 2017-06-19 RX ADMIN — Medication 100 MILLIEQUIVALENT(S): at 11:00

## 2017-06-19 RX ADMIN — Medication 1 PATCH: at 13:48

## 2017-06-19 RX ADMIN — Medication 250 MILLIGRAM(S): at 06:39

## 2017-06-19 RX ADMIN — Medication 650 MILLIGRAM(S): at 06:39

## 2017-06-19 RX ADMIN — HEPARIN SODIUM 5000 UNIT(S): 5000 INJECTION INTRAVENOUS; SUBCUTANEOUS at 23:16

## 2017-06-19 RX ADMIN — HEPARIN SODIUM 0.1 UNIT(S): 5000 INJECTION INTRAVENOUS; SUBCUTANEOUS at 17:30

## 2017-06-19 RX ADMIN — SODIUM CHLORIDE 500 MILLILITER(S): 9 INJECTION INTRAMUSCULAR; INTRAVENOUS; SUBCUTANEOUS at 05:50

## 2017-06-19 RX ADMIN — Medication 250 MILLIGRAM(S): at 21:49

## 2017-06-19 RX ADMIN — PIPERACILLIN AND TAZOBACTAM 200 GRAM(S): 4; .5 INJECTION, POWDER, LYOPHILIZED, FOR SOLUTION INTRAVENOUS at 13:48

## 2017-06-19 RX ADMIN — PIPERACILLIN AND TAZOBACTAM 200 GRAM(S): 4; .5 INJECTION, POWDER, LYOPHILIZED, FOR SOLUTION INTRAVENOUS at 06:39

## 2017-06-19 RX ADMIN — Medication 100 MILLIEQUIVALENT(S): at 13:06

## 2017-06-19 NOTE — ED ADULT NURSE NOTE - OBJECTIVE STATEMENT
Pt BIBA from nursing home due to desat to 85% on RA. Pt moans to verbal stimulation. Pt febrile on arrival. Respiratory at bedside to place pt on BIPAP. Pt with h/o of dementia, baseline unknown.

## 2017-06-19 NOTE — CONSULT NOTE ADULT - SUBJECTIVE AND OBJECTIVE BOX
LUIS GARDNER   MRN-[id number]     : 1929    HPI:  The patient is an 88 yo M with ESRD (MWF HD), Alzheimer's dementia, CAD, COPD, angina, anemia, CHF, depression, HTN, HLD, OA, PVD, polyneuropathy, blindness, seizures, IDDM, and recurrent aspiration PNA 2/2 Zenker's diverticulum who presented with AMS for a few hours and found to have a temp of 102 with cough. Of note, the patient is a poor historian so the HPI is limited. The patient was initially noted to desaturate to the high 80's. The patient denied any chills, nausea, vomiting, diarrhea.     In the ED, T 101.3, HR 60, /64, RR 20, 99% on O2 NC    given Tylenol x1, vanco x1 and zosyn, poatssium IV 10 MeQ x3, BC x1 sent    CXR significant for LLL infiltrate    Unable to obtain urine culture    labs significant for lactate negative, WBC 14.5, K 2.6, Cr 7.3    ICU was consulted in the setting of a K 2.6, BiPAP placed in setting of respiratory distress and patient comfortable after BiPAP placed    ICU deemed patient stable for regional medical floor, especially in setting of DNR/DNI status (2017 10:40)    PAST MEDICAL & SURGICAL HISTORY:  AV graft thrombosis  Osteoarthritis  PVD (peripheral vascular disease)  COPD (chronic obstructive pulmonary disease)  Heart failure  Angina pectoris  ESRD (end stage renal disease)  Seizures: post traumatic  CAD (coronary artery disease)  HTN (hypertension)  Polyneuropathy  Hyperlipidemia  Dysphagia  Hypotension  Alzheimers disease  Osteoarthritis: Osteoarthritis  Chronic obstructive pulmonary disease: Chronic obstructive pulmonary disease  Anemia: Anemia  Dementia without behavioral disturbance: Dementia  Atherosclerosis of coronary artery: CAD (coronary artery disease)  Nondependent alcohol abuse: ETOH abuse  Depressive disorder: Depression  End-stage renal disease: ESRD on hemodialysis  Essential hypertension: HTN (hypertension)  Deep venous embolism and thrombosis of lower extremity: DVT (deep venous thrombosis)  Congestive heart failure: CHF (congestive heart failure)  Legal blindness: Legally blind  Hemodialysis access, AV graft: LUE loop AVG  Acquired arteriovenous fistula: AV fistula    FAMILY HISTORY:  Family history unknown    SOC HX: NH Resident, never , no children, retired musician, no family, has only one friend pt has previously elected as HCP but HCP Estrellita Butler refuses to serve role    ROS:    Unable to attain due to: lethargy                    DYSPNEA (Charanjit 0-10):                        N/V (Y/N):                              SECRETIONS (Y/N):                AGITATION(Y/N):   PAIN (Y/N):          -Provocation/Palliation:     -Quality/Quantity:     -Radiating:     -Severity:     -Timing/Frequency:     -Impact on ADLs:    ALLERGIES:  Allergies    clonidine (Unknown)    Intolerances    OPIATE NAIVE (Y/N): y  -ISTOP REVIEWED(Y/N): y, ref #[id number]    MEDICATIONS:      MEDICATIONS  (STANDING):  potassium chloride  10 mEq/100 mL IVPB 10milliEquivalent(s) IV Intermittent every 1 hour  epoetin nicki Injectable 7000Unit(s) IV Push Once  calcitriol Injectable 2MICROGram(s) IV Push Once  heparin  Injectable 5000Unit(s) SubCutaneous every 8 hours  aspirin  chewable 81milliGRAM(s) Oral daily  nitroglycerin    Patch 0.1 mG/Hr(s) 1patch Transdermal daily  levETIRAcetam 500milliGRAM(s) Oral two times a day  gabapentin 100milliGRAM(s) Oral two times a day  metoclopramide 10milliGRAM(s) Oral three times a day with meals  simvastatin 20milliGRAM(s) Oral at bedtime  midodrine 5milliGRAM(s) Oral every 8 hours  calcium acetate 667milliGRAM(s) Oral three times a day with meals  folic acid 1milliGRAM(s) Oral daily  mirtazapine 15milliGRAM(s) Oral at bedtime  insulin lispro (HumaLOG) corrective regimen sliding scale  SubCutaneous Before meals and at bedtime  piperacillin/tazobactam IVPB. 2.25Gram(s) IV Intermittent every 8 hours    MEDICATIONS  (PRN):    PHYSICAL EXAM:  Vital Signs Last 24 Hrs  T(C): 37, Max: 38.6 ( @ 06:48)  T(F): 98.6, Max: 101.4 ( @ 06:48)  HR: 68 (56 - 68)  BP: 122/54 (104/64 - 137/63)  BP(mean): --  RR: 19 (19 - 26)  SpO2: 97% (50% - 100%)  Daily     Daily   CAPILLARY BLOOD GLUCOSE  132 (2017 12:34)    I&O's Summary    GENERAL: Chronically ill looking  HEENT: mild OU opening with strong tactile stimuli without any tracking, no icterus, no nasal discharge, dry mucous membranes  NECK: supple, no palp masses   CVS: nl S1S2,   RESP: bipap, bilateral rhonchi  GI: +BS, softly distended, NT     : left AV graft with +bruit/thrill    MS: min UE spont. mov't, no edema in lower with atrophy    NEURO: lethargic, mildly arouseable with mod. tactile stimuli   PSYCH: lethargic  SKIN: no open sores noted   LYMPH: no supraclavicular LAd  KARNOFSKY: PRE-ADMIT=  30-40%               CURRENT=  20%  CACHEXIA (Y/N): n    LABS:    CBC:                        8.1    14.5  )-----------( 172      ( 2017 06:30 )             23.9     CMP:        135  |  89<L>  |  68<H>  ----------------------------<  125<H>  2.6<LL>   |  24  |  7.30<H>    Ca    9.0      2017 08:43    TPro  8.0  /  Alb  3.5  /  TBili  0.5  /  DBili  x   /  AST  26  /  ALT  10  /  AlkPhos  92  06-19    PT/INR - ( 2017 08:43 )   PT: 13.9 sec;   INR: 1.25     PTT - ( 2017 08:43 )  PTT:28.5 sec  LIVER FUNCTIONS - ( 2017 08:43 )  Alb: 3.5 g/dL / Pro: 8.0 g/dL / ALK PHOS: 92 U/L / ALT: 10 U/L / AST: 26 U/L / GGT: x           IMAGING: pending    ADVANCED DIRECTIVES:    DNR/DNI       DECISION MAKER:  LEGAL SURROGATE: pt had previously elected friend estrellita Butler to be HCP, but she refuses role    GOALS OF CARE DISCUSSION       Palliative care info/counseling provided	           Documentation of GOC: 	          REFERRALS       - has declined repeatedly from previous admits	        Unit SW/Case Mgmt       PT/OT

## 2017-06-19 NOTE — CONSULT NOTE ADULT - SUBJECTIVE AND OBJECTIVE BOX
Patient is a 88y old  Male who presents with a chief complaint of fever    HPI: Pt is an 89 yo M w/pmhx of blindness, dCHF (EF = 60-65%), HTN, HLD, OA, PVD, anemia, COPD, OA, alzheimer's dementia, recurrent aspiration PNA secondary to zenker's diverticulum, CAD, ESR on HD (M,W,F, follows with Dr. Child), w/recent admission w/e.coli bacteremia who presents today from [facility] after. The pt cannot give a history at this time      Allergies    clonidine (Unknown)    Intolerances        MEDICATIONS  (STANDING):    MEDICATIONS  (PRN):      Drug Dosing Weight  Height (cm): 182.9 (27 Mar 2017 09:19)  Weight (kg): 79.4 (19 Jun 2017 07:04)  BMI (kg/m2): 23.7 (19 Jun 2017 07:04)  BSA (m2): 2.01 (19 Jun 2017 07:04)    PAST MEDICAL & SURGICAL HISTORY:  AV graft thrombosis  Osteoarthritis  PVD (peripheral vascular disease)  COPD (chronic obstructive pulmonary disease)  Heart failure  Angina pectoris  ESRD (end stage renal disease)  Seizures: post traumatic  CAD (coronary artery disease)  HTN (hypertension)  Polyneuropathy  Hyperlipidemia  Dysphagia  Hypotension  Alzheimers disease  Osteoarthritis: Osteoarthritis  Chronic obstructive pulmonary disease: Chronic obstructive pulmonary disease  Anemia: Anemia  Dementia without behavioral disturbance: Dementia  Atherosclerosis of coronary artery: CAD (coronary artery disease)  Nondependent alcohol abuse: ETOH abuse  Depressive disorder: Depression  End-stage renal disease: ESRD on hemodialysis  Essential hypertension: HTN (hypertension)  Deep venous embolism and thrombosis of lower extremity: DVT (deep venous thrombosis)  Congestive heart failure: CHF (congestive heart failure)  Legal blindness: Legally blind  Hemodialysis access, AV graft: LUE loop AVG  Acquired arteriovenous fistula: AV fistula      FAMILY HISTORY:  Family history unknown      SOCIAL HISTORY:    ADVANCE DIRECTIVES: DNR/DNI    REVIEW OF SYSTEMS: unable to obtain due to altered mental status      Vital Signs Last 24 Hrs  T(C): 38.6, Max: 38.6 (06-19 @ 06:48)  T(F): 101.4, Max: 101.4 (06-19 @ 06:48)  HR: 56 (56 - 66)  BP: 117/56 (104/64 - 117/56)  BP(mean): --  ABP: --  ABP(mean): --  RR: 22 (20 - 26)  SpO2: 100% (99% - 100%)          I&O's Detail      PHYSICAL EXAM:    Constitutional: WDWN resting comfortably in bed; NAD  Head: NC/AT  Eyes: PERRL, EOMI, clear conjunctiva  ENT: no nasal discharge; uvula midline, no oropharyngeal erythema or exudates; MMM  Neck: supple; no JVD or thyromegaly  Respiratory: CTA B/L; no W/R/R, no retractions  Cardiac: +S1/S2; RRR; no M/R/G; PMI non-displaced  Gastrointestinal: soft, NT/ND; no rebound or guarding; +BSx4  Genitourinary: normal external genitalia  Back: spine midline, no bony tenderness or step-offs; no CVAT B/L  Extremities: WWP, no clubbing or cyanosis; no peripheral edema  Musculoskeletal: NROM x4; no joint swelling, tenderness or erythema  Vascular: 2+ radial, femoral, DP/PT pulses B/L  Dermatologic: skin warm, dry and intact; no rashes, wounds, or scars  Lymphatic: no submandibular or cervical LAD  Neurologic: AAOx3; CNII-XII grossly intact; no focal deficits  Psychiatric: affect and characteristics of appearance, verbalizations, behaviors are appropriate    LABS:  CBC Full  -  ( 19 Jun 2017 06:30 )  WBC Count : 14.5 K/uL  Hemoglobin : 8.1 g/dL  Hematocrit : 23.9 %  Platelet Count - Automated : 172 K/uL  Mean Cell Volume : 93.7 fL  Mean Cell Hemoglobin : 31.8 pg  Mean Cell Hemoglobin Concentration : 33.9 g/dL  Auto Neutrophil # : x  Auto Lymphocyte # : x  Auto Monocyte # : x  Auto Eosinophil # : x  Auto Basophil # : x  Auto Neutrophil % : 70.3 %  Auto Lymphocyte % : 24.1 %  Auto Monocyte % : 5.2 %  Auto Eosinophil % : 0.1 %  Auto Basophil % : 0.3 %    06-19    139  |  92<L>  |  71<H>  ----------------------------<  107<H>  2.6<LL>   |  23  |  7.50<H>    Ca    8.5      19 Jun 2017 06:30    TPro  7.2  /  Alb  3.3  /  TBili  0.5  /  DBili  x   /  AST  18  /  ALT  10  /  AlkPhos  83  06-19    CAPILLARY BLOOD GLUCOSE  122 (19 Jun 2017 06:20)          EKG:    ECHO, US:    RADIOLOGY:    CRITICAL CARE TIME SPENT: Patient is a 88y old  Male who presents with a chief complaint of fever    HPI: Pt is an 89 yo M w/pmhx of blindness, dCHF (EF = 60-65%), HTN, HLD, OA, PVD, anemia, COPD, OA, alzheimer's dementia, recurrent aspiration PNA secondary to zenker's diverticulum, CAD, ESR on HD (M,W,F, follows with Dr. Child), w/recent admission w/e.coli bacteremia who presents today from [facility] after being found altered w/fever of 102.6 and desaturation. The pt cannot give a history at this time give his altered mental status but responds to his name. He was placed on BiPAP in the ED with oxygen saturation in the low 90s.       Allergies    clonidine (Unknown)    Intolerances        MEDICATIONS  (STANDING):    MEDICATIONS  (PRN):      Drug Dosing Weight  Height (cm): 182.9 (27 Mar 2017 09:19)  Weight (kg): 79.4 (19 Jun 2017 07:04)  BMI (kg/m2): 23.7 (19 Jun 2017 07:04)  BSA (m2): 2.01 (19 Jun 2017 07:04)    PAST MEDICAL & SURGICAL HISTORY:  AV graft thrombosis  Osteoarthritis  PVD (peripheral vascular disease)  COPD (chronic obstructive pulmonary disease)  Heart failure  Angina pectoris  ESRD (end stage renal disease)  Seizures: post traumatic  CAD (coronary artery disease)  HTN (hypertension)  Polyneuropathy  Hyperlipidemia  Dysphagia  Hypotension  Alzheimers disease  Osteoarthritis: Osteoarthritis  Chronic obstructive pulmonary disease: Chronic obstructive pulmonary disease  Anemia: Anemia  Dementia without behavioral disturbance: Dementia  Atherosclerosis of coronary artery: CAD (coronary artery disease)  Nondependent alcohol abuse: ETOH abuse  Depressive disorder: Depression  End-stage renal disease: ESRD on hemodialysis  Essential hypertension: HTN (hypertension)  Deep venous embolism and thrombosis of lower extremity: DVT (deep venous thrombosis)  Congestive heart failure: CHF (congestive heart failure)  Legal blindness: Legally blind  Hemodialysis access, AV graft: LUE loop AVG  Acquired arteriovenous fistula: AV fistula      FAMILY HISTORY:  Family history unknown      SOCIAL HISTORY:    ADVANCE DIRECTIVES: DNR/DNI    REVIEW OF SYSTEMS: unable to obtain due to altered mental status      Vital Signs Last 24 Hrs  T(C): 38.6, Max: 38.6 (06-19 @ 06:48)  T(F): 101.4, Max: 101.4 (06-19 @ 06:48)  HR: 56 (56 - 66)  BP: 117/56 (104/64 - 117/56)  BP(mean): --  ABP: --  ABP(mean): --  RR: 22 (20 - 26)  SpO2: 100% (99% - 100%)          I&O's Detail      PHYSICAL EXAM:    Constitutional: WDWN resting comfortably in bed; NAD  Head: NC/AT  Eyes: PERRL, EOMI, clear conjunctiva  ENT: no nasal discharge; uvula midline, no oropharyngeal erythema or exudates; MMM  Neck: supple; no JVD or thyromegaly  Respiratory: CTA B/L; no W/R/R, no retractions  Cardiac: +S1/S2; RRR; no M/R/G; PMI non-displaced  Gastrointestinal: soft, NT/ND; no rebound or guarding; +BSx4  Genitourinary: normal external genitalia  Back: spine midline, no bony tenderness or step-offs; no CVAT B/L  Extremities: WWP, no clubbing or cyanosis; no peripheral edema  Musculoskeletal: NROM x4; no joint swelling, tenderness or erythema  Vascular: 2+ radial, femoral, DP/PT pulses B/L  Dermatologic: skin warm, dry and intact; no rashes, wounds, or scars  Lymphatic: no submandibular or cervical LAD  Neurologic: AAOx3; CNII-XII grossly intact; no focal deficits  Psychiatric: affect and characteristics of appearance, verbalizations, behaviors are appropriate    LABS:  CBC Full  -  ( 19 Jun 2017 06:30 )  WBC Count : 14.5 K/uL  Hemoglobin : 8.1 g/dL  Hematocrit : 23.9 %  Platelet Count - Automated : 172 K/uL  Mean Cell Volume : 93.7 fL  Mean Cell Hemoglobin : 31.8 pg  Mean Cell Hemoglobin Concentration : 33.9 g/dL  Auto Neutrophil # : x  Auto Lymphocyte # : x  Auto Monocyte # : x  Auto Eosinophil # : x  Auto Basophil # : x  Auto Neutrophil % : 70.3 %  Auto Lymphocyte % : 24.1 %  Auto Monocyte % : 5.2 %  Auto Eosinophil % : 0.1 %  Auto Basophil % : 0.3 %    06-19    139  |  92<L>  |  71<H>  ----------------------------<  107<H>  2.6<LL>   |  23  |  7.50<H>    Ca    8.5      19 Jun 2017 06:30    TPro  7.2  /  Alb  3.3  /  TBili  0.5  /  DBili  x   /  AST  18  /  ALT  10  /  AlkPhos  83  06-19    CAPILLARY BLOOD GLUCOSE  122 (19 Jun 2017 06:20)          EKG:    ECHO, US:    RADIOLOGY:    A/P:    #AMS -     #Fever            CRITICAL CARE TIME SPENT: Patient is a 88y old  Male who presents with a chief complaint of fever    HPI: Pt is an 89 yo M w/pmhx of blindness, dCHF (EF = 60-65%), HTN, HLD, OA, PVD, anemia, COPD, OA, alzheimer's dementia, recurrent aspiration PNA secondary to zenker's diverticulum, CAD, ESR on HD (M,W,F, follows with Dr. Child), w/recent admission w/e.coli bacteremia who presents today from [facility] after being found altered w/fever of 102.6 and desaturation. The pt cannot give a history at this time give his altered mental status but responds to his name. He was placed on BiPAP in the ED with oxygen saturation in the low 90s.       Allergies    clonidine (Unknown)    Intolerances        MEDICATIONS  (STANDING):    MEDICATIONS  (PRN):      Drug Dosing Weight  Height (cm): 182.9 (27 Mar 2017 09:19)  Weight (kg): 79.4 (19 Jun 2017 07:04)  BMI (kg/m2): 23.7 (19 Jun 2017 07:04)  BSA (m2): 2.01 (19 Jun 2017 07:04)    PAST MEDICAL & SURGICAL HISTORY:  AV graft thrombosis  Osteoarthritis  PVD (peripheral vascular disease)  COPD (chronic obstructive pulmonary disease)  Heart failure  Angina pectoris  ESRD (end stage renal disease)  Seizures: post traumatic  CAD (coronary artery disease)  HTN (hypertension)  Polyneuropathy  Hyperlipidemia  Dysphagia  Hypotension  Alzheimers disease  Osteoarthritis: Osteoarthritis  Chronic obstructive pulmonary disease: Chronic obstructive pulmonary disease  Anemia: Anemia  Dementia without behavioral disturbance: Dementia  Atherosclerosis of coronary artery: CAD (coronary artery disease)  Nondependent alcohol abuse: ETOH abuse  Depressive disorder: Depression  End-stage renal disease: ESRD on hemodialysis  Essential hypertension: HTN (hypertension)  Deep venous embolism and thrombosis of lower extremity: DVT (deep venous thrombosis)  Congestive heart failure: CHF (congestive heart failure)  Legal blindness: Legally blind  Hemodialysis access, AV graft: LUE loop AVG  Acquired arteriovenous fistula: AV fistula      FAMILY HISTORY:  Family history unknown      SOCIAL HISTORY:    ADVANCE DIRECTIVES: DNR/DNI    REVIEW OF SYSTEMS: limited due to altered mental status; denies pain      Vital Signs Last 24 Hrs  T(C): 38.6, Max: 38.6 (06-19 @ 06:48)  T(F): 101.4, Max: 101.4 (06-19 @ 06:48)  HR: 56 (56 - 66)  BP: 117/56 (104/64 - 117/56)  BP(mean): --  ABP: --  ABP(mean): --  RR: 22 (20 - 26)  SpO2: 100% (99% - 100%)          I&O's Detail      PHYSICAL EXAM:    Constitutional: WD, minimally responsive   Head: NC/AT  Eyes: legally blind, pupils minimally responsive, equal b/l  ENT: dry mucous membranes; BiPAP on  Neck: supple;  Respiratory: b/l rhonchi and crackles  Cardiac: +S1/S2; RRR; no M/R/G;   Gastrointestinal: soft, distended, decreased bowel sounds  Genitourinary: limited foreskin retraction   Extremities: WWP, no clubbing or cyanosis; trace peripheral edema  Vascular: 2+ radial pulses B/L  Dermatologic: skin warm, dry and intact; no rashes, wounds, or scars  Lymphatic: no submandibular or cervical LAD  Neurologic: AAOx1 (to self);    Psychiatric: pt follows limited commands, hand  intact b/l       LABS:  CBC Full  -  ( 19 Jun 2017 06:30 )  WBC Count : 14.5 K/uL  Hemoglobin : 8.1 g/dL  Hematocrit : 23.9 %  Platelet Count - Automated : 172 K/uL  Mean Cell Volume : 93.7 fL  Mean Cell Hemoglobin : 31.8 pg  Mean Cell Hemoglobin Concentration : 33.9 g/dL  Auto Neutrophil # : x  Auto Lymphocyte # : x  Auto Monocyte # : x  Auto Eosinophil # : x  Auto Basophil # : x  Auto Neutrophil % : 70.3 %  Auto Lymphocyte % : 24.1 %  Auto Monocyte % : 5.2 %  Auto Eosinophil % : 0.1 %  Auto Basophil % : 0.3 %    06-19    139  |  92<L>  |  71<H>  ----------------------------<  107<H>  2.6<LL>   |  23  |  7.50<H>    Ca    8.5      19 Jun 2017 06:30    TPro  7.2  /  Alb  3.3  /  TBili  0.5  /  DBili  x   /  AST  18  /  ALT  10  /  AlkPhos  83  06-19    CAPILLARY BLOOD GLUCOSE  122 (19 Jun 2017 06:20)          EKG:     ECHO, US:    RADIOLOGY:    CXR:     A/P:  89 yo M w/pmhx of blindness, dCHF (EF = 60-65%), HTN, HLD, OA, PVD, anemia, COPD, OA, alzheimer's dementia, recurrent aspiration PNA secondary to zenker's diverticulum, CAD, ESR on HD (M,W,F, follows with Dr. Child), w/recent admission w/e.coli bacteremia who presented from Nursing home w/fever, AMS and desaturation on room air likely secondary to sepsis from aspiration PNA vs UTI vs recurrent bacteremia.     #Sepsis - meeting 3/4 sepsis criteria w/RR pf 26, wbc of 14.5, temp of 102.6 in the field and 101.3 in the ED  -s/p zosyn       #AMS    #Respiratory failure             CRITICAL CARE TIME SPENT: Patient is a 88y old  Male who presents with a chief complaint of fever, oxygen desaturation and AMS    HPI: Pt is an 89 yo M w/pmhx of blindness, dCHF (EF = 60-65%), HTN, HLD, OA, PVD, anemia, COPD, OA, alzheimer's dementia, recurrent aspiration PNA secondary to zenker's diverticulum, CAD, ESR on HD (M,W,F, follows with Dr. Child), w/recent admission w/e.coli bacteremia who presents today from [facility] after being found altered w/fever of 102.6 and desaturation. The pt cannot give a history at this time give his altered mental status but responds to his name. He was placed on BiPAP in the ED with oxygen saturation in the low 90s.       Allergies    clonidine (Unknown)    Intolerances        MEDICATIONS  (STANDING):    MEDICATIONS  (PRN):      Drug Dosing Weight  Height (cm): 182.9 (27 Mar 2017 09:19)  Weight (kg): 79.4 (19 Jun 2017 07:04)  BMI (kg/m2): 23.7 (19 Jun 2017 07:04)  BSA (m2): 2.01 (19 Jun 2017 07:04)    PAST MEDICAL & SURGICAL HISTORY:  AV graft thrombosis  Osteoarthritis  PVD (peripheral vascular disease)  COPD (chronic obstructive pulmonary disease)  Heart failure  Angina pectoris  ESRD (end stage renal disease)  Seizures: post traumatic  CAD (coronary artery disease)  HTN (hypertension)  Polyneuropathy  Hyperlipidemia  Dysphagia  Hypotension  Alzheimers disease  Osteoarthritis: Osteoarthritis  Chronic obstructive pulmonary disease: Chronic obstructive pulmonary disease  Anemia: Anemia  Dementia without behavioral disturbance: Dementia  Atherosclerosis of coronary artery: CAD (coronary artery disease)  Nondependent alcohol abuse: ETOH abuse  Depressive disorder: Depression  End-stage renal disease: ESRD on hemodialysis  Essential hypertension: HTN (hypertension)  Deep venous embolism and thrombosis of lower extremity: DVT (deep venous thrombosis)  Congestive heart failure: CHF (congestive heart failure)  Legal blindness: Legally blind  Hemodialysis access, AV graft: LUE loop AVG  Acquired arteriovenous fistula: AV fistula      FAMILY HISTORY:  Family history unknown    ADVANCE DIRECTIVES: DNR/DNI    REVIEW OF SYSTEMS: limited due to altered mental status; denies pain      Vital Signs Last 24 Hrs  T(C): 38.6, Max: 38.6 (06-19 @ 06:48)  T(F): 101.4, Max: 101.4 (06-19 @ 06:48)  HR: 56 (56 - 66)  BP: 117/56 (104/64 - 117/56)  BP(mean): --  ABP: --  ABP(mean): --  RR: 22 (20 - 26)  SpO2: 100% (99% - 100%)          I&O's Detail      PHYSICAL EXAM:    Constitutional: WD, minimally responsive   Head: NC/AT  Eyes: legally blind, pupils minimally responsive, equal b/l  ENT: dry mucous membranes; BiPAP on  Neck: supple;  Respiratory: b/l rhonchi and crackles  Cardiac: +S1/S2; RRR; no M/R/G;   Gastrointestinal: soft, distended, decreased bowel sounds  Genitourinary: limited foreskin retraction   Extremities: WWP, no clubbing or cyanosis; trace peripheral edema  Vascular: 2+ radial pulses B/L  Dermatologic: skin warm, dry and intact; no rashes, wounds, or scars  Lymphatic: no submandibular or cervical LAD  Neurologic: AAOx1 (to self);    Psychiatric: pt follows limited commands, hand  intact b/l       LABS:  CBC Full  -  ( 19 Jun 2017 06:30 )  WBC Count : 14.5 K/uL  Hemoglobin : 8.1 g/dL  Hematocrit : 23.9 %  Platelet Count - Automated : 172 K/uL  Mean Cell Volume : 93.7 fL  Mean Cell Hemoglobin : 31.8 pg  Mean Cell Hemoglobin Concentration : 33.9 g/dL  Auto Neutrophil # : x  Auto Lymphocyte # : x  Auto Monocyte # : x  Auto Eosinophil # : x  Auto Basophil # : x  Auto Neutrophil % : 70.3 %  Auto Lymphocyte % : 24.1 %  Auto Monocyte % : 5.2 %  Auto Eosinophil % : 0.1 %  Auto Basophil % : 0.3 %    06-19    139  |  92<L>  |  71<H>  ----------------------------<  107<H>  2.6<LL>   |  23  |  7.50<H>    Ca    8.5      19 Jun 2017 06:30    TPro  7.2  /  Alb  3.3  /  TBili  0.5  /  DBili  x   /  AST  18  /  ALT  10  /  AlkPhos  83  06-19    CAPILLARY BLOOD GLUCOSE  122 (19 Jun 2017 06:20)          EKG: consistent w/    RADIOLOGY:    CXR: consistent previous CXR, possible RLL developing infiltrate, official read pending    A/P:  89 yo M w/pmhx of blindness, dCHF (EF = 60-65%), HTN, HLD, OA, PVD, anemia, COPD, OA, alzheimer's dementia, recurrent aspiration PNA secondary to zenker's diverticulum, CAD, ESR on HD (M,W,F, follows with Dr. Child), w/recent admission w/e.coli bacteremia who presented from Nursing home w/fever, AMS and desaturation on room air likely secondary to sepsis from aspiration PNA vs UTI vs recurrent bacteremia.     #Sepsis - meeting 3/4 sepsis criteria w/RR pf 26, wbc of 14.5, temp of 102.6 in the field and 101.3 in the ED, given diffuse rhonchi on physical exam and desaturation on room air and prior history of aspiration PNA w/zenker's diverticulum most likely etiology is aspiration PNA  -s/p zosyn and vancomycin in the ED, would continue with empiric broad spectrum antibiotics  -f/u CXR official read  -f/u blood cultures  -unable to obtain urine analysis/culture given that pt is anuric and w/difficult urethral anatomy (multiple attempts were made in the ED to catheterize)       #AMS - likely secondary to sepsis  -continue to monitor  -would recommend calling HCP to update given change in clinical status   -f/u with palliative as above    #Respiratory failure     #ESRD on HD, M,W,F  -would f/u with Dr. Child for possible HD today          CRITICAL CARE TIME SPENT: Patient is a 88y old  Male who presents with a chief complaint of fever, oxygen desaturation and AMS    HPI: Pt is an 89 yo M w/pmhx of blindness, dCHF (EF = 60-65%), HTN, HLD, OA, PVD, anemia, COPD, OA, alzheimer's dementia, recurrent aspiration PNA secondary to zenker's diverticulum, CAD, ESR on HD (M,W,F, follows with Dr. Child), w/recent admission w/e.coli bacteremia who presents today from [facility] after being found altered w/fever of 102.6 and desaturation. The pt cannot give a history at this time give his altered mental status but responds to his name. He was placed on BiPAP in the ED with oxygen saturation in the low 90s.       Allergies    clonidine (Unknown)    Intolerances        MEDICATIONS  (STANDING):    MEDICATIONS  (PRN):      Drug Dosing Weight  Height (cm): 182.9 (27 Mar 2017 09:19)  Weight (kg): 79.4 (19 Jun 2017 07:04)  BMI (kg/m2): 23.7 (19 Jun 2017 07:04)  BSA (m2): 2.01 (19 Jun 2017 07:04)    PAST MEDICAL & SURGICAL HISTORY:  AV graft thrombosis  Osteoarthritis  PVD (peripheral vascular disease)  COPD (chronic obstructive pulmonary disease)  Heart failure  Angina pectoris  ESRD (end stage renal disease)  Seizures: post traumatic  CAD (coronary artery disease)  HTN (hypertension)  Polyneuropathy  Hyperlipidemia  Dysphagia  Hypotension  Alzheimers disease  Osteoarthritis: Osteoarthritis  Chronic obstructive pulmonary disease: Chronic obstructive pulmonary disease  Anemia: Anemia  Dementia without behavioral disturbance: Dementia  Atherosclerosis of coronary artery: CAD (coronary artery disease)  Nondependent alcohol abuse: ETOH abuse  Depressive disorder: Depression  End-stage renal disease: ESRD on hemodialysis  Essential hypertension: HTN (hypertension)  Deep venous embolism and thrombosis of lower extremity: DVT (deep venous thrombosis)  Congestive heart failure: CHF (congestive heart failure)  Legal blindness: Legally blind  Hemodialysis access, AV graft: LUE loop AVG  Acquired arteriovenous fistula: AV fistula      FAMILY HISTORY:  Family history unknown    ADVANCE DIRECTIVES: DNR/DNI    REVIEW OF SYSTEMS: limited due to altered mental status; denies pain      Vital Signs Last 24 Hrs  T(C): 38.6, Max: 38.6 (06-19 @ 06:48)  T(F): 101.4, Max: 101.4 (06-19 @ 06:48)  HR: 56 (56 - 66)  BP: 117/56 (104/64 - 117/56)  BP(mean): --  ABP: --  ABP(mean): --  RR: 22 (20 - 26)  SpO2: 100% (99% - 100%)          I&O's Detail      PHYSICAL EXAM:    Constitutional: WD, minimally responsive   Head: NC/AT  Eyes: legally blind, pupils minimally responsive, equal b/l  ENT: dry mucous membranes; BiPAP on  Neck: supple;  Respiratory: b/l rhonchi and crackles  Cardiac: +S1/S2; RRR; no M/R/G;   Gastrointestinal: soft, distended, decreased bowel sounds  Genitourinary: limited foreskin retraction   Extremities: WWP, no clubbing or cyanosis; trace peripheral edema  Vascular: 2+ radial pulses B/L  Dermatologic: skin warm, dry and intact; no rashes, wounds, or scars  Lymphatic: no submandibular or cervical LAD  Neurologic: AAOx1 (to self);    Psychiatric: pt follows limited commands, hand  intact b/l       LABS:  CBC Full  -  ( 19 Jun 2017 06:30 )  WBC Count : 14.5 K/uL  Hemoglobin : 8.1 g/dL  Hematocrit : 23.9 %  Platelet Count - Automated : 172 K/uL  Mean Cell Volume : 93.7 fL  Mean Cell Hemoglobin : 31.8 pg  Mean Cell Hemoglobin Concentration : 33.9 g/dL  Auto Neutrophil # : x  Auto Lymphocyte # : x  Auto Monocyte # : x  Auto Eosinophil # : x  Auto Basophil # : x  Auto Neutrophil % : 70.3 %  Auto Lymphocyte % : 24.1 %  Auto Monocyte % : 5.2 %  Auto Eosinophil % : 0.1 %  Auto Basophil % : 0.3 %    06-19    139  |  92<L>  |  71<H>  ----------------------------<  107<H>  2.6<LL>   |  23  |  7.50<H>    Ca    8.5      19 Jun 2017 06:30    TPro  7.2  /  Alb  3.3  /  TBili  0.5  /  DBili  x   /  AST  18  /  ALT  10  /  AlkPhos  83  06-19    CAPILLARY BLOOD GLUCOSE  122 (19 Jun 2017 06:20)          EKG: consistent w/    RADIOLOGY:    CXR: consistent previous CXR, possible RLL developing infiltrate, official read pending    A/P:  89 yo M w/pmhx of blindness, dCHF (EF = 60-65%), HTN, HLD, OA, PVD, anemia, COPD, OA, alzheimer's dementia, recurrent aspiration PNA secondary to zenker's diverticulum, CAD, ESR on HD (M,W,F, follows with Dr. Child), w/recent admission w/e.coli bacteremia who presented from Nursing home w/fever, AMS and desaturation on room air likely secondary to sepsis from aspiration PNA vs UTI vs recurrent bacteremia.     #Sepsis - meeting 3/4 sepsis criteria w/RR pf 26, wbc of 14.5, temp of 102.6 in the field and 101.3 in the ED, given diffuse rhonchi on physical exam and desaturation on room air and prior history of aspiration PNA w/zenker's diverticulum most likely etiology is aspiration PNA  -s/p zosyn and vancomycin in the ED, would continue with empiric broad spectrum antibiotics  -f/u CXR official read  -f/u blood cultures; if positive would recommend abdominal/pelvic imaging to assess for gastrointestinal vs hepatobiliary source  -unable to obtain urine analysis/culture given that pt is anuric and w/difficult urethral anatomy (multiple attempts were made in the ED to catheterize)   -call palliative to re-evaluate given change in clinical status       #AMS - likely secondary to sepsis  -continue to monitor  -would recommend calling HCP to update given change in clinical status   -f/u with palliative as above    #Respiratory failure - likely secondary to aspiration in the setting of zenker's diverticulum  -c/w BiPAP and wean as tolerated     #Hypokalemia  -replete w/potassium chloride and monitor, if pt goes for HD today would monitor at/after HD      #ESRD on HD, M,W,F  -would f/u with Dr. Child for possible HD today          CRITICAL CARE TIME SPENT: Patient is a 88y old  Male who presents with a chief complaint of fever, oxygen desaturation and AMS    HPI: Pt is an 89 yo M w/pmhx of blindness, dCHF (EF = 60-65%), HTN, HLD, OA, PVD, anemia, COPD, OA, alzheimer's dementia, recurrent aspiration PNA secondary to zenker's diverticulum, CAD, ESR on HD (M,W,F, follows with Dr. Child), w/recent admission w/e.coli bacteremia who presents today from [facility] after being found altered w/fever of 102.6 and desaturation. The pt cannot give a history at this time give his altered mental status but responds to his name. He was placed on BiPAP in the ED with oxygen saturation in the low 90s.     Of note, during the patient's previous admission palliative care was following the patient and ethics had been consulted as he has limited social support and his HCP is not a close family/member next of kin and had expressed concern about being appointed the healthcare proxy. There is documentation from the previous admission that the pt is DNR/DNI at this time.       Allergies    clonidine (Unknown)    Intolerances        MEDICATIONS  (STANDING):    MEDICATIONS  (PRN):      Drug Dosing Weight  Height (cm): 182.9 (27 Mar 2017 09:19)  Weight (kg): 79.4 (19 Jun 2017 07:04)  BMI (kg/m2): 23.7 (19 Jun 2017 07:04)  BSA (m2): 2.01 (19 Jun 2017 07:04)    PAST MEDICAL & SURGICAL HISTORY:  AV graft thrombosis  Osteoarthritis  PVD (peripheral vascular disease)  COPD (chronic obstructive pulmonary disease)  Heart failure  Angina pectoris  ESRD (end stage renal disease)  Seizures: post traumatic  CAD (coronary artery disease)  HTN (hypertension)  Polyneuropathy  Hyperlipidemia  Dysphagia  Hypotension  Alzheimers disease  Osteoarthritis: Osteoarthritis  Chronic obstructive pulmonary disease: Chronic obstructive pulmonary disease  Anemia: Anemia  Dementia without behavioral disturbance: Dementia  Atherosclerosis of coronary artery: CAD (coronary artery disease)  Nondependent alcohol abuse: ETOH abuse  Depressive disorder: Depression  End-stage renal disease: ESRD on hemodialysis  Essential hypertension: HTN (hypertension)  Deep venous embolism and thrombosis of lower extremity: DVT (deep venous thrombosis)  Congestive heart failure: CHF (congestive heart failure)  Legal blindness: Legally blind  Hemodialysis access, AV graft: LUE loop AVG  Acquired arteriovenous fistula: AV fistula      FAMILY HISTORY:  Family history unknown    ADVANCE DIRECTIVES: DNR/DNI    REVIEW OF SYSTEMS: limited due to altered mental status; denies pain      Vital Signs Last 24 Hrs  T(C): 38.6, Max: 38.6 (06-19 @ 06:48)  T(F): 101.4, Max: 101.4 (06-19 @ 06:48)  HR: 56 (56 - 66)  BP: 117/56 (104/64 - 117/56)  BP(mean): --  ABP: --  ABP(mean): --  RR: 22 (20 - 26)  SpO2: 100% (99% - 100%)          I&O's Detail      PHYSICAL EXAM:    Constitutional: WD, minimally responsive   Head: NC/AT  Eyes: legally blind, pupils minimally responsive, equal b/l  ENT: dry mucous membranes; BiPAP on  Neck: supple;  Respiratory: b/l rhonchi and crackles  Cardiac: +S1/S2; RRR; no M/R/G;   Gastrointestinal: soft, distended, decreased bowel sounds  Genitourinary: limited foreskin retraction   Extremities: WWP, no clubbing or cyanosis; trace peripheral edema  Vascular: 2+ radial pulses B/L  Dermatologic: skin warm, dry and intact; no rashes, wounds, or scars  Lymphatic: no submandibular or cervical LAD  Neurologic: AAOx1 (to self);    Psychiatric: pt follows limited commands, hand  intact b/l       LABS:  CBC Full  -  ( 19 Jun 2017 06:30 )  WBC Count : 14.5 K/uL  Hemoglobin : 8.1 g/dL  Hematocrit : 23.9 %  Platelet Count - Automated : 172 K/uL  Mean Cell Volume : 93.7 fL  Mean Cell Hemoglobin : 31.8 pg  Mean Cell Hemoglobin Concentration : 33.9 g/dL  Auto Neutrophil # : x  Auto Lymphocyte # : x  Auto Monocyte # : x  Auto Eosinophil # : x  Auto Basophil # : x  Auto Neutrophil % : 70.3 %  Auto Lymphocyte % : 24.1 %  Auto Monocyte % : 5.2 %  Auto Eosinophil % : 0.1 %  Auto Basophil % : 0.3 %    06-19    139  |  92<L>  |  71<H>  ----------------------------<  107<H>  2.6<LL>   |  23  |  7.50<H>    Ca    8.5      19 Jun 2017 06:30    TPro  7.2  /  Alb  3.3  /  TBili  0.5  /  DBili  x   /  AST  18  /  ALT  10  /  AlkPhos  83  06-19    CAPILLARY BLOOD GLUCOSE  122 (19 Jun 2017 06:20)          EKG: consistent w/mobitz type 2, t-wave flattening ; previous EKG consistent w/first degree av block    RADIOLOGY:    CXR: consistent previous CXR, possible RLL developing infiltrate, official read pending    A/P:  89 yo M w/pmhx of blindness, dCHF (EF = 60-65%), HTN, HLD, OA, PVD, anemia, COPD, OA, alzheimer's dementia, recurrent aspiration PNA secondary to zenker's diverticulum, CAD, ESR on HD (M,W,F, follows with Dr. Child), w/recent admission w/e.coli bacteremia who presented from Nursing home w/fever, AMS and desaturation on room air likely secondary to sepsis from aspiration PNA vs UTI vs recurrent bacteremia.     #Sepsis - meeting 3/4 sepsis criteria w/RR pf 26, wbc of 14.5, temp of 102.6 in the field and 101.3 in the ED, given diffuse rhonchi on physical exam and desaturation on room air and prior history of aspiration PNA w/zenker's diverticulum most likely etiology is aspiration PNA  -s/p zosyn and vancomycin in the ED, would continue with empiric broad spectrum antibiotics  -f/u CXR official read  -f/u blood cultures; if positive would recommend abdominal/pelvic imaging to assess for gastrointestinal vs hepatobiliary source  -unable to obtain urine analysis/culture given that pt is anuric and w/difficult urethral anatomy (multiple attempts were made in the ED to catheterize)   -call palliative to re-evaluate given change in clinical status       #AMS - likely secondary to sepsis  -continue to monitor  -would recommend calling HCP to update given change in clinical status   -f/u with palliative as above    #Respiratory failure - likely secondary to aspiration in the setting of zenker's diverticulum  -c/w BiPAP and wean as tolerated     #Hypokalemia  -replete w/potassium chloride and monitor, with aggressive repletion as needed given ekg changes (t-wave flattening) if pt goes for HD today would monitor at/after HD    #arrhythmia - with new mobitz type 2 on ekg, changed from 1st degree av block, w/t-wave flattening possibly secondary to electrolyte abnormalities   -would obtain ekg after potassium repletion     #ESRD on HD, M,W,F  -would f/u with Dr. Child for possible HD today          CRITICAL CARE TIME SPENT: Patient is a 88y old  Male who presents with a chief complaint of fever, oxygen desaturation and AMS    HPI: Pt is an 87 yo M w/pmhx of blindness, dCHF (EF = 60-65%), HTN, HLD, OA, PVD, anemia, COPD, OA, alzheimer's dementia, recurrent aspiration PNA secondary to zenker's diverticulum, CAD, ESR on HD (M,W,F, follows with Dr. Child), w/recent admission w/e.coli bacteremia who presents today from [facility] after being found altered w/fever of 102.6 and desaturation. The pt cannot give a history at this time give his altered mental status but responds to his name. He was placed on BiPAP in the ED with oxygen saturation in the low 90s.     Of note, during the patient's previous admission palliative care was following the patient and ethics had been consulted as he has limited social support and his HCP is not a close family/member next of kin and had expressed concern about being appointed the healthcare proxy. There is documentation from the previous admission that the pt is DNR/DNI at this time.       Allergies    clonidine (Unknown)    Intolerances        MEDICATIONS  (STANDING):    MEDICATIONS  (PRN):      Drug Dosing Weight  Height (cm): 182.9 (27 Mar 2017 09:19)  Weight (kg): 79.4 (19 Jun 2017 07:04)  BMI (kg/m2): 23.7 (19 Jun 2017 07:04)  BSA (m2): 2.01 (19 Jun 2017 07:04)    PAST MEDICAL & SURGICAL HISTORY:  AV graft thrombosis  Osteoarthritis  PVD (peripheral vascular disease)  COPD (chronic obstructive pulmonary disease)  Heart failure  Angina pectoris  ESRD (end stage renal disease)  Seizures: post traumatic  CAD (coronary artery disease)  HTN (hypertension)  Polyneuropathy  Hyperlipidemia  Dysphagia  Hypotension  Alzheimers disease  Osteoarthritis: Osteoarthritis  Chronic obstructive pulmonary disease: Chronic obstructive pulmonary disease  Anemia: Anemia  Dementia without behavioral disturbance: Dementia  Atherosclerosis of coronary artery: CAD (coronary artery disease)  Nondependent alcohol abuse: ETOH abuse  Depressive disorder: Depression  End-stage renal disease: ESRD on hemodialysis  Essential hypertension: HTN (hypertension)  Deep venous embolism and thrombosis of lower extremity: DVT (deep venous thrombosis)  Congestive heart failure: CHF (congestive heart failure)  Legal blindness: Legally blind  Hemodialysis access, AV graft: LUE loop AVG  Acquired arteriovenous fistula: AV fistula      FAMILY HISTORY:  Family history unknown    ADVANCE DIRECTIVES: DNR/DNI    REVIEW OF SYSTEMS: limited due to altered mental status; denies pain      Vital Signs Last 24 Hrs  T(C): 38.6, Max: 38.6 (06-19 @ 06:48)  T(F): 101.4, Max: 101.4 (06-19 @ 06:48)  HR: 56 (56 - 66)  BP: 117/56 (104/64 - 117/56)  BP(mean): --  ABP: --  ABP(mean): --  RR: 22 (20 - 26)  SpO2: 100% (99% - 100%)          I&O's Detail      PHYSICAL EXAM:    Constitutional: WD, minimally responsive   Head: NC/AT  Eyes: legally blind, pupils minimally responsive, equal b/l  ENT: dry mucous membranes; BiPAP on  Neck: supple;  Respiratory: b/l rhonchi and crackles  Cardiac: +S1/S2; RRR; no M/R/G;   Gastrointestinal: soft, distended, decreased bowel sounds  Genitourinary: limited foreskin retraction   Extremities: WWP, no clubbing or cyanosis; trace peripheral edema  Vascular: 2+ radial pulses B/L  Dermatologic: skin warm, dry and intact; no rashes, wounds, or scars  Lymphatic: no submandibular or cervical LAD  Neurologic: AAOx1 (to self);    Psychiatric: pt follows limited commands, hand  intact b/l       LABS:  CBC Full  -  ( 19 Jun 2017 06:30 )  WBC Count : 14.5 K/uL  Hemoglobin : 8.1 g/dL  Hematocrit : 23.9 %  Platelet Count - Automated : 172 K/uL  Mean Cell Volume : 93.7 fL  Mean Cell Hemoglobin : 31.8 pg  Mean Cell Hemoglobin Concentration : 33.9 g/dL  Auto Neutrophil # : x  Auto Lymphocyte # : x  Auto Monocyte # : x  Auto Eosinophil # : x  Auto Basophil # : x  Auto Neutrophil % : 70.3 %  Auto Lymphocyte % : 24.1 %  Auto Monocyte % : 5.2 %  Auto Eosinophil % : 0.1 %  Auto Basophil % : 0.3 %    06-19    139  |  92<L>  |  71<H>  ----------------------------<  107<H>  2.6<LL>   |  23  |  7.50<H>    Ca    8.5      19 Jun 2017 06:30    TPro  7.2  /  Alb  3.3  /  TBili  0.5  /  DBili  x   /  AST  18  /  ALT  10  /  AlkPhos  83  06-19    CAPILLARY BLOOD GLUCOSE  122 (19 Jun 2017 06:20)          EKG: consistent w/mobitz type 2, t-wave flattening ; previous EKG consistent w/first degree av block    RADIOLOGY:    CXR: consistent previous CXR, possible RLL developing infiltrate, official read pending    A/P:  87 yo M w/pmhx of blindness, dCHF (EF = 60-65%), HTN, HLD, OA, PVD, anemia, COPD, OA, alzheimer's dementia, recurrent aspiration PNA secondary to zenker's diverticulum, CAD, ESR on HD (M,W,F, follows with Dr. Child), w/recent admission w/e.coli bacteremia who presented from Nursing home w/fever, AMS and desaturation on room air likely secondary to sepsis from aspiration PNA vs UTI vs recurrent bacteremia.     #Sepsis - meeting 3/4 sepsis criteria w/RR pf 26, wbc of 14.5, temp of 102.6 in the field and 101.3 in the ED, given diffuse rhonchi on physical exam and desaturation on room air and prior history of aspiration PNA w/zenker's diverticulum most likely etiology is aspiration PNA  -s/p zosyn and vancomycin in the ED, would continue with empiric broad spectrum antibiotics  -f/u CXR official read  -f/u blood cultures; if positive would recommend abdominal/pelvic imaging to assess for gastrointestinal vs hepatobiliary source  -unable to obtain urine analysis/culture given that pt is anuric and w/difficult urethral anatomy (multiple attempts were made in the ED to catheterize)   -call palliative to re-evaluate given change in clinical status       #AMS - likely secondary to sepsis  -continue to monitor  -would recommend calling HCP to update given change in clinical status   -f/u with palliative as above    #Respiratory failure - likely secondary to aspiration in the setting of zenker's diverticulum  -c/w BiPAP and wean as tolerated     #Hypokalemia  -replete w/potassium chloride and monitor, with aggressive repletion as needed given ekg changes (t-wave flattening) if pt goes for HD today would monitor at/after HD    #arrhythmia - with new mobitz type 2 on ekg, changed from 1st degree av block, w/t-wave flattening possibly secondary to electrolyte abnormalities   -would obtain ekg after potassium repletion     #ESRD on HD, M,W,F  -would f/u with Dr. Child for possible HD today    #Dispo    Admit to [facility], case discussed with ICU STEPDOWN attending and critical care fellow      CRITICAL CARE TIME SPENT:

## 2017-06-19 NOTE — H&P ADULT - PROBLEM SELECTOR PLAN 1
fever to 101.3, given vanco/zosyn, LLL infiltrate, currently on BiPAP due to resp distress initially, ICU consulted due to hypokalemia, patient deemed stable for GMF, s/p vanco/zosyn x1, lactate negative, BC x2 sent    - vanco with HD  - zosyn dosed renally  - f/u blood cultures

## 2017-06-19 NOTE — ED PROVIDER NOTE - CARE PLAN
Principal Discharge DX:	Altered mental status  Secondary Diagnosis:	Fever  Secondary Diagnosis:	ESRD (end stage renal disease)

## 2017-06-19 NOTE — ED ADULT NURSE REASSESSMENT NOTE - NS ED NURSE REASSESS COMMENT FT1
Patient is responsive to verbal stimuli, chest rise is equal and symmetrical bilaterally, lung sounds have fine crackles in upper bases bilaterally, crackles noted in lower bases bilaterally. +AV fistula to left upper arm. Abdomen is soft, no facial grimacing noted upon palpation, +bowel sounds in all 4 quadrants, no pulsatile mass noted. +stage 2 pressure ulcer 1cmx 1cm on right buttock, no drainage noted. +1 non pitting edema noted to lower extremities bilaterally, cap refill less than 3 secs bilaterally. BIPAP Settings Rate-10, IPAP-10, EPAP-5, Fio2-50%. Patient is responsive to verbal stimuli, chest rise is equal and symmetrical bilaterally, lung sounds have fine crackles in upper bases bilaterally, crackles noted in lower bases bilaterally. +AV fistula to left upper arm. Abdomen is soft, no facial grimacing noted upon palpation, +bowel sounds in all 4 quadrants, no pulsatile mass noted. +stage 2 pressure ulcer 1cmx 1cm on right buttock, no drainage noted. +1 non pitting edema noted to lower extremities bilaterally, cap refill less than 3 secs bilaterally. BIPAP Settings Rate-10, IPAP-10, EPAP-5, Fio2-50%. Awaiting repeat CMP results prior to administration of IV potassium.

## 2017-06-19 NOTE — H&P ADULT - NSHPLABSRESULTS_GEN_ALL_CORE
8.1    14.5  )-----------( 172      ( 19 Jun 2017 06:30 )             23.9     06-19    135  |  89<L>  |  68<H>  ----------------------------<  125<H>  2.6<LL>   |  24  |  7.30<H>    Ca    9.0      19 Jun 2017 08:43    TPro  8.0  /  Alb  3.5  /  TBili  0.5  /  DBili  x   /  AST  26  /  ALT  10  /  AlkPhos  92  06-19    PT/INR - ( 19 Jun 2017 08:43 )   PT: 13.9 sec;   INR: 1.25          PTT - ( 19 Jun 2017 08:43 )  PTT:28.5 sec      RADIOLOGY & ADDITIONAL TESTS: Reviewed.    CXR:

## 2017-06-19 NOTE — ED ADULT NURSE REASSESSMENT NOTE - NS ED NURSE REASSESS COMMENT FT1
Transfer of care acknowledged with bedside rounding, lab results reviewed, will continue to with plan of care. Transfer of care acknowledged with bedside rounding, lab results reviewed, 2 attempts made by RADHA Napier and RADHA Persaud but unsuccessful, DENISE Mcgregor made aware, will continue to with plan of care.

## 2017-06-19 NOTE — CONSULT NOTE ADULT - PROBLEM SELECTOR RECOMMENDATION 3
Please check phosphate level  Patient tends to be hypophosphatemic   would hold calcium acetate until phosphorous level is reported

## 2017-06-19 NOTE — ED PROVIDER NOTE - OBJECTIVE STATEMENT
Patient is an 87 yr old  male poor historian at baseline with PMHx ESRD dialyzed M W F, Alzheimer's dementia, CAD, COPD, angina, anemia, CHF, depression, HTN, HLD, OA, PVD, polyneuropathy, blindness, seizures, IDDM, and recurrent aspiration PNA 2/2 Zenker's diverticulum presenting from Nursing Home with AMS  x 6 hrs with fever to 102 axillary slight cough-  no abd pain or diarrhea desat to hi 80s  low 90s  no obv  skin rashes noted  no neck stiffness   no missed dialysis appts recently

## 2017-06-19 NOTE — PROGRESS NOTE ADULT - ATTENDING COMMENTS
seen and evaluated while on dialysis.  tolerating the procedure well  Continue full treatment as prescribed  Opens his eyes when I call his name.  Still on BIPAP

## 2017-06-19 NOTE — CONSULT NOTE ADULT - PROBLEM SELECTOR RECOMMENDATION 2
pt had elected to be DNR/DNI with comfort feeds/peg refusal previously as stated on [facility] with the understanding that risks i.e. recurrent aspiration and death can occur, as confirmed by Ethics.  Decision from him was also to cont. HD as long as Renal felt it was still medically appropriate and cont. rehospitalizations PRN.  Hospice services were also discussed from previous admits.  Will cont. to follow
Patient presented with LLL infiltrate. Management as per pulmonary and primary team.

## 2017-06-19 NOTE — H&P ADULT - NSHPPHYSICALEXAM_GEN_ALL_CORE
Constitutional: mild distress, on BiPAP  HEENT: NCAT, PEERL, sclera non-icteric  Neck: supple, no JVD  Respiratory: diffuse rhonchi  Cardiovascular: RRR, normal s1/s2, no MRG  Gastrointestinal: soft, NTND, +BS, no guarding, no organomegaly  Extremities: WWP, DP/radial pulses 2+ b/l, no edema  Neurological: AAOx 0, nonverbal, moves all extremities, CN 2-12 intact

## 2017-06-19 NOTE — H&P ADULT - HISTORY OF PRESENT ILLNESS
The patient is an 88 yo M with ESRD (MWF HD), Alzheimer's dementia, CAD, COPD, angina, anemia, CHF, depression, HTN, HLD, OA, PVD, polyneuropathy, blindness, seizures, IDDM, and recurrent aspiration PNA 2/2 Zenker's diverticulum who presented with AMS for a few hours and found to have a temp of 102 with cough. Of note, the patient is a poor historian so the HPI is limited. The patient was initially noted to desaturate to the high 80's. The patient denied any chills, nausea, vomiting, diarrhea The patient is an 88 yo M with ESRD (MWF HD), Alzheimer's dementia, CAD, COPD, angina, anemia, CHF, depression, HTN, HLD, OA, PVD, polyneuropathy, blindness, seizures, IDDM, and recurrent aspiration PNA 2/2 Zenker's diverticulum who presented with AMS for a few hours and found to have a temp of 102 with cough. Of note, the patient is a poor historian so the HPI is limited. The patient was initially noted to desaturate to the high 80's. The patient denied any chills, nausea, vomiting, diarrhea.     In the ED, T 101.3, HR 60, /64, RR 20, 99% on O2 NC    given Tylenol x1, vanco x1 and zosyn, poatssium IV 10 MeQ x3, BC x1 sent    CXR significant for infiltrate    labs significant for lactate negative, WBC 14.5, K 2.6, Cr 7.3    ICU was consulted in the setting of a K 2.6, BiPAP placed in setting of respiratory distress and patient comfortable after BiPAP placed    ICU deemed patient stable for regional medical floor, especially in setting of DNR/DNI status The patient is an 88 yo M with ESRD (MWF HD), Alzheimer's dementia, CAD, COPD, angina, anemia, CHF, depression, HTN, HLD, OA, PVD, polyneuropathy, blindness, seizures, IDDM, and recurrent aspiration PNA 2/2 Zenker's diverticulum who presented with AMS for a few hours and found to have a temp of 102 with cough. Of note, the patient is a poor historian so the HPI is limited. The patient was initially noted to desaturate to the high 80's. The patient denied any chills, nausea, vomiting, diarrhea.     In the ED, T 101.3, HR 60, /64, RR 20, 99% on O2 NC    given Tylenol x1, vanco x1 and zosyn, poatssium IV 10 MeQ x3, BC x1 sent    CXR significant for LLL infiltrate    labs significant for lactate negative, WBC 14.5, K 2.6, Cr 7.3    ICU was consulted in the setting of a K 2.6, BiPAP placed in setting of respiratory distress and patient comfortable after BiPAP placed    ICU deemed patient stable for regional medical floor, especially in setting of DNR/DNI status The patient is an 88 yo M with ESRD (MWF HD), Alzheimer's dementia, CAD, COPD, angina, anemia, CHF, depression, HTN, HLD, OA, PVD, polyneuropathy, blindness, seizures, IDDM, and recurrent aspiration PNA 2/2 Zenker's diverticulum who presented with AMS for a few hours and found to have a temp of 102 with cough. Of note, the patient is a poor historian so the HPI is limited. The patient was initially noted to desaturate to the high 80's. The patient denied any chills, nausea, vomiting, diarrhea.     In the ED, T 101.3, HR 60, /64, RR 20, 99% on O2 NC    given Tylenol x1, vanco x1 and zosyn, poatssium IV 10 MeQ x3, BC x1 sent    CXR significant for LLL infiltrate    Unable to obtain urine culture    labs significant for lactate negative, WBC 14.5, K 2.6, Cr 7.3    ICU was consulted in the setting of a K 2.6, BiPAP placed in setting of respiratory distress and patient comfortable after BiPAP placed    ICU deemed patient stable for regional medical floor, especially in setting of DNR/DNI status

## 2017-06-19 NOTE — ED ADULT NURSE NOTE - CHIEF COMPLAINT QUOTE
pt BIBA from [facility] for fever AMS pt hx dementia responds to verbal stimuli DNR DNI

## 2017-06-19 NOTE — PROGRESS NOTE ADULT - SUBJECTIVE AND OBJECTIVE BOX
PUD CCM ATTENDING FOR DR NAYLOR    87 yo AA man with many medical issues was found to have fever of 102. His CXR shows a LLL infiltrate. He is hypoxic and BiPAP is used with 10/5 and FiO2 of 0.5.  He his DNR/DNI. He will continue HD. He is aphasic today.    PAST MEDICAL & SURGICAL HISTORY:  AV graft thrombosis  Osteoarthritis  PVD (peripheral vascular disease)  COPD (chronic obstructive pulmonary disease)  Heart failure  Angina pectoris  ESRD (end stage renal disease)  Seizures: post traumatic  CAD (coronary artery disease)  HTN (hypertension)  Polyneuropathy  Hyperlipidemia  Dysphagia  Hypotension  Alzheimers disease  Osteoarthritis: Osteoarthritis  Chronic obstructive pulmonary disease: Chronic obstructive pulmonary disease  Anemia: Anemia  Dementia without behavioral disturbance: Dementia  Atherosclerosis of coronary artery: CAD (coronary artery disease)  Nondependent alcohol abuse: ETOH abuse  Depressive disorder: Depression  End-stage renal disease: ESRD on hemodialysis  Essential hypertension: HTN (hypertension)  Deep venous embolism and thrombosis of lower extremity: DVT (deep venous thrombosis)  Congestive heart failure: CHF (congestive heart failure)  Legal blindness: Legally blind  Hemodialysis access, AV graft: LUE loop AVG  Acquired arteriovenous fistula: AV fistula    FAMILY HISTORY:  Family history unknown    SOCIAL HISTORY:  lives in nursing home. No tobacco    REVIEW OF SYSTEMS:  unable to obtain    Vital Signs Last 24 Hrs  T(C): 37.2, Max: 38.6 (06-19 @ 06:48)  T(F): 98.9, Max: 101.4 (06-19 @ 06:48)  HR: 67 (56 - 67)  BP: 137/63 (104/64 - 137/63)  BP(mean): --  RR: 19 (19 - 26)  SpO2: 50% (50% - 100%)    I&O's Detail    PHYSICAL EXAM:  in bed, on BiPAP, moving right arm, AV fistula left arm  Well nourished, comfortable, - acute distress; vital signs are monitored  Eyes: -conjunctivitis, -scleritis AMAUROSIS  Head: no focal deficit, normocephalic,  no trauma  ENMT: dry tongue, no thrush, -nasal discharge, normal hearing, -cough, -hemoptysis, trachea midline  Neck: supple, - lymphadenopathy,  -masses, -JVD  Respiratory: bilateral diminished breath sounds, -wheezing, -rhonchi, -rales, -crackles  Chest: -accessory muscle use, -paradoxical breathing  Cardiovascular: irregular rate, S1 S2 normal, -S3, -S4, -murmur, -gallop, -rub  Gastrointestinal: soft, nontender, nondistended, normal bowel sounds, no hepatosplenomegaly  Genitourinary: -flank pain, -dysuria  Extremities: -clubbing, -cyanosis, -edema    Vascular: peripheral pulses palpable 2+ bilaterally  Neurological: awake, not oriented, no focal deficit, -tremor   Skin: warm, dry, -erythema, iv site intact  Lymph nodes; no cervical, supraclavicular or axillary adenopathy  Psychiatric: not responding to questions    MEDICATIONS  (STANDING):  potassium chloride  10 mEq/100 mL IVPB 10milliEquivalent(s) IV Intermittent every 1 hour  epoetin nicki Injectable 7000Unit(s) IV Push Once  calcitriol Injectable 2MICROGram(s) IV Push Once  heparin  Injectable 5000Unit(s) SubCutaneous every 8 hours  aspirin  chewable 81milliGRAM(s) Oral daily  nitroglycerin    Patch 0.1 mG/Hr(s) 1patch Transdermal daily  levETIRAcetam 500milliGRAM(s) Oral two times a day  gabapentin 100milliGRAM(s) Oral two times a day  metoclopramide 10milliGRAM(s) Oral three times a day with meals  simvastatin 20milliGRAM(s) Oral at bedtime  midodrine 5milliGRAM(s) Oral every 8 hours  calcium acetate 667milliGRAM(s) Oral three times a day with meals  folic acid 1milliGRAM(s) Oral daily  mirtazapine 15milliGRAM(s) Oral at bedtime  insulin lispro (HumaLOG) corrective regimen sliding scale  SubCutaneous Before meals and at bedtime  piperacillin/tazobactam IVPB. 2.25Gram(s) IV Intermittent every 8 hours    MEDICATIONS  (PRN):    Allergies  clonidine (Unknown)  Intolerances    LABS:                        8.1    14.5  )-----------( 172      ( 19 Jun 2017 06:30 )             23.9     06-19    135  |  89<L>  |  68<H>  ----------------------------<  125<H>  2.6<LL>   |  24  |  7.30<H>    Ca    9.0      19 Jun 2017 08:43  TPro  8.0  /  Alb  3.5  /  TBili  0.5  /  DBili  x   /  AST  26  /  ALT  10  /  AlkPhos  92  06-19  PT/INR - ( 19 Jun 2017 08:43 )   PT: 13.9 sec;   INR: 1.25     PTT - ( 19 Jun 2017 08:43 )  PTT:28.5 sec    +DVT prophylaxis SC HEPARIN  ?Sleep  +Nutrition goals ORAL, WITH ASSISTANCE  -Pain NOT OBVIOUS  ?Decubital ulcer  +GI prophylaxis (PPV, coagulopathy, Hx)  PPI  +Aspiration prophylaxis (45 degrees)    Ventilator synchronized  Tracheal cuff pressure  ORONASAL INTERFACE  +Sedation/analgesia stopped once  +ID (phos, CH, mupi, SB)  -Delirium  +Cardiac ASA/statin  +Prevention  +Education  +Medication reviewed (drug-drug interactions, PDA)  Medical devices  Discussed with PGY, RN,     RADIOLOGY & ADDITIONAL STUDIES:    CXR reviewed LLL infiltrate, cardiomegaly

## 2017-06-19 NOTE — PROGRESS NOTE ADULT - SUBJECTIVE AND OBJECTIVE BOX
Patient was seen and evaluated on dialysis.   Patient is tolerating the procedure well.   HR: 68  BP: 122/54  Continue dialysis:   Optiflux 180, , , 4K bath   Goal  cc over 3.5 hours.

## 2017-06-19 NOTE — CONSULT NOTE ADULT - PROBLEM SELECTOR RECOMMENDATION 9
on zosyn, await cultures
Patient is an 88 year old male with a history of ESRD on HD M/W/F whom presented to the hospital with a change in mental status and fever of 102.     P - dialysis today primarily for clearance   Optiflux 180, , , 4K bath   Goal  cc over 3.5 hours

## 2017-06-19 NOTE — H&P ADULT - PROBLEM SELECTOR PLAN 4
stable, last ef 65-70 in feb. currently not in exacerbation as no jvd crackles or edema. Does not seem to be in exacerbation. No wheezing, on BiPAP    - Duoneb prn.

## 2017-06-19 NOTE — H&P ADULT - PROBLEM SELECTOR PLAN 3
Does not seem to be in exacerbation. No wheezing, on BiPAP    - Duoneb prn. flattened T waves on EKG, mobitx type 2 on EKG, previously type 1 heart block    - f/u repeat EKG  - f/u repeat BMP

## 2017-06-19 NOTE — H&P ADULT - PROBLEM SELECTOR PLAN 5
history of HTN, but currently on midodrine, continue midodrine stable, last ef 65-70 in feb. currently not in exacerbation as no jvd crackles or edema.

## 2017-06-19 NOTE — H&P ADULT - PROBLEM SELECTOR PLAN 10
monitory nito, no IVF, NPO while on BiPAP, bedside dysphagia screen once off BiPAP    DNR/DNI, f/u palliative care    Dispo: admit to medicine

## 2017-06-19 NOTE — H&P ADULT - PROBLEM SELECTOR PLAN 9
monitory nito, no IVF, NPO while on BiPAP, bedside dysphagia screen once off BiPAP    DNR/DNI    Dispo: admit to medicine HSQ

## 2017-06-19 NOTE — CONSULT NOTE ADULT - SUBJECTIVE AND OBJECTIVE BOX
Patient is a 88y Male with a history of ESRD on HD M/W/F. Patient is well known to the nephrology department and receives dialysis at [facility] [place]. Patient was sent to the hospital with a change in mental status and temperature of 102 with cough. Patient was seen in the ER and noted to be desaturating to the high 80s. Patient was started on BiPAP. Xray chest showed LLL pneumonia.     PAST MEDICAL & SURGICAL HISTORY:  AV graft thrombosis  Osteoarthritis  PVD (peripheral vascular disease)  COPD (chronic obstructive pulmonary disease)  Heart failure  Angina pectoris  ESRD (end stage renal disease)  Seizures: post traumatic  CAD (coronary artery disease)  HTN (hypertension)  Polyneuropathy  Hyperlipidemia  Dysphagia  Hypotension  Alzheimers disease  Osteoarthritis: Osteoarthritis  Chronic obstructive pulmonary disease: Chronic obstructive pulmonary disease  Anemia: Anemia  Dementia without behavioral disturbance: Dementia  Atherosclerosis of coronary artery: CAD (coronary artery disease)  Nondependent alcohol abuse: ETOH abuse  Depressive disorder: Depression  End-stage renal disease: ESRD on hemodialysis  Essential hypertension: HTN (hypertension)  Deep venous embolism and thrombosis of lower extremity: DVT (deep venous thrombosis)  Congestive heart failure: CHF (congestive heart failure)  Legal blindness: Legally blind  Hemodialysis access, AV graft: LUE loop AVG  Acquired arteriovenous fistula: AV fistula      MEDICATIONS  (STANDING):  epoetin nicki Injectable 7000Unit(s) IV Push Once  calcitriol Injectable 2MICROGram(s) IV Push Once  heparin  Injectable 5000Unit(s) SubCutaneous every 8 hours  aspirin  chewable 81milliGRAM(s) Oral daily  nitroglycerin    Patch 0.1 mG/Hr(s) 1patch Transdermal daily  levETIRAcetam 500milliGRAM(s) Oral two times a day  gabapentin 100milliGRAM(s) Oral two times a day  metoclopramide 10milliGRAM(s) Oral three times a day with meals  simvastatin 20milliGRAM(s) Oral at bedtime  midodrine 5milliGRAM(s) Oral every 8 hours  calcium acetate 667milliGRAM(s) Oral three times a day with meals  folic acid 1milliGRAM(s) Oral daily  mirtazapine 15milliGRAM(s) Oral at bedtime  insulin lispro (HumaLOG) corrective regimen sliding scale  SubCutaneous Before meals and at bedtime  piperacillin/tazobactam IVPB. 2.25Gram(s) IV Intermittent every 8 hours  heparin -  Bolus 1000Unit(s) IV Bolus once  heparin -  Bolus 500Unit(s) IV Bolus every 1 hour  heparin -  Bolus       MEDICATIONS  (PRN):    Allergies    clonidine (Unknown)    Intolerances    FAMILY HISTORY:  Family history unknown    T(C): , Max: 38.6 (06-19 @ 06:48)  T(F): , Max: 101.4 (06-19 @ 06:48)  HR: 68  BP: 122/54  RR: 19  SpO2: 97%    Weight (kg): 72.1 (06-19 @ 12:34)      LABS:                        8.1    14.5  )-----------( 172      ( 19 Jun 2017 06:30 )             23.9     06-19    135  |  89<L>  |  68<H>  ----------------------------<  125<H>  2.6<LL>   |  24  |  7.30<H>    Ca    9.0      19 Jun 2017 08:43    TPro  8.0  /  Alb  3.5  /  TBili  0.5  /  DBili  x   /  AST  26  /  ALT  10  /  AlkPhos  92  06-19    PT/INR - ( 19 Jun 2017 08:43 )   PT: 13.9 sec;   INR: 1.25        PTT - ( 19 Jun 2017 08:43 )  PTT:28.5 sec

## 2017-06-19 NOTE — CONSULT NOTE ADULT - ASSESSMENT
86 y/o gentleman known to this service from prior admits, with h/o OA, PVD, COPD, ESRD on HD via left AV graft, dysphagia (previously refused peg), dementia (oriented x2 at baseline), CAD, HTN, CHF, blindness, recurrent aspiration pna, Zenker's diverticulum s/p botox, presenting again from LTC with fevers and hypoxia, likely septic from recurrent aspiration pna

## 2017-06-20 LAB
ANION GAP SERPL CALC-SCNC: 19 MMOL/L — HIGH (ref 5–17)
ANION GAP SERPL CALC-SCNC: 23 MMOL/L — HIGH (ref 5–17)
BUN SERPL-MCNC: 49 MG/DL — HIGH (ref 7–23)
BUN SERPL-MCNC: 52 MG/DL — HIGH (ref 7–23)
CALCIUM SERPL-MCNC: 8.3 MG/DL — LOW (ref 8.4–10.5)
CALCIUM SERPL-MCNC: 9.2 MG/DL — SIGNIFICANT CHANGE UP (ref 8.4–10.5)
CHLORIDE SERPL-SCNC: 93 MMOL/L — LOW (ref 96–108)
CHLORIDE SERPL-SCNC: 94 MMOL/L — LOW (ref 96–108)
CO2 SERPL-SCNC: 20 MMOL/L — LOW (ref 22–31)
CO2 SERPL-SCNC: 26 MMOL/L — SIGNIFICANT CHANGE UP (ref 22–31)
CREAT SERPL-MCNC: 5.4 MG/DL — HIGH (ref 0.5–1.3)
CREAT SERPL-MCNC: 5.5 MG/DL — HIGH (ref 0.5–1.3)
GLUCOSE SERPL-MCNC: 106 MG/DL — HIGH (ref 70–99)
GLUCOSE SERPL-MCNC: 107 MG/DL — HIGH (ref 70–99)
MAGNESIUM SERPL-MCNC: 1.8 MG/DL — SIGNIFICANT CHANGE UP (ref 1.6–2.6)
MAGNESIUM SERPL-MCNC: 2 MG/DL — SIGNIFICANT CHANGE UP (ref 1.6–2.6)
PHOSPHATE SERPL-MCNC: 2.5 MG/DL — SIGNIFICANT CHANGE UP (ref 2.5–4.5)
PHOSPHATE SERPL-MCNC: 2.8 MG/DL — SIGNIFICANT CHANGE UP (ref 2.5–4.5)
POTASSIUM SERPL-MCNC: 2.5 MMOL/L — CRITICAL LOW (ref 3.5–5.3)
POTASSIUM SERPL-MCNC: 3 MMOL/L — LOW (ref 3.5–5.3)
POTASSIUM SERPL-SCNC: 2.5 MMOL/L — CRITICAL LOW (ref 3.5–5.3)
POTASSIUM SERPL-SCNC: 3 MMOL/L — LOW (ref 3.5–5.3)
SODIUM SERPL-SCNC: 137 MMOL/L — SIGNIFICANT CHANGE UP (ref 135–145)
SODIUM SERPL-SCNC: 138 MMOL/L — SIGNIFICANT CHANGE UP (ref 135–145)

## 2017-06-20 PROCEDURE — 99232 SBSQ HOSP IP/OBS MODERATE 35: CPT | Mod: GC

## 2017-06-20 PROCEDURE — 99233 SBSQ HOSP IP/OBS HIGH 50: CPT

## 2017-06-20 PROCEDURE — 93010 ELECTROCARDIOGRAM REPORT: CPT

## 2017-06-20 PROCEDURE — 99222 1ST HOSP IP/OBS MODERATE 55: CPT

## 2017-06-20 RX ORDER — POTASSIUM CHLORIDE 20 MEQ
10 PACKET (EA) ORAL
Qty: 0 | Refills: 0 | Status: COMPLETED | OUTPATIENT
Start: 2017-06-20 | End: 2017-06-20

## 2017-06-20 RX ADMIN — PIPERACILLIN AND TAZOBACTAM 200 GRAM(S): 4; .5 INJECTION, POWDER, LYOPHILIZED, FOR SOLUTION INTRAVENOUS at 19:08

## 2017-06-20 RX ADMIN — Medication 100 MILLIEQUIVALENT(S): at 14:07

## 2017-06-20 RX ADMIN — Medication 100 MILLIEQUIVALENT(S): at 13:09

## 2017-06-20 RX ADMIN — Medication 10 MILLIGRAM(S): at 19:01

## 2017-06-20 RX ADMIN — PIPERACILLIN AND TAZOBACTAM 200 GRAM(S): 4; .5 INJECTION, POWDER, LYOPHILIZED, FOR SOLUTION INTRAVENOUS at 10:38

## 2017-06-20 RX ADMIN — Medication 100 MILLIEQUIVALENT(S): at 02:50

## 2017-06-20 RX ADMIN — Medication 100 MILLIEQUIVALENT(S): at 04:45

## 2017-06-20 RX ADMIN — LEVETIRACETAM 500 MILLIGRAM(S): 250 TABLET, FILM COATED ORAL at 19:01

## 2017-06-20 RX ADMIN — Medication 100 MILLIEQUIVALENT(S): at 21:58

## 2017-06-20 RX ADMIN — HEPARIN SODIUM 5000 UNIT(S): 5000 INJECTION INTRAVENOUS; SUBCUTANEOUS at 21:58

## 2017-06-20 RX ADMIN — HEPARIN SODIUM 5000 UNIT(S): 5000 INJECTION INTRAVENOUS; SUBCUTANEOUS at 06:58

## 2017-06-20 RX ADMIN — HEPARIN SODIUM 5000 UNIT(S): 5000 INJECTION INTRAVENOUS; SUBCUTANEOUS at 13:35

## 2017-06-20 RX ADMIN — Medication 1 PATCH: at 02:00

## 2017-06-20 RX ADMIN — SIMVASTATIN 20 MILLIGRAM(S): 20 TABLET, FILM COATED ORAL at 21:58

## 2017-06-20 RX ADMIN — Medication 667 MILLIGRAM(S): at 19:00

## 2017-06-20 RX ADMIN — Medication 100 MILLIEQUIVALENT(S): at 12:07

## 2017-06-20 RX ADMIN — GABAPENTIN 100 MILLIGRAM(S): 400 CAPSULE ORAL at 19:00

## 2017-06-20 RX ADMIN — MIRTAZAPINE 15 MILLIGRAM(S): 45 TABLET, ORALLY DISINTEGRATING ORAL at 21:58

## 2017-06-20 RX ADMIN — Medication 1 PATCH: at 12:08

## 2017-06-20 RX ADMIN — Medication 100 MILLIEQUIVALENT(S): at 00:32

## 2017-06-20 RX ADMIN — PIPERACILLIN AND TAZOBACTAM 200 GRAM(S): 4; .5 INJECTION, POWDER, LYOPHILIZED, FOR SOLUTION INTRAVENOUS at 06:58

## 2017-06-20 NOTE — PROGRESS NOTE ADULT - ATTENDING COMMENTS
more awake.  Still on BIPAP  responds to my commands  tolerated dialysis well yesterday  nephrologically stable today.  next HD 6/21

## 2017-06-20 NOTE — CONSULT NOTE ADULT - SUBJECTIVE AND OBJECTIVE BOX
REASON FOR CONSULT:    HISTORY OF PRESENT ILLNESS: 86 yo M with ESRD (MWF HD), Alzheimer's dementia, CAD, COPD, angina, anemia, CHF, depression, HTN, HLD, OA, PVD, polyneuropathy, blindness, seizures, IDDM, and recurrent aspiration PNA 2/2 Zenker's diverticulum who presented with AMS for a few hours and found to have a temp of 102 with cough. Of note, the patient is a poor historian so the HPI is limited. The patient was initially noted to desaturate to the high 80's. The patient denied any chills, nausea, vomiting, diarrhea.     In the ED, T 101.3, HR 60, /64, RR 20, 99% on O2 NC    given Tylenol x1, vanco x1 and zosyn, poatssium IV 10 MeQ x3, BC x1 sent    CXR significant for LLL infiltrate    Unable to obtain urine culture    labs significant for lactate negative, WBC 14.5, K 2.6, Cr 7.3    ICU was consulted in the setting of a K 2.6, BiPAP placed in setting of respiratory distress and patient comfortable after BiPAP placed    ICU deemed patient stable for regional medical floor, especially in setting of DNR/DNI status      PAST MEDICAL & SURGICAL HISTORY:  AV graft thrombosis  Osteoarthritis  PVD (peripheral vascular disease)  COPD (chronic obstructive pulmonary disease)  Heart failure  Angina pectoris  ESRD (end stage renal disease)  Seizures: post traumatic  CAD (coronary artery disease)  HTN (hypertension)  Polyneuropathy  Hyperlipidemia  Dysphagia  Hypotension  Alzheimers disease  Osteoarthritis: Osteoarthritis  Chronic obstructive pulmonary disease: Chronic obstructive pulmonary disease  Anemia: Anemia  Dementia without behavioral disturbance: Dementia  Atherosclerosis of coronary artery: CAD (coronary artery disease)  Nondependent alcohol abuse: ETOH abuse  Depressive disorder: Depression  End-stage renal disease: ESRD on hemodialysis  Essential hypertension: HTN (hypertension)  Deep venous embolism and thrombosis of lower extremity: DVT (deep venous thrombosis)  Congestive heart failure: CHF (congestive heart failure)  Legal blindness: Legally blind  Hemodialysis access, AV graft: LUE loop AVG  Acquired arteriovenous fistula: AV fistula      [ ] Diabetes   [ ] Hypertension  [ ] Hyperlipidemia  [ ] CAD  [ ] PCI  [ ] CABG    PREVIOUS DIAGNOSTIC TESTING:    [ ] Echocardiogram:  [ ]  Catheterization:  [ ] Stress Test:  	    MEDICATIONS:  nitroglycerin    Patch 0.1 mG/Hr(s) 1patch Transdermal daily  midodrine 5milliGRAM(s) Oral every 8 hours    piperacillin/tazobactam IVPB. 2.25Gram(s) IV Intermittent every 8 hours      levETIRAcetam 500milliGRAM(s) Oral two times a day  gabapentin 100milliGRAM(s) Oral two times a day  mirtazapine 15milliGRAM(s) Oral at bedtime    metoclopramide 10milliGRAM(s) Oral three times a day with meals    simvastatin 20milliGRAM(s) Oral at bedtime  insulin lispro (HumaLOG) corrective regimen sliding scale  SubCutaneous Before meals and at bedtime    heparin  Injectable 5000Unit(s) SubCutaneous every 8 hours  aspirin  chewable 81milliGRAM(s) Oral daily  calcium acetate 667milliGRAM(s) Oral three times a day with meals  folic acid 1milliGRAM(s) Oral daily  potassium chloride  10 mEq/100 mL IVPB 10milliEquivalent(s) IV Intermittent every 1 hour      FAMILY HISTORY:  Family history unknown      SOCIAL HISTORY:    [ ] Non-smoker  [ ] Smoker  [ ] Alcohol    Allergies    clonidine (Unknown)    Intolerances    	    REVIEW OF SYSTEMS:    [x] as per HPI  CONSTITUTIONAL: No fever, weight loss, or fatigue  ENT:  No difficulty hearing, tinnitus, vertigo; No sinus or throat pain  RESPIRATORY: No cough, wheezing, chills or hemoptysis; No Shortness of Breath  CARDIOVASCULAR: No chest pain, palpitations, dizziness, or leg swelling  GASTROINTESTINAL: No abdominal or epigastric pain. No nausea, vomiting, or hematemesis; No diarrhea or constipation. No melena or hematochezia.  GENITOURINARY: No dysuria, frequency, hematuria, or incontinence  NEUROLOGICAL: No headaches, memory loss, loss of strength, numbness, or tremors  MUSCULOSKELETAL: No joint pain or swelling; No muscle, back, or extremity pain  [x] All others negative	  [ ] Unable to obtain    PHYSICAL EXAM:  T(C): 37.7, Max: 37.7 (06-20 @ 09:54)  HR: 74 (63 - 74)  BP: 120/62 (102/49 - 120/62)  RR: 16 (16 - 22)  SpO2: 97% (97% - 100%)  Wt(kg): --  I&O's Summary    I & Os for current day (as of 20 Jun 2017 13:18)  =============================================  IN: 100 ml / OUT: 500 ml / NET: -400 ml      Appearance: Normal	  HEENT:   Normal oral mucosa, PERRL, EOMI	  Lymphatic: No lymphadenopathy  Cardiovascular: Normal S1 S2, No JVD, No murmurs, No edema  Respiratory: Lungs clear to auscultation	  Psychiatry: A & O x 3, Mood & affect appropriate  Gastrointestinal:  Soft, Non-tender, + BS	  Skin: No rashes, No ecchymoses, No cyanosis	  Neurologic: Non-focal  Extremities: Normal range of motion, No clubbing, cyanosis or edema  Vascular: Peripheral pulses palpable 2+ bilaterally    TELEMETRY: 	    ECG:  Diagnosis Line Sinus rhythm with 1st degree AV block  Left axis deviation  Cannot rule out Anterior infarct , age undetermined  ECHO:There is mild concentric left ventricular hypertrophy.The left   ventricular wall   motion is normal.The left ventricular ejection fraction is estimated to   be   65-70%The left atrium is dilated.Right atrial size is normal.The right   ventricle is normal in size and function.Calcified aortic valve.No aortic   regurgitation noted.There is mild mitral valve thickening.There is mild   mitral   annular calcification.No mitral regurgitation noted.Structurally normal   tricuspid valve.There is mild tricuspid regurgitation.Structurally normal   pulmonic valve.No pulmonic regurgitation noted.No aortic root   dilatation.The   inferior vena cava is normal in size (<2.1 cm) with normal inspiratory   collapse (>50%) consistent with normal right atrial pressure.  There is   no   pericardial effusion.  STRESS:  CATH:  	  RADIOLOGY:  CXR:Mild to moderate pulmonary venous congestion, increased. Improving   bibasilar infiltrates. Probable trace pleural effusions.    CT:  US:   	  	  LABS:	 	    CARDIAC MARKERS:                                  8.1    14.5  )-----------( 172      ( 19 Jun 2017 06:30 )             23.9     06-20    138  |  93<L>  |  49<H>  ----------------------------<  106<H>  2.5<LL>   |  26  |  5.40<H>    Ca    9.2      20 Jun 2017 10:34  Phos  2.5     06-20  Mg     2.0     06-20    TPro  8.0  /  Alb  3.5  /  TBili  0.5  /  DBili  x   /  AST  26  /  ALT  10  /  AlkPhos  92  06-19    proBNP:   Lipid Profile:   HgA1c:   TSH:     ASSESSMENT/PLAN: 	  Problem: Congestive heart failure.  Plan: stable, last ef 65-70 in feb. currently not in exacerbation as no jvd crackles or edema. CXR with PVC on impression, net neg 400cc today, continue HD with UF per renal recs    Problem/Plan - 6:  Problem: Essential hypertension. Plan: history of HTN, but currently on midodrine, continue midodrine.

## 2017-06-20 NOTE — PROGRESS NOTE ADULT - ASSESSMENT
86 y/o debilitated gentleman known to this service from prior admits, with h/o OA, PVD, COPD, ESRD on HD via left AV graft, dysphagia (previously refused peg), dementia (oriented x2 at baseline), CAD, HTN, CHF, blindness, recurrent aspiration pna, Zenker's diverticulum s/p botox, presenting again from LTC with fevers and hypoxia, likely septic from recurrent aspiration pna

## 2017-06-20 NOTE — PROGRESS NOTE ADULT - SUBJECTIVE AND OBJECTIVE BOX
Patient is a 88y old  Male who presents with a chief complaint of fever (19 Jun 2017 10:40)      HPI:  The patient is an 86 yo M with ESRD (MWF HD), Alzheimer's dementia, CAD, COPD, angina, anemia, CHF, depression, HTN, HLD, OA, PVD, polyneuropathy, blindness, seizures, IDDM, and recurrent aspiration PNA 2/2 Zenker's diverticulum who presented with AMS for a few hours and found to have a temp of 102 with cough. Of note, the patient is a poor historian so the HPI is limited. The patient was initially noted to desaturate to the high 80's. The patient denied any chills, nausea, vomiting, diarrhea.     In the ED, T 101.3, HR 60, /64, RR 20, 99% on O2 NC    given Tylenol x1, vanco x1 and zosyn, poatssium IV 10 MeQ x3, BC x1 sent    CXR significant for LLL infiltrate    Unable to obtain urine culture    labs significant for lactate negative, WBC 14.5, K 2.6, Cr 7.3    ICU was consulted in the setting of a K 2.6, BiPAP placed in setting of respiratory distress and patient comfortable after BiPAP placed    ICU deemed patient stable for regional medical floor, especially in setting of DNR/DNI status (19 Jun 2017 10:40)    INTERVAL HPI/OVERNIGHT EVENTS:::lethargic, arousable.    HEALTH ISSUES - PROBLEM Dx:  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  Palliative care by specialist: Palliative care by specialist  Sepsis, due to unspecified organism: Sepsis, due to unspecified organism  Pneumonia: Pneumonia  Acute respiratory failure, unspecified whether with hypoxia or hypercapnia: Acute respiratory failure, unspecified whether with hypoxia or hypercapnia  Hypokalemia: Hypokalemia  Nutrition, metabolism, and development symptoms: Nutrition, metabolism, and development symptoms  Need for prophylactic measure: Need for prophylactic measure  Diabetes: Diabetes  Anemia: Anemia  Essential hypertension: Essential hypertension  Congestive heart failure: Congestive heart failure  Chronic obstructive pulmonary disease: Chronic obstructive pulmonary disease  ESRD (end stage renal disease): ESRD (end stage renal disease)  Sepsis: Sepsis          PAST MEDICAL & SURGICAL HISTORY:  AV graft thrombosis  Osteoarthritis  PVD (peripheral vascular disease)  COPD (chronic obstructive pulmonary disease)  Heart failure  Angina pectoris  ESRD (end stage renal disease)  Seizures: post traumatic  CAD (coronary artery disease)  HTN (hypertension)  Polyneuropathy  Hyperlipidemia  Dysphagia  Hypotension  Alzheimers disease  Osteoarthritis: Osteoarthritis  Chronic obstructive pulmonary disease: Chronic obstructive pulmonary disease  Anemia: Anemia  Dementia without behavioral disturbance: Dementia  Atherosclerosis of coronary artery: CAD (coronary artery disease)  Nondependent alcohol abuse: ETOH abuse  Depressive disorder: Depression  End-stage renal disease: ESRD on hemodialysis  Essential hypertension: HTN (hypertension)  Deep venous embolism and thrombosis of lower extremity: DVT (deep venous thrombosis)  Congestive heart failure: CHF (congestive heart failure)  Legal blindness: Legally blind  Hemodialysis access, AV graft: LUE loop AVG  Acquired arteriovenous fistula: AV fistula          Consultant NOTE  REVIEWED  (++   )    REVIEW OF SYSTEMS:  [x] As per HPI    	  [ ] Unable to obtain          Vital Signs Last 24 Hrs  T(C): 37.7, Max: 37.7 (06-20 @ 09:54)  T(F): 99.8, Max: 99.8 (06-20 @ 09:54)  HR: 73 (63 - 74)  BP: 136/67 (102/49 - 136/67)  BP(mean): --  RR: 16 (16 - 22)  SpO2: 100% (97% - 100%)      I & Os for 24h ending 06-20 @ 07:00  =============================================  IN: 100 ml / OUT: 500 ml / NET: -400 ml    I & Os for current day (as of 06-20 @ 15:23)  =============================================  IN: 300 ml / OUT: 0 ml / NET: 300 ml    PHYSICAL EXAMINATION:                                    (    )  NO CHANGE  Appearance: Normal	weak, lethargic  HEENT:   Normal oral mucosa, PERRL, EOMI	  Neck: Supple, + JVD/ - JVD; Carotid Bruit   Cardiovascular: Normal S1 S2, No JVD, No murmurs,   Respiratory: rhonchi  Gastrointestinal:  Soft, Non-tender, + BS	  Skin: No rashes, No ecchymoses, No cyanosis  Extremities: Normal range of motion, No clubbing, cyanosis or edema  Vascular: Peripheral pulses dimished  Neurologic: Non-focal  blind  Psychiatry: Awake    heparin  Injectable 5000Unit(s) SubCutaneous every 8 hours  aspirin  chewable 81milliGRAM(s) Oral daily  nitroglycerin    Patch 0.1 mG/Hr(s) 1patch Transdermal daily  levETIRAcetam 500milliGRAM(s) Oral two times a day  gabapentin 100milliGRAM(s) Oral two times a day  metoclopramide 10milliGRAM(s) Oral three times a day with meals  simvastatin 20milliGRAM(s) Oral at bedtime  midodrine 5milliGRAM(s) Oral every 8 hours  calcium acetate 667milliGRAM(s) Oral three times a day with meals  folic acid 1milliGRAM(s) Oral daily  mirtazapine 15milliGRAM(s) Oral at bedtime  insulin lispro (HumaLOG) corrective regimen sliding scale  SubCutaneous Before meals and at bedtime  piperacillin/tazobactam IVPB. 2.25Gram(s) IV Intermittent every 8 hours        PT/INR - ( 19 Jun 2017 08:43 )   PT: 13.9 sec;   INR: 1.25          PTT - ( 19 Jun 2017 08:43 )  PTT:28.5 sec                              8.1    14.5  )-----------( 172      ( 19 Jun 2017 06:30 )             23.9     06-20    138  |  93<L>  |  49<H>  ----------------------------<  106<H>  2.5<LL>   |  26  |  5.40<H>    Ca    9.2      20 Jun 2017 10:34  Phos  2.5     06-20  Mg     2.0     06-20    TPro  8.0  /  Alb  3.5  /  TBili  0.5  /  DBili  x   /  AST  26  /  ALT  10  /  AlkPhos  92  06-19      CAPILLARY BLOOD GLUCOSE  121 (20 Jun 2017 12:27)  109 (20 Jun 2017 08:36)  90 (19 Jun 2017 22:34)

## 2017-06-20 NOTE — PROGRESS NOTE ADULT - SUBJECTIVE AND OBJECTIVE BOX
PUD CCM ATTENDING FOR DR NAYLOR    89 yo AA man with many medical issues was found to have fever of 102. His CXR shows a LLL infiltrate. He is hypoxic and BiPAP is used with 10/5 and FiO2 of 0.5.  He his DNR/DNI. He will continue HD. He is aphasic today. He has Alzheimers. He has been dialyzed again.    PAST MEDICAL & SURGICAL HISTORY:  AV graft thrombosis  Osteoarthritis  PVD (peripheral vascular disease)  COPD (chronic obstructive pulmonary disease)  Heart failure  Angina pectoris  ESRD (end stage renal disease)  Seizures: post traumatic  CAD (coronary artery disease)  HTN (hypertension)  Polyneuropathy  Hyperlipidemia  Dysphagia  Hypotension  Alzheimers disease  Osteoarthritis: Osteoarthritis  Chronic obstructive pulmonary disease: Chronic obstructive pulmonary disease  Anemia: Anemia  Dementia without behavioral disturbance: Dementia  Atherosclerosis of coronary artery: CAD (coronary artery disease)  Nondependent alcohol abuse: ETOH abuse  Depressive disorder: Depression  End-stage renal disease: ESRD on hemodialysis  Essential hypertension: HTN (hypertension)  Deep venous embolism and thrombosis of lower extremity: DVT (deep venous thrombosis)  Congestive heart failure: CHF (congestive heart failure)  Legal blindness: Legally blind  Hemodialysis access, AV graft: LUE loop AVG  Acquired arteriovenous fistula: AV fistula    FAMILY HISTORY:  Family history unknown    SOCIAL HISTORY:  lives in nursing home. No tobacco    REVIEW OF SYSTEMS:  unable to obtain, aphasic    Vital Signs Last 24 Hrs  T(C): 37.2, Max: 38.6 (06-19 @ 06:48)  T(F): 98.9, Max: 101.4 (06-19 @ 06:48)  HR: 67 (56 - 67)  BP: 137/63 (104/64 - 137/63)  BP(mean): --  RR: 19 (19 - 26)  SpO2: 50% (50% - 100%)    I&O's Detail    PHYSICAL EXAM:  coughing  in bed, off BiPAP, not moving right arm, AV fistula left arm  Well nourished, comfortable, - acute distress; vital signs are monitored  Eyes: -conjunctivitis, -scleritis AMAUROSIS, opening eyes partially  Head: no focal deficit, normocephalic,  no trauma  ENMT: dry tongue, no thrush, -nasal discharge, normal hearing, -cough, -hemoptysis, trachea midline  Neck: supple, - lymphadenopathy,  -masses, -JVD  Respiratory: bilateral diminished breath sounds, -wheezing, RHONCHI WITH COUGH, -rales, -crackles  Chest: -accessory muscle use, -paradoxical breathing  Cardiovascular: irregular rate, S1 S2 normal, -S3, -S4, -murmur, -gallop, -rub  Gastrointestinal: soft, nontender, nondistended, normal bowel sounds, no hepatosplenomegaly  Genitourinary: -flank pain, -dysuria  Extremities: -clubbing, -cyanosis, -edema    Vascular: peripheral pulses palpable 2+ bilaterally  Neurological: awake, not oriented, no focal deficit, -tremor   Skin: warm, dry, -erythema, iv site intact  Lymph nodes; no cervical, supraclavicular or axillary adenopathy  Psychiatric: not responding to questions    MEDICATIONS  (STANDING):  potassium chloride  10 mEq/100 mL IVPB 10milliEquivalent(s) IV Intermittent every 1 hour  epoetin nicki Injectable 7000Unit(s) IV Push Once  calcitriol Injectable 2MICROGram(s) IV Push Once  heparin  Injectable 5000Unit(s) SubCutaneous every 8 hours  aspirin  chewable 81milliGRAM(s) Oral daily  nitroglycerin    Patch 0.1 mG/Hr(s) 1patch Transdermal daily  levETIRAcetam 500milliGRAM(s) Oral two times a day  gabapentin 100milliGRAM(s) Oral two times a day  metoclopramide 10milliGRAM(s) Oral three times a day with meals  simvastatin 20milliGRAM(s) Oral at bedtime  midodrine 5milliGRAM(s) Oral every 8 hours  calcium acetate 667milliGRAM(s) Oral three times a day with meals  folic acid 1milliGRAM(s) Oral daily  mirtazapine 15milliGRAM(s) Oral at bedtime  insulin lispro (HumaLOG) corrective regimen sliding scale  SubCutaneous Before meals and at bedtime  piperacillin/tazobactam IVPB. 2.25Gram(s) IV Intermittent every 8 hours    MEDICATIONS  (PRN):    Allergies  clonidine (Unknown)  Intolerances    LABS:                        8.1    14.5  )-----------( 172      ( 19 Jun 2017 06:30 )             23.9     06-19    135  |  89<L>  |  68<H>  ----------------------------<  125<H>  2.6<LL>   |  24  |  7.30<H>    Ca    9.0      19 Jun 2017 08:43  TPro  8.0  /  Alb  3.5  /  TBili  0.5  /  DBili  x   /  AST  26  /  ALT  10  /  AlkPhos  92  06-19  PT/INR - ( 19 Jun 2017 08:43 )   PT: 13.9 sec;   INR: 1.25     PTT - ( 19 Jun 2017 08:43 )  PTT:28.5 sec    +DVT prophylaxis SC HEPARIN  ?Sleep  +Nutrition goals ORAL, WITH ASSISTANCE  -Pain NOT OBVIOUS  ?Decubital ulcer  +GI prophylaxis (PPV, coagulopathy, Hx)  PPI  +Aspiration prophylaxis (45 degrees)  +Sedation/analgesia stopped once  +ID (phos, CH, mupi, SB)  -Delirium  +Cardiac ASA/statin  +Prevention  +Education  +Medication reviewed (drug-drug interactions, PDA)  Medical devices  Discussed with PGY, RN,     RADIOLOGY & ADDITIONAL STUDIES:    CXR reviewed LLL infiltrate, cardiomegaly

## 2017-06-20 NOTE — DIETITIAN INITIAL EVALUATION ADULT. - DIET TYPE
dysphagia 1, pureed, honey consistency fluid/renal replacement pts:no protein restr,no conc K & phos, low sodium

## 2017-06-20 NOTE — PROGRESS NOTE ADULT - SUBJECTIVE AND OBJECTIVE BOX
LUIS GARDNER   MRN-[id number]         CC: Patient is a 88y old  Male who presents with a chief complaint of fever (19 Jun 2017 10:40) found to be septic likely from aspiration pna.  Pt is s/p HD yesterday    ROS:  UNABLE TO OBTAIN  due to: drowsy    DYSPNEA (Y/N):	  N/V (Y/N):	  SECRETIONS (Y/N):	  AGITATION (Y/N):  PAIN(Y/N):        -Provocation/Palliation:     -Quality/Quantity:     -Radiating:     -Severity:     -Timing/Frequency:     -Impact on ADLs:     OTHER REVIEW OF SYSTEMS:  ALLERGIES:  clonidine (Unknown)    OPIATE NAÏVE (Y/N): y  iSTOP REVIEWED (Y/N): y    MEDICATIONS: reviewed  MEDICATIONS  (STANDING):  heparin  Injectable 5000Unit(s) SubCutaneous every 8 hours  aspirin  chewable 81milliGRAM(s) Oral daily  nitroglycerin    Patch 0.1 mG/Hr(s) 1patch Transdermal daily  levETIRAcetam 500milliGRAM(s) Oral two times a day  gabapentin 100milliGRAM(s) Oral two times a day  metoclopramide 10milliGRAM(s) Oral three times a day with meals  simvastatin 20milliGRAM(s) Oral at bedtime  midodrine 5milliGRAM(s) Oral every 8 hours  calcium acetate 667milliGRAM(s) Oral three times a day with meals  folic acid 1milliGRAM(s) Oral daily  mirtazapine 15milliGRAM(s) Oral at bedtime  insulin lispro (HumaLOG) corrective regimen sliding scale  SubCutaneous Before meals and at bedtime  piperacillin/tazobactam IVPB. 2.25Gram(s) IV Intermittent every 8 hours    MEDICATIONS  (PRN):      PHYSICAL EXAM:  T(C): 37.7, Max: 37.7 (06-20 @ 09:54)  T(F): 99.8, Max: 99.8 (06-20 @ 09:54)  HR: 74 (63 - 74)  BP: 136/67 (102/49 - 136/67)  RR: 16 (16 - 22)  SpO2: 100% (97% - 100%)    GENERAL: Sleepy, but easily arouseable with mod verbal stimuli with apparent attempts to speak  HEENT: mild eye opening, tracking briefly     	  NECK: supple           CVS: nl S1S2         	  RESP: bipap, decrease bilateral rhonchi     	  GI: softly distended            	  : left AV graft with +bruit/thrill           	  MUSC: atrophy LEs      	  NEURO: sleepy, but arouseable with mod verbal stimuli, not following commands    	  PSYCH: sleepy        SKIN: no open sores      	   LYMPH: no supraclavicular LAD        LABS: reviewed                        8.1    14.5  )-----------( 172      ( 19 Jun 2017 06:30 )             23.9     06-20    138  |  93<L>  |  49<H>  ----------------------------<  106<H>  2.5<LL>   |  26  |  5.40<H>    Ca    9.2      20 Jun 2017 10:34  Phos  2.5     06-20  Mg     2.0     06-20    TPro  8.0  /  Alb  3.5  /  TBili  0.5  /  DBili  x   /  AST  26  /  ALT  10  /  AlkPhos  92  06-19    LIVER FUNCTIONS - ( 19 Jun 2017 08:43 )  Alb: 3.5 g/dL / Pro: 8.0 g/dL / ALK PHOS: 92 U/L / ALT: 10 U/L / AST: 26 U/L / GGT: x           PT/INR - ( 19 Jun 2017 08:43 )   PT: 13.9 sec;   INR: 1.25        PTT - ( 19 Jun 2017 08:43 )  PTT:28.5 sec    IMAGING: reviewed  none new    ADVANCED DIRECTIVES:      DNR     DNI     MOLST    DECISION MAKER:  LEGAL SURROGATE: pt had previously elected friend km Butler to be HCP, but she refuses role    PSYCHOSOCIAL-SPIRITUAL ASSESSMENT:       Reviewed  GOALS OF CARE DISCUSSION       Palliative care info/counseling provided	           See previous Palliative Medicine Note       Documentation of GOC: treatment of pna/acute issues, then return to LTC facility  	      AGENCY CHOICE DISCUSSED:          Other: [facility]    REFERRALS	        Unit SW/Case Mgmt       Massage Therapy       Music Therapy         [ ]CRITICAL CARE TIME PROVIDED TO UNSTABLE PT W/ ORGAN FAILURE    Start:               End:  	       Minutes:          [x]> 50% OF THE TIME SPENT IN COUNSELING AND COORDINATING CARE   Start:               End:  	       Minutes:  [ ]PROLONGED SERVICE             FACE TO FACE: [x]PT     [ ]PT & FAMILY   Start:               End:  	       Minutes:

## 2017-06-20 NOTE — PROGRESS NOTE ADULT - SUBJECTIVE AND OBJECTIVE BOX
Patient is an 88 year old male with a history of ESRD on HD M/W/F whom presented to the hospital with a change in mental status and fever of 102.   Patient seen and examined at bedside. Patient was last dialyzed 6/19 with UF of 500 cc.     heparin  Injectable 5000Unit(s) every 8 hours  aspirin  chewable 81milliGRAM(s) daily  nitroglycerin    Patch 0.1 mG/Hr(s) 1patch daily  levETIRAcetam 500milliGRAM(s) two times a day  gabapentin 100milliGRAM(s) two times a day  metoclopramide 10milliGRAM(s) three times a day with meals  simvastatin 20milliGRAM(s) at bedtime  midodrine 5milliGRAM(s) every 8 hours  calcium acetate 667milliGRAM(s) three times a day with meals  folic acid 1milliGRAM(s) daily  mirtazapine 15milliGRAM(s) at bedtime  insulin lispro (HumaLOG) corrective regimen sliding scale  Before meals and at bedtime  piperacillin/tazobactam IVPB. 2.25Gram(s) every 8 hours    Allergies    clonidine (Unknown)    Intolerances    T(C): , Max: 37.7 (06-20 @ 09:54)  T(F): , Max: 99.8 (06-20 @ 09:54)  HR: 74  BP: 136/67  RR: 16  SpO2: 100%    I & Os for 24h ending 06-20 @ 07:00  =============================================  IN:    IV PiggyBack: 100 ml    Total IN: 100 ml  ---------------------------------------------  OUT:    Other: 500 ml    Total OUT: 500 ml  ---------------------------------------------  Total NET: -400 ml    I & Os for current day (as of 06-20 @ 14:29)  =============================================  IN:    IV PiggyBack: 300 ml    Total IN: 300 ml  ---------------------------------------------  OUT:    Total OUT: 0 ml  ---------------------------------------------  Total NET: 300 ml    LABS:                        8.1    14.5  )-----------( 172      ( 19 Jun 2017 06:30 )             23.9     06-20    138  |  93<L>  |  49<H>  ----------------------------<  106<H>  2.5<LL>   |  26  |  5.40<H>    Ca    9.2      20 Jun 2017 10:34  Phos  2.5     06-20  Mg     2.0     06-20    TPro  8.0  /  Alb  3.5  /  TBili  0.5  /  DBili  x   /  AST  26  /  ALT  10  /  AlkPhos  92  06-19    PT/INR - ( 19 Jun 2017 08:43 )   PT: 13.9 sec;   INR: 1.25       PTT - ( 19 Jun 2017 08:43 )  PTT:28.5 sec Patient is an 88 year old male with a history of ESRD on HD M/W/F whom presented to the hospital with a change in mental status and fever of 102.   Patient seen and examined at bedside. Patient was last dialyzed 6/19 with UF of 500 cc.         heparin  Injectable 5000Unit(s) every 8 hours  aspirin  chewable 81milliGRAM(s) daily  nitroglycerin    Patch 0.1 mG/Hr(s) 1patch daily  levETIRAcetam 500milliGRAM(s) two times a day  gabapentin 100milliGRAM(s) two times a day  metoclopramide 10milliGRAM(s) three times a day with meals  simvastatin 20milliGRAM(s) at bedtime  midodrine 5milliGRAM(s) every 8 hours  calcium acetate 667milliGRAM(s) three times a day with meals  folic acid 1milliGRAM(s) daily  mirtazapine 15milliGRAM(s) at bedtime  insulin lispro (HumaLOG) corrective regimen sliding scale  Before meals and at bedtime  piperacillin/tazobactam IVPB. 2.25Gram(s) every 8 hours    Allergies    clonidine (Unknown)    Intolerances    T(C): , Max: 37.7 (06-20 @ 09:54)  T(F): , Max: 99.8 (06-20 @ 09:54)  HR: 74  BP: 136/67  RR: 16  SpO2: 100%    I & Os for 24h ending 06-20 @ 07:00  =============================================  IN:    IV PiggyBack: 100 ml    Total IN: 100 ml  ---------------------------------------------  OUT:    Other: 500 ml    Total OUT: 500 ml  ---------------------------------------------  Total NET: -400 ml    I & Os for current day (as of 06-20 @ 14:29)  =============================================  IN:    IV PiggyBack: 300 ml    Total IN: 300 ml  ---------------------------------------------  OUT:    Total OUT: 0 ml  ---------------------------------------------  Total NET: 300 ml    LABS:                        8.1    14.5  )-----------( 172      ( 19 Jun 2017 06:30 )             23.9     06-20    138  |  93<L>  |  49<H>  ----------------------------<  106<H>  2.5<LL>   |  26  |  5.40<H>    Ca    9.2      20 Jun 2017 10:34  Phos  2.5     06-20  Mg     2.0     06-20    TPro  8.0  /  Alb  3.5  /  TBili  0.5  /  DBili  x   /  AST  26  /  ALT  10  /  AlkPhos  92  06-19    PT/INR - ( 19 Jun 2017 08:43 )   PT: 13.9 sec;   INR: 1.25       PTT - ( 19 Jun 2017 08:43 )  PTT:28.5 sec

## 2017-06-20 NOTE — PROGRESS NOTE ADULT - ASSESSMENT
Patient is an 87 yr old male with extensive PMHx and recent admission for sepsis 2/2 PNA and another admission 6 months prior for septic shock secondary to UTI vs aspiration PNA who presented with AMS 2/2 fever, likely PNA.

## 2017-06-20 NOTE — DIETITIAN INITIAL EVALUATION ADULT. - OTHER INFO
height needed to assess nutritional needs , PVD skin pressure ulcer rt buttocks, family  not present ,  no n/v/d/c , full assistance with set up plus meals

## 2017-06-20 NOTE — PROGRESS NOTE ADULT - SUBJECTIVE AND OBJECTIVE BOX
INTERVAL HPI/OVERNIGHT EVENTS:  DONATO  Pt was seen and examined at the bedside. He was observed to be lying down comfortably.   He is somnolent, slightly lethargic, however arousable and AOx3. He offered no complaints.    VITAL SIGNS:  T(F): 99.8  HR: 74  BP: 120/62  RR: 16  SpO2: 97%  Wt(kg): --  I&O's Summary    I & Os for current day (as of 20 Jun 2017 11:05)  =============================================  IN: 100 ml / OUT: 500 ml / NET: -400 ml    PHYSICAL EXAM:  Constitutional: does not appear to be in distress, on BiPAP  HEENT: NCAT, PEERL, sclera non-icteric  Neck: supple, no JVD  Respiratory: decreased BS on L  Cardiovascular: RRR, normal s1/s2, no MRG  Gastrointestinal: soft, NTND, +BS, no guarding, no organomegaly  Extremities: WWP, DP/radial pulses 2+ b/l, no edema  Neurological: AAOx 0, nonverbal, moves all extremities, CN 2-12 intact        MEDICATIONS  (STANDING):  heparin  Injectable 5000Unit(s) SubCutaneous every 8 hours  aspirin  chewable 81milliGRAM(s) Oral daily  nitroglycerin    Patch 0.1 mG/Hr(s) 1patch Transdermal daily  levETIRAcetam 500milliGRAM(s) Oral two times a day  gabapentin 100milliGRAM(s) Oral two times a day  metoclopramide 10milliGRAM(s) Oral three times a day with meals  simvastatin 20milliGRAM(s) Oral at bedtime  midodrine 5milliGRAM(s) Oral every 8 hours  calcium acetate 667milliGRAM(s) Oral three times a day with meals  folic acid 1milliGRAM(s) Oral daily  mirtazapine 15milliGRAM(s) Oral at bedtime  insulin lispro (HumaLOG) corrective regimen sliding scale  SubCutaneous Before meals and at bedtime  piperacillin/tazobactam IVPB. 2.25Gram(s) IV Intermittent every 8 hours    MEDICATIONS  (PRN):      Allergies    clonidine (Unknown)    Intolerances        LABS:                        8.1    14.5  )-----------( 172      ( 19 Jun 2017 06:30 )             23.9     06-19    139  |  95<L>  |  39<H>  ----------------------------<  129<H>  2.5<LL>   |  24  |  4.60<H>    Ca    8.6      19 Jun 2017 23:09  Phos  2.5     06-19  Mg     1.8     06-19    TPro  8.0  /  Alb  3.5  /  TBili  0.5  /  DBili  x   /  AST  26  /  ALT  10  /  AlkPhos  92  06-19    PT/INR - ( 19 Jun 2017 08:43 )   PT: 13.9 sec;   INR: 1.25          PTT - ( 19 Jun 2017 08:43 )  PTT:28.5 sec      RADIOLOGY & ADDITIONAL TESTS:

## 2017-06-20 NOTE — PROGRESS NOTE ADULT - PROBLEM SELECTOR PLAN 3
Please check phosphate level  Patient tends to be hypophosphatemic   would hold calcium acetate until phosphorous level is reported.

## 2017-06-20 NOTE — PROGRESS NOTE ADULT - PROBLEM SELECTOR PLAN 1
Patient is an 88 year old male with ESRD on HD M/W/F whom presented with a change in mental status. Patient was last dialyzed 6/19 with UF of 500 cc.     P - no need for emergent dialysis today as volume status is acceptable   Will anticipate next dialysis on 6/21

## 2017-06-21 LAB
ANION GAP SERPL CALC-SCNC: 21 MMOL/L — HIGH (ref 5–17)
BUN SERPL-MCNC: 24 MG/DL — HIGH (ref 7–23)
BUN SERPL-MCNC: 67 MG/DL — HIGH (ref 7–23)
CALCIUM SERPL-MCNC: 9.2 MG/DL — SIGNIFICANT CHANGE UP (ref 8.4–10.5)
CHLORIDE SERPL-SCNC: 97 MMOL/L — SIGNIFICANT CHANGE UP (ref 96–108)
CO2 SERPL-SCNC: 22 MMOL/L — SIGNIFICANT CHANGE UP (ref 22–31)
CREAT SERPL-MCNC: 6.3 MG/DL — HIGH (ref 0.5–1.3)
GLUCOSE SERPL-MCNC: 126 MG/DL — HIGH (ref 70–99)
HCT VFR BLD CALC: 25.5 % — LOW (ref 39–50)
HGB BLD-MCNC: 8.5 G/DL — LOW (ref 13–17)
MAGNESIUM SERPL-MCNC: 1.8 MG/DL — SIGNIFICANT CHANGE UP (ref 1.6–2.6)
MCHC RBC-ENTMCNC: 31.4 PG — SIGNIFICANT CHANGE UP (ref 27–34)
MCHC RBC-ENTMCNC: 33.3 G/DL — SIGNIFICANT CHANGE UP (ref 32–36)
MCV RBC AUTO: 94.1 FL — SIGNIFICANT CHANGE UP (ref 80–100)
PHOSPHATE SERPL-MCNC: 2.2 MG/DL — LOW (ref 2.5–4.5)
PLATELET # BLD AUTO: 194 K/UL — SIGNIFICANT CHANGE UP (ref 150–400)
POTASSIUM SERPL-MCNC: 2.9 MMOL/L — CRITICAL LOW (ref 3.5–5.3)
POTASSIUM SERPL-SCNC: 2.9 MMOL/L — CRITICAL LOW (ref 3.5–5.3)
RBC # BLD: 2.71 M/UL — LOW (ref 4.2–5.8)
RBC # FLD: 17.7 % — HIGH (ref 10.3–16.9)
SODIUM SERPL-SCNC: 140 MMOL/L — SIGNIFICANT CHANGE UP (ref 135–145)
WBC # BLD: 12.3 K/UL — HIGH (ref 3.8–10.5)
WBC # FLD AUTO: 12.3 K/UL — HIGH (ref 3.8–10.5)

## 2017-06-21 PROCEDURE — 99232 SBSQ HOSP IP/OBS MODERATE 35: CPT

## 2017-06-21 PROCEDURE — 93010 ELECTROCARDIOGRAM REPORT: CPT

## 2017-06-21 PROCEDURE — 90935 HEMODIALYSIS ONE EVALUATION: CPT | Mod: GC

## 2017-06-21 RX ORDER — CALCITRIOL 0.5 UG/1
2 CAPSULE ORAL ONCE
Qty: 0 | Refills: 0 | Status: COMPLETED | OUTPATIENT
Start: 2017-06-21 | End: 2017-06-21

## 2017-06-21 RX ORDER — ACETAMINOPHEN 500 MG
650 TABLET ORAL EVERY 6 HOURS
Qty: 0 | Refills: 0 | Status: DISCONTINUED | OUTPATIENT
Start: 2017-06-21 | End: 2017-07-03

## 2017-06-21 RX ORDER — POTASSIUM CHLORIDE 20 MEQ
10 PACKET (EA) ORAL
Qty: 0 | Refills: 0 | Status: COMPLETED | OUTPATIENT
Start: 2017-06-21 | End: 2017-06-21

## 2017-06-21 RX ORDER — ACETAMINOPHEN 500 MG
650 TABLET ORAL ONCE
Qty: 0 | Refills: 0 | Status: COMPLETED | OUTPATIENT
Start: 2017-06-21 | End: 2017-06-21

## 2017-06-21 RX ORDER — ERYTHROPOIETIN 10000 [IU]/ML
7000 INJECTION, SOLUTION INTRAVENOUS; SUBCUTANEOUS ONCE
Qty: 0 | Refills: 0 | Status: COMPLETED | OUTPATIENT
Start: 2017-06-21 | End: 2017-06-21

## 2017-06-21 RX ADMIN — PIPERACILLIN AND TAZOBACTAM 200 GRAM(S): 4; .5 INJECTION, POWDER, LYOPHILIZED, FOR SOLUTION INTRAVENOUS at 21:31

## 2017-06-21 RX ADMIN — Medication 10 MILLIGRAM(S): at 14:32

## 2017-06-21 RX ADMIN — LEVETIRACETAM 500 MILLIGRAM(S): 250 TABLET, FILM COATED ORAL at 07:02

## 2017-06-21 RX ADMIN — Medication 100 MILLIEQUIVALENT(S): at 14:31

## 2017-06-21 RX ADMIN — Medication 1 PATCH: at 14:31

## 2017-06-21 RX ADMIN — Medication 100 MILLIEQUIVALENT(S): at 00:02

## 2017-06-21 RX ADMIN — Medication 650 MILLIGRAM(S): at 13:07

## 2017-06-21 RX ADMIN — Medication 81 MILLIGRAM(S): at 14:46

## 2017-06-21 RX ADMIN — GABAPENTIN 100 MILLIGRAM(S): 400 CAPSULE ORAL at 17:20

## 2017-06-21 RX ADMIN — MIDODRINE HYDROCHLORIDE 5 MILLIGRAM(S): 2.5 TABLET ORAL at 00:02

## 2017-06-21 RX ADMIN — GABAPENTIN 100 MILLIGRAM(S): 400 CAPSULE ORAL at 07:02

## 2017-06-21 RX ADMIN — Medication 100 MILLIEQUIVALENT(S): at 02:13

## 2017-06-21 RX ADMIN — SIMVASTATIN 20 MILLIGRAM(S): 20 TABLET, FILM COATED ORAL at 21:26

## 2017-06-21 RX ADMIN — MIDODRINE HYDROCHLORIDE 5 MILLIGRAM(S): 2.5 TABLET ORAL at 07:02

## 2017-06-21 RX ADMIN — Medication 100 MILLIEQUIVALENT(S): at 09:27

## 2017-06-21 RX ADMIN — Medication 100 MILLIEQUIVALENT(S): at 17:20

## 2017-06-21 RX ADMIN — LEVETIRACETAM 500 MILLIGRAM(S): 250 TABLET, FILM COATED ORAL at 17:20

## 2017-06-21 RX ADMIN — Medication 1 PATCH: at 00:54

## 2017-06-21 RX ADMIN — ERYTHROPOIETIN 7000 UNIT(S): 10000 INJECTION, SOLUTION INTRAVENOUS; SUBCUTANEOUS at 10:48

## 2017-06-21 RX ADMIN — Medication 1 MILLIGRAM(S): at 14:46

## 2017-06-21 RX ADMIN — Medication 650 MILLIGRAM(S): at 22:21

## 2017-06-21 RX ADMIN — HEPARIN SODIUM 5000 UNIT(S): 5000 INJECTION INTRAVENOUS; SUBCUTANEOUS at 14:47

## 2017-06-21 RX ADMIN — Medication 650 MILLIGRAM(S): at 21:32

## 2017-06-21 RX ADMIN — Medication 650 MILLIGRAM(S): at 09:55

## 2017-06-21 RX ADMIN — MIRTAZAPINE 15 MILLIGRAM(S): 45 TABLET, ORALLY DISINTEGRATING ORAL at 21:26

## 2017-06-21 RX ADMIN — PIPERACILLIN AND TAZOBACTAM 200 GRAM(S): 4; .5 INJECTION, POWDER, LYOPHILIZED, FOR SOLUTION INTRAVENOUS at 14:32

## 2017-06-21 RX ADMIN — HEPARIN SODIUM 5000 UNIT(S): 5000 INJECTION INTRAVENOUS; SUBCUTANEOUS at 21:26

## 2017-06-21 RX ADMIN — Medication 650 MILLIGRAM(S): at 05:18

## 2017-06-21 RX ADMIN — PIPERACILLIN AND TAZOBACTAM 200 GRAM(S): 4; .5 INJECTION, POWDER, LYOPHILIZED, FOR SOLUTION INTRAVENOUS at 03:54

## 2017-06-21 RX ADMIN — Medication 10 MILLIGRAM(S): at 21:26

## 2017-06-21 RX ADMIN — MIDODRINE HYDROCHLORIDE 5 MILLIGRAM(S): 2.5 TABLET ORAL at 14:52

## 2017-06-21 RX ADMIN — CALCITRIOL 2 MICROGRAM(S): 0.5 CAPSULE ORAL at 10:49

## 2017-06-21 RX ADMIN — HEPARIN SODIUM 5000 UNIT(S): 5000 INJECTION INTRAVENOUS; SUBCUTANEOUS at 07:02

## 2017-06-21 NOTE — DISCHARGE NOTE ADULT - CARE PLAN
Principal Discharge DX:	Acute respiratory failure, unspecified whether with hypoxia or hypercapnia  Goal:	terminal care  Instructions for follow-up, activity and diet:	You were admitted for altered mental status due to acute respiratory failure in setting  Secondary Diagnosis:	Encephalopathy, metabolic  Secondary Diagnosis:	Aspiration pneumonia  Secondary Diagnosis:	CAD (coronary artery disease)  Secondary Diagnosis:	ESRD (end stage renal disease)  Secondary Diagnosis:	Essential hypertension  Secondary Diagnosis:	Hypokalemia Principal Discharge DX:	Acute respiratory failure, unspecified whether with hypoxia or hypercapnia  Goal:	terminal care  Instructions for follow-up, activity and diet:	You were admitted for altered mental status due to acute respiratory failure in setting of aspiration pneumonia. You required BiPAP for this reason, and were later able to be weaned off and placed on nasal cannula. During this hospitalization you decided to not continue with any dyalisis. Palliative care was consulted and you are now being discharged to hospice.  Secondary Diagnosis:	Encephalopathy, metabolic  Instructions for follow-up, activity and diet:	You were admitted with altered mental status and now are getting discharged to hospice.  Secondary Diagnosis:	Aspiration pneumonia  Instructions for follow-up, activity and diet:	You were admitted with aspiration pneumonia. You received Zosyn and Vancomycin.  Secondary Diagnosis:	CAD (coronary artery disease)  Instructions for follow-up, activity and diet:	Continue medications.  Secondary Diagnosis:	ESRD (end stage renal disease)  Instructions for follow-up, activity and diet:	Hospice care.  Secondary Diagnosis:	Essential hypertension  Secondary Diagnosis:	Hypokalemia Principal Discharge DX:	Acute respiratory failure, unspecified whether with hypoxia or hypercapnia  Goal:	terminal care  Instructions for follow-up, activity and diet:	You were admitted for altered mental status due to acute respiratory failure in setting of aspiration pneumonia. You required BiPAP for this reason, and were later able to be weaned off and placed on nasal cannula. During this hospitalization you decided to not continue with any dialysis. Palliative care was consulted and you are now being discharged to hospice.  Secondary Diagnosis:	Encephalopathy, metabolic  Instructions for follow-up, activity and diet:	You were admitted with altered mental status and now are getting discharged to hospice.  Secondary Diagnosis:	Aspiration pneumonia  Instructions for follow-up, activity and diet:	You were admitted with aspiration pneumonia. You received Zosyn and Vancomycin, and then elected to stop all treatment. You will be going to hospice for continued care  Secondary Diagnosis:	ESRD (end stage renal disease)  Instructions for follow-up, activity and diet:	Hospice care

## 2017-06-21 NOTE — PROGRESS NOTE ADULT - PROBLEM SELECTOR PLAN 3
hospice back at [facility]LTC facility vs. [place] [facility] and [facility] discussed, pt prefers [facility] at [facility] in terms of distance for a friend Estrellita Butler who had refused to serve as HCP who may wish to visit.  Will reach out to her to update per pt request.  DNR/DNI    Case d/w primary team and unit //Kamala lialoyda

## 2017-06-21 NOTE — PROGRESS NOTE ADULT - SUBJECTIVE AND OBJECTIVE BOX
Patient is an 88 year old male with a history of ESRD on HD M/W/F whom presented to the hospital with a change in mental status and fever of 102.   Patient seen and examined at bedside. Patient was last dialyzed on 6/19. Patient continues to have moderate respiratory distress and is on BiPAP.     heparin  Injectable 5000Unit(s) every 8 hours  aspirin  chewable 81milliGRAM(s) daily  nitroglycerin    Patch 0.1 mG/Hr(s) 1patch daily  levETIRAcetam 500milliGRAM(s) two times a day  gabapentin 100milliGRAM(s) two times a day  metoclopramide 10milliGRAM(s) three times a day with meals  simvastatin 20milliGRAM(s) at bedtime  midodrine 5milliGRAM(s) every 8 hours  calcium acetate 667milliGRAM(s) three times a day with meals  folic acid 1milliGRAM(s) daily  mirtazapine 15milliGRAM(s) at bedtime  insulin lispro (HumaLOG) corrective regimen sliding scale  Before meals and at bedtime  piperacillin/tazobactam IVPB. 2.25Gram(s) every 8 hours  calcitriol Injectable 2MICROGram(s) Once  epoetin nicki Injectable 7000Unit(s) Once  acetaminophen   Tablet. 650milliGRAM(s) every 6 hours PRN  potassium chloride  10 mEq/100 mL IVPB 10milliEquivalent(s) every 1 hour    Allergies    clonidine (Unknown)    Intolerances    T(C): , Max: 38 (06-21 @ 04:48)  T(F): , Max: 100.4 (06-21 @ 04:48)  HR: 92  BP: 122/71  RR: 16  SpO2: 96%    I & Os for current day (as of 06-21 @ 10:37)  =============================================  IN:    IV PiggyBack: 300 ml    Total IN: 300 ml  ---------------------------------------------  OUT:    Total OUT: 0 ml  ---------------------------------------------  Total NET: 300 ml    Height (cm): 177.8 (06-20 @ 20:45)    LABS:                        8.5    12.3  )-----------( 194      ( 21 Jun 2017 08:08 )             25.5     06-21    140  |  97  |  67<H>  ----------------------------<  126<H>  2.9<LL>   |  22  |  6.30<H>    Ca    9.2      21 Jun 2017 08:08  Phos  2.2     06-21  Mg     1.8     06-21 Patient is an 88 year old male with a history of ESRD on HD M/W/F whom presented to the hospital with a change in mental status and fever of 102.   Patient seen and examined at bedside. Patient was last dialyzed on 6/19. now on NC O2    heparin  Injectable 5000Unit(s) every 8 hours  aspirin  chewable 81milliGRAM(s) daily  nitroglycerin    Patch 0.1 mG/Hr(s) 1patch daily  levETIRAcetam 500milliGRAM(s) two times a day  gabapentin 100milliGRAM(s) two times a day  metoclopramide 10milliGRAM(s) three times a day with meals  simvastatin 20milliGRAM(s) at bedtime  midodrine 5milliGRAM(s) every 8 hours  calcium acetate 667milliGRAM(s) three times a day with meals  folic acid 1milliGRAM(s) daily  mirtazapine 15milliGRAM(s) at bedtime  insulin lispro (HumaLOG) corrective regimen sliding scale  Before meals and at bedtime  piperacillin/tazobactam IVPB. 2.25Gram(s) every 8 hours  calcitriol Injectable 2MICROGram(s) Once  epoetin nicki Injectable 7000Unit(s) Once  acetaminophen   Tablet. 650milliGRAM(s) every 6 hours PRN  potassium chloride  10 mEq/100 mL IVPB 10milliEquivalent(s) every 1 hour    Allergies    clonidine (Unknown)    Intolerances    T(C): , Max: 38 (06-21 @ 04:48)  T(F): , Max: 100.4 (06-21 @ 04:48)  HR: 92  BP: 122/71  RR: 16  SpO2: 96%    I & Os for current day (as of 06-21 @ 10:37)  =============================================  IN:    IV PiggyBack: 300 ml    Total IN: 300 ml  ---------------------------------------------  OUT:    Total OUT: 0 ml  ---------------------------------------------  Total NET: 300 ml    Height (cm): 177.8 (06-20 @ 20:45)    LABS:                        8.5    12.3  )-----------( 194      ( 21 Jun 2017 08:08 )             25.5     06-21    140  |  97  |  67<H>  ----------------------------<  126<H>  2.9<LL>   |  22  |  6.30<H>    Ca    9.2      21 Jun 2017 08:08  Phos  2.2     06-21  Mg     1.8     06-21

## 2017-06-21 NOTE — DISCHARGE NOTE ADULT - MEDICATION SUMMARY - MEDICATIONS TO STOP TAKING
I will STOP taking the medications listed below when I get home from the hospital:    aspirin 81 mg oral tablet  -- 1 tab(s) by mouth once a day    nitroglycerin 0.1 mg/hr transdermal film, extended release  -- 1 patch by transdermal patch 3 times a week, As Needed only during dialysis    simvastatin 20 mg oral tablet  -- 1 tab(s) by mouth once a day (at bedtime)    Advair Diskus 250 mcg-50 mcg inhalation powder  -- 1 puff(s) inhaled 2 times a day    Colace 100 mg oral capsule  -- 1 cap(s) by mouth once (at bedtime)    PhosLo Gelcap 667 mg oral capsule  -- 1 cap(s) by mouth 3 times a day    folic acid 1 mg oral tablet  -- 1 tab(s) by mouth once a day    albuterol-ipratropium 2.5 mg-0.5 mg/3 mL inhalation solution  -- 3 milliliter(s) inhaled every 4 hours    metoclopramide 10 mg oral tablet  -- 1 tab(s) by mouth 3 times a day (with meals)    meloxicam 7.5 mg oral tablet  -- 1 tab(s) by mouth once a day    sodium polystyrene sulfonate 15 g/60 mL oral and rectal suspension  -- 60 milliliter(s) oral once a day every M,W,F for ESRD    midodrine 5 mg oral tablet  -- 1 tab(s) by mouth every 8 hours    epoetin nicki  --    insulin lispro 100 units/mL subcutaneous solution  --  subcutaneous 4 times a day (before meals and at bedtime); 2 Unit(s) if Glucose 151 - 200  4 Unit(s) if Glucose 201 - 250  6 Unit(s) if Glucose 251 - 300  8 Unit(s) if Glucose 301 - 350  10 Unit(s) if Glucose 351 - 400  12 Unit(s) if Glucose Greater Than 400    sulfamethoxazole-trimethoprim 800 mg-160 mg oral tablet  -- 2 tab(s) by mouth    aspirin 81 mg oral tablet, chewable  -- 1 tab(s) by mouth once a day

## 2017-06-21 NOTE — PROGRESS NOTE ADULT - PROBLEM SELECTOR PLAN 1
pt has opted to forego any further HD and focus his care on comfort/symptom management.  He expresses clear understanding that death will occur with HD termination but appears at peace with it.  Msg left for Dr. Haq

## 2017-06-21 NOTE — DISCHARGE NOTE ADULT - PLAN OF CARE
terminal care You were admitted for altered mental status due to acute respiratory failure in setting You were admitted with altered mental status and now are getting discharged to hospice. You were admitted with aspiration pneumonia. You received Zosyn and Vancomycin. Continue medications. Hospice care. You were admitted for altered mental status due to acute respiratory failure in setting of aspiration pneumonia. You required BiPAP for this reason, and were later able to be weaned off and placed on nasal cannula. During this hospitalization you decided to not continue with any dyalisis. Palliative care was consulted and you are now being discharged to hospice. You were admitted with aspiration pneumonia. You received Zosyn and Vancomycin, and then elected to stop all treatment. You will be going to hospice for continued care Hospice care You were admitted for altered mental status due to acute respiratory failure in setting of aspiration pneumonia. You required BiPAP for this reason, and were later able to be weaned off and placed on nasal cannula. During this hospitalization you decided to not continue with any dialysis. Palliative care was consulted and you are now being discharged to hospice.

## 2017-06-21 NOTE — PROGRESS NOTE ADULT - ASSESSMENT
Patient is an 88 year old male with ESRD on HD M/W/F whom presented with a change in mental status. Patient was last dialyzed 6/19 with UF of 500 cc.

## 2017-06-21 NOTE — PROGRESS NOTE ADULT - PROBLEM SELECTOR PLAN 1
Patient is an 88 year old male with ESRD on HD M/W/F whom presented with a change in mental status. Patient was last dialyzed 6/19 with UF of 500 cc. Will dialyze patient today     P - dialysis today  Optiflux 180, , , 4k bath   goal uf 500 cc over 3.5 hours  patient verbally expressed he does not want to be on dialysis anymore after today. Please involve palliative care in regards to withdrawing from hemodialysis.

## 2017-06-21 NOTE — PROGRESS NOTE ADULT - ATTENDING COMMENTS
respiratory status improving.  Patient considering withdrawal from dialysis and hospice.  We have had these discussions in the past and he seems of clear mind.  Will d/w palliative care to help organize a smooth transition.  dialysis while organizing logistics

## 2017-06-21 NOTE — DISCHARGE NOTE ADULT - MEDICATION SUMMARY - MEDICATIONS TO TAKE
I will START or STAY ON the medications listed below when I get home from the hospital:    acetaminophen 325 mg oral tablet  -- 2 tab(s) by mouth every 6 hours, As needed, Moderate Pain (4 - 6)  -- Indication: For Pain    HYDROmorphone  -- 0.2 milligram(s) injectable every 4 hours, As Needed  -- Indication: For Pain    gabapentin 100 mg oral capsule  -- 1 cap(s) by mouth 2 times a day  -- Indication: For Pain    levETIRAcetam 500 mg oral tablet  -- 1 tab(s) by mouth 2 times a day  -- Indication: For Seziures    mirtazapine 15 mg oral tablet  -- 1 tab(s) by mouth once a day (at bedtime)  -- Indication: For Appetitie     atropine 1% ophthalmic solution  -- 2 dose(s) to each affected eye every 6 hours, As Needed  -- Indication: For Secretions

## 2017-06-21 NOTE — PROGRESS NOTE ADULT - SUBJECTIVE AND OBJECTIVE BOX
INTERVAL HPI/OVERNIGHT EVENTS:  DONATO  Pt was seen and examined at the bedside. He was observed to be lying down comfortably.   Pt c/o  VITAL SIGNS:  T(F): 98.8  HR: 66  BP: 122/64  RR: 20  SpO2: 100%  Wt(kg): --  I&O's Summary    I & Os for current day (as of 21 Jun 2017 09:00)  =============================================  IN: 300 ml / OUT: 0 ml / NET: 300 ml    PHYSICAL EXAM:  Constitutional: NAD, well-groomed, well-developed  HEENT: PERRLA, EOMI, Normal Hearing, MMM  Neck: No LAD, No JVD  Back: Normal spine flexure, No CVA tenderness  Respiratory: CTAB  Cardiovascular: S1 and S2, RRR, no M/G/R  Gastrointestinal: BS+, soft, NT/ND  Extremities: No peripheral edema  Vascular: 2+ peripheral pulses  Neurological: A/O x 3, no focal deficits  Psychiatric: Normal mood, normal affect  Musculoskeletal: 5/5 strength b/l upper and lower extremities  Skin: No rashes        MEDICATIONS  (STANDING):  heparin  Injectable 5000Unit(s) SubCutaneous every 8 hours  aspirin  chewable 81milliGRAM(s) Oral daily  nitroglycerin    Patch 0.1 mG/Hr(s) 1patch Transdermal daily  levETIRAcetam 500milliGRAM(s) Oral two times a day  gabapentin 100milliGRAM(s) Oral two times a day  metoclopramide 10milliGRAM(s) Oral three times a day with meals  simvastatin 20milliGRAM(s) Oral at bedtime  midodrine 5milliGRAM(s) Oral every 8 hours  calcium acetate 667milliGRAM(s) Oral three times a day with meals  folic acid 1milliGRAM(s) Oral daily  mirtazapine 15milliGRAM(s) Oral at bedtime  insulin lispro (HumaLOG) corrective regimen sliding scale  SubCutaneous Before meals and at bedtime  piperacillin/tazobactam IVPB. 2.25Gram(s) IV Intermittent every 8 hours  calcitriol Injectable 2MICROGram(s) IV Push Once  epoetin nicki Injectable 7000Unit(s) IV Push Once  potassium chloride  10 mEq/100 mL IVPB 10milliEquivalent(s) IV Intermittent every 1 hour    MEDICATIONS  (PRN):  acetaminophen   Tablet. 650milliGRAM(s) Oral every 6 hours PRN Moderate Pain (4 - 6)      Allergies    clonidine (Unknown)    Intolerances        LABS:                        8.5    12.3  )-----------( 194      ( 21 Jun 2017 08:08 )             25.5     06-21    140  |  97  |  67<H>  ----------------------------<  126<H>  2.9<LL>   |  22  |  6.30<H>    Ca    9.2      21 Jun 2017 08:08  Phos  2.2     06-21  Mg     1.8     06-21            RADIOLOGY & ADDITIONAL TESTS: INTERVAL HPI/OVERNIGHT EVENTS:  S[iked temperature to 100.4 ON, Ordered UA and BCx, however night intern unable to obtain it. Later fever resolved.  Pt was seen and examined at the bedside. He was observed to be lying down comfortably.   Pt reports his breathing feels comfortable now, he is on NC, he denies any CP, NVD, abdominal pain, cough, HA. He c/o generalized body aches and requests to be given Tylenol.     VITAL SIGNS:  T(F): 98.8  HR: 66  BP: 122/64  RR: 20  SpO2: 100%  Wt(kg): --  I&O's Summary    I & Os for current day (as of 21 Jun 2017 09:00)  =============================================  IN: 300 ml / OUT: 0 ml / NET: 300 ml    PHYSICAL EXAM:  Constitutional: does not appear to be in distress, on NC  HEENT: NCAT, PEERL, sclera non-icteric  Neck: supple, no JVD  Respiratory: diffuse crackles, mostly at bases  Cardiovascular: RRR, normal s1/s2, no MRG  Gastrointestinal: soft, NTND, +BS, no guarding, no organomegaly  Extremities: WWP, DP/radial pulses 2+ b/l, no edema  Neurological: AAOx 3, nonverbal, moves all extremities, CN 2-12 intact      MEDICATIONS  (STANDING):  heparin  Injectable 5000Unit(s) SubCutaneous every 8 hours  aspirin  chewable 81milliGRAM(s) Oral daily  nitroglycerin    Patch 0.1 mG/Hr(s) 1patch Transdermal daily  levETIRAcetam 500milliGRAM(s) Oral two times a day  gabapentin 100milliGRAM(s) Oral two times a day  metoclopramide 10milliGRAM(s) Oral three times a day with meals  simvastatin 20milliGRAM(s) Oral at bedtime  midodrine 5milliGRAM(s) Oral every 8 hours  calcium acetate 667milliGRAM(s) Oral three times a day with meals  folic acid 1milliGRAM(s) Oral daily  mirtazapine 15milliGRAM(s) Oral at bedtime  insulin lispro (HumaLOG) corrective regimen sliding scale  SubCutaneous Before meals and at bedtime  piperacillin/tazobactam IVPB. 2.25Gram(s) IV Intermittent every 8 hours  calcitriol Injectable 2MICROGram(s) IV Push Once  epoetin nicki Injectable 7000Unit(s) IV Push Once  potassium chloride  10 mEq/100 mL IVPB 10milliEquivalent(s) IV Intermittent every 1 hour    MEDICATIONS  (PRN):  acetaminophen   Tablet. 650milliGRAM(s) Oral every 6 hours PRN Moderate Pain (4 - 6)      Allergies    clonidine (Unknown)    Intolerances        LABS:                        8.5    12.3  )-----------( 194      ( 21 Jun 2017 08:08 )             25.5     06-21    140  |  97  |  67<H>  ----------------------------<  126<H>  2.9<LL>   |  22  |  6.30<H>    Ca    9.2      21 Jun 2017 08:08  Phos  2.2     06-21  Mg     1.8     06-21            RADIOLOGY & ADDITIONAL TESTS:

## 2017-06-21 NOTE — DISCHARGE NOTE ADULT - CARE PROVIDER_API CALL
Hailey Baker), Internal Medicine; Nephrology  [address] [place] [address]  Phone: [phone]  Fax: [phone]

## 2017-06-21 NOTE — PROGRESS NOTE ADULT - ATTENDING COMMENTS
seen and evaluated while on dialysis.  tolerating the procedure well.  Continue full treatment s prescribed

## 2017-06-21 NOTE — PROGRESS NOTE ADULT - PROBLEM SELECTOR PLAN 1
fever to 101.3, given vanco/zosyn, LLL infiltrate, currently on BiPAP due to resp distress initially, ICU consulted due to hypokalemia, patient deemed stable for GMF, s/p vanco/zosyn x1, lactate negative, BC x2 sent    - vanco with HD  - zosyn dosed renally  - f/u blood cultures fever to 101.3, given vanco/zosyn, LLL infiltrate, currently on BiPAP due to resp distress initially, ICU consulted due to hypokalemia, patient deemed stable for GMF, s/p vanco/zosyn x1, lactate negative, BC x2 sent  - vanco with HD  - zosyn dosed renally  - f/u blood cultures - NGTD

## 2017-06-21 NOTE — PROGRESS NOTE ADULT - SUBJECTIVE AND OBJECTIVE BOX
PUD CCM ATTENDING FOR DR NAYLOR    87 yo AA man with many medical issues was found to have fever of 102. His CXR shows a LLL infiltrate. He is hypoxic and was on BiPAP.  He his DNR/DNI. He will continue HD. He is aphasic and has Alzheimer.    Today on HD with 400 mL/min, -2 L, F180, 4 K, 3.5 hours.    PAST MEDICAL & SURGICAL HISTORY:  AV graft thrombosis  Osteoarthritis  PVD (peripheral vascular disease)  COPD (chronic obstructive pulmonary disease)  Heart failure  Angina pectoris  ESRD (end stage renal disease)  Seizures: post traumatic  CAD (coronary artery disease)  HTN (hypertension)  Polyneuropathy  Hyperlipidemia  Dysphagia  Hypotension  Alzheimers disease  Osteoarthritis: Osteoarthritis  Chronic obstructive pulmonary disease: Chronic obstructive pulmonary disease  Anemia: Anemia  Dementia without behavioral disturbance: Dementia  Atherosclerosis of coronary artery: CAD (coronary artery disease)  Nondependent alcohol abuse: ETOH abuse  Depressive disorder: Depression  End-stage renal disease: ESRD on hemodialysis  Essential hypertension: HTN (hypertension)  Deep venous embolism and thrombosis of lower extremity: DVT (deep venous thrombosis)  Congestive heart failure: CHF (congestive heart failure)  Legal blindness: Legally blind  Hemodialysis access, AV graft: LUE loop AVG  Acquired arteriovenous fistula: AV fistula    FAMILY HISTORY:  Family history unknown    SOCIAL HISTORY:  lives in nursing home. No tobacco    REVIEW OF SYSTEMS:  unable to obtain, aphasic    Vital Signs Last 24 Hrs  T(C): 37.2, Max: 38.6 (06-19 @ 06:48)  T(F): 98.9, Max: 101.4 (06-19 @ 06:48)  HR: 67 (56 - 67)  BP: 137/63 (104/64 - 137/63)  BP(mean): --  RR: 19 (19 - 26)  SpO2: 50% (50% - 100%)    I&O's Detail    PHYSICAL EXAM:  less cough  in bed, off BiPAP, moving right arm, AV fistula left arm  Well nourished, comfortable, - acute distress; vital signs are monitored  Eyes: -conjunctivitis, -scleritis AMAUROSIS, opening eyes partially  Head: no focal deficit, normocephalic,  no trauma  ENMT: moist tongue, no thrush, -nasal discharge, normal hearing, -cough, -hemoptysis, trachea midline  Neck: supple, - lymphadenopathy,  -masses, -JVD  Respiratory: bilateral diminished breath sounds, -wheezing, - rhonchi, -rales, -crackles  Chest: -accessory muscle use, -paradoxical breathing  Cardiovascular: irregular rate, S1 S2 normal, -S3, -S4, -murmur, -gallop, -rub  Gastrointestinal: soft, nontender, nondistended, normal bowel sounds, no hepatosplenomegaly  Genitourinary: -flank pain, -dysuria  Extremities: -clubbing, -cyanosis, -edema    Vascular: peripheral pulses palpable 2+ bilaterally  Neurological: awake, not oriented, no focal deficit, -tremor   Skin: warm, dry, -erythema, iv site intact  Lymph nodes; no cervical, supraclavicular or axillary adenopathy  Psychiatric: not responding to questions    MEDICATIONS  (STANDING):  potassium chloride  10 mEq/100 mL IVPB 10milliEquivalent(s) IV Intermittent every 1 hour  epoetin nicki Injectable 7000Unit(s) IV Push Once  calcitriol Injectable 2MICROGram(s) IV Push Once  heparin  Injectable 5000Unit(s) SubCutaneous every 8 hours  aspirin  chewable 81milliGRAM(s) Oral daily  nitroglycerin    Patch 0.1 mG/Hr(s) 1patch Transdermal daily  levETIRAcetam 500milliGRAM(s) Oral two times a day  gabapentin 100milliGRAM(s) Oral two times a day  metoclopramide 10milliGRAM(s) Oral three times a day with meals  simvastatin 20milliGRAM(s) Oral at bedtime  midodrine 5milliGRAM(s) Oral every 8 hours  calcium acetate 667milliGRAM(s) Oral three times a day with meals  folic acid 1milliGRAM(s) Oral daily  mirtazapine 15milliGRAM(s) Oral at bedtime  insulin lispro (HumaLOG) corrective regimen sliding scale  SubCutaneous Before meals and at bedtime  piperacillin/tazobactam IVPB. 2.25Gram(s) IV Intermittent every 8 hours    MEDICATIONS  (PRN):    Allergies  clonidine (Unknown)  Intolerances    LABS:                        8.1    14.5  )-----------( 172      ( 19 Jun 2017 06:30 )             23.9     Today decreasing to 12 K    06-19    135  |  89<L>  |  68<H>  ----------------------------<  125<H>  2.6<LL>   |  24  |  7.30<H>    Ca    9.0      19 Jun 2017 08:43  TPro  8.0  /  Alb  3.5  /  TBili  0.5  /  DBili  x   /  AST  26  /  ALT  10  /  AlkPhos  92  06-19  PT/INR - ( 19 Jun 2017 08:43 )   PT: 13.9 sec;   INR: 1.25     PTT - ( 19 Jun 2017 08:43 )  PTT:28.5 sec    +DVT prophylaxis SC HEPARIN  +Sleep  +Nutrition goals ORAL, WITH ASSISTANCE  -Pain NOT OBVIOUS  -Decubital ulcer  +GI prophylaxis (PPV, coagulopathy, Hx)  PPI  +Aspiration prophylaxis (45 degrees)  +Sedation/analgesia stopped once  +ID (phos, CH, mupi, SB)  -Delirium  +Cardiac ASA/statin  +Prevention  +Education  +Medication reviewed (drug-drug interactions, PDA)  Medical devices  Discussed with PGY, RN,     RADIOLOGY & ADDITIONAL STUDIES:    CXR reviewed LLL infiltrate, cardiomegaly

## 2017-06-21 NOTE — DISCHARGE NOTE ADULT - PATIENT PORTAL LINK FT
“You can access the [employer] Patient Portal, offered by [facility], by registering with the following website: [email or web address]”

## 2017-06-21 NOTE — PROGRESS NOTE ADULT - ASSESSMENT
Patient is an 87 yr old male with extensive PMHx and recent admission for sepsis 2/2 PNA and another admission 6 months prior for septic shock secondary to UTI vs aspiration PNA who presented with AMS 2/2 fever, likely PNA. 87 yr old male with extensive PMHx and recent admission for sepsis 2/2 PNA and another admission 6 months prior for septic shock secondary to UTI vs aspiration PNA who presented with AMS 2/2 fever, likely PNA.

## 2017-06-21 NOTE — PROGRESS NOTE ADULT - SUBJECTIVE AND OBJECTIVE BOX
Patient was seen and evaluated on dialysis.   Patient is tolerating the procedure well.   HR: 92  BP: 122/71  Continue dialysis:   Optiflux 180, , , 4k bath   goal uf 500 cc over 3.5 hours

## 2017-06-21 NOTE — PROGRESS NOTE ADULT - SUBJECTIVE AND OBJECTIVE BOX
Patient is a 88y old  Male who presents with a chief complaint of fever (19 Jun 2017 10:40)      HPI:  The patient is an 86 yo M with ESRD (MWF HD), Alzheimer's dementia, CAD, COPD, angina, anemia, CHF, depression, HTN, HLD, OA, PVD, polyneuropathy, blindness, seizures, IDDM, and recurrent aspiration PNA 2/2 Zenker's diverticulum who presented with AMS for a few hours and found to have a temp of 102 with cough. Of note, the patient is a poor historian so the HPI is limited. The patient was initially noted to desaturate to the high 80's. The patient denied any chills, nausea, vomiting, diarrhea.     In the ED, T 101.3, HR 60, /64, RR 20, 99% on O2 NC    given Tylenol x1, vanco x1 and zosyn, poatssium IV 10 MeQ x3, BC x1 sent    CXR significant for LLL infiltrate    Unable to obtain urine culture    labs significant for lactate negative, WBC 14.5, K 2.6, Cr 7.3    ICU was consulted in the setting of a K 2.6, BiPAP placed in setting of respiratory distress and patient comfortable after BiPAP placed        INTERVAL HPI/OVERNIGHT EVENTS::: comfortable    HEALTH ISSUES - PROBLEM Dx:  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  Palliative care by specialist: Palliative care by specialist  Sepsis, due to unspecified organism: Sepsis, due to unspecified organism  Pneumonia: Pneumonia  Acute respiratory failure, unspecified whether with hypoxia or hypercapnia: Acute respiratory failure, unspecified whether with hypoxia or hypercapnia  Hypokalemia: Hypokalemia  Nutrition, metabolism, and development symptoms: Nutrition, metabolism, and development symptoms  Need for prophylactic measure: Need for prophylactic measure  Diabetes: Diabetes  Anemia: Anemia  Essential hypertension: Essential hypertension  Congestive heart failure: Congestive heart failure  Chronic obstructive pulmonary disease: Chronic obstructive pulmonary disease  ESRD (end stage renal disease): ESRD (end stage renal disease)  Sepsis: Sepsis          PAST MEDICAL & SURGICAL HISTORY:  AV graft thrombosis  Osteoarthritis  PVD (peripheral vascular disease)  COPD (chronic obstructive pulmonary disease)  Heart failure  Angina pectoris  ESRD (end stage renal disease)  Seizures: post traumatic  CAD (coronary artery disease)  HTN (hypertension)  Polyneuropathy  Hyperlipidemia  Dysphagia  Hypotension  Alzheimers disease  Osteoarthritis: Osteoarthritis  Chronic obstructive pulmonary disease: Chronic obstructive pulmonary disease  Anemia: Anemia  Dementia without behavioral disturbance: Dementia  Atherosclerosis of coronary artery: CAD (coronary artery disease)  Nondependent alcohol abuse: ETOH abuse  Depressive disorder: Depression  End-stage renal disease: ESRD on hemodialysis  Essential hypertension: HTN (hypertension)  Deep venous embolism and thrombosis of lower extremity: DVT (deep venous thrombosis)  Congestive heart failure: CHF (congestive heart failure)  Legal blindness: Legally blind  Hemodialysis access, AV graft: LUE loop AVG  Acquired arteriovenous fistula: AV fistula          Consultant NOTE  REVIEWED  (   )    REVIEW OF SYSTEMS:  [x] As per HPI  CONSTITUTIONAL: No fever, weight loss, or fatigue  RESPIRATORY: No cough, wheezing, chills or hemoptysis; No Shortness of Breath  CARDIOVASCULAR: No chest pain, palpitations, dizziness, or leg swelling  GASTROINTESTINAL: No abdominal or epigastric pain. No nausea, vomiting, or hematemesis; No diarrhea or constipation. No melena or hematochezia.  MUSCULOSKELETAL: No joint pain or swelling; No muscle, back, or extremity pain  PSYCH    awake, alert       [x] All others negative	  [ ] Unable to obtain          Vital Signs Last 24 Hrs  T(C): 37.1, Max: 38 (06-21 @ 04:48)  T(F): 98.8, Max: 100.4 (06-21 @ 04:48)  HR: 66 (66 - 74)  BP: 122/64 (98/57 - 136/67)  BP(mean): --  RR: 20 (16 - 22)  SpO2: 100% (97% - 100%)        I & Os for current day (as of 06-21 @ 07:59)  =============================================  IN: 300 ml / OUT: 0 ml / NET: 300 ml    PHYSICAL EXAMINATION:                                    (    )  NO CHANGE  Appearance: Normal	  HEENT:   Normal oral mucosa, PERRL, EOMI	  Neck: Supple, + JVD/ - JVD; Carotid Bruit   Cardiovascular: Normal S1 S2, No JVD, No murmurs,   Respiratory: Lungs clear to auscultation/Decreased Breath Sounds/No Rales, Rhonchi, Wheezing	  Gastrointestinal:  Soft, Non-tender, + BS	  Skin: No rashes, No ecchymoses, No cyanosis  Extremities: Normal range of motion, No clubbing, cyanosis or edema  Vascular: Peripheral pulses palpable 2+ bilaterally  Neurologic: Non-focal  Psychiatry: A & O x 3, Mood & affect appropriate    heparin  Injectable 5000Unit(s) SubCutaneous every 8 hours  aspirin  chewable 81milliGRAM(s) Oral daily  nitroglycerin    Patch 0.1 mG/Hr(s) 1patch Transdermal daily  levETIRAcetam 500milliGRAM(s) Oral two times a day  gabapentin 100milliGRAM(s) Oral two times a day  metoclopramide 10milliGRAM(s) Oral three times a day with meals  simvastatin 20milliGRAM(s) Oral at bedtime  midodrine 5milliGRAM(s) Oral every 8 hours  calcium acetate 667milliGRAM(s) Oral three times a day with meals  folic acid 1milliGRAM(s) Oral daily  mirtazapine 15milliGRAM(s) Oral at bedtime  insulin lispro (HumaLOG) corrective regimen sliding scale  SubCutaneous Before meals and at bedtime  piperacillin/tazobactam IVPB. 2.25Gram(s) IV Intermittent every 8 hours        PT/INR - ( 19 Jun 2017 08:43 )   PT: 13.9 sec;   INR: 1.25          PTT - ( 19 Jun 2017 08:43 )  PTT:28.5 sec          06-20    137  |  94<L>  |  52<H>  ----------------------------<  107<H>  3.0<L>   |  20<L>  |  5.50<H>    Ca    8.3<L>      20 Jun 2017 17:21  Phos  2.8     06-20  Mg     1.8     06-20    TPro  8.0  /  Alb  3.5  /  TBili  0.5  /  DBili  x   /  AST  26  /  ALT  10  /  AlkPhos  92  06-19      CAPILLARY BLOOD GLUCOSE  120 (21 Jun 2017 07:27)  119 (20 Jun 2017 21:07)  107 (20 Jun 2017 16:54)  121 (20 Jun 2017 12:27)  109 (20 Jun 2017 08:36) Patient is a 88y old  Male who presents with a chief complaint of fever (19 Jun 2017 10:40)      HPI:  The patient is an 86 yo M with ESRD (MWF HD), Alzheimer's dementia, CAD, COPD, angina, anemia, CHF, depression, HTN, HLD, OA, PVD, polyneuropathy, blindness, seizures, IDDM, and recurrent aspiration PNA 2/2 Zenker's diverticulum who presented with AMS for a few hours and found to have a temp of 102 with cough. Of note, the patient is a poor historian so the HPI is limited. The patient was initially noted to desaturate to the high 80's. The patient denied any chills, nausea, vomiting, diarrhea.     In the ED, T 101.3, HR 60, /64, RR 20, 99% on O2 NC    given Tylenol x1, vanco x1 and zosyn, poatssium IV 10 MeQ x3, BC x1 sent    CXR significant for LLL infiltrate    Unable to obtain urine culture    labs significant for lactate negative, WBC 14.5, K 2.6, Cr 7.3    ICU was consulted in the setting of a K 2.6, BiPAP placed in setting of respiratory distress and patient comfortable after BiPAP placed        INTERVAL HPI/OVERNIGHT EVENTS::: comfortable    HEALTH ISSUES - PROBLEM Dx:  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  Palliative care by specialist: Palliative care by specialist  Sepsis, due to unspecified organism: Sepsis, due to unspecified organism  Pneumonia: Pneumonia  Acute respiratory failure, unspecified whether with hypoxia or hypercapnia: Acute respiratory failure, unspecified whether with hypoxia or hypercapnia  Hypokalemia: Hypokalemia  Nutrition, metabolism, and development symptoms: Nutrition, metabolism, and development symptoms  Need for prophylactic measure: Need for prophylactic measure  Diabetes: Diabetes  Anemia: Anemia  Essential hypertension: Essential hypertension  Congestive heart failure: Congestive heart failure  Chronic obstructive pulmonary disease: Chronic obstructive pulmonary disease  ESRD (end stage renal disease): ESRD (end stage renal disease)  Sepsis: Sepsis          PAST MEDICAL & SURGICAL HISTORY:  AV graft thrombosis  Osteoarthritis  PVD (peripheral vascular disease)  COPD (chronic obstructive pulmonary disease)  Heart failure  Angina pectoris  ESRD (end stage renal disease)  Seizures: post traumatic  CAD (coronary artery disease)  HTN (hypertension)  Polyneuropathy  Hyperlipidemia  Dysphagia  Hypotension  Alzheimers disease  Osteoarthritis: Osteoarthritis  Chronic obstructive pulmonary disease: Chronic obstructive pulmonary disease  Anemia: Anemia  Dementia without behavioral disturbance: Dementia  Atherosclerosis of coronary artery: CAD (coronary artery disease)  Nondependent alcohol abuse: ETOH abuse  Depressive disorder: Depression  End-stage renal disease: ESRD on hemodialysis  Essential hypertension: HTN (hypertension)  Deep venous embolism and thrombosis of lower extremity: DVT (deep venous thrombosis)  Congestive heart failure: CHF (congestive heart failure)  Legal blindness: Legally blind  Hemodialysis access, AV graft: LUE loop AVG  Acquired arteriovenous fistula: AV fistula          Consultant NOTE  REVIEWED  (   )    REVIEW OF SYSTEMS:    [ ] Unable to obtain          Vital Signs Last 24 Hrs  T(C): 37.1, Max: 38 (06-21 @ 04:48)  T(F): 98.8, Max: 100.4 (06-21 @ 04:48)  HR: 66 (66 - 74)  BP: 122/64 (98/57 - 136/67)  BP(mean): --  RR: 20 (16 - 22)  SpO2: 100% (97% - 100%)        I & Os for current day (as of 06-21 @ 07:59)  =============================================  IN: 300 ml / OUT: 0 ml / NET: 300 ml    PHYSICAL EXAMINATION:                                    (    )  NO CHANGE  Appearance: Normal	  HEENT:   Normal oral mucosa, PERRL, EOMI	  Neck: Supple, + JVD/ - JVD; Carotid Bruit   Cardiovascular: Normal S1 S2, No JVD, No murmurs,   Respiratory: Lungs clear to auscultation/Decreased Breath Sounds/No Rales, Rhonchi, Wheezing	  Gastrointestinal:  Soft, Non-tender, + BS	  Skin: No rashes, No ecchymoses, No cyanosis  Extremities: Normal range of motion, No clubbing, cyanosis or edema  Vascular: Peripheral pulses palpable 2+ bilaterally  Neurologic: Non-focal  Psychiatry: A & O x 3, Mood & affect appropriate    heparin  Injectable 5000Unit(s) SubCutaneous every 8 hours  aspirin  chewable 81milliGRAM(s) Oral daily  nitroglycerin    Patch 0.1 mG/Hr(s) 1patch Transdermal daily  levETIRAcetam 500milliGRAM(s) Oral two times a day  gabapentin 100milliGRAM(s) Oral two times a day  metoclopramide 10milliGRAM(s) Oral three times a day with meals  simvastatin 20milliGRAM(s) Oral at bedtime  midodrine 5milliGRAM(s) Oral every 8 hours  calcium acetate 667milliGRAM(s) Oral three times a day with meals  folic acid 1milliGRAM(s) Oral daily  mirtazapine 15milliGRAM(s) Oral at bedtime  insulin lispro (HumaLOG) corrective regimen sliding scale  SubCutaneous Before meals and at bedtime  piperacillin/tazobactam IVPB. 2.25Gram(s) IV Intermittent every 8 hours        PT/INR - ( 19 Jun 2017 08:43 )   PT: 13.9 sec;   INR: 1.25          PTT - ( 19 Jun 2017 08:43 )  PTT:28.5 sec          06-20    137  |  94<L>  |  52<H>  ----------------------------<  107<H>  3.0<L>   |  20<L>  |  5.50<H>    Ca    8.3<L>      20 Jun 2017 17:21  Phos  2.8     06-20  Mg     1.8     06-20    TPro  8.0  /  Alb  3.5  /  TBili  0.5  /  DBili  x   /  AST  26  /  ALT  10  /  AlkPhos  92  06-19      CAPILLARY BLOOD GLUCOSE  120 (21 Jun 2017 07:27)  119 (20 Jun 2017 21:07)  107 (20 Jun 2017 16:54)  121 (20 Jun 2017 12:27)  109 (20 Jun 2017 08:36)

## 2017-06-21 NOTE — DISCHARGE NOTE ADULT - HOSPITAL COURSE
88 yo M with ESRD (MWF HD), Alzheimer's dementia, CAD, COPD, angina, anemia, CHF, depression, HTN, HLD, OA, PVD, polyneuropathy, blindness, seizures, IDDM, and recurrent aspiration PNA 2/2 Zenker's diverticulum who presented with AMS for a few hours and found to have a temp of 102 with cough. In the ED, T 101.3, HR 60, /64, RR 20, 99% on O2 NC. The patient was initially noted to desaturate to the high 80's. On CXR he was found to have a LLL infiltrate and started on Zosyn and Vancomycin (dosed at HD), he was placed on BiPAP for respiratory distress, subsequently transitioned to NC. During this hospitalziation the patient was dyalized twice, however after an extensive discussion with  he decided he does not wish to pursue any further treatments Palliative care team was involved and the patient has been arranged for hospice services. 86 yo M with ESRD (MWF HD), Alzheimer's dementia, CAD, COPD, angina, anemia, CHF, depression, HTN, HLD, OA, PVD, polyneuropathy, blindness, seizures, IDDM, and recurrent aspiration PNA 2/2 Zenker's diverticulum who presented with AMS for a few hours and found to have a temp of 102 with cough. In the ED, T 101.3, HR 60, /64, RR 20, 99% on O2 NC. The patient was initially noted to desaturate to the high 80's. On CXR he was found to have a LLL infiltrate and started on Zosyn and Vancomycin (dosed at HD), he was placed on BiPAP for respiratory distress, subsequently transitioned to NC. During this hospitalziation the patient was dyalized twice, however after an extensive discussion with  he decided he does not wish to pursue any further treatments Palliative care team was involved and the patient has been arranged for hospice services. Midodrine was discontinued due to hypertension. Patient is HD stable and comfortable and is being discharged to hospice facility.

## 2017-06-21 NOTE — PROGRESS NOTE ADULT - SUBJECTIVE AND OBJECTIVE BOX
Chief Complaint/Reason for Consult: cv mgmt  INTERVAL HPI: events noted for fever, no cp sob palp  	  MEDICATIONS:  nitroglycerin    Patch 0.1 mG/Hr(s) 1patch Transdermal daily  midodrine 5milliGRAM(s) Oral every 8 hours    piperacillin/tazobactam IVPB. 2.25Gram(s) IV Intermittent every 8 hours      levETIRAcetam 500milliGRAM(s) Oral two times a day  gabapentin 100milliGRAM(s) Oral two times a day  mirtazapine 15milliGRAM(s) Oral at bedtime  acetaminophen   Tablet. 650milliGRAM(s) Oral every 6 hours PRN    metoclopramide 10milliGRAM(s) Oral three times a day with meals    simvastatin 20milliGRAM(s) Oral at bedtime  insulin lispro (HumaLOG) corrective regimen sliding scale  SubCutaneous Before meals and at bedtime    heparin  Injectable 5000Unit(s) SubCutaneous every 8 hours  aspirin  chewable 81milliGRAM(s) Oral daily  calcium acetate 667milliGRAM(s) Oral three times a day with meals  folic acid 1milliGRAM(s) Oral daily  calcitriol Injectable 2MICROGram(s) IV Push Once  epoetin nicki Injectable 7000Unit(s) IV Push Once  potassium chloride  10 mEq/100 mL IVPB 10milliEquivalent(s) IV Intermittent every 1 hour      REVIEW OF SYSTEMS:  [x] As per HPI  CONSTITUTIONAL: No fever, weight loss, or fatigue  RESPIRATORY: No cough, wheezing, chills or hemoptysis; No Shortness of Breath  CARDIOVASCULAR: No chest pain, palpitations, dizziness, or leg swelling  GASTROINTESTINAL: No abdominal or epigastric pain. No nausea, vomiting, or hematemesis; No diarrhea or constipation. No melena or hematochezia.  MUSCULOSKELETAL: No joint pain or swelling; No muscle, back, or extremity pain  [x] All others negative	  [ ] Unable to obtain    PHYSICAL EXAM:  T(C): 37.1, Max: 38 (06-21 @ 04:48)  HR: 66 (66 - 74)  BP: 122/64 (98/57 - 136/67)  RR: 20 (16 - 22)  SpO2: 100% (97% - 100%)  Wt(kg): --  I&O's Summary    I & Os for current day (as of 21 Jun 2017 09:09)  =============================================  IN: 300 ml / OUT: 0 ml / NET: 300 ml    Height (cm): 177.8 (06-20 @ 20:45)    Appearance: Normal	  HEENT:   Normal oral mucosa  Cardiovascular: Normal S1 S2, No JVD, No murmurs, No edema  Respiratory: Lungs clear to auscultation	  Gastrointestinal:  Soft, Non-tender, + BS	  Extremities: Normal range of motion, No clubbing, cyanosis or edema  Vascular: Peripheral pulses palpable 2+ bilaterally    TELEMETRY: 	    ECG:    	  RADIOLOGY:   CXR:  CT:  US:    CARDIAC TESTING:  Echocardiogram:  Catheterization:  Stress Test:      LABS:	 	    CARDIAC MARKERS:                                  8.5    12.3  )-----------( 194      ( 21 Jun 2017 08:08 )             25.5     06-21    140  |  97  |  67<H>  ----------------------------<  126<H>  2.9<LL>   |  22  |  6.30<H>    Ca    9.2      21 Jun 2017 08:08  Phos  2.2     06-21  Mg     1.8     06-21      proBNP:   Lipid Profile:   HgA1c:   TSH:     ASSESSMENT/PLAN: 	  Problem: Congestive heart failure.  Plan: stable, last ef 65-70 in feb. currently not in exacerbation as no jvd crackles or edema. CXR with PVC on impression, net neg 400cc today, continue HD with UF per renal recs      Problem: Essential hypertension. Plan: history of HTN, but currently on midodrine, continue midodrine.

## 2017-06-21 NOTE — PROGRESS NOTE ADULT - ASSESSMENT
86 y/o debilitated gentleman known to this service from prior admits, with h/o OA, PVD, COPD, ESRD on HD via left AV graft, dysphagia (previously refused peg), dementia, CAD, HTN, CHF, blindness, recurrent aspiration pna, achalasia, Zenker's diverticulum s/p botox, presenting again from LTC with fevers and hypoxia, likely septic from recurrent aspiration pna, now opting to forego any further HD

## 2017-06-21 NOTE — PROGRESS NOTE ADULT - PROBLEM SELECTOR PLAN 3
flattened T waves on EKG, mobitx type 2 on EKG, previously type 1 heart block  - f/u repeat EKG flattened T waves on EKG, mobitx type 2 on EKG, previously type 1 heart block  - f/u repeat EKG  - ordered for 3 runs K IV today

## 2017-06-21 NOTE — PROGRESS NOTE ADULT - PROBLEM SELECTOR PLAN 2
Hypokalemic to 2.6, and Cr 7.3, s/p K 10 MeQ x3  -fu repeat labs Hypokalemic to 2.6, and Cr 7.3, K low again this AM  - ordered for 3 runs K IV today    Addendum:  had extensive discussion with patient today. The patient wants to discontinue HD.

## 2017-06-21 NOTE — PROGRESS NOTE ADULT - SUBJECTIVE AND OBJECTIVE BOX
LUIS GARDNER   MRN-[id number]         CC: Patient is a 88y old  Male who presents with a chief complaint of fever (21 Jun 2017 11:16).  Saw pt back on unit post HD, now off bipap on NC    ROS:  UNABLE TO OBTAIN  due to:    DYSPNEA (Y/N):n	  N/V (Y/N):n	  SECRETIONS (Y/N):n	  AGITATION (Y/N):n  PAIN(Y/N): n, "achy"       -Provocation/Palliation:     -Quality/Quantity:     -Radiating:     -Severity:     -Timing/Frequency:     -Impact on ADLs:     OTHER REVIEW OF SYSTEMS:  ALLERGIES:  clonidine (Unknown)    OPIATE NAÏVE (Y/N): y  iSTOP REVIEWED (Y/N): y     MEDICATIONS: reviewed  MEDICATIONS  (STANDING):  heparin  Injectable 5000Unit(s) SubCutaneous every 8 hours  aspirin  chewable 81milliGRAM(s) Oral daily  nitroglycerin    Patch 0.1 mG/Hr(s) 1patch Transdermal daily  levETIRAcetam 500milliGRAM(s) Oral two times a day  gabapentin 100milliGRAM(s) Oral two times a day  metoclopramide 10milliGRAM(s) Oral three times a day with meals  simvastatin 20milliGRAM(s) Oral at bedtime  midodrine 5milliGRAM(s) Oral every 8 hours  folic acid 1milliGRAM(s) Oral daily  mirtazapine 15milliGRAM(s) Oral at bedtime  insulin lispro (HumaLOG) corrective regimen sliding scale  SubCutaneous Before meals and at bedtime  piperacillin/tazobactam IVPB. 2.25Gram(s) IV Intermittent every 8 hours  potassium chloride  10 mEq/100 mL IVPB 10milliEquivalent(s) IV Intermittent every 1 hour    MEDICATIONS  (PRN):  acetaminophen   Tablet. 650milliGRAM(s) Oral every 6 hours PRN Moderate Pain (4 - 6)    PHYSICAL EXAM:  T(C): 36.8, Max: 38 (06-21 @ 04:48)  T(F): 98.3, Max: 100.4 (06-21 @ 04:48)  HR: 92 (66 - 92)  BP: 122/71 (98/57 - 160/77)  RR: 16 (16 - 22)  SpO2: 96% (96% - 100%)    GENERAL:  Awake and alert, although tire appearing  HEENT:  OD ptosis, edentulous, no nasal discharge    	  NECK: supple           CVS: nl S1S2           	  RESP: 4LNC, resp even and not labored       	  GI: soft, NT, ND           	  : left AV graft with +bruit/thrill           	  MUSC: atrophic LEs      	  NEURO: awake and alert, oriented x3, following commands appropriately   	  PSYCH: calm    SKIN: no open sores      	   LYMPH: no supraclavicular LAD        LABS: reviewed                        8.5    12.3  )-----------( 194      ( 21 Jun 2017 08:08 )             25.5     06-21    x   |  x   |  24<H>  ----------------------------<  x   x    |  x   |  x     Ca    9.2      21 Jun 2017 08:08  Phos  2.2     06-21  Mg     1.8     06-21    IMAGING: reviewed  none new    ADVANCED DIRECTIVES:      DNR     DNI    MOLST    DECISION MAKER: pt  LEGAL SURROGATE:    PSYCHOSOCIAL-SPIRITUAL ASSESSMENT:       Reviewed       Care plan adjusted as below	    GOALS OF CARE DISCUSSION       Palliative care info/counseling provided	           Advanced Directives addressed (*please see Advance Care Planning Note)	           See previous Palliative Medicine Note       Documentation of GOC: pt states his goal is to "rest".  States HD has become "too much" and he's "tired" and have done it long enough and "can't do it anymore, am done"  	      AGENCY CHOICE DISCUSSED:          Hospice       [facility]       Other: return to [facility] with hospice    REFERRALS	        Unit SW/Case Mgmt       Music Therapy       Hospice         [ ]CRITICAL CARE TIME PROVIDED TO UNSTABLE PT W/ ORGAN FAILURE    Start:               End:  	       Minutes:          [x]> 50% OF THE TIME SPENT IN COUNSELING AND COORDINATING CARE   Start:               End:  	       Minutes:  [ ]PROLONGED SERVICE             FACE TO FACE: [x]PT     [ ]PT & FAMILY   Start:               End:  	       Minutes:

## 2017-06-22 DIAGNOSIS — J69.0 PNEUMONITIS DUE TO INHALATION OF FOOD AND VOMIT: ICD-10-CM

## 2017-06-22 DIAGNOSIS — N18.6 END STAGE RENAL DISEASE: ICD-10-CM

## 2017-06-22 LAB
ANION GAP SERPL CALC-SCNC: 18 MMOL/L — HIGH (ref 5–17)
BUN SERPL-MCNC: 39 MG/DL — HIGH (ref 7–23)
CALCIUM SERPL-MCNC: 9 MG/DL — SIGNIFICANT CHANGE UP (ref 8.4–10.5)
CHLORIDE SERPL-SCNC: 98 MMOL/L — SIGNIFICANT CHANGE UP (ref 96–108)
CO2 SERPL-SCNC: 25 MMOL/L — SIGNIFICANT CHANGE UP (ref 22–31)
CREAT SERPL-MCNC: 4.4 MG/DL — HIGH (ref 0.5–1.3)
GLUCOSE SERPL-MCNC: 120 MG/DL — HIGH (ref 70–99)
MAGNESIUM SERPL-MCNC: 1.8 MG/DL — SIGNIFICANT CHANGE UP (ref 1.6–2.6)
PHOSPHATE SERPL-MCNC: 1.9 MG/DL — LOW (ref 2.5–4.5)
POTASSIUM SERPL-MCNC: 2.7 MMOL/L — CRITICAL LOW (ref 3.5–5.3)
POTASSIUM SERPL-SCNC: 2.7 MMOL/L — CRITICAL LOW (ref 3.5–5.3)
SODIUM SERPL-SCNC: 141 MMOL/L — SIGNIFICANT CHANGE UP (ref 135–145)

## 2017-06-22 PROCEDURE — 99231 SBSQ HOSP IP/OBS SF/LOW 25: CPT | Mod: GC

## 2017-06-22 PROCEDURE — 99232 SBSQ HOSP IP/OBS MODERATE 35: CPT

## 2017-06-22 RX ORDER — POTASSIUM CHLORIDE 20 MEQ
40 PACKET (EA) ORAL EVERY 4 HOURS
Qty: 0 | Refills: 0 | Status: COMPLETED | OUTPATIENT
Start: 2017-06-22 | End: 2017-06-22

## 2017-06-22 RX ORDER — POTASSIUM CHLORIDE 20 MEQ
10 PACKET (EA) ORAL
Qty: 0 | Refills: 0 | Status: COMPLETED | OUTPATIENT
Start: 2017-06-22 | End: 2017-06-22

## 2017-06-22 RX ORDER — MAGNESIUM SULFATE 500 MG/ML
2 VIAL (ML) INJECTION ONCE
Qty: 0 | Refills: 0 | Status: DISCONTINUED | OUTPATIENT
Start: 2017-06-22 | End: 2017-06-22

## 2017-06-22 RX ADMIN — HEPARIN SODIUM 5000 UNIT(S): 5000 INJECTION INTRAVENOUS; SUBCUTANEOUS at 22:16

## 2017-06-22 RX ADMIN — Medication 40 MILLIEQUIVALENT(S): at 13:53

## 2017-06-22 RX ADMIN — Medication 100 MILLIEQUIVALENT(S): at 12:32

## 2017-06-22 RX ADMIN — Medication 1 PATCH: at 23:13

## 2017-06-22 RX ADMIN — Medication 1 PATCH: at 02:00

## 2017-06-22 RX ADMIN — Medication 650 MILLIGRAM(S): at 07:52

## 2017-06-22 RX ADMIN — Medication 81 MILLIGRAM(S): at 11:30

## 2017-06-22 RX ADMIN — LEVETIRACETAM 500 MILLIGRAM(S): 250 TABLET, FILM COATED ORAL at 18:38

## 2017-06-22 RX ADMIN — Medication 1 PATCH: at 11:30

## 2017-06-22 RX ADMIN — Medication 10 MILLIGRAM(S): at 11:31

## 2017-06-22 RX ADMIN — MIDODRINE HYDROCHLORIDE 5 MILLIGRAM(S): 2.5 TABLET ORAL at 18:38

## 2017-06-22 RX ADMIN — Medication 650 MILLIGRAM(S): at 08:30

## 2017-06-22 RX ADMIN — GABAPENTIN 100 MILLIGRAM(S): 400 CAPSULE ORAL at 05:47

## 2017-06-22 RX ADMIN — Medication 100 MILLIEQUIVALENT(S): at 10:07

## 2017-06-22 RX ADMIN — MIRTAZAPINE 15 MILLIGRAM(S): 45 TABLET, ORALLY DISINTEGRATING ORAL at 22:16

## 2017-06-22 RX ADMIN — HEPARIN SODIUM 5000 UNIT(S): 5000 INJECTION INTRAVENOUS; SUBCUTANEOUS at 05:47

## 2017-06-22 RX ADMIN — HEPARIN SODIUM 5000 UNIT(S): 5000 INJECTION INTRAVENOUS; SUBCUTANEOUS at 13:53

## 2017-06-22 RX ADMIN — LEVETIRACETAM 500 MILLIGRAM(S): 250 TABLET, FILM COATED ORAL at 05:47

## 2017-06-22 RX ADMIN — GABAPENTIN 100 MILLIGRAM(S): 400 CAPSULE ORAL at 18:38

## 2017-06-22 RX ADMIN — Medication 100 MILLIEQUIVALENT(S): at 08:47

## 2017-06-22 RX ADMIN — MIDODRINE HYDROCHLORIDE 5 MILLIGRAM(S): 2.5 TABLET ORAL at 07:26

## 2017-06-22 RX ADMIN — PIPERACILLIN AND TAZOBACTAM 200 GRAM(S): 4; .5 INJECTION, POWDER, LYOPHILIZED, FOR SOLUTION INTRAVENOUS at 05:47

## 2017-06-22 RX ADMIN — Medication 10 MILLIGRAM(S): at 07:52

## 2017-06-22 RX ADMIN — Medication 40 MILLIEQUIVALENT(S): at 08:51

## 2017-06-22 RX ADMIN — Medication 1 MILLIGRAM(S): at 11:29

## 2017-06-22 RX ADMIN — SIMVASTATIN 20 MILLIGRAM(S): 20 TABLET, FILM COATED ORAL at 22:16

## 2017-06-22 RX ADMIN — MIDODRINE HYDROCHLORIDE 5 MILLIGRAM(S): 2.5 TABLET ORAL at 00:25

## 2017-06-22 RX ADMIN — Medication 2: at 18:38

## 2017-06-22 NOTE — PROGRESS NOTE ADULT - ASSESSMENT
87 yr old male with extensive PMHx and recent admission for sepsis 2/2 PNA and another admission 6 months prior for septic shock secondary to UTI vs aspiration PNA who presented with AMS 2/2 fever, found to have new LLL infiltrate admitted now for HAP vs aspiration PNA now decided to stop all HD and ready to go to hospice.

## 2017-06-22 NOTE — PROGRESS NOTE ADULT - SUBJECTIVE AND OBJECTIVE BOX
Chief Complaint/Reason for Consult: cv mgmt  INTERVAL HPI: events noted, no cp sob palp  	  MEDICATIONS:  nitroglycerin    Patch 0.1 mG/Hr(s) 1patch Transdermal daily  midodrine 5milliGRAM(s) Oral every 8 hours    piperacillin/tazobactam IVPB. 2.25Gram(s) IV Intermittent every 8 hours      levETIRAcetam 500milliGRAM(s) Oral two times a day  gabapentin 100milliGRAM(s) Oral two times a day  mirtazapine 15milliGRAM(s) Oral at bedtime  acetaminophen   Tablet. 650milliGRAM(s) Oral every 6 hours PRN    metoclopramide 10milliGRAM(s) Oral three times a day with meals    simvastatin 20milliGRAM(s) Oral at bedtime  insulin lispro (HumaLOG) corrective regimen sliding scale  SubCutaneous Before meals and at bedtime    heparin  Injectable 5000Unit(s) SubCutaneous every 8 hours  aspirin  chewable 81milliGRAM(s) Oral daily  folic acid 1milliGRAM(s) Oral daily  potassium chloride  10 mEq/100 mL IVPB 10milliEquivalent(s) IV Intermittent every 1 hour  potassium chloride   Powder 40milliEquivalent(s) Oral every 4 hours      REVIEW OF SYSTEMS:  [x] As per HPI  CONSTITUTIONAL: No fever, weight loss, or fatigue  RESPIRATORY: No cough, wheezing, chills or hemoptysis; No Shortness of Breath  CARDIOVASCULAR: No chest pain, palpitations, dizziness, or leg swelling  GASTROINTESTINAL: No abdominal or epigastric pain. No nausea, vomiting, or hematemesis; No diarrhea or constipation. No melena or hematochezia.  MUSCULOSKELETAL: No joint pain or swelling; No muscle, back, or extremity pain  [x] All others negative	  [ ] Unable to obtain    PHYSICAL EXAM:  T(C): 36.9, Max: 37.2 (06-21 @ 16:32)  HR: 61 (61 - 92)  BP: 112/62 (112/62 - 160/77)  RR: 18 (16 - 20)  SpO2: 99% (96% - 100%)  Wt(kg): --  I&O's Summary    I & Os for current day (as of 22 Jun 2017 08:43)  =============================================  IN: 200 ml / OUT: 2000 ml / NET: -1800 ml        Appearance: Normal	  HEENT:   Normal oral mucosa  Cardiovascular: Normal S1 S2, No JVD, No murmurs, No edema  Respiratory: Lungs clear to auscultation	  Gastrointestinal:  Soft, Non-tender, + BS	  Extremities: Normal range of motion, No clubbing, cyanosis or edema  Vascular: Peripheral pulses palpable 2+ bilaterally    TELEMETRY: 	    ECG:    	  RADIOLOGY:   CXR:  CT:  US:    CARDIAC TESTING:  Echocardiogram:  Catheterization:  Stress Test:      LABS:	 	    CARDIAC MARKERS:                                  8.5    12.3  )-----------( 194      ( 21 Jun 2017 08:08 )             25.5     06-22    141  |  98  |  39<H>  ----------------------------<  120<H>  2.7<LL>   |  25  |  4.40<H>    Ca    9.0      22 Jun 2017 07:38  Phos  1.9     06-22  Mg     1.8     06-22      proBNP:   Lipid Profile:   HgA1c:   TSH:     ASSESSMENT/PLAN: 	  Problem: Congestive heart failure.  Plan: stable, last ef 65-70 in feb. currently not in exacerbation as no jvd crackles or edema. CXR with PVC on impression, net neg 400cc today, continue HD with UF per renal recs      Problem: Essential hypertension. Plan: history of HTN, but currently on midodrine, continue midodrine.

## 2017-06-22 NOTE — PROGRESS NOTE ADULT - PROBLEM SELECTOR PLAN 1
now resolved, on admission fever to 101.3, given vanco/zosyn, LLL infiltrate, currently on BiPAP due to resp distress initially, ICU consulted due to hypokalemia, patient deemed stable for GMF, s/p vanco/zosyn x1, lactate negative, BC x2 sent  - did not dose vanco today as it was pt's last HD session yesterday and he wouldn't be able to excrete it, pt aware and agrees with plan  - zosyn dosed renally  - f/u blood cultures - NGTD

## 2017-06-22 NOTE — PROGRESS NOTE ADULT - SUBJECTIVE AND OBJECTIVE BOX
INTERVAL HPI/OVERNIGHT EVENTS:  DONATO  Pt was seen and examined at the bedside. He was observed to be lying down comfortably.   Pt reports feeling well. He still refuses to continue HD and is ready to go to hospice. He denies any HA, CP, NVD.    VITAL SIGNS:  T(F): 98.4  HR: 61  BP: 112/62  RR: 18  SpO2: 99%  Wt(kg): --  I&O's Summary    I & Os for current day (as of 22 Jun 2017 08:07)  =============================================  IN: 200 ml / OUT: 2000 ml / NET: -1800 ml    PHYSICAL EXAM:  Constitutional: does not appear to be in distress, on NC  HEENT: NCAT, PEERL, sclera non-icteric  Neck: supple, no JVD  Respiratory: crackles at the bases  Cardiovascular: RRR, normal s1/s2, no MRG  Gastrointestinal: soft, NTND, +BS, no guarding, no organomegaly  Extremities: WWP, DP/radial pulses 2+ b/l, no edema  Neurological: AAOx 3, nonverbal, moves all extremities, CN 2-12 intact        MEDICATIONS  (STANDING):  heparin  Injectable 5000Unit(s) SubCutaneous every 8 hours  aspirin  chewable 81milliGRAM(s) Oral daily  nitroglycerin    Patch 0.1 mG/Hr(s) 1patch Transdermal daily  levETIRAcetam 500milliGRAM(s) Oral two times a day  gabapentin 100milliGRAM(s) Oral two times a day  metoclopramide 10milliGRAM(s) Oral three times a day with meals  simvastatin 20milliGRAM(s) Oral at bedtime  midodrine 5milliGRAM(s) Oral every 8 hours  folic acid 1milliGRAM(s) Oral daily  mirtazapine 15milliGRAM(s) Oral at bedtime  insulin lispro (HumaLOG) corrective regimen sliding scale  SubCutaneous Before meals and at bedtime  piperacillin/tazobactam IVPB. 2.25Gram(s) IV Intermittent every 8 hours    MEDICATIONS  (PRN):  acetaminophen   Tablet. 650milliGRAM(s) Oral every 6 hours PRN Moderate Pain (4 - 6)      Allergies    clonidine (Unknown)    Intolerances        LABS:                        8.5    12.3  )-----------( 194      ( 21 Jun 2017 08:08 )             25.5     06-21    x   |  x   |  24<H>  ----------------------------<  x   x    |  x   |  x     Ca    9.2      21 Jun 2017 08:08  Phos  2.2     06-21  Mg     1.8     06-21            RADIOLOGY & ADDITIONAL TESTS:

## 2017-06-22 NOTE — PROGRESS NOTE ADULT - PROBLEM SELECTOR PLAN 1
Patient is an 88 year old male with ESRD on HD M/W/F whom presented with a change in mental status, found to have recurrent aspiration pneumonia- clinically improving. Alert and now requesting withdrawal from dialysis.  reviewed with palliative team.  Discontinued dialysis- patient to be transferred to hospice

## 2017-06-22 NOTE — PROGRESS NOTE ADULT - SUBJECTIVE AND OBJECTIVE BOX
Patient is a 88y old  Male who presents with a chief complaint of fever (21 Jun 2017 11:16)      HPI:  The patient is an 88 yo M with ESRD (MWF HD), Alzheimer's dementia, CAD, COPD, angina, anemia, CHF, depression, HTN, HLD, OA, PVD, polyneuropathy, blindness, seizures, IDDM, and recurrent aspiration PNA 2/2 Zenker's diverticulum who presented with AMS for a few hours and found to have a temp of 102 with cough. Of note, the patient is a poor historian so the HPI is limited. The patient was initially noted to desaturate to the high 80's. The patient denied any chills, nausea, vomiting, diarrhea.     In the ED, T 101.3, HR 60, /64, RR 20, 99% on O2 NC    given Tylenol x1, vanco x1 and zosyn, poatssium IV 10 MeQ x3, BC x1 sent    CXR significant for LLL infiltrate    Unable to obtain urine culture    labs significant for lactate negative, WBC 14.5, K 2.6, Cr 7.3    ICU was consulted in the setting of a K 2.6, BiPAP placed in setting of respiratory distress and patient comfortable after BiPAP placed    ICU deemed patient stable for regional medical floor, especially in setting of DNR/DNI status (19 Jun 2017 10:40)    INTERVAL HPI/OVERNIGHT EVENTS:::comfortable, labs reviewed    HEALTH ISSUES - PROBLEM Dx:  End-stage renal disease: End-stage renal disease  Aspiration pneumonia: Aspiration pneumonia  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  Palliative care by specialist: Palliative care by specialist  Sepsis, due to unspecified organism: Sepsis, due to unspecified organism  Pneumonia: Pneumonia  Acute respiratory failure, unspecified whether with hypoxia or hypercapnia: Acute respiratory failure, unspecified whether with hypoxia or hypercapnia  Hypokalemia: Hypokalemia  Nutrition, metabolism, and development symptoms: Nutrition, metabolism, and development symptoms  Need for prophylactic measure: Need for prophylactic measure  Diabetes: Diabetes  Anemia: Anemia  Essential hypertension: Essential hypertension  Congestive heart failure: Congestive heart failure  Chronic obstructive pulmonary disease: Chronic obstructive pulmonary disease  ESRD (end stage renal disease): ESRD (end stage renal disease)  Sepsis: Sepsis          PAST MEDICAL & SURGICAL HISTORY:  AV graft thrombosis  Osteoarthritis  PVD (peripheral vascular disease)  COPD (chronic obstructive pulmonary disease)  Heart failure  Angina pectoris  ESRD (end stage renal disease)  Seizures: post traumatic  CAD (coronary artery disease)  HTN (hypertension)  Polyneuropathy  Hyperlipidemia  Dysphagia  Hypotension  Alzheimers disease  Osteoarthritis: Osteoarthritis  Chronic obstructive pulmonary disease: Chronic obstructive pulmonary disease  Anemia: Anemia  Dementia without behavioral disturbance: Dementia  Atherosclerosis of coronary artery: CAD (coronary artery disease)  Nondependent alcohol abuse: ETOH abuse  Depressive disorder: Depression  End-stage renal disease: ESRD on hemodialysis  Essential hypertension: HTN (hypertension)  Deep venous embolism and thrombosis of lower extremity: DVT (deep venous thrombosis)  Congestive heart failure: CHF (congestive heart failure)  Legal blindness: Legally blind  Hemodialysis access, AV graft: LUE loop AVG  Acquired arteriovenous fistula: AV fistula          Consultant NOTE  REVIEWED  (   )    REVIEW OF SYSTEMS:  [x] As per HPI    PSYCH    awake,     	  [ ] Unable to obtain          Vital Signs Last 24 Hrs  T(C): 36.7, Max: 36.9 (06-22 @ 04:55)  T(F): 98, Max: 98.4 (06-22 @ 04:55)  HR: 59 (59 - 65)  BP: 113/65 (112/62 - 128/70)  BP(mean): --  RR: 18 (16 - 18)  SpO2: 100% (98% - 100%)        I & Os for current day (as of 06-22 @ 20:00)  =============================================  IN: 200 ml / OUT: 2000 ml / NET: -1800 ml    PHYSICAL EXAMINATION:                                    (    )  NO CHANGE  Appearance: 	  HEENT:   Normal oral mucosa, PERRL, EOMI	  Neck: Supple, + JVD/ - JVD; Carotid Bruit   Cardiovascular: Normal S1 S2, No JVD, No murmurs,   Respiratory: rhonchi  Gastrointestinal:  Soft, Non-tender, + BS	  Skin: No rashes, No ecchymoses, No cyanosis  Extremities:  Vascular: Peripheral pulses  Neurologic:   Psychiatry awake    heparin  Injectable 5000Unit(s) SubCutaneous every 8 hours  aspirin  chewable 81milliGRAM(s) Oral daily  nitroglycerin    Patch 0.1 mG/Hr(s) 1patch Transdermal daily  levETIRAcetam 500milliGRAM(s) Oral two times a day  gabapentin 100milliGRAM(s) Oral two times a day  simvastatin 20milliGRAM(s) Oral at bedtime  midodrine 5milliGRAM(s) Oral every 8 hours  folic acid 1milliGRAM(s) Oral daily  mirtazapine 15milliGRAM(s) Oral at bedtime  insulin lispro (HumaLOG) corrective regimen sliding scale  SubCutaneous Before meals and at bedtime  piperacillin/tazobactam IVPB. 2.25Gram(s) IV Intermittent every 8 hours  acetaminophen   Tablet. 650milliGRAM(s) Oral every 6 hours PRN                                      8.5    12.3  )-----------( 194      ( 21 Jun 2017 08:08 )             25.5     06-22    141  |  98  |  39<H>  ----------------------------<  120<H>  2.7<LL>   |  25  |  4.40<H>    Ca    9.0      22 Jun 2017 07:38  Phos  1.9     06-22  Mg     1.8     06-22        CAPILLARY BLOOD GLUCOSE  155 (22 Jun 2017 17:55)  124 (22 Jun 2017 12:20)  109 (22 Jun 2017 07:36)  121 (21 Jun 2017 21:22)

## 2017-06-22 NOTE — PROGRESS NOTE ADULT - SUBJECTIVE AND OBJECTIVE BOX
INTERVAL HPI/OVERNIGHT EVENTS:    off BiPAP  breathing on nasal cannula    PAST MEDICAL & SURGICAL HISTORY:  AV graft thrombosis  Osteoarthritis  PVD (peripheral vascular disease)  COPD (chronic obstructive pulmonary disease)  Heart failure  Angina pectoris  ESRD (end stage renal disease)  Seizures: post traumatic  CAD (coronary artery disease)  HTN (hypertension)  Polyneuropathy  Hyperlipidemia  Dysphagia  Hypotension  Alzheimers disease  Osteoarthritis: Osteoarthritis  Chronic obstructive pulmonary disease: Chronic obstructive pulmonary disease  Anemia: Anemia  Dementia without behavioral disturbance: Dementia  Atherosclerosis of coronary artery: CAD (coronary artery disease)  Nondependent alcohol abuse: ETOH abuse  Depressive disorder: Depression  End-stage renal disease: ESRD on hemodialysis  Essential hypertension: HTN (hypertension)  Deep venous embolism and thrombosis of lower extremity: DVT (deep venous thrombosis)  Congestive heart failure: CHF (congestive heart failure)  Legal blindness: Legally blind  Hemodialysis access, AV graft: LUE loop AVG  Acquired arteriovenous fistula: AV fistula    FAMILY HISTORY:  Family history unknown    SOCIAL HISTORY:    REVIEW OF SYSTEMS:  unable to obtain  Constitutional: () weight change, () fever,  () chills, () fatigue, () night sweats  Eyes: () discharge, () eye pain, () visual change  ENT:  () hearing difficulty, () vertigo, () sinus pain,  () throat pain, () epistaxis, () dysphagia, () hoarseness  Neck: () pain, () stiffness, () swelling  Respiratory: () cough, () wheezing, () hemoptysis      Cardiovascular: () chest pain, ()palpitations, () dizziness   Gastrointestinal: () abdominal pain, () nausea, () vomiting, () hematemesis, () diarrhea,  () constipation, () melena  Genitourinary:  () dysuria, () frequency, () hematuria, () incontinence      Neurologic: () headache, () memory loss, () loss of strength, () numbness, () tremor     Skin: () itching, () burning, () rash, () lesions   Lymphatic: () enlarged lymph nodes  Endocrine: () hair loss, () temperature intolerance         Musculoskeletal: () back pain, () joint pain,  () extremity pain  Psychiatric: () visual change, () auditory change, () depression, () anxiety, () suicidal  Sleep: () disorder, () insomnia, () sleep deprivation  Heme/Lymph: () easy bruising, () bleeding gums            Allergy and Immunologic: () hives, () eczema    Vital Signs Last 24 Hrs  T(C): 36.9, Max: 37.2 (06-21 @ 16:32)  T(F): 98.4, Max: 98.9 (06-21 @ 16:32)  HR: 65 (61 - 69)  BP: 128/70 (112/62 - 128/70)  BP(mean): --  RR: 18 (16 - 18)  SpO2: 98% (98% - 100%)    I&O's Detail    I & Os for current day (as of 22 Jun 2017 13:43)  =============================================  IN:    Solution: 200 ml    Total IN: 200 ml  ---------------------------------------------  OUT:    Other: 2000 ml    Total OUT: 2000 ml  ---------------------------------------------  Total NET: -1800 ml    PHYSICAL EXAM:  in bed, not talking  Well nourished, well developed, comfortable, - acute distress; vital signs are monitored  Eyes: -conjunctivitis, -scleritis   Head: no focal deficit, normocephalic,  no trauma  ENMT: DRY tongue, no thrush, -nasal discharge, -hoarseness, abnormal hearing, -cough, -hemoptysis, trachea midline  Neck: supple, - lymphadenopathy,  -masses, -JVD  Respiratory: bilateral diminished breath sounds, -wheezing, +rhonchi, -rales, -crackles  Chest: -accessory muscle use, -paradoxical breathing  Cardiovascular: regular rate and sinus rhythm, S1 S2 normal, -S3, -S4, -murmur, -gallop, -rub  Gastrointestinal: soft, nontender, nondistended, normal bowel sounds, no hepatosplenomegaly  Genitourinary: -flank pain, -dysuria  Extremities: -clubbing, -cyanosis, -edema    Vascular: peripheral pulses palpable 2+ bilaterally  Neurological: awake, no focal deficit, -tremor   Skin: warm, dry, -erythema, iv site intact  Lymph nodes; no cervical, supraclavicular or axillary adenopathy  Psychiatric: not talking    MEDICATIONS  (STANDING):  heparin  Injectable 5000Unit(s) SubCutaneous every 8 hours  aspirin  chewable 81milliGRAM(s) Oral daily  nitroglycerin    Patch 0.1 mG/Hr(s) 1patch Transdermal daily  levETIRAcetam 500milliGRAM(s) Oral two times a day  gabapentin 100milliGRAM(s) Oral two times a day  metoclopramide 10milliGRAM(s) Oral three times a day with meals  simvastatin 20milliGRAM(s) Oral at bedtime  midodrine 5milliGRAM(s) Oral every 8 hours  folic acid 1milliGRAM(s) Oral daily  mirtazapine 15milliGRAM(s) Oral at bedtime  insulin lispro (HumaLOG) corrective regimen sliding scale  SubCutaneous Before meals and at bedtime  piperacillin/tazobactam IVPB. 2.25Gram(s) IV Intermittent every 8 hours  potassium chloride   Powder 40milliEquivalent(s) Oral every 4 hours    MEDICATIONS  (PRN):  acetaminophen   Tablet. 650milliGRAM(s) Oral every 6 hours PRN Moderate Pain (4 - 6)    Allergies  clonidine (Unknown)  Intolerances    LABS:                        8.5    12.3  )-----------( 194      ( 21 Jun 2017 08:08 )             25.5     06-22    141  |  98  |  39<H>  ----------------------------<  120<H>  2.7<LL>   |  25  |  4.40<H>    Ca    9.0      22 Jun 2017 07:38  Phos  1.9     06-22  Mg     1.8     06-22    +DVT prophylaxis  SC HEPARIN  +Sleep  +Nutrition goals ORAL WITH ASSISTANCE  -Pain NO EVIDENCE  -Decubital ulcer  +GI prophylaxis (PPV, coagulopathy, Hx)  +Aspiration prophylaxis (45 degrees)  +Sedation/analgesia stopped once  +ID (phos, CH, mupi, SB)  -Delirium  +Cardiac ASA/statin  +Prevention  +Education  +Medication reviewed (drug-drug interactions, PDA)  Medical devices  Discussed with PGY, CCRN    RADIOLOGY & ADDITIONAL STUDIES:      EXAM:  XR CHEST 1 VIEW PORT IMMEDIATE                          PROCEDURE DATE:  06/19/2017         INTERPRETATION:  Portable Chest X-Ray dated 6/19/2017 6:23 AM    Indication: ams    An AP portable view of the chest is compared to 3/27/2017. Mild to   moderate pulmonary venous congestion, increased since the prior study.   Improved bibasilar infiltrates. Probable trace effusions.    IMPRESSION:  Mild to moderate pulmonary venous congestion, increased. Improving   bibasilar infiltrates. Probable trace pleural effusions.

## 2017-06-22 NOTE — PROVIDER CONTACT NOTE (MEDICATION) - SITUATION
Pt ordered for Zosyn IV after HD. Pt refusing HD. As per day nurse day medical team was supposed to discontinue Zosyn order.

## 2017-06-22 NOTE — PROVIDER CONTACT NOTE (MEDICATION) - ACTION/TREATMENT ORDERED:
As per Dr. Alfredo he received signout pt refused Zosyn earlier. Per MD pt on comfort care and no need to administer Zosyn and instructed to hold doses.

## 2017-06-22 NOTE — PROGRESS NOTE ADULT - PROBLEM SELECTOR PLAN 3
flattened T waves on EKG, mobitx type 2 on EKG, previously type 1 heart block  - fu repeat BMP today

## 2017-06-22 NOTE — PROGRESS NOTE ADULT - SUBJECTIVE AND OBJECTIVE BOX
Patient seen and examined at bedside.   Awake, NC O2- reaffirms his desire to withdraw from dialysis  Reviewed with palliative --to make arrrangements for hospice at [facility]        T(C): , Max: 36.9 (06-22 @ 04:55)  T(F): , Max: 98.4 (06-22 @ 04:55)  HR: 58  BP: 129/77  BP(mean): --  RR: 18  SpO2: 99%  Wt(kg): --    I & Os for current day (as of 06-22 @ 22:13)  =============================================  IN:    Solution: 200 ml    Total IN: 200 ml  ---------------------------------------------  OUT:    Other: 2000 ml    Total OUT: 2000 ml  ---------------------------------------------  Total NET: -1800 ml        heparin  Injectable 5000 every 8 hours  aspirin  chewable 81 daily  nitroglycerin    Patch 0.1 mG/Hr(s) 1 daily  levETIRAcetam 500 two times a day  gabapentin 100 two times a day  simvastatin 20 at bedtime  midodrine 5 every 8 hours  folic acid 1 daily  mirtazapine 15 at bedtime  insulin lispro (HumaLOG) corrective regimen sliding scale  Before meals and at bedtime  piperacillin/tazobactam IVPB. 2.25 every 8 hours    Allergies    clonidine (Unknown)    Intolerances      PHYSICAL EXAM:  Constitutional:  No acute distress  Respiratory: occ rhonchi  Cardiovascular: S1, S2.  Regular rate and rhythm.    Gastrointestinal: soft, non-tender, non-distended, normal bowel sounds; no HSM  Vasc/Extremities:  No lower extremity edema.      LABS:                        8.5    12.3  )-----------( 194      ( 21 Jun 2017 08:08 )             25.5     06-22    141  |  98  |  39<H>  ----------------------------<  120<H>  2.7<LL>   |  25  |  4.40<H>    Ca    9.0      22 Jun 2017 07:38  Phos  1.9     06-22  Mg     1.8     06-22                  RADIOLOGY & ADDITIONAL STUDIES:

## 2017-06-23 PROCEDURE — 99232 SBSQ HOSP IP/OBS MODERATE 35: CPT

## 2017-06-23 PROCEDURE — 99233 SBSQ HOSP IP/OBS HIGH 50: CPT

## 2017-06-23 RX ORDER — GABAPENTIN 400 MG/1
1 CAPSULE ORAL
Qty: 0 | Refills: 0 | COMMUNITY
Start: 2017-06-23

## 2017-06-23 RX ORDER — SODIUM POLYSTYRENE SULFONATE 4.1 MEQ/G
60 POWDER, FOR SUSPENSION ORAL
Qty: 0 | Refills: 0 | COMMUNITY

## 2017-06-23 RX ORDER — NITROGLYCERIN 6.5 MG
1 CAPSULE, EXTENDED RELEASE ORAL
Qty: 0 | Refills: 0 | COMMUNITY
Start: 2017-06-23

## 2017-06-23 RX ORDER — SIMVASTATIN 20 MG/1
1 TABLET, FILM COATED ORAL
Qty: 0 | Refills: 0 | COMMUNITY
Start: 2017-06-23

## 2017-06-23 RX ORDER — ASPIRIN/CALCIUM CARB/MAGNESIUM 324 MG
1 TABLET ORAL
Qty: 0 | Refills: 0 | COMMUNITY

## 2017-06-23 RX ORDER — MIDODRINE HYDROCHLORIDE 2.5 MG/1
1 TABLET ORAL
Qty: 0 | Refills: 0 | COMMUNITY
Start: 2017-06-23

## 2017-06-23 RX ORDER — LEVETIRACETAM 250 MG/1
1 TABLET, FILM COATED ORAL
Qty: 0 | Refills: 0 | COMMUNITY
Start: 2017-06-23

## 2017-06-23 RX ORDER — ASPIRIN/CALCIUM CARB/MAGNESIUM 324 MG
1 TABLET ORAL
Qty: 0 | Refills: 0 | COMMUNITY
Start: 2017-06-23

## 2017-06-23 RX ORDER — MIRTAZAPINE 45 MG/1
1 TABLET, ORALLY DISINTEGRATING ORAL
Qty: 0 | Refills: 0 | COMMUNITY
Start: 2017-06-23

## 2017-06-23 RX ORDER — FOLIC ACID 0.8 MG
1 TABLET ORAL
Qty: 0 | Refills: 0 | COMMUNITY
Start: 2017-06-23

## 2017-06-23 RX ORDER — ACETAMINOPHEN 500 MG
2 TABLET ORAL
Qty: 0 | Refills: 0 | COMMUNITY
Start: 2017-06-23

## 2017-06-23 RX ADMIN — LEVETIRACETAM 500 MILLIGRAM(S): 250 TABLET, FILM COATED ORAL at 05:49

## 2017-06-23 RX ADMIN — MIRTAZAPINE 15 MILLIGRAM(S): 45 TABLET, ORALLY DISINTEGRATING ORAL at 23:04

## 2017-06-23 RX ADMIN — MIDODRINE HYDROCHLORIDE 5 MILLIGRAM(S): 2.5 TABLET ORAL at 00:18

## 2017-06-23 RX ADMIN — MIDODRINE HYDROCHLORIDE 5 MILLIGRAM(S): 2.5 TABLET ORAL at 17:01

## 2017-06-23 RX ADMIN — LEVETIRACETAM 500 MILLIGRAM(S): 250 TABLET, FILM COATED ORAL at 17:01

## 2017-06-23 RX ADMIN — MIDODRINE HYDROCHLORIDE 5 MILLIGRAM(S): 2.5 TABLET ORAL at 07:23

## 2017-06-23 RX ADMIN — Medication 1 MILLIGRAM(S): at 11:32

## 2017-06-23 RX ADMIN — HEPARIN SODIUM 5000 UNIT(S): 5000 INJECTION INTRAVENOUS; SUBCUTANEOUS at 23:03

## 2017-06-23 RX ADMIN — GABAPENTIN 100 MILLIGRAM(S): 400 CAPSULE ORAL at 17:01

## 2017-06-23 RX ADMIN — MIDODRINE HYDROCHLORIDE 5 MILLIGRAM(S): 2.5 TABLET ORAL at 23:04

## 2017-06-23 RX ADMIN — HEPARIN SODIUM 5000 UNIT(S): 5000 INJECTION INTRAVENOUS; SUBCUTANEOUS at 13:54

## 2017-06-23 RX ADMIN — GABAPENTIN 100 MILLIGRAM(S): 400 CAPSULE ORAL at 05:49

## 2017-06-23 RX ADMIN — Medication 81 MILLIGRAM(S): at 11:32

## 2017-06-23 RX ADMIN — Medication 1 PATCH: at 11:32

## 2017-06-23 RX ADMIN — HEPARIN SODIUM 5000 UNIT(S): 5000 INJECTION INTRAVENOUS; SUBCUTANEOUS at 05:49

## 2017-06-23 RX ADMIN — SIMVASTATIN 20 MILLIGRAM(S): 20 TABLET, FILM COATED ORAL at 23:04

## 2017-06-23 NOTE — PROGRESS NOTE ADULT - SUBJECTIVE AND OBJECTIVE BOX
INTERVAL HPI/OVERNIGHT EVENTS:    off BiPAP  breathing on nasal cannula    PAST MEDICAL & SURGICAL HISTORY:  AV graft thrombosis  Osteoarthritis  PVD (peripheral vascular disease)  COPD (chronic obstructive pulmonary disease)  Heart failure  Angina pectoris  ESRD (end stage renal disease)  Seizures: post traumatic  CAD (coronary artery disease)  HTN (hypertension)  Polyneuropathy  Hyperlipidemia  Dysphagia  Hypotension  Alzheimers disease  Osteoarthritis: Osteoarthritis  Chronic obstructive pulmonary disease: Chronic obstructive pulmonary disease  Anemia: Anemia  Dementia without behavioral disturbance: Dementia  Atherosclerosis of coronary artery: CAD (coronary artery disease)  Nondependent alcohol abuse: ETOH abuse  Depressive disorder: Depression  End-stage renal disease: ESRD on hemodialysis  Essential hypertension: HTN (hypertension)  Deep venous embolism and thrombosis of lower extremity: DVT (deep venous thrombosis)  Congestive heart failure: CHF (congestive heart failure)  Legal blindness: Legally blind  Hemodialysis access, AV graft: LUE loop AVG  Acquired arteriovenous fistula: AV fistula    FAMILY HISTORY:  Family history unknown    SOCIAL HISTORY:    REVIEW OF SYSTEMS:  unable to obtain  Constitutional: () weight change, () fever,  () chills, () fatigue, () night sweats  Eyes: () discharge, () eye pain, () visual change  ENT:  () hearing difficulty, () vertigo, () sinus pain,  () throat pain, () epistaxis, () dysphagia, () hoarseness  Neck: () pain, () stiffness, () swelling  Respiratory: () cough, () wheezing, () hemoptysis      Cardiovascular: () chest pain, ()palpitations, () dizziness   Gastrointestinal: () abdominal pain, () nausea, () vomiting, () hematemesis, () diarrhea,  () constipation, () melena  Genitourinary:  () dysuria, () frequency, () hematuria, () incontinence      Neurologic: () headache, () memory loss, () loss of strength, () numbness, () tremor     Skin: () itching, () burning, () rash, () lesions   Lymphatic: () enlarged lymph nodes  Endocrine: () hair loss, () temperature intolerance         Musculoskeletal: () back pain, () joint pain,  () extremity pain  Psychiatric: () visual change, () auditory change, () depression, () anxiety, () suicidal  Sleep: () disorder, () insomnia, () sleep deprivation  Heme/Lymph: () easy bruising, () bleeding gums            Allergy and Immunologic: () hives, () eczema    Vital Signs Last 24 Hrs  T(C): 36.9, Max: 37.2 (06-21 @ 16:32)  T(F): 98.4, Max: 98.9 (06-21 @ 16:32)  HR: 65 (61 - 69)  BP: 128/70 (112/62 - 128/70)  BP(mean): --  RR: 18 (16 - 18)  SpO2: 98% (98% - 100%)    I&O's Detail    I & Os for current day (as of 22 Jun 2017 13:43)  =============================================  IN:    Solution: 200 ml    Total IN: 200 ml  ---------------------------------------------  OUT:    Other: 2000 ml    Total OUT: 2000 ml  ---------------------------------------------  Total NET: -1800 ml    PHYSICAL EXAM:  in bed, not talking; more awake today and opening left eye  Well nourished, well developed, comfortable, - acute distress; vital signs are monitored  Eyes: -conjunctivitis, -scleritis   Head: no focal deficit, normocephalic,  no trauma  ENMT: DRY tongue, no thrush, -nasal discharge, -hoarseness, abnormal hearing, -cough, -hemoptysis, trachea midline  Neck: supple, - lymphadenopathy,  -masses, -JVD  Respiratory: bilateral diminished breath sounds, -wheezing, +rhonchi, -rales, -crackles  Chest: -accessory muscle use, -paradoxical breathing  Cardiovascular: regular rate and sinus rhythm, S1 S2 normal, -S3, -S4, -murmur, -gallop, -rub  Gastrointestinal: soft, nontender, nondistended, normal bowel sounds, no hepatosplenomegaly  Genitourinary: -flank pain, -dysuria  Extremities: -clubbing, -cyanosis, -edema    Vascular: peripheral pulses palpable 2+ bilaterally  Neurological: awake, no focal deficit, -tremor   Skin: warm, dry, -erythema, iv site intact  Lymph nodes; no cervical, supraclavicular or axillary adenopathy  Psychiatric: not talking    MEDICATIONS  (STANDING):  heparin  Injectable 5000Unit(s) SubCutaneous every 8 hours  aspirin  chewable 81milliGRAM(s) Oral daily  nitroglycerin    Patch 0.1 mG/Hr(s) 1patch Transdermal daily  levETIRAcetam 500milliGRAM(s) Oral two times a day  gabapentin 100milliGRAM(s) Oral two times a day  metoclopramide 10milliGRAM(s) Oral three times a day with meals  simvastatin 20milliGRAM(s) Oral at bedtime  midodrine 5milliGRAM(s) Oral every 8 hours  folic acid 1milliGRAM(s) Oral daily  mirtazapine 15milliGRAM(s) Oral at bedtime  insulin lispro (HumaLOG) corrective regimen sliding scale  SubCutaneous Before meals and at bedtime  piperacillin/tazobactam IVPB. 2.25Gram(s) IV Intermittent every 8 hours  potassium chloride   Powder 40milliEquivalent(s) Oral every 4 hours    MEDICATIONS  (PRN):  acetaminophen   Tablet. 650milliGRAM(s) Oral every 6 hours PRN Moderate Pain (4 - 6)    Allergies  clonidine (Unknown)  Intolerances    LABS:                        8.5    12.3  )-----------( 194      ( 21 Jun 2017 08:08 )             25.5     06-22    141  |  98  |  39<H>  ----------------------------<  120<H>  2.7<LL>   |  25  |  4.40<H>    Ca    9.0      22 Jun 2017 07:38  Phos  1.9     06-22  Mg     1.8     06-22    +DVT prophylaxis  SC HEPARIN  +Sleep  +Nutrition goals ORAL WITH ASSISTANCE  -Pain NO EVIDENCE  -Decubital ulcer  +GI prophylaxis (PPV, coagulopathy, Hx)  +Aspiration prophylaxis (45 degrees)  +Sedation/analgesia stopped once  +ID (phos, CH, mupi, SB)  -Delirium  +Cardiac ASA/statin  +Prevention  +Education  +Medication reviewed (drug-drug interactions, PDA)  Medical devices  Discussed with PGY, CCRN    RADIOLOGY & ADDITIONAL STUDIES:      EXAM:  XR CHEST 1 VIEW PORT IMMEDIATE                          PROCEDURE DATE:  06/19/2017         INTERPRETATION:  Portable Chest X-Ray dated 6/19/2017 6:23 AM    Indication: ams    An AP portable view of the chest is compared to 3/27/2017. Mild to   moderate pulmonary venous congestion, increased since the prior study.   Improved bibasilar infiltrates. Probable trace effusions.    IMPRESSION:  Mild to moderate pulmonary venous congestion, increased. Improving   bibasilar infiltrates. Probable trace pleural effusions.

## 2017-06-23 NOTE — PROGRESS NOTE ADULT - SUBJECTIVE AND OBJECTIVE BOX
Chief Complaint/Reason for Consult: cv mgmt  INTERVAL HPI: events noted, no cp sob palp  	  MEDICATIONS:  nitroglycerin    Patch 0.1 mG/Hr(s) 1patch Transdermal daily  midodrine 5milliGRAM(s) Oral every 8 hours        levETIRAcetam 500milliGRAM(s) Oral two times a day  gabapentin 100milliGRAM(s) Oral two times a day  mirtazapine 15milliGRAM(s) Oral at bedtime  acetaminophen   Tablet. 650milliGRAM(s) Oral every 6 hours PRN      simvastatin 20milliGRAM(s) Oral at bedtime  insulin lispro (HumaLOG) corrective regimen sliding scale  SubCutaneous Before meals and at bedtime    heparin  Injectable 5000Unit(s) SubCutaneous every 8 hours  aspirin  chewable 81milliGRAM(s) Oral daily  folic acid 1milliGRAM(s) Oral daily      REVIEW OF SYSTEMS:  [x] As per HPI  CONSTITUTIONAL: No fever, weight loss, or fatigue  RESPIRATORY: No cough, wheezing, chills or hemoptysis; No Shortness of Breath  CARDIOVASCULAR: No chest pain, palpitations, dizziness, or leg swelling  GASTROINTESTINAL: No abdominal or epigastric pain. No nausea, vomiting, or hematemesis; No diarrhea or constipation. No melena or hematochezia.  MUSCULOSKELETAL: No joint pain or swelling; No muscle, back, or extremity pain  [x] All others negative	  [ ] Unable to obtain    PHYSICAL EXAM:  T(C): 36.4, Max: 37 (06-23 @ 05:46)  HR: 60 (58 - 60)  BP: 120/86 (113/65 - 129/77)  RR: 18 (18 - 24)  SpO2: 99% (99% - 100%)  Wt(kg): --  I&O's Summary        Appearance: Normal	  HEENT:   Normal oral mucosa  Cardiovascular: Normal S1 S2, No JVD, No murmurs, No edema  Respiratory: Lungs clear to auscultation	  Gastrointestinal:  Soft, Non-tender, + BS	  Extremities: Normal range of motion, No clubbing, cyanosis or edema  Vascular: Peripheral pulses palpable 2+ bilaterally    TELEMETRY: 	    ECG:    	  RADIOLOGY:   CXR:  CT:  US:    CARDIAC TESTING:  Echocardiogram:  Catheterization:  Stress Test:      LABS:	 	    CARDIAC MARKERS:              06-22    141  |  98  |  39<H>  ----------------------------<  120<H>  2.7<LL>   |  25  |  4.40<H>    Ca    9.0      22 Jun 2017 07:38  Phos  1.9     06-22  Mg     1.8     06-22      proBNP:   Lipid Profile:   HgA1c:   TSH:     ASSESSMENT/PLAN: 	  Problem: Congestive heart failure.  Plan: stable, last ef 65-70 in feb. currently not in exacerbation as no jvd crackles or edema. CXR with PVC on impression, net neg 400cc today, continue HD with UF per renal recs      Problem: Essential hypertension. Plan: history of HTN, but currently on midodrine, continue midodrine.

## 2017-06-23 NOTE — PROGRESS NOTE ADULT - ASSESSMENT
86 yo admitted with aspiration pneumonia  ESRD on dialysis.  Has chosen to forgo further dialysis.  Seems to be dying but denies discomfort.  At present will continue present regimen, no further labs.  Awaiting bed a [facility].  Will need  two physicians to sign for his admission since he does not seem able to do so at this point.  Would not move if his death appears imminent  Presently no bed at [facility].

## 2017-06-23 NOTE — PROGRESS NOTE ADULT - PROBLEM SELECTOR PLAN 1
now resolved, on admission fever to 101.3, given vanco/zosyn, LLL infiltrate, currently on BiPAP due to resp distress initially, ICU consulted due to hypokalemia, patient de  - dc abx

## 2017-06-23 NOTE — PROGRESS NOTE ADULT - SUBJECTIVE AND OBJECTIVE BOX
LUIS GARDNER   MRN-[id number]    CC:     HPI:  The patient is an 86 yo M with ESRD (MWF HD), Alzheimer's dementia, CAD, COPD, angina, anemia, CHF, depression, HTN, HLD, OA, PVD, polyneuropathy, blindness, seizures, IDDM, and recurrent aspiration PNA 2/2 Zenker's diverticulum who presented with AMS for a few hours and found to have a temp of 102 with cough. Of note, the patient is a poor historian so the HPI is limited. The patient was initially noted to desaturate to the high 80's. The patient denied any chills, nausea, vomiting, diarrhea.     In the ED, T 101.3, HR 60, /64, RR 20, 99% on O2 NC    given Tylenol x1, vanco x1 and zosyn, poatssium IV 10 MeQ x3, BC x1 sent    CXR significant for LLL infiltrate    Unable to obtain urine culture    labs significant for lactate negative, WBC 14.5, K 2.6, Cr 7.3    ICU was consulted in the setting of a K 2.6, BiPAP placed in setting of respiratory distress and patient comfortable after BiPAP placed    ICU deemed patient stable for [facility] medical floor, especially in setting of DNR/DNI status (19 Jun 2017 10:40)      Subjective: patient's hospital course was that he was treated for aspiration pneumonia with some improvemnt, but has decided to forgo further dialysis.  He has discussed this at length with his nephrologist Dr. Baker  Presently very sleepy, difficult to arouse, which is very different that yesterday    DYSPNEA: Y / N   N	  NAUS/VOM: Y / N	N  SECRETIONS: Y / N	N  AGITATION: Y / N          N  Pain (Y/N):     denies  -Provocation/Palliation:  -Quality/Quantity:  -Radiating:  -Severity:  -Timing/Frequency:  -Impact on ADLs:    OTHER REVIEW OF SYSTEMS:  UNABLE TO OBTAIN  due to: lethargy    PEx:  T(C): 36.4, Max: 37 (06-23 @ 05:46)  HR: 60 (58 - 60)  BP: 120/86 (113/65 - 129/77)  RR: 18 (18 - 24)  SpO2: 99% (99% - 100%)  Wt(kg): --    GENERAL:  ill appearing elderly man  HEENT:     moist mucous membranes, non-icteric 	  NECK:  supple         CVS:     bradycardia- distant heart sounds     	  RESP:    clear, some coarse referred sounds    	  GI:  abd soft           	  :            	  MUSC:    generalized weakness   	  NEURO:     	lethargic but arousable   opens eyes.  No focal abnormailty  PSYCH:          SKIN:         	   LYMPH:      	     clonidine (Unknown)      OPIATE NAÏVE (Y/N):  iSTOP REVIEWED (Y/N):     MEDICATIONS: REVIEWED  MEDICATIONS  (STANDING):  heparin  Injectable 5000Unit(s) SubCutaneous every 8 hours  aspirin  chewable 81milliGRAM(s) Oral daily  nitroglycerin    Patch 0.1 mG/Hr(s) 1patch Transdermal daily  levETIRAcetam 500milliGRAM(s) Oral two times a day  gabapentin 100milliGRAM(s) Oral two times a day  simvastatin 20milliGRAM(s) Oral at bedtime  midodrine 5milliGRAM(s) Oral every 8 hours  folic acid 1milliGRAM(s) Oral daily  mirtazapine 15milliGRAM(s) Oral at bedtime  insulin lispro (HumaLOG) corrective regimen sliding scale  SubCutaneous Before meals and at bedtime    MEDICATIONS  (PRN):  acetaminophen   Tablet. 650milliGRAM(s) Oral every 6 hours PRN Moderate Pain (4 - 6)      LABS: REVIEWED    none new          IMAGING: REVIEWED    ADVANCED DIRECTIVES:       DNR     DNI       DECISION MAKER:   LEGAL SURROGATE:    PSYCHOSOCIAL-SPIRITUAL ASSESSMENT:       Reviewed       Care plan unchanged       Care plan adjusted as above	    GOALS OF CARE DISCUSSION         Documentation of GOC: [facility] care at the EOL  	      AGENCY CHOICE DISCUSSED:       [facility] hospice at [facility]  :    REFERRALS	        Palliative Med              Hospice

## 2017-06-23 NOTE — PROGRESS NOTE ADULT - SUBJECTIVE AND OBJECTIVE BOX
INTERVAL HPI/OVERNIGHT EVENTS:  DONATO  Pt was seen and examined at the bedside. He was observed to be lying down comfortably.   Pt offers no complaints, he feels comfrotable, no HA, no SOB, no CP.     VITAL SIGNS:  T(F): 97.6  HR: 60  BP: 120/86  RR: 18  SpO2: 99%  Wt(kg): --  I&O's Summary    PHYSICAL EXAM:  Constitutional: does not appear to be in distress, on NC  HEENT: NCAT, PEERL, sclera non-icteric  Neck: supple, no JVD  Respiratory: crackles at the bases  Cardiovascular: RRR, normal s1/s2, no MRG  Gastrointestinal: soft, NTND, +BS, no guarding, no organomegaly  Extremities: WWP, DP/radial pulses 2+ b/l, no edema  Neurological: AAOx 3, nonverbal, moves all extremities, CN 2-12 intact        MEDICATIONS  (STANDING):  heparin  Injectable 5000Unit(s) SubCutaneous every 8 hours  aspirin  chewable 81milliGRAM(s) Oral daily  nitroglycerin    Patch 0.1 mG/Hr(s) 1patch Transdermal daily  levETIRAcetam 500milliGRAM(s) Oral two times a day  gabapentin 100milliGRAM(s) Oral two times a day  simvastatin 20milliGRAM(s) Oral at bedtime  midodrine 5milliGRAM(s) Oral every 8 hours  folic acid 1milliGRAM(s) Oral daily  mirtazapine 15milliGRAM(s) Oral at bedtime  insulin lispro (HumaLOG) corrective regimen sliding scale  SubCutaneous Before meals and at bedtime    MEDICATIONS  (PRN):  acetaminophen   Tablet. 650milliGRAM(s) Oral every 6 hours PRN Moderate Pain (4 - 6)      Allergies    clonidine (Unknown)    Intolerances        LABS:    06-22    141  |  98  |  39<H>  ----------------------------<  120<H>  2.7<LL>   |  25  |  4.40<H>    Ca    9.0      22 Jun 2017 07:38  Phos  1.9     06-22  Mg     1.8     06-22            RADIOLOGY & ADDITIONAL TESTS:

## 2017-06-23 NOTE — PROGRESS NOTE ADULT - SUBJECTIVE AND OBJECTIVE BOX
Patient is a 88y old  Male who presents with a chief complaint of fever (21 Jun 2017 11:16)      HPI:  The patient is an 86 yo M with ESRD (MWF HD), Alzheimer's dementia, CAD, COPD, angina, anemia, CHF, depression, HTN, HLD, OA, PVD, polyneuropathy, blindness, seizures, IDDM, and recurrent aspiration PNA 2/2 Zenker's diverticulum who presented with AMS for a few hours and found to have a temp of 102 with cough. Of note, the patient is a poor historian so the HPI is limited. The patient was initially noted to desaturate to the high 80's. The patient denied any chills, nausea, vomiting, diarrhea.     In the ED, T 101.3, HR 60, /64, RR 20, 99% on O2 NC    given Tylenol x1, vanco x1 and zosyn, poatssium IV 10 MeQ x3, BC x1 sent    CXR significant for LLL infiltrate    Unable to obtain urine culture    labs significant for lactate negative, WBC 14.5, K 2.6, Cr 7.3        INTERVAL HPI/OVERNIGHT EVENTS:::comfortable, supportive rx    HEALTH ISSUES - PROBLEM Dx:  End-stage renal disease: End-stage renal disease  Aspiration pneumonia: Aspiration pneumonia  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  Palliative care by specialist: Palliative care by specialist  Sepsis, due to unspecified organism: Sepsis, due to unspecified organism  Pneumonia: Pneumonia  Acute respiratory failure, unspecified whether with hypoxia or hypercapnia: Acute respiratory failure, unspecified whether with hypoxia or hypercapnia  Hypokalemia: Hypokalemia  Nutrition, metabolism, and development symptoms: Nutrition, metabolism, and development symptoms  Need for prophylactic measure: Need for prophylactic measure  Diabetes: Diabetes  Anemia: Anemia  Essential hypertension: Essential hypertension  Congestive heart failure: Congestive heart failure  Chronic obstructive pulmonary disease: Chronic obstructive pulmonary disease  ESRD (end stage renal disease): ESRD (end stage renal disease)  Sepsis: Sepsis          PAST MEDICAL & SURGICAL HISTORY:  AV graft thrombosis  Osteoarthritis  PVD (peripheral vascular disease)  COPD (chronic obstructive pulmonary disease)  Heart failure  Angina pectoris  ESRD (end stage renal disease)  Seizures: post traumatic  CAD (coronary artery disease)  HTN (hypertension)  Polyneuropathy  Hyperlipidemia  Dysphagia  Hypotension  Alzheimers disease  Osteoarthritis: Osteoarthritis  Chronic obstructive pulmonary disease: Chronic obstructive pulmonary disease  Anemia: Anemia  Dementia without behavioral disturbance: Dementia  Atherosclerosis of coronary artery: CAD (coronary artery disease)  Nondependent alcohol abuse: ETOH abuse  Depressive disorder: Depression  End-stage renal disease: ESRD on hemodialysis  Essential hypertension: HTN (hypertension)  Deep venous embolism and thrombosis of lower extremity: DVT (deep venous thrombosis)  Congestive heart failure: CHF (congestive heart failure)  Legal blindness: Legally blind  Hemodialysis access, AV graft: LUE loop AVG  Acquired arteriovenous fistula: AV fistula          Consultant NOTE  REVIEWED  (   )    REVIEW OF SYSTEMS:  [x] As per HPI    	  [ ] Unable to obtain          Vital Signs Last 24 Hrs  T(C): 36.9, Max: 37 (06-23 @ 05:46)  T(F): 98.5, Max: 98.6 (06-23 @ 05:46)  HR: 56 (56 - 61)  BP: 123/66 (120/86 - 128/73)  BP(mean): --  RR: 22 (18 - 24)  SpO2: 100% (99% - 100%)        PHYSICAL EXAMINATION:                                    ( +++   )  NO CHANGE  Appearance: Normal	  HEENT:   Normal oral mucosa, PERRL, EOMI	  Neck: Supple, + JVD/ - JVD; Carotid Bruit   Cardiovascular: Normal S1 S2, No JVD,  Respiratory: rhonchi	  Gastrointestinal:  Soft, Non-tender, + BS	  Skin: No rashes, No ecchymoses, No cyanosis  Extremities: Normal range of motion, No clubbing, cyanosis or edema  Vascular:   Neurologic: Psychiatry: A    heparin  Injectable 5000Unit(s) SubCutaneous every 8 hours  aspirin  chewable 81milliGRAM(s) Oral daily  nitroglycerin    Patch 0.1 mG/Hr(s) 1patch Transdermal daily  levETIRAcetam 500milliGRAM(s) Oral two times a day  gabapentin 100milliGRAM(s) Oral two times a day  simvastatin 20milliGRAM(s) Oral at bedtime  midodrine 5milliGRAM(s) Oral every 8 hours  folic acid 1milliGRAM(s) Oral daily  mirtazapine 15milliGRAM(s) Oral at bedtime  insulin lispro (HumaLOG) corrective regimen sliding scale  SubCutaneous Before meals and at bedtime  acetaminophen   Tablet. 650milliGRAM(s) Oral every 6 hours PRN                  06-22    141  |  98  |  39<H>  ----------------------------<  120<H>  2.7<LL>   |  25  |  4.40<H>    Ca    9.0      22 Jun 2017 07:38  Phos  1.9     06-22  Mg     1.8     06-22        CAPILLARY BLOOD GLUCOSE  135 (23 Jun 2017 21:26)  131 (23 Jun 2017 18:00)  113 (23 Jun 2017 12:23)  88 (23 Jun 2017 07:17)

## 2017-06-24 RX ADMIN — Medication 1 PATCH: at 11:35

## 2017-06-24 RX ADMIN — Medication 1 MILLIGRAM(S): at 11:35

## 2017-06-24 RX ADMIN — LEVETIRACETAM 500 MILLIGRAM(S): 250 TABLET, FILM COATED ORAL at 17:36

## 2017-06-24 RX ADMIN — Medication 81 MILLIGRAM(S): at 11:35

## 2017-06-24 RX ADMIN — GABAPENTIN 100 MILLIGRAM(S): 400 CAPSULE ORAL at 17:36

## 2017-06-24 RX ADMIN — Medication 1 PATCH: at 00:40

## 2017-06-24 RX ADMIN — LEVETIRACETAM 500 MILLIGRAM(S): 250 TABLET, FILM COATED ORAL at 06:00

## 2017-06-24 RX ADMIN — MIDODRINE HYDROCHLORIDE 5 MILLIGRAM(S): 2.5 TABLET ORAL at 16:13

## 2017-06-24 RX ADMIN — MIRTAZAPINE 15 MILLIGRAM(S): 45 TABLET, ORALLY DISINTEGRATING ORAL at 21:42

## 2017-06-24 RX ADMIN — HEPARIN SODIUM 5000 UNIT(S): 5000 INJECTION INTRAVENOUS; SUBCUTANEOUS at 21:42

## 2017-06-24 RX ADMIN — SIMVASTATIN 20 MILLIGRAM(S): 20 TABLET, FILM COATED ORAL at 21:43

## 2017-06-24 RX ADMIN — HEPARIN SODIUM 5000 UNIT(S): 5000 INJECTION INTRAVENOUS; SUBCUTANEOUS at 06:00

## 2017-06-24 RX ADMIN — HEPARIN SODIUM 5000 UNIT(S): 5000 INJECTION INTRAVENOUS; SUBCUTANEOUS at 14:21

## 2017-06-24 RX ADMIN — GABAPENTIN 100 MILLIGRAM(S): 400 CAPSULE ORAL at 06:00

## 2017-06-24 RX ADMIN — Medication 1 PATCH: at 23:03

## 2017-06-24 RX ADMIN — MIDODRINE HYDROCHLORIDE 5 MILLIGRAM(S): 2.5 TABLET ORAL at 07:40

## 2017-06-24 NOTE — PROGRESS NOTE ADULT - SUBJECTIVE AND OBJECTIVE BOX
INTERVAL HPI/OVERNIGHT EVENTS:  DONATO  Pt was seen and examined at the bedside. He was observed to be lying down comfortably.   Pt offers no complaints. he denies any pain, states he feels comfortable and requests he wants to eat banans.    VITAL SIGNS:  T(F): 98.1  HR: 65  BP: 148/69  RR: 18  SpO2: 100%  Wt(kg): --  I&O's Summary    PHYSICAL EXAM:  Constitutional: does not appear to be in distress, on RA  HEENT: NCAT, PEERL, sclera non-icteric  Neck: supple, no JVD  Respiratory: crackles up to lower 1/2 lung fields bl  Cardiovascular: RRR, normal s1/s2, no MRG  Gastrointestinal: soft, NTND, +BS, no guarding, no organomegaly  Extremities: WWP, DP/radial pulses 2+ b/l, no edema  Neurological: AAOx 1-2, somnolent, but arousable, moves all extremities, CN 2-12 intact        MEDICATIONS  (STANDING):  heparin  Injectable 5000Unit(s) SubCutaneous every 8 hours  aspirin  chewable 81milliGRAM(s) Oral daily  nitroglycerin    Patch 0.1 mG/Hr(s) 1patch Transdermal daily  levETIRAcetam 500milliGRAM(s) Oral two times a day  gabapentin 100milliGRAM(s) Oral two times a day  simvastatin 20milliGRAM(s) Oral at bedtime  midodrine 5milliGRAM(s) Oral every 8 hours  folic acid 1milliGRAM(s) Oral daily  mirtazapine 15milliGRAM(s) Oral at bedtime  insulin lispro (HumaLOG) corrective regimen sliding scale  SubCutaneous Before meals and at bedtime    MEDICATIONS  (PRN):  acetaminophen   Tablet. 650milliGRAM(s) Oral every 6 hours PRN Moderate Pain (4 - 6)      Allergies    clonidine (Unknown)    Intolerances        LABS:                RADIOLOGY & ADDITIONAL TESTS:

## 2017-06-24 NOTE — PROGRESS NOTE ADULT - SUBJECTIVE AND OBJECTIVE BOX
INTERVAL HPI/OVERNIGHT EVENTS:    off BiPAP    PAST MEDICAL & SURGICAL HISTORY:  AV graft thrombosis  Osteoarthritis  PVD (peripheral vascular disease)  COPD (chronic obstructive pulmonary disease)  Heart failure  Angina pectoris  ESRD (end stage renal disease)  Seizures: post traumatic  CAD (coronary artery disease)  HTN (hypertension)  Polyneuropathy  Hyperlipidemia  Dysphagia  Hypotension  Alzheimers disease  Osteoarthritis: Osteoarthritis  Chronic obstructive pulmonary disease: Chronic obstructive pulmonary disease  Anemia: Anemia  Dementia without behavioral disturbance: Dementia  Atherosclerosis of coronary artery: CAD (coronary artery disease)  Nondependent alcohol abuse: ETOH abuse  Depressive disorder: Depression  End-stage renal disease: ESRD on hemodialysis  Essential hypertension: HTN (hypertension)  Deep venous embolism and thrombosis of lower extremity: DVT (deep venous thrombosis)  Congestive heart failure: CHF (congestive heart failure)  Legal blindness: Legally blind  Hemodialysis access, AV graft: LUE loop AVG  Acquired arteriovenous fistula: AV fistula    FAMILY HISTORY:  Family history unknown    SOCIAL HISTORY:    REVIEW OF SYSTEMS:  not talking    Vital Signs Last 24 Hrs  T(C): 36.7, Max: 36.9 (06-23 @ 21:28)  T(F): 98.1, Max: 98.5 (06-23 @ 21:28)  HR: 65 (56 - 65)  BP: 148/69 (123/66 - 148/69)  BP(mean): --  RR: 18 (18 - 22)  SpO2: 100% (100% - 100%)    I&O's Detail      PHYSICAL EXAM:  Dementia, Alzheimer  Well nourished, well developed, comfortable, - acute distress; vital signs are monitored  Eyes: -conjunctivitis, -scleritis   Head: no focal deficit, normocephalic,  no trauma  ENMT: dry tongue, no thrush, -nasal discharge, -hoarseness, normal hearing, -cough, -hemoptysis, trachea midline  Neck: supple, - lymphadenopathy,  -masses, -JVD  Respiratory: bilateral diminished breath sounds, -wheezing, -rhonchi, -rales, -crackles  Chest: -accessory muscle use, -paradoxical breathing  Cardiovascular: regular rate and sinus rhythm, S1 S2 normal, -S3, -S4, -murmur, -gallop, -rub  Gastrointestinal: soft, nontender, nondistended, normal bowel sounds, no hepatosplenomegaly  Genitourinary: -flank pain, -dysuria  Extremities: -clubbing, -cyanosis, -edema    Vascular: peripheral pulses palpable 2+ bilaterally  Neurological: awake  Skin: warm, dry, -erythema, iv site intact  Lymph nodes; no cervical, supraclavicular or axillary adenopathy  Psychiatric:     MEDICATIONS  (STANDING):  heparin  Injectable 5000Unit(s) SubCutaneous every 8 hours  aspirin  chewable 81milliGRAM(s) Oral daily  nitroglycerin    Patch 0.1 mG/Hr(s) 1patch Transdermal daily  levETIRAcetam 500milliGRAM(s) Oral two times a day  gabapentin 100milliGRAM(s) Oral two times a day  simvastatin 20milliGRAM(s) Oral at bedtime  midodrine 5milliGRAM(s) Oral every 8 hours  folic acid 1milliGRAM(s) Oral daily  mirtazapine 15milliGRAM(s) Oral at bedtime  insulin lispro (HumaLOG) corrective regimen sliding scale  SubCutaneous Before meals and at bedtime    MEDICATIONS  (PRN):  acetaminophen   Tablet. 650milliGRAM(s) Oral every 6 hours PRN Moderate Pain (4 - 6)      Allergies    clonidine (Unknown)    Intolerances        LABS:    +DVT prophylaxis 5000 q8  +Sleep  +Nutrition goals  -Pain  -Decubital ulcer  +GI prophylaxis (PPV, coagulopathy, Hx)  +Aspiration prophylaxis (45 degrees)    Ventilator synchronized  Tracheal cuff pressure    +Sedation/analgesia stopped once  +ID (phos, CH, mupi, SB)  COMPLETED ANTIMICROBIAL THERAPY  -Delirium  +Cardiac ASA/statin  +Prevention  +Education  +Medication reviewed (drug-drug interactions, PDA)  Medical devices    Discussed with ICU, PGY, CCRN, family    RADIOLOGY & ADDITIONAL STUDIES:

## 2017-06-24 NOTE — PROGRESS NOTE ADULT - SUBJECTIVE AND OBJECTIVE BOX
Patient is a 88y old  Male who presents with a chief complaint of fever (21 Jun 2017 11:16)      HPI:  The patient is an 86 yo M with ESRD (MWF HD), Alzheimer's dementia, CAD, COPD, angina, anemia, CHF, depression, HTN, HLD, OA, PVD, polyneuropathy, blindness, seizures, IDDM, and recurrent aspiration PNA 2/2 Zenker's diverticulum who presented with AMS for a few hours and found to have a temp of 102 with cough. Of note, the patient is a poor historian so the HPI is limited. The patient was initially noted to desaturate to the high 80's. The patient denied any chills, nausea, vomiting, diarrhea.     In the ED, T 101.3, HR 60, /64, RR 20, 99% on O2 NC    given Tylenol x1, vanco x1 and zosyn, poatssium IV 10 MeQ x3, BC x1 sent    CXR significant for LLL infiltrate    Unable to obtain urine culture    labs significant for lactate negative, WBC 14.5, K 2.6, Cr 7.3    ICU was consulted in the setting of a K 2.6, BiPAP placed in setting of respiratory distress and patient comfortable after BiPAP placed    ICU deemed patient stable for regional medical floor, especially in setting of DNR/DNI status (19 Jun 2017 10:40)    INTERVAL HPI/OVERNIGHT EVENTS:::comfortable, no complaints    HEALTH ISSUES - PROBLEM Dx:  End-stage renal disease: End-stage renal disease  Aspiration pneumonia: Aspiration pneumonia  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  Palliative care by specialist: Palliative care by specialist  Sepsis, due to unspecified organism: Sepsis, due to unspecified organism  Pneumonia: Pneumonia  Acute respiratory failure, unspecified whether with hypoxia or hypercapnia: Acute respiratory failure, unspecified whether with hypoxia or hypercapnia  Hypokalemia: Hypokalemia  Nutrition, metabolism, and development symptoms: Nutrition, metabolism, and development symptoms  Need for prophylactic measure: Need for prophylactic measure  Diabetes: Diabetes  Anemia: Anemia  Essential hypertension: Essential hypertension  Congestive heart failure: Congestive heart failure  Chronic obstructive pulmonary disease: Chronic obstructive pulmonary disease  ESRD (end stage renal disease): ESRD (end stage renal disease)  Sepsis: Sepsis          PAST MEDICAL & SURGICAL HISTORY:  AV graft thrombosis  Osteoarthritis  PVD (peripheral vascular disease)  COPD (chronic obstructive pulmonary disease)  Heart failure  Angina pectoris  ESRD (end stage renal disease)  Seizures: post traumatic  CAD (coronary artery disease)  HTN (hypertension)  Polyneuropathy  Hyperlipidemia  Dysphagia  Hypotension  Alzheimers disease  Osteoarthritis: Osteoarthritis  Chronic obstructive pulmonary disease: Chronic obstructive pulmonary disease  Anemia: Anemia  Dementia without behavioral disturbance: Dementia  Atherosclerosis of coronary artery: CAD (coronary artery disease)  Nondependent alcohol abuse: ETOH abuse  Depressive disorder: Depression  End-stage renal disease: ESRD on hemodialysis  Essential hypertension: HTN (hypertension)  Deep venous embolism and thrombosis of lower extremity: DVT (deep venous thrombosis)  Congestive heart failure: CHF (congestive heart failure)  Legal blindness: Legally blind  Hemodialysis access, AV graft: LUE loop AVG  Acquired arteriovenous fistula: AV fistula          Consultant NOTE  REVIEWED  (   )    REVIEW OF SYSTEMS:  [x] As per HPI    [ ] Unable to obtain          Vital Signs Last 24 Hrs  T(C): 36.6, Max: 36.9 (06-24 @ 16:10)  T(F): 97.8, Max: 98.5 (06-24 @ 16:10)  HR: 53 (50 - 65)  BP: 130/68 (125/65 - 148/69)  BP(mean): --  RR: 20 (18 - 20)  SpO2: 100% (100% - 100%)        PHYSICAL EXAMINATION:                                    (    )  NO CHANGE  Appearance: Normal	  HEENT:   Normal oral mucosa, PERRL, EOMI	  Neck: Supple, + JVD/ - JVD; Carotid Bruit   Cardiovascular: Normal S1 S2, No JVD,    Respiratory: Lungs occ rhonchi  Gastrointestinal:  Soft, Non-tender, + BS	  Skin: No rashes, No ecchymoses, No cyanosis  Extremities: Normal range of motion,   Vascular: Peripheral pulses palpable 2+ bilaterally  Neurologic:   Psychiatry: A    heparin  Injectable 5000Unit(s) SubCutaneous every 8 hours  aspirin  chewable 81milliGRAM(s) Oral daily  nitroglycerin    Patch 0.1 mG/Hr(s) 1patch Transdermal daily  levETIRAcetam 500milliGRAM(s) Oral two times a day  gabapentin 100milliGRAM(s) Oral two times a day  simvastatin 20milliGRAM(s) Oral at bedtime  midodrine 5milliGRAM(s) Oral every 8 hours  folic acid 1milliGRAM(s) Oral daily  mirtazapine 15milliGRAM(s) Oral at bedtime  insulin lispro (HumaLOG) corrective regimen sliding scale  SubCutaneous Before meals and at bedtime  acetaminophen   Tablet. 650milliGRAM(s) Oral every 6 hours PRN                          CAPILLARY BLOOD GLUCOSE  137 (24 Jun 2017 21:37)  128 (24 Jun 2017 17:17)  109 (24 Jun 2017 11:58)  106 (24 Jun 2017 08:05)

## 2017-06-25 RX ADMIN — SIMVASTATIN 20 MILLIGRAM(S): 20 TABLET, FILM COATED ORAL at 22:09

## 2017-06-25 RX ADMIN — MIDODRINE HYDROCHLORIDE 5 MILLIGRAM(S): 2.5 TABLET ORAL at 18:06

## 2017-06-25 RX ADMIN — GABAPENTIN 100 MILLIGRAM(S): 400 CAPSULE ORAL at 05:33

## 2017-06-25 RX ADMIN — MIRTAZAPINE 15 MILLIGRAM(S): 45 TABLET, ORALLY DISINTEGRATING ORAL at 22:10

## 2017-06-25 RX ADMIN — MIDODRINE HYDROCHLORIDE 5 MILLIGRAM(S): 2.5 TABLET ORAL at 00:07

## 2017-06-25 RX ADMIN — GABAPENTIN 100 MILLIGRAM(S): 400 CAPSULE ORAL at 18:06

## 2017-06-25 RX ADMIN — HEPARIN SODIUM 5000 UNIT(S): 5000 INJECTION INTRAVENOUS; SUBCUTANEOUS at 13:13

## 2017-06-25 RX ADMIN — Medication 1 PATCH: at 13:11

## 2017-06-25 RX ADMIN — MIDODRINE HYDROCHLORIDE 5 MILLIGRAM(S): 2.5 TABLET ORAL at 07:09

## 2017-06-25 RX ADMIN — Medication 1 MILLIGRAM(S): at 13:11

## 2017-06-25 RX ADMIN — LEVETIRACETAM 500 MILLIGRAM(S): 250 TABLET, FILM COATED ORAL at 18:06

## 2017-06-25 RX ADMIN — Medication 81 MILLIGRAM(S): at 13:11

## 2017-06-25 RX ADMIN — HEPARIN SODIUM 5000 UNIT(S): 5000 INJECTION INTRAVENOUS; SUBCUTANEOUS at 05:34

## 2017-06-25 RX ADMIN — LEVETIRACETAM 500 MILLIGRAM(S): 250 TABLET, FILM COATED ORAL at 05:34

## 2017-06-25 RX ADMIN — HEPARIN SODIUM 5000 UNIT(S): 5000 INJECTION INTRAVENOUS; SUBCUTANEOUS at 22:09

## 2017-06-25 NOTE — PROGRESS NOTE ADULT - SUBJECTIVE AND OBJECTIVE BOX
PUD FOR DR NAYLOR    INTERVAL HPI/OVERNIGHT EVENTS:    able to breathe without assistance  on nasal cannula 2 L with sat 100%  decreased to 1 L    PAST MEDICAL & SURGICAL HISTORY:  AV graft thrombosis  Osteoarthritis  PVD (peripheral vascular disease)  COPD (chronic obstructive pulmonary disease)  Heart failure  Angina pectoris  ESRD (end stage renal disease)  Seizures: post traumatic  CAD (coronary artery disease)  HTN (hypertension)  Polyneuropathy  Hyperlipidemia  Dysphagia  Hypotension  Alzheimers disease  Osteoarthritis: Osteoarthritis  Chronic obstructive pulmonary disease: Chronic obstructive pulmonary disease  Anemia: Anemia  Dementia without behavioral disturbance: Dementia  Atherosclerosis of coronary artery: CAD (coronary artery disease)  Nondependent alcohol abuse: ETOH abuse  Depressive disorder: Depression  End-stage renal disease: ESRD on hemodialysis  Essential hypertension: HTN (hypertension)  Deep venous embolism and thrombosis of lower extremity: DVT (deep venous thrombosis)  Congestive heart failure: CHF (congestive heart failure)  Legal blindness: Legally blind  Hemodialysis access, AV graft: LUE loop AVG  Acquired arteriovenous fistula: AV fistula      FAMILY HISTORY:  Family history unknown      SOCIAL HISTORY:    REVIEW OF SYSTEMS:  not talking today  Constitutional: () weight change, () fever,  () chills, () fatigue, () night sweats  Eyes: () discharge, () eye pain, () visual change  ENT:  () hearing difficulty, () vertigo, () sinus pain,  () throat pain, () epistaxis, () dysphagia, () hoarseness  Neck: () pain, () stiffness, () swelling  Respiratory: () cough, () wheezing, () hemoptysis      Cardiovascular: () chest pain, ()palpitations, () dizziness   Gastrointestinal: () abdominal pain, () nausea, () vomiting, () hematemesis, () diarrhea,  () constipation, () melena  Genitourinary:  () dysuria, () frequency, () hematuria, () incontinence      Neurologic: () headache, () memory loss, () loss of strength, () numbness, () tremor     Skin: () itching, () burning, () rash, () lesions   Lymphatic: () enlarged lymph nodes  Endocrine: () hair loss, () temperature intolerance         Musculoskeletal: () back pain, () joint pain,  () extremity pain  Psychiatric: () visual change, () auditory change, () depression, () anxiety, () suicidal  Sleep: () disorder, () insomnia, () sleep deprivation  Heme/Lymph: () easy bruising, () bleeding gums            Allergy and Immunologic: () hives, () eczema    Vital Signs Last 24 Hrs  T(C): 36.3, Max: 36.8 (06-25 @ 08:34)  T(F): 97.4, Max: 98.2 (06-25 @ 08:34)  HR: 50 (50 - 60)  BP: 116/60 (116/60 - 141/78)  BP(mean): --  RR: 18 (18 - 20)  SpO2: 100% (100% - 100%)    I&O's Detail    PHYSICAL EXAM:  LEFT arm AV shunt  Well nourished, well developed, comfortable, - acute distress; vital signs are monitored continuously  Eyes: PERRLA, EOMI, -conjunctivitis, -scleritis   Head: no focal deficit, normocephalic,  no trauma  ENMT: moist tongue, no thrush, -nasal discharge, -hoarseness, normal hearing, -cough, -hemoptysis, trachea midline  Neck: supple, - lymphadenopathy,  -masses, -JVD  Respiratory: bilateral diminished breath sounds, -wheezing, -rhonchi, -rales, -crackles  Chest: -accessory muscle use, -paradoxical breathing  Cardiovascular: regular rate and sinus rhythm, S1 S2 normal, -S3, -S4, -murmur, -gallop, -rub  Gastrointestinal: soft, nontender, nondistended, normal bowel sounds, no hepatosplenomegaly  Genitourinary: -flank pain, -dysuria  Extremities: -clubbing, -cyanosis, -edema    Vascular: peripheral pulses palpable 2+ bilaterally  Neurological: awake, no focal deficit, -tremor   Skin: warm, dry, -erythema, iv sites intact  Lymph nodes; no cervical, supraclavicular or axillary adenopathy  Psychiatric: cooperative, appropriate mood      MEDICATIONS  (STANDING):  heparin  Injectable 5000Unit(s) SubCutaneous every 8 hours  aspirin  chewable 81milliGRAM(s) Oral daily  nitroglycerin    Patch 0.1 mG/Hr(s) 1patch Transdermal daily  levETIRAcetam 500milliGRAM(s) Oral two times a day  gabapentin 100milliGRAM(s) Oral two times a day  simvastatin 20milliGRAM(s) Oral at bedtime  midodrine 5milliGRAM(s) Oral every 8 hours  folic acid 1milliGRAM(s) Oral daily  mirtazapine 15milliGRAM(s) Oral at bedtime  insulin lispro (HumaLOG) corrective regimen sliding scale  SubCutaneous Before meals and at bedtime    MEDICATIONS  (PRN):  acetaminophen   Tablet. 650milliGRAM(s) Oral every 6 hours PRN Moderate Pain (4 - 6)      Allergies    clonidine (Unknown)    Intolerances        LABS:              +DVT prophylaxis SC HEPARIN  +Sleep OK, as by aid  +Nutrition goals ENTERAL  -Pain DENIED, with positioning  -Decubital ulcer  +GI prophylaxis (PPV, coagulopathy, Hx)  +Aspiration prophylaxis (45 degrees)  +Sedation/analgesia stopped once  +ID (phos, CH, mupi, SB)  -Delirium  +Cardiac /ASA/statin  +Prevention  +Education  +Medication reviewed (drug-drug interactions, PDA)  Medical devices    Discussed with aid CCRN,     RADIOLOGY & ADDITIONAL STUDIES:      EXAM:  XR CHEST 1 VIEW PORT IMMEDIATE                          PROCEDURE DATE:  06/19/2017                     INTERPRETATION:  Portable Chest X-Ray dated 6/19/2017 6:23 AM    Indication: ams    An AP portable view of the chest is compared to 3/27/2017. Mild to   moderate pulmonary venous congestion, increased since the prior study.   Improved bibasilar infiltrates. Probable trace effusions.    IMPRESSION:  Mild to moderate pulmonary venous congestion, increased. Improving   bibasilar infiltrates. Probable trace pleural effusions.

## 2017-06-25 NOTE — PROGRESS NOTE ADULT - SUBJECTIVE AND OBJECTIVE BOX
Patient is a 88y old  Male who presents with a chief complaint of fever (21 Jun 2017 11:16)      HPI:  The patient is an 86 yo M with ESRD (MWF HD), Alzheimer's dementia, CAD, COPD, angina, anemia, CHF, depression, HTN, HLD, OA, PVD, polyneuropathy, blindness, seizures, IDDM, and recurrent aspiration PNA 2/2 Zenker's diverticulum who presented with AMS for a few hours and found to have a temp of 102 with cough. Of note, the patient is a poor historian so the HPI is limited. The patient was initially noted to desaturate to the high 80's. The patient denied any chills, nausea, vomiting, diarrhea.     In the ED, T 101.3, HR 60, /64, RR 20, 99% on O2 NC    given Tylenol x1, vanco x1 and zosyn, poatssium IV 10 MeQ x3, BC x1 sent    CXR significant for LLL infiltrate    Unable to obtain urine culture    labs significant for lactate negative, WBC 14.5, K 2.6, Cr 7.3    ICU was consulted in the setting of a K 2.6, BiPAP placed in setting of respiratory distress and patient comfortable after BiPAP placed    ICU deemed patient stable for regional medical floor, especially in setting of DNR/DNI status (19 Jun 2017 10:40)    INTERVAL HPI/OVERNIGHT EVENTS:::supportive care    HEALTH ISSUES - PROBLEM Dx:  End-stage renal disease: End-stage renal disease  Aspiration pneumonia: Aspiration pneumonia  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  Palliative care by specialist: Palliative care by specialist  Sepsis, due to unspecified organism: Sepsis, due to unspecified organism  Pneumonia: Pneumonia  Acute respiratory failure, unspecified whether with hypoxia or hypercapnia: Acute respiratory failure, unspecified whether with hypoxia or hypercapnia  Hypokalemia: Hypokalemia  Nutrition, metabolism, and development symptoms: Nutrition, metabolism, and development symptoms  Need for prophylactic measure: Need for prophylactic measure  Diabetes: Diabetes  Anemia: Anemia  Essential hypertension: Essential hypertension  Congestive heart failure: Congestive heart failure  Chronic obstructive pulmonary disease: Chronic obstructive pulmonary disease  ESRD (end stage renal disease): ESRD (end stage renal disease)  Sepsis: Sepsis          PAST MEDICAL & SURGICAL HISTORY:  AV graft thrombosis  Osteoarthritis  PVD (peripheral vascular disease)  COPD (chronic obstructive pulmonary disease)  Heart failure  Angina pectoris  ESRD (end stage renal disease)  Seizures: post traumatic  CAD (coronary artery disease)  HTN (hypertension)  Polyneuropathy  Hyperlipidemia  Dysphagia  Hypotension  Alzheimers disease  Osteoarthritis: Osteoarthritis  Chronic obstructive pulmonary disease: Chronic obstructive pulmonary disease  Anemia: Anemia  Dementia without behavioral disturbance: Dementia  Atherosclerosis of coronary artery: CAD (coronary artery disease)  Nondependent alcohol abuse: ETOH abuse  Depressive disorder: Depression  End-stage renal disease: ESRD on hemodialysis  Essential hypertension: HTN (hypertension)  Deep venous embolism and thrombosis of lower extremity: DVT (deep venous thrombosis)  Congestive heart failure: CHF (congestive heart failure)  Legal blindness: Legally blind  Hemodialysis access, AV graft: LUE loop AVG  Acquired arteriovenous fistula: AV fistula          Consultant NOTE  REVIEWED  (   )    REVIEW OF SYSTEMS:  [x] As per HPI    [ ] Unable to obtain          Vital Signs Last 24 Hrs  T(C): 36.3, Max: 36.8 (06-25 @ 08:34)  T(F): 97.3, Max: 98.2 (06-25 @ 08:34)  HR: 54 (50 - 60)  BP: 141/62 (116/60 - 141/78)  BP(mean): --  RR: 18 (18 - 19)  SpO2: 100% (100% - 100%)        PHYSICAL EXAMINATION:                                    (  ++ )  NO CHANGE  Appearance: Normal	  HEENT:   Normal oral mucosa, PERRL, EOMI	  Neck: Supple, + JVD/ - JVD; Carotid Bruit   Cardiovascular: Normal S1 S2, No JVD, No murmurs,   Respiratory: Lungs clear to auscultation/Decreased Breath Sounds/No Rales, Rhonchi, Wheezing	  Gastrointestinal:  Soft, Non-tender, + BS	  Skin: No rashes, No ecchymoses, No cyanosis  Extremities: Normal range of motion, No clubbing, cyanosis or edema  Vascular: Peripheral pulses   Neurologic: Non-focal  Psychiatry: A    heparin  Injectable 5000Unit(s) SubCutaneous every 8 hours  aspirin  chewable 81milliGRAM(s) Oral daily  nitroglycerin    Patch 0.1 mG/Hr(s) 1patch Transdermal daily  levETIRAcetam 500milliGRAM(s) Oral two times a day  gabapentin 100milliGRAM(s) Oral two times a day  simvastatin 20milliGRAM(s) Oral at bedtime  midodrine 5milliGRAM(s) Oral every 8 hours  folic acid 1milliGRAM(s) Oral daily  mirtazapine 15milliGRAM(s) Oral at bedtime  insulin lispro (HumaLOG) corrective regimen sliding scale  SubCutaneous Before meals and at bedtime  acetaminophen   Tablet. 650milliGRAM(s) Oral every 6 hours PRN                          CAPILLARY BLOOD GLUCOSE  96 (25 Jun 2017 22:08)  67 (25 Jun 2017 21:15)  91 (25 Jun 2017 17:31)  88 (25 Jun 2017 12:19)  95 (25 Jun 2017 08:29)

## 2017-06-26 DIAGNOSIS — Z51.5 ENCOUNTER FOR PALLIATIVE CARE: ICD-10-CM

## 2017-06-26 PROCEDURE — 99233 SBSQ HOSP IP/OBS HIGH 50: CPT

## 2017-06-26 PROCEDURE — 99232 SBSQ HOSP IP/OBS MODERATE 35: CPT

## 2017-06-26 RX ADMIN — GABAPENTIN 100 MILLIGRAM(S): 400 CAPSULE ORAL at 05:07

## 2017-06-26 RX ADMIN — LEVETIRACETAM 500 MILLIGRAM(S): 250 TABLET, FILM COATED ORAL at 17:12

## 2017-06-26 RX ADMIN — Medication 81 MILLIGRAM(S): at 11:28

## 2017-06-26 RX ADMIN — HEPARIN SODIUM 5000 UNIT(S): 5000 INJECTION INTRAVENOUS; SUBCUTANEOUS at 14:33

## 2017-06-26 RX ADMIN — Medication 1 MILLIGRAM(S): at 11:24

## 2017-06-26 RX ADMIN — MIDODRINE HYDROCHLORIDE 5 MILLIGRAM(S): 2.5 TABLET ORAL at 01:09

## 2017-06-26 RX ADMIN — HEPARIN SODIUM 5000 UNIT(S): 5000 INJECTION INTRAVENOUS; SUBCUTANEOUS at 05:08

## 2017-06-26 RX ADMIN — Medication 1 PATCH: at 01:09

## 2017-06-26 RX ADMIN — Medication 1 PATCH: at 11:24

## 2017-06-26 RX ADMIN — MIDODRINE HYDROCHLORIDE 5 MILLIGRAM(S): 2.5 TABLET ORAL at 10:19

## 2017-06-26 RX ADMIN — HEPARIN SODIUM 5000 UNIT(S): 5000 INJECTION INTRAVENOUS; SUBCUTANEOUS at 22:17

## 2017-06-26 RX ADMIN — GABAPENTIN 100 MILLIGRAM(S): 400 CAPSULE ORAL at 17:12

## 2017-06-26 RX ADMIN — MIDODRINE HYDROCHLORIDE 5 MILLIGRAM(S): 2.5 TABLET ORAL at 17:12

## 2017-06-26 RX ADMIN — LEVETIRACETAM 500 MILLIGRAM(S): 250 TABLET, FILM COATED ORAL at 05:08

## 2017-06-26 NOTE — PROGRESS NOTE ADULT - ASSESSMENT
88 y/o debilitated gentleman known to this service from prior admits, with h/o OA, PVD, COPD, ESRD on HD via left AV graft, dysphagia (previously refused peg), dementia, CAD, HTN, CHF, blindness, recurrent aspiration pna, achalasia, Zenker's diverticulum s/p botox, presenting again from [facility]TC with fevers and hypoxia, likely septic from recurrent aspiration pna, who opted out for further HD, dying

## 2017-06-26 NOTE — PROGRESS NOTE ADULT - SUBJECTIVE AND OBJECTIVE BOX
CC/ HPI  Patient is an 87 yr old male with chronic obstructive pulmonary disease, congestive heart failure, admitted for pneumonia complicated by sepsis, resting in bed today without acute respiratory complaint.    PAST MEDICAL & SURGICAL HISTORY:  AV graft thrombosis  Osteoarthritis  PVD (peripheral vascular disease)  COPD (chronic obstructive pulmonary disease)  Heart failure  Angina pectoris  ESRD (end stage renal disease)  Seizures: post traumatic  CAD (coronary artery disease)  HTN (hypertension)  Polyneuropathy  Hyperlipidemia  Dysphagia  Alzheimers disease  Osteoarthritis: Osteoarthritis  Chronic obstructive pulmonary disease: Chronic obstructive pulmonary disease  Anemia: Anemia  Dementia without behavioral disturbance: Dementia  Atherosclerosis of coronary artery: CAD (coronary artery disease)  Nondependent alcohol abuse: ETOH abuse  Depressive disorder: Depression  End-stage renal disease: ESRD on hemodialysis  Essential hypertension: HTN (hypertension)  Deep venous embolism and thrombosis of lower extremity: DVT (deep venous thrombosis)  Congestive heart failure: CHF (congestive heart failure)  Legal blindness: Legally blind  Hemodialysis access, AV graft: LUE loop AVG  Acquired arteriovenous fistula: AV fistula    SOCHX:   + tobacco,  -  alcohol    FMHX: FA/MO  - contributory     ROS reviewed below with positive findings marked (+) :  GEN:  fever, chills ENT: tracheostomy,   epistaxis,  sinusitis COR: +CAD, +CHF,  +HTN, dysrhythmia PUL: +COPD, ILD, asthma, pneumonia GI: PEG, dysphagia, hemorrhage, other IVON: +kidney disease, electrolyte disorder HEM:  anemia, thrombus, coagulopathy, cancer ENDO:  thyroid disease, diabetes mellitus CNS:  +dementia, stroke, seizure, PSY:  depression, anxiety, other      MEDICATIONS  (STANDING):  heparin  Injectable 5000Unit(s) SubCutaneous every 8 hours  aspirin  chewable 81milliGRAM(s) Oral daily  nitroglycerin    Patch 0.1 mG/Hr(s) 1patch Transdermal daily  levETIRAcetam 500milliGRAM(s) Oral two times a day  gabapentin 100milliGRAM(s) Oral two times a day  simvastatin 20milliGRAM(s) Oral at bedtime  midodrine 5milliGRAM(s) Oral every 8 hours  folic acid 1milliGRAM(s) Oral daily  mirtazapine 15milliGRAM(s) Oral at bedtime  insulin lispro (HumaLOG) corrective regimen sliding scale  SubCutaneous Before meals and at bedtime    MEDICATIONS  (PRN):  acetaminophen   Tablet. 650milliGRAM(s) Oral every 6 hours PRN Moderate Pain (4 - 6)      Vital Signs Last 24 Hrs  T(C): 36.1, Max: 36.8 (06-26 @ 05:18)  T(F): 97, Max: 98.2 (06-26 @ 05:18)  HR: 59 (50 - 59)  BP: 161/73 (116/60 - 161/73)  BP(mean): --  RR: 20 (16 - 20)  SpO2: 100% (100% - 100%)    GENERAL:         comfortable,  - distress.  HEENT:            - trauma,  - icterus,  - injection,  - nasal discharge.  NECK:              - jugular venous distention, - thyromegaly.  LYMPH:           - lymphadenopathy, - masses.  RESP:              + crackles,   - rhonchi,   - wheezes.   COR:                S1S2  - gallops,  - rubs.  ABD:                bowel sounds,   soft, - tender, - distended, - organomegaly.  EXT/MSC:         - cyanosis,  - clubbing,  - edema.    NEURO:             alert,   responds to stimuli.        CXR (6/19)  Mild to moderate pulmonary venous congestion, increased. Improving bibasilar infiltrates. Probable trace pleural effusions.    ASSESSMENT/PLAN    1)  Pneumonia  2)  Chronic obstructive pulmonary disease  3)  Congestive heart failure  4)  Dementia  5)  Chronic kidney disease      Hospice   Bronchodilators:  Atrovent/ albuterol q 4 – 6 hours as needed  ID/Antibiotics:  status post Vanco/Zosyn follow up cultures  Cardiac/HTN:  BP stable  GI: Rx/ prophylaxis c PPI/H2B  Heme: Rx/VT prophylaxis  Discuss with medical team

## 2017-06-26 NOTE — PROGRESS NOTE ADULT - SUBJECTIVE AND OBJECTIVE BOX
LUIS GARDNER   MRN-[id number]         CC: Patient is a 88y old  Male who presents with a chief complaint of fever (21 Jun 2017 11:16).  Inpt hospice bed still pending    ROS:  UNABLE TO OBTAIN  due to:    DYSPNEA (Y/N): n	  N/V (Y/N):n	  SECRETIONS (Y/N):n	  AGITATION (Y/N):n  PAIN(Y/N):n        -Provocation/Palliation:     -Quality/Quantity:     -Radiating:     -Severity:     -Timing/Frequency:     -Impact on ADLs:     OTHER REVIEW OF SYSTEMS:  ALLERGIES:  clonidine (Unknown)    OPIATE NAÏVE (Y/N):y  iSTOP REVIEWED (Y/N): y    MEDICATIONS: reviewed  MEDICATIONS  (STANDING):  heparin  Injectable 5000Unit(s) SubCutaneous every 8 hours  aspirin  chewable 81milliGRAM(s) Oral daily  nitroglycerin    Patch 0.1 mG/Hr(s) 1patch Transdermal daily  levETIRAcetam 500milliGRAM(s) Oral two times a day  gabapentin 100milliGRAM(s) Oral two times a day  simvastatin 20milliGRAM(s) Oral at bedtime  midodrine 5milliGRAM(s) Oral every 8 hours  folic acid 1milliGRAM(s) Oral daily  mirtazapine 15milliGRAM(s) Oral at bedtime  insulin lispro (HumaLOG) corrective regimen sliding scale  SubCutaneous Before meals and at bedtime    MEDICATIONS  (PRN):  acetaminophen   Tablet. 650milliGRAM(s) Oral every 6 hours PRN Moderate Pain (4 - 6)      PHYSICAL EXAM:  T(C): 36.1, Max: 36.8 (06-26 @ 05:18)  T(F): 97, Max: 98.2 (06-26 @ 05:18)  HR: 59 (50 - 59)  BP: 161/73 (116/60 - 161/73)  RR: 20 (16 - 20)  SpO2: 100% (100% - 100%)    GENERAL: Lethargic, but easily arouseable with mild-mod verbal stimuli.  Appears fairly comfortable    HEENT: edentulous, dry mucous membranes, denies any desire for food/water    	  RESP: 4LNC, resp even and not labored        	  GI: soft            	  : left AV graft           	  MUSC: min. spont mov't to extremities       	  NEURO: lethargic but easily arouseable and answering simple questions appropriately    	  PSYCH: lethargic           LABS: reviewed  none    IMAGING: reviewed  none    ADVANCED DIRECTIVES:     DNR     DNI     MOLST    DECISION MAKER:   LEGAL SURROGATE: none    PSYCHOSOCIAL-SPIRITUAL ASSESSMENT:       Reviewed       Care plan unchanged      GOALS OF CARE DISCUSSION       Palliative care info/counseling provided	           See previous Palliative Medicine Note       Documentation of GOC: comfort care at inpt. hospice  	      AGENCY CHOICE DISCUSSED:          [facility]         REFERRALS	        Unit SW/Case Mgmt       Speech/Swallow       Hospice          [ ]CRITICAL CARE TIME PROVIDED TO UNSTABLE PT W/ ORGAN FAILURE    Start:               End:  	       Minutes:          [x]> 50% OF THE TIME SPENT IN COUNSELING AND COORDINATING CARE   Start:               End:  	       Minutes:  [ ]PROLONGED SERVICE             FACE TO FACE: [x]PT     [ ]PT & FAMILY   Start:               End:  	       Minutes:

## 2017-06-26 NOTE — PROGRESS NOTE ADULT - SUBJECTIVE AND OBJECTIVE BOX
INTERVAL HPI/OVERNIGHT EVENTS:  DONATO  Pt was seen and examined at the bedside. He was observed to be lying down comfortably.   Pt offers no complaints. He deneis NVD, CP or SOb, feels comfortable.  VITAL SIGNS:  T(F): 98.2  HR: 51  BP: 130/61  RR: 18  SpO2: 100%  Wt(kg): --  I&O's Summary    I & Os for current day (as of 26 Jun 2017 08:12)  =============================================  IN: 0 ml / OUT: 1 ml / NET: -1 ml    PHYSICAL EXAM:  Constitutional: does not appear to be in distress, on RA  HEENT: NCAT, PEERL, sclera non-icteric  Neck: supple, no JVD  Respiratory: crackles up to lower 1/2 lung fields bl  Cardiovascular: RRR, normal s1/s2, no MRG  Gastrointestinal: soft, NTND, +BS, no guarding, no organomegaly  Extremities: WWP, DP/radial pulses 2+ b/l, no edema  Neurological: AAOx 3,  moves all extremities, CN 2-12 intac      MEDICATIONS  (STANDING):  heparin  Injectable 5000Unit(s) SubCutaneous every 8 hours  aspirin  chewable 81milliGRAM(s) Oral daily  nitroglycerin    Patch 0.1 mG/Hr(s) 1patch Transdermal daily  levETIRAcetam 500milliGRAM(s) Oral two times a day  gabapentin 100milliGRAM(s) Oral two times a day  simvastatin 20milliGRAM(s) Oral at bedtime  midodrine 5milliGRAM(s) Oral every 8 hours  folic acid 1milliGRAM(s) Oral daily  mirtazapine 15milliGRAM(s) Oral at bedtime  insulin lispro (HumaLOG) corrective regimen sliding scale  SubCutaneous Before meals and at bedtime    MEDICATIONS  (PRN):  acetaminophen   Tablet. 650milliGRAM(s) Oral every 6 hours PRN Moderate Pain (4 - 6)      Allergies    clonidine (Unknown)    Intolerances        LABS:                RADIOLOGY & ADDITIONAL TESTS:

## 2017-06-26 NOTE — PROGRESS NOTE ADULT - SUBJECTIVE AND OBJECTIVE BOX
Patient is a 88y old  Male who presents with a chief complaint of fever (21 Jun 2017 11:16)      HPI:  The patient is an 86 yo M with ESRD (MWF HD), Alzheimer's dementia, CAD, COPD, angina, anemia, CHF, depression, HTN, HLD, OA, PVD, polyneuropathy, blindness, seizures, IDDM, and recurrent aspiration PNA 2/2 Zenker's diverticulum who presented with AMS for a few hours and found to have a temp of 102 with cough. Of note, the patient is a poor historian so the HPI is limited. The patient was initially noted to desaturate to the high 80's. The patient denied any chills, nausea, vomiting, diarrhea.     In the ED, T 101.3, HR 60, /64, RR 20, 99% on O2 NC    given Tylenol x1, vanco x1 and zosyn, poatssium IV 10 MeQ x3, BC x1 sent    CXR significant for LLL infiltrate    Unable to obtain urine culture    labs significant for lactate negative, WBC 14.5, K 2.6, Cr 7.3    ICU was consulted in the setting of a K 2.6, BiPAP placed in setting of respiratory distress and patient comfortable after BiPAP placed    ICU deemed patient stable for regional medical floor, especially in setting of DNR/DNI status (19 Jun 2017 10:40)    INTERVAL HPI/OVERNIGHT EVENTS:::    HEALTH ISSUES - PROBLEM Dx:  End-stage renal disease: End-stage renal disease  Aspiration pneumonia: Aspiration pneumonia  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  Palliative care by specialist: Palliative care by specialist  Sepsis, due to unspecified organism: Sepsis, due to unspecified organism  Pneumonia: Pneumonia  Acute respiratory failure, unspecified whether with hypoxia or hypercapnia: Acute respiratory failure, unspecified whether with hypoxia or hypercapnia  Hypokalemia: Hypokalemia  Nutrition, metabolism, and development symptoms: Nutrition, metabolism, and development symptoms  Need for prophylactic measure: Need for prophylactic measure  Diabetes: Diabetes  Anemia: Anemia  Essential hypertension: Essential hypertension  Congestive heart failure: Congestive heart failure  Chronic obstructive pulmonary disease: Chronic obstructive pulmonary disease  ESRD (end stage renal disease): ESRD (end stage renal disease)  Sepsis: Sepsis          PAST MEDICAL & SURGICAL HISTORY:  AV graft thrombosis  Osteoarthritis  PVD (peripheral vascular disease)  COPD (chronic obstructive pulmonary disease)  Heart failure  Angina pectoris  ESRD (end stage renal disease)  Seizures: post traumatic  CAD (coronary artery disease)  HTN (hypertension)  Polyneuropathy  Hyperlipidemia  Dysphagia  Hypotension  Alzheimers disease  Osteoarthritis: Osteoarthritis  Chronic obstructive pulmonary disease: Chronic obstructive pulmonary disease  Anemia: Anemia  Dementia without behavioral disturbance: Dementia  Atherosclerosis of coronary artery: CAD (coronary artery disease)  Nondependent alcohol abuse: ETOH abuse  Depressive disorder: Depression  End-stage renal disease: ESRD on hemodialysis  Essential hypertension: HTN (hypertension)  Deep venous embolism and thrombosis of lower extremity: DVT (deep venous thrombosis)  Congestive heart failure: CHF (congestive heart failure)  Legal blindness: Legally blind  Hemodialysis access, AV graft: LUE loop AVG  Acquired arteriovenous fistula: AV fistula          Consultant NOTE  REVIEWED  (   )    REVIEW OF SYSTEMS:  [x] As per HPI  CONSTITUTIONAL: No fever, weight loss, or fatigue  RESPIRATORY: No cough, wheezing, chills or hemoptysis; No Shortness of Breath  CARDIOVASCULAR: No chest pain, palpitations, dizziness, or leg swelling  GASTROINTESTINAL: No abdominal or epigastric pain. No nausea, vomiting, or hematemesis; No diarrhea or constipation. No melena or hematochezia.  MUSCULOSKELETAL: No joint pain or swelling; No muscle, back, or extremity pain  PSYCH    awake, alert       [x] All others negative	  [ ] Unable to obtain          Vital Signs Last 24 Hrs  T(C): 36.8, Max: 36.8 (06-25 @ 08:34)  T(F): 98.2, Max: 98.2 (06-25 @ 08:34)  HR: 51 (50 - 58)  BP: 130/61 (116/60 - 141/62)  BP(mean): --  RR: 18 (16 - 19)  SpO2: 100% (100% - 100%)        I & Os for current day (as of 06-26 @ 08:05)  =============================================  IN: 0 ml / OUT: 1 ml / NET: -1 ml    PHYSICAL EXAMINATION:                                    (    )  NO CHANGE  Appearance: Normal	  HEENT:   Normal oral mucosa, PERRL, EOMI	  Neck: Supple, + JVD/ - JVD; Carotid Bruit   Cardiovascular: Normal S1 S2, No JVD, No murmurs,   Respiratory: Lungs clear to auscultation/Decreased Breath Sounds/No Rales, Rhonchi, Wheezing	  Gastrointestinal:  Soft, Non-tender, + BS	  Skin: No rashes, No ecchymoses, No cyanosis  Extremities: Normal range of motion, No clubbing, cyanosis or edema  Vascular: Peripheral pulses palpable 2+ bilaterally  Neurologic: Non-focal  Psychiatry: A & O x 3, Mood & affect appropriate    heparin  Injectable 5000Unit(s) SubCutaneous every 8 hours  aspirin  chewable 81milliGRAM(s) Oral daily  nitroglycerin    Patch 0.1 mG/Hr(s) 1patch Transdermal daily  levETIRAcetam 500milliGRAM(s) Oral two times a day  gabapentin 100milliGRAM(s) Oral two times a day  simvastatin 20milliGRAM(s) Oral at bedtime  midodrine 5milliGRAM(s) Oral every 8 hours  folic acid 1milliGRAM(s) Oral daily  mirtazapine 15milliGRAM(s) Oral at bedtime  insulin lispro (HumaLOG) corrective regimen sliding scale  SubCutaneous Before meals and at bedtime  acetaminophen   Tablet. 650milliGRAM(s) Oral every 6 hours PRN                          CAPILLARY BLOOD GLUCOSE  97 (26 Jun 2017 07:37)  96 (25 Jun 2017 22:08)  67 (25 Jun 2017 21:15)  91 (25 Jun 2017 17:31)  88 (25 Jun 2017 12:19)  95 (25 Jun 2017 08:29) Patient is a 88y old  Male who presents with a chief complaint of fever (21 Jun 2017 11:16)      HPI:  The patient is an 86 yo M with ESRD (MWF HD), Alzheimer's dementia, CAD, COPD, angina, anemia, CHF, depression, HTN, HLD, OA, PVD, polyneuropathy, blindness, seizures, IDDM, and recurrent aspiration PNA 2/2 Zenker's diverticulum who presented with AMS for a few hours and found to have a temp of 102 with cough. Of note, the patient is a poor historian so the HPI is limited. The patient was initially noted to desaturate to the high 80's. The patient denied any chills, nausea, vomiting, diarrhea.           INTERVAL HPI/OVERNIGHT EVENTS:::comfort care    HEALTH ISSUES - PROBLEM Dx:  End-stage renal disease: End-stage renal disease  Aspiration pneumonia: Aspiration pneumonia  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  Palliative care by specialist: Palliative care by specialist  Sepsis, due to unspecified organism: Sepsis, due to unspecified organism  Pneumonia: Pneumonia  Acute respiratory failure, unspecified whether with hypoxia or hypercapnia: Acute respiratory failure, unspecified whether with hypoxia or hypercapnia  Hypokalemia: Hypokalemia  Nutrition, metabolism, and development symptoms: Nutrition, metabolism, and development symptoms  Need for prophylactic measure: Need for prophylactic measure  Diabetes: Diabetes  Anemia: Anemia  Essential hypertension: Essential hypertension  Congestive heart failure: Congestive heart failure  Chronic obstructive pulmonary disease: Chronic obstructive pulmonary disease  ESRD (end stage renal disease): ESRD (end stage renal disease)  Sepsis: Sepsis          PAST MEDICAL & SURGICAL HISTORY:  AV graft thrombosis  Osteoarthritis  PVD (peripheral vascular disease)  COPD (chronic obstructive pulmonary disease)  Heart failure  Angina pectoris  ESRD (end stage renal disease)  Seizures: post traumatic  CAD (coronary artery disease)  HTN (hypertension)  Polyneuropathy  Hyperlipidemia  Dysphagia  Hypotension  Alzheimers disease  Osteoarthritis: Osteoarthritis  Chronic obstructive pulmonary disease: Chronic obstructive pulmonary disease  Anemia: Anemia  Dementia without behavioral disturbance: Dementia  Atherosclerosis of coronary artery: CAD (coronary artery disease)  Nondependent alcohol abuse: ETOH abuse  Depressive disorder: Depression  End-stage renal disease: ESRD on hemodialysis  Essential hypertension: HTN (hypertension)  Deep venous embolism and thrombosis of lower extremity: DVT (deep venous thrombosis)  Congestive heart failure: CHF (congestive heart failure)  Legal blindness: Legally blind  Hemodialysis access, AV graft: LUE loop AVG  Acquired arteriovenous fistula: AV fistula          Consultant NOTE  REVIEWED  (   )    REVIEW OF SYSTEMS:  [x] As per HPI  	  [ ] Unable to obtain          Vital Signs Last 24 Hrs  T(C): 36.8, Max: 36.8 (06-25 @ 08:34)  T(F): 98.2, Max: 98.2 (06-25 @ 08:34)  HR: 51 (50 - 58)  BP: 130/61 (116/60 - 141/62)  BP(mean): --  RR: 18 (16 - 19)  SpO2: 100% (100% - 100%)        I & Os for current day (as of 06-26 @ 08:05)  =============================================  IN: 0 ml / OUT: 1 ml / NET: -1 ml    PHYSICAL EXAMINATION:                                    (    )  NO CHANGE  Appearance: Normal  weak, frail	  HEENT:   Normal oral mucosa, PERRL, EOMI	  Neck: Supple, + JVD/ - JVD; Carotid Bruit   Cardiovascular: Normal S1 S2, No JVD, No murmurs,   Respiratory: Lungs clear to auscultation/Decreased Breath Sounds/No Rales, Rhonchi, Wheezing	  Gastrointestinal:  Soft, Non-tender, + BS	  Skin: No rashes, No ecchymoses, No cyanosis  Extremities: Normal range of motion, N  Vascular: Peripheral pulses  Neurologic: Non-focal  Psychiatry: A   heparin  Injectable 5000Unit(s) SubCutaneous every 8 hours  aspirin  chewable 81milliGRAM(s) Oral daily  nitroglycerin    Patch 0.1 mG/Hr(s) 1patch Transdermal daily  levETIRAcetam 500milliGRAM(s) Oral two times a day  gabapentin 100milliGRAM(s) Oral two times a day  simvastatin 20milliGRAM(s) Oral at bedtime  midodrine 5milliGRAM(s) Oral every 8 hours  folic acid 1milliGRAM(s) Oral daily  mirtazapine 15milliGRAM(s) Oral at bedtime  insulin lispro (HumaLOG) corrective regimen sliding scale  SubCutaneous Before meals and at bedtime  acetaminophen   Tablet. 650milliGRAM(s) Oral every 6 hours PRN                          CAPILLARY BLOOD GLUCOSE  97 (26 Jun 2017 07:37)  96 (25 Jun 2017 22:08)  67 (25 Jun 2017 21:15)  91 (25 Jun 2017 17:31)  88 (25 Jun 2017 12:19)  95 (25 Jun 2017 08:29)

## 2017-06-26 NOTE — PROGRESS NOTE ADULT - PROBLEM SELECTOR PLAN 1
no need for FS or heparin, would d/c.  Cont. comfort feeds if pt desires, currently still taking po meds.  Can give dilaudid 0.2mg IV/subcutaneous or oxycodone solution 1mg sublingual q4h PRN for comfort/SOB if pt reaspirates

## 2017-06-26 NOTE — PROGRESS NOTE ADULT - SUBJECTIVE AND OBJECTIVE BOX
Chief Complaint/Reason for Consult: cv mgmt  INTERVAL HPI: weekend events noted, no cp sob palp  	  MEDICATIONS:  nitroglycerin    Patch 0.1 mG/Hr(s) 1patch Transdermal daily  midodrine 5milliGRAM(s) Oral every 8 hours        levETIRAcetam 500milliGRAM(s) Oral two times a day  gabapentin 100milliGRAM(s) Oral two times a day  mirtazapine 15milliGRAM(s) Oral at bedtime  acetaminophen   Tablet. 650milliGRAM(s) Oral every 6 hours PRN      simvastatin 20milliGRAM(s) Oral at bedtime  insulin lispro (HumaLOG) corrective regimen sliding scale  SubCutaneous Before meals and at bedtime    heparin  Injectable 5000Unit(s) SubCutaneous every 8 hours  aspirin  chewable 81milliGRAM(s) Oral daily  folic acid 1milliGRAM(s) Oral daily      REVIEW OF SYSTEMS:  [x] As per HPI  CONSTITUTIONAL: No fever, weight loss, or fatigue  RESPIRATORY: No cough, wheezing, chills or hemoptysis; No Shortness of Breath  CARDIOVASCULAR: No chest pain, palpitations, dizziness, or leg swelling  GASTROINTESTINAL: No abdominal or epigastric pain. No nausea, vomiting, or hematemesis; No diarrhea or constipation. No melena or hematochezia.  MUSCULOSKELETAL: No joint pain or swelling; No muscle, back, or extremity pain  [x] All others negative	  [ ] Unable to obtain    PHYSICAL EXAM:  T(C): 36.8, Max: 36.8 (06-25 @ 08:34)  HR: 51 (50 - 58)  BP: 130/61 (116/60 - 141/62)  RR: 18 (16 - 19)  SpO2: 100% (100% - 100%)  Wt(kg): --  I&O's Summary    I & Os for current day (as of 26 Jun 2017 08:14)  =============================================  IN: 0 ml / OUT: 1 ml / NET: -1 ml        Appearance: Normal	  HEENT:   Normal oral mucosa  Cardiovascular: Normal S1 S2, No JVD, No murmurs, No edema  Respiratory: Lungs clear to auscultation	  Gastrointestinal:  Soft, Non-tender, + BS	  Extremities: Normal range of motion, No clubbing, cyanosis or edema  Vascular: Peripheral pulses palpable 2+ bilaterally    TELEMETRY: 	    ECG:    	  RADIOLOGY:   CXR:  CT:  US:    CARDIAC TESTING:  Echocardiogram:  Catheterization:  Stress Test:      LABS:	 	    CARDIAC MARKERS:                    proBNP:   Lipid Profile:   HgA1c:   TSH:     ASSESSMENT/PLAN: 	  Problem: Congestive heart failure.  Plan: stable, last ef 65-70 in feb. currently not in exacerbation as no jvd crackles or edema. CXR with PVC on impression, net neg 400cc today, continue HD with UF per renal recs      Problem: Essential hypertension. Plan: history of HTN, but currently on midodrine, continue midodrine.

## 2017-06-27 PROCEDURE — 99233 SBSQ HOSP IP/OBS HIGH 50: CPT

## 2017-06-27 PROCEDURE — 99232 SBSQ HOSP IP/OBS MODERATE 35: CPT

## 2017-06-27 RX ADMIN — MIDODRINE HYDROCHLORIDE 5 MILLIGRAM(S): 2.5 TABLET ORAL at 17:36

## 2017-06-27 RX ADMIN — MIDODRINE HYDROCHLORIDE 5 MILLIGRAM(S): 2.5 TABLET ORAL at 00:30

## 2017-06-27 RX ADMIN — LEVETIRACETAM 500 MILLIGRAM(S): 250 TABLET, FILM COATED ORAL at 06:17

## 2017-06-27 RX ADMIN — Medication 81 MILLIGRAM(S): at 13:31

## 2017-06-27 RX ADMIN — MIDODRINE HYDROCHLORIDE 5 MILLIGRAM(S): 2.5 TABLET ORAL at 09:26

## 2017-06-27 RX ADMIN — GABAPENTIN 100 MILLIGRAM(S): 400 CAPSULE ORAL at 06:17

## 2017-06-27 RX ADMIN — LEVETIRACETAM 500 MILLIGRAM(S): 250 TABLET, FILM COATED ORAL at 17:36

## 2017-06-27 RX ADMIN — Medication 1 PATCH: at 01:45

## 2017-06-27 RX ADMIN — HEPARIN SODIUM 5000 UNIT(S): 5000 INJECTION INTRAVENOUS; SUBCUTANEOUS at 13:31

## 2017-06-27 RX ADMIN — HEPARIN SODIUM 5000 UNIT(S): 5000 INJECTION INTRAVENOUS; SUBCUTANEOUS at 06:17

## 2017-06-27 RX ADMIN — GABAPENTIN 100 MILLIGRAM(S): 400 CAPSULE ORAL at 17:36

## 2017-06-27 RX ADMIN — HEPARIN SODIUM 5000 UNIT(S): 5000 INJECTION INTRAVENOUS; SUBCUTANEOUS at 21:47

## 2017-06-27 NOTE — PROGRESS NOTE ADULT - SUBJECTIVE AND OBJECTIVE BOX
INTERVAL HPI/OVERNIGHT EVENTS:  DONATO  Pt was seen and examined at the bedside. He was observed to be lying down comfortably.   Pt offers no complaints, no NVD, no CP or SOB.    VITAL SIGNS:  T(F): 97.4 (06-27-17 @ 04:40)  HR: 55 (06-27-17 @ 04:40)  BP: 142/75 (06-27-17 @ 04:40)  RR: 18 (06-27-17 @ 04:40)  SpO2: 100% (06-27-17 @ 04:40)  Wt(kg): --  I&O's Summary    PHYSICAL EXAM:  Constitutional: does not appear to be in distress, on RA  HEENT: NCAT, PEERL, sclera non-icteric  Neck: supple, no JVD  Respiratory: crackles up to lower 1/2 lung fields bl  Cardiovascular: RRR, normal s1/s2, no MRG  Gastrointestinal: soft, NTND, +BS, no guarding, no organomegaly  Extremities: WWP, DP/radial pulses 2+ b/l, no edema  Neurological: AAOx 3,  moves all extremities, CN 2-12 intact        MEDICATIONS  (STANDING):  heparin  Injectable 5000 Unit(s) SubCutaneous every 8 hours  aspirin  chewable 81 milliGRAM(s) Oral daily  levETIRAcetam 500 milliGRAM(s) Oral two times a day  gabapentin 100 milliGRAM(s) Oral two times a day  midodrine 5 milliGRAM(s) Oral every 8 hours  insulin lispro (HumaLOG) corrective regimen sliding scale   SubCutaneous Before meals and at bedtime    MEDICATIONS  (PRN):  acetaminophen   Tablet. 650 milliGRAM(s) Oral every 6 hours PRN Moderate Pain (4 - 6)      Allergies    clonidine (Unknown)    Intolerances        LABS:                RADIOLOGY & ADDITIONAL TESTS:

## 2017-06-27 NOTE — PROGRESS NOTE ADULT - PROBLEM SELECTOR PLAN 1
no need to cont. heparin and FS, d/w primary team.  Still awaiting inpt. placement.  Can give dilaudid 0.2mg IV/subcutaneous or oxycodone solution 1mg sublingual q4h PRN for comfort/SOB and atropine opth 2 drops sublingual for oropharyngeal secretions q6h PRN.  DNR/DNI

## 2017-06-27 NOTE — PROGRESS NOTE ADULT - ASSESSMENT
86 y/o debilitated gentleman known to this service from prior admits, with h/o OA, PVD, COPD, ESRD on HD via left AV graft, dysphagia (previously refused peg), dementia, CAD, HTN, CHF, blindness, recurrent aspiration pna, achalasia, Zenker's diverticulum s/p botox, presenting again from LTC with fevers and hypoxia, likely septic from recurrent aspiration pna, who opted out of further HD, dying

## 2017-06-27 NOTE — PROGRESS NOTE ADULT - SUBJECTIVE AND OBJECTIVE BOX
Patient is a 88y old  Male who presents with a chief complaint of fever (21 Jun 2017 11:16)      HPI:  The patient is an 88 yo M with ESRD (MWF HD), Alzheimer's dementia, CAD, COPD, angina, anemia, CHF, depression, HTN, HLD, OA, PVD, polyneuropathy, blindness, seizures, IDDM, and recurrent aspiration PNA 2/2 Zenker's diverticulum who presented with AMS for a few hours and found to have a temp of 102 with cough. Of note, the patient is a poor historian so the HPI is limited. The patient was initially noted to desaturate to the high 80's. The patient denied any chills, nausea, vomiting, diarrhea.           ICU deemed patient stable for regional medical floor, especially in setting of DNR/DNI status (19 Jun 2017 10:40)    INTERVAL HPI/OVERNIGHT EVENTS:::await hospice    HEALTH ISSUES - PROBLEM Dx:  End of life care: End of life care  End-stage renal disease: End-stage renal disease  Aspiration pneumonia: Aspiration pneumonia  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  Palliative care by specialist: Palliative care by specialist  Sepsis, due to unspecified organism: Sepsis, due to unspecified organism  Pneumonia: Pneumonia  Acute respiratory failure, unspecified whether with hypoxia or hypercapnia: Acute respiratory failure, unspecified whether with hypoxia or hypercapnia  Hypokalemia: Hypokalemia  Nutrition, metabolism, and development symptoms: Nutrition, metabolism, and development symptoms  Need for prophylactic measure: Need for prophylactic measure  Diabetes: Diabetes  Anemia: Anemia  Essential hypertension: Essential hypertension  Congestive heart failure: Congestive heart failure  Chronic obstructive pulmonary disease: Chronic obstructive pulmonary disease  ESRD (end stage renal disease): ESRD (end stage renal disease)  Sepsis: Sepsis          PAST MEDICAL & SURGICAL HISTORY:  AV graft thrombosis  Osteoarthritis  PVD (peripheral vascular disease)  COPD (chronic obstructive pulmonary disease)  Heart failure  Angina pectoris  ESRD (end stage renal disease)  Seizures: post traumatic  CAD (coronary artery disease)  HTN (hypertension)  Polyneuropathy  Hyperlipidemia  Dysphagia  Hypotension  Alzheimers disease  Osteoarthritis: Osteoarthritis  Chronic obstructive pulmonary disease: Chronic obstructive pulmonary disease  Anemia: Anemia  Dementia without behavioral disturbance: Dementia  Atherosclerosis of coronary artery: CAD (coronary artery disease)  Nondependent alcohol abuse: ETOH abuse  Depressive disorder: Depression  End-stage renal disease: ESRD on hemodialysis  Essential hypertension: HTN (hypertension)  Deep venous embolism and thrombosis of lower extremity: DVT (deep venous thrombosis)  Congestive heart failure: CHF (congestive heart failure)  Legal blindness: Legally blind  Hemodialysis access, AV graft: LUE loop AVG          Consultant NOTE  REVIEWED  (   )    REVIEW OF SYSTEMS:  [x] As per HPI      [x] All others negative	  [ ] Unable to obtain          Vital Signs Last 24 Hrs  T(C): 36.6 (27 Jun 2017 08:50), Max: 36.6 (27 Jun 2017 08:50)  T(F): 97.9 (27 Jun 2017 08:50), Max: 97.9 (27 Jun 2017 08:50)  HR: 58 (27 Jun 2017 08:50) (55 - 59)  BP: 120/71 (27 Jun 2017 08:50) (120/71 - 161/73)  BP(mean): --  RR: 18 (27 Jun 2017 08:50) (18 - 20)  SpO2: 100% (27 Jun 2017 08:50) (100% - 100%)        PHYSICAL EXAMINATION:                                    (    )  NO CHANGE  Appearance: Normal	  HEENT:   Normal oral mucosa, PERRL, EOMI	  Neck: Supple, + JVD/ - JVD; Carotid Bruit   Cardiovascular: Normal S1 S2,    Respiratory: Lungs clear 	  Gastrointestinal:  Soft, Non-tender, + BS	  Skin: No rashes, No ecchymoses, No cyanosis  Extremities: Normal range of motion,  Vascular: Peripheral pulses  Neurologic:  Psychiatry: A & O x 3, Mood & affect appropriate    heparin  Injectable 5000 Unit(s) SubCutaneous every 8 hours  aspirin  chewable 81 milliGRAM(s) Oral daily  levETIRAcetam 500 milliGRAM(s) Oral two times a day  gabapentin 100 milliGRAM(s) Oral two times a day  midodrine 5 milliGRAM(s) Oral every 8 hours  insulin lispro (HumaLOG) corrective regimen sliding scale   SubCutaneous Before meals and at bedtime  acetaminophen   Tablet. 650 milliGRAM(s) Oral every 6 hours PRN                          CAPILLARY BLOOD GLUCOSE  92 (27 Jun 2017 07:32)  103 (26 Jun 2017 22:04)  100 (26 Jun 2017 18:04)  80 (26 Jun 2017 12:24)

## 2017-06-27 NOTE — PROGRESS NOTE ADULT - SUBJECTIVE AND OBJECTIVE BOX
Progress Note Adult-Pulmonology Attending [[id number] [Authored: 26-Jun-2017 09:18] - for Visit: [id number], Complete, Entered, Signed in Full, General    Progress Note:   · Provider Specialty	Pulmonology	      · Subjective and Objective: 	  CC/ HPI  Patient is an 87 yr old male with chronic obstructive pulmonary disease, congestive heart failure, admitted for pneumonia complicated by sepsis, resting in bed today without acute respiratory complaint.    PAST MEDICAL & SURGICAL HISTORY:  AV graft thrombosis  Osteoarthritis  PVD (peripheral vascular disease)  COPD (chronic obstructive pulmonary disease)  Heart failure  Angina pectoris  ESRD (end stage renal disease)  Seizures: post traumatic  CAD (coronary artery disease)  HTN (hypertension)  Polyneuropathy  Hyperlipidemia  Dysphagia  Alzheimers disease  Osteoarthritis: Osteoarthritis  Chronic obstructive pulmonary disease: Chronic obstructive pulmonary disease  Anemia: Anemia  Dementia without behavioral disturbance: Dementia  Atherosclerosis of coronary artery: CAD (coronary artery disease)  Nondependent alcohol abuse: ETOH abuse  Depressive disorder: Depression  End-stage renal disease: ESRD on hemodialysis  Essential hypertension: HTN (hypertension)  Deep venous embolism and thrombosis of lower extremity: DVT (deep venous thrombosis)  Congestive heart failure: CHF (congestive heart failure)  Legal blindness: Legally blind  Hemodialysis access, AV graft: LUE loop AVG  Acquired arteriovenous fistula: AV fistula    SOCHX:   + tobacco,  -  alcohol    FMHX: FA/MO  - contributory     ROS reviewed below with positive findings marked (+) :  GEN:  fever, chills ENT: tracheostomy,   epistaxis,  sinusitis COR: +CAD, +CHF,  +HTN, dysrhythmia PUL: +COPD, ILD, asthma, pneumonia GI: PEG, dysphagia, hemorrhage, other IVON: +kidney disease, electrolyte disorder HEM:  anemia, thrombus, coagulopathy, cancer ENDO:  thyroid disease, diabetes mellitus CNS:  +dementia, stroke, seizure, PSY:  depression, anxiety, other      MEDICATIONS  (STANDING):  heparin  Injectable 5000 Unit(s) SubCutaneous every 8 hours  aspirin  chewable 81 milliGRAM(s) Oral daily  levETIRAcetam 500 milliGRAM(s) Oral two times a day  gabapentin 100 milliGRAM(s) Oral two times a day  midodrine 5 milliGRAM(s) Oral every 8 hours  insulin lispro (HumaLOG) corrective regimen sliding scale   SubCutaneous Before meals and at bedtime    MEDICATIONS  (PRN):  acetaminophen   Tablet. 650 milliGRAM(s) Oral every 6 hours PRN Moderate Pain (4 - 6)      Vital Signs Last 24 Hrs  T(C): 36.6 (27 Jun 2017 08:50), Max: 36.6 (27 Jun 2017 08:50)  T(F): 97.9 (27 Jun 2017 08:50), Max: 97.9 (27 Jun 2017 08:50)  HR: 58 (27 Jun 2017 08:50) (55 - 58)  BP: 120/71 (27 Jun 2017 08:50) (120/71 - 158/70)  RR: 18 (27 Jun 2017 08:50) (18 - 19)  SpO2: 100% (27 Jun 2017 08:50) (100% - 100%)    GENERAL:         comfortable,  - distress.  HEENT:            - trauma,  - icterus,  - injection,  - nasal discharge.  NECK:              - jugular venous distention, - thyromegaly.  LYMPH:           - lymphadenopathy, - masses.  RESP:              + crackles,   - rhonchi,   - wheezes.   COR:                S1S2 RRR  - murmurs,  - gallops,  - rubs.  ABD:                bowel sounds,   soft, - tender, - distended, - organomegaly.  EXT/MSC:         - cyanosis,  + clubbing, + edema.    NEURO:             alert,   responds to stimuli.      CXR (6/19)  Mild to moderate pulmonary venous congestion, increased. Improving bibasilar infiltrates. Probable trace pleural effusions.    ASSESSMENT/PLAN    1)  Status post pneumonia  2)  Chronic obstructive pulmonary disease  3)  Congestive heart failure  4)  Dementia  5)  Chronic kidney disease      Comfort care, hospice transfer pending.  Bronchodilators:  Atrovent/ albuterol q 4 – 6 hours as needed  ID/Antibiotics:  status post Vanco/Zosyn follow up cultures  Cardiac/HTN:  BP stable  Discussed with medical team CC/ HPI  Patient is an 87 yr old male with chronic obstructive pulmonary disease, congestive heart failure, admitted for pneumonia complicated by sepsis, resting in bed today without acute respiratory complaint.    PAST MEDICAL & SURGICAL HISTORY:  AV graft thrombosis  Osteoarthritis  PVD (peripheral vascular disease)  COPD (chronic obstructive pulmonary disease)  Heart failure  Angina pectoris  ESRD (end stage renal disease)  Seizures: post traumatic  CAD (coronary artery disease)  HTN (hypertension)  Polyneuropathy  Hyperlipidemia  Dysphagia  Alzheimers disease  Osteoarthritis: Osteoarthritis  Chronic obstructive pulmonary disease: Chronic obstructive pulmonary disease  Anemia: Anemia  Dementia without behavioral disturbance: Dementia  Atherosclerosis of coronary artery: CAD (coronary artery disease)  Nondependent alcohol abuse: ETOH abuse  Depressive disorder: Depression  End-stage renal disease: ESRD on hemodialysis  Essential hypertension: HTN (hypertension)  Deep venous embolism and thrombosis of lower extremity: DVT (deep venous thrombosis)  Congestive heart failure: CHF (congestive heart failure)  Legal blindness: Legally blind  Hemodialysis access, AV graft: LUE loop AVG  Acquired arteriovenous fistula: AV fistula    SOCHX:   + tobacco,  -  alcohol    FMHX: FA/MO  - contributory     ROS reviewed below with positive findings marked (+) :  GEN:  fever, chills ENT: tracheostomy,   epistaxis,  sinusitis COR: +CAD, +CHF,  +HTN, dysrhythmia PUL: +COPD, ILD, asthma, pneumonia GI: PEG, dysphagia, hemorrhage, other IVON: +kidney disease, electrolyte disorder HEM:  anemia, thrombus, coagulopathy, cancer ENDO:  thyroid disease, diabetes mellitus CNS:  +dementia, stroke, seizure, PSY:  depression, anxiety, other      MEDICATIONS  (STANDING):  heparin  Injectable 5000 Unit(s) SubCutaneous every 8 hours  aspirin  chewable 81 milliGRAM(s) Oral daily  levETIRAcetam 500 milliGRAM(s) Oral two times a day  gabapentin 100 milliGRAM(s) Oral two times a day  midodrine 5 milliGRAM(s) Oral every 8 hours  insulin lispro (HumaLOG) corrective regimen sliding scale   SubCutaneous Before meals and at bedtime    MEDICATIONS  (PRN):  acetaminophen   Tablet. 650 milliGRAM(s) Oral every 6 hours PRN Moderate Pain (4 - 6)      Vital Signs Last 24 Hrs  T(C): 36.6 (27 Jun 2017 08:50), Max: 36.6 (27 Jun 2017 08:50)  T(F): 97.9 (27 Jun 2017 08:50), Max: 97.9 (27 Jun 2017 08:50)  HR: 58 (27 Jun 2017 08:50) (55 - 58)  BP: 120/71 (27 Jun 2017 08:50) (120/71 - 158/70)  RR: 18 (27 Jun 2017 08:50) (18 - 19)  SpO2: 100% (27 Jun 2017 08:50) (100% - 100%)    GENERAL:         comfortable,  - distress.  HEENT:            - trauma,  - icterus,  - injection,  - nasal discharge.  NECK:              - jugular venous distention, - thyromegaly.  LYMPH:           - lymphadenopathy, - masses.  RESP:              + crackles,   - rhonchi,   - wheezes.   COR:                S1S2 RRR  - murmurs,  - gallops,  - rubs.  ABD:                bowel sounds,   soft, - tender, - distended, - organomegaly.  EXT/MSC:         - cyanosis,  + clubbing, + edema.    NEURO:             alert,   responds to stimuli.      CXR (6/19)  Mild to moderate pulmonary venous congestion, increased. Improving bibasilar infiltrates. Probable trace pleural effusions.    ASSESSMENT/PLAN    1)  Status post pneumonia  2)  Chronic obstructive pulmonary disease  3)  Congestive heart failure  4)  Dementia  5)  Chronic kidney disease      Comfort care, hospice transfer pending.  Bronchodilators:  Atrovent/ albuterol q 4 – 6 hours as needed  ID/Antibiotics:  status post Vanco/Zosyn follow up cultures  Cardiac/HTN:  BP stable  Discussed with medical team

## 2017-06-27 NOTE — PROGRESS NOTE ADULT - SUBJECTIVE AND OBJECTIVE BOX
LUIS GARDNER   MRN-[id number]         CC: Patient is a 88y old  Male who presents with a chief complaint of fever (21 Jun 2017 11:16), still awaiting inpt hospice bed    ROS:  UNABLE TO OBTAIN  due to:stupor    DYSPNEA (Y/N):	  N/V (Y/N):	  SECRETIONS (Y/N):	  AGITATION (Y/N):  PAIN(Y/N):        -Provocation/Palliation:     -Quality/Quantity:     -Radiating:     -Severity:     -Timing/Frequency:     -Impact on ADLs:     OTHER REVIEW OF SYSTEMS:  ALLERGIES:  clonidine (Unknown)      OPIATE NAÏVE (Y/N): y  iSTOP REVIEWED (Y/N): y    MEDICATIONS: reviewed  MEDICATIONS  (STANDING):  heparin  Injectable 5000 Unit(s) SubCutaneous every 8 hours  aspirin  chewable 81 milliGRAM(s) Oral daily  levETIRAcetam 500 milliGRAM(s) Oral two times a day  gabapentin 100 milliGRAM(s) Oral two times a day  midodrine 5 milliGRAM(s) Oral every 8 hours  insulin lispro (HumaLOG) corrective regimen sliding scale   SubCutaneous Before meals and at bedtime    MEDICATIONS  (PRN):  acetaminophen   Tablet. 650 milliGRAM(s) Oral every 6 hours PRN Moderate Pain (4 - 6)      PHYSICAL EXAM:  T(C): 36.6 (06-27-17 @ 08:50), Max: 36.6 (06-27-17 @ 08:50)  T(F): 97.9 (06-27-17 @ 08:50), Max: 97.9 (06-27-17 @ 08:50)  HR: 58 (06-27-17 @ 08:50) (55 - 58)  BP: 120/71 (06-27-17 @ 08:50) (120/71 - 158/70)  RR: 18 (06-27-17 @ 08:50) (18 - 19)  SpO2: 100% (06-27-17 @ 08:50) (100% - 100%)    GENERAL: Appears comfortable  HEENT: min. eye opening with strong tactile stimuli  RESP:2LNC, resp even and not labored, no oropharyngeal gurgling audible  GI: soft      	  : leftAV graft  MUSC:min. spont mov't noted to UEs  NEURO: +stupor, not speaking today  PSYCH: +stupor    LABS: reviewed  none    IMAGING: reviewed  none    ADVANCED DIRECTIVES:     DNR     DNI  MOLST    DECISION MAKER:none  LEGAL SURROGATE:    PSYCHOSOCIAL-SPIRITUAL ASSESSMENT:       Reviewed       GOALS OF CARE DISCUSSION       Palliative care info/counseling provided	           See previous Palliative Medicine Note       	      AGENCY CHOICE DISCUSSED:          Hospice       [facility]         REFERRALS	        Palliative Med        Unit SW/Case Mgmt       Music Therapy       Hospice         [ ]CRITICAL CARE TIME PROVIDED TO UNSTABLE PT W/ ORGAN FAILURE    Start:               End:  	       Minutes:          [x]> 50% OF THE TIME SPENT IN COUNSELING AND COORDINATING CARE   Start:               End:  	       Minutes:  [ ]PROLONGED SERVICE             FACE TO FACE: [x]PT     [ ]PT & FAMILY   Start:               End:  	       Minutes:

## 2017-06-27 NOTE — PROGRESS NOTE ADULT - SUBJECTIVE AND OBJECTIVE BOX
Chief Complaint/Reason for Consult: cv mgmt  INTERVAL HPI: no cp sob palp  	  MEDICATIONS:  midodrine 5 milliGRAM(s) Oral every 8 hours        levETIRAcetam 500 milliGRAM(s) Oral two times a day  gabapentin 100 milliGRAM(s) Oral two times a day  acetaminophen   Tablet. 650 milliGRAM(s) Oral every 6 hours PRN        heparin  Injectable 5000 Unit(s) SubCutaneous every 8 hours  aspirin  chewable 81 milliGRAM(s) Oral daily      REVIEW OF SYSTEMS:  [x] As per HPI  CONSTITUTIONAL: No fever, weight loss, or fatigue  RESPIRATORY: No cough, wheezing, chills or hemoptysis; No Shortness of Breath  CARDIOVASCULAR: No chest pain, palpitations, dizziness, or leg swelling  GASTROINTESTINAL: No abdominal or epigastric pain. No nausea, vomiting, or hematemesis; No diarrhea or constipation. No melena or hematochezia.  MUSCULOSKELETAL: No joint pain or swelling; No muscle, back, or extremity pain  [x] All others negative	  [ ] Unable to obtain    PHYSICAL EXAM:  T(C): 36.6 (06-27-17 @ 08:50), Max: 36.6 (06-27-17 @ 08:50)  HR: 58 (06-27-17 @ 08:50) (55 - 58)  BP: 120/71 (06-27-17 @ 08:50) (120/71 - 158/70)  RR: 18 (06-27-17 @ 08:50) (18 - 19)  SpO2: 100% (06-27-17 @ 08:50) (100% - 100%)  Wt(kg): --  I&O's Summary        Appearance: Normal	  HEENT:   Normal oral mucosa  Cardiovascular: Normal S1 S2, No JVD, No murmurs, No edema  Respiratory: Lungs clear to auscultation	  Gastrointestinal:  Soft, Non-tender, + BS	  Extremities: Normal range of motion, No clubbing, cyanosis or edema  Vascular: Peripheral pulses palpable 2+ bilaterally    TELEMETRY: 	    ECG:    	  RADIOLOGY:   CXR:  CT:  US:    CARDIAC TESTING:  Echocardiogram:  Catheterization:  Stress Test:      LABS:	 	    CARDIAC MARKERS:                    proBNP:   Lipid Profile:   HgA1c:   TSH:     ASSESSMENT/PLAN: 	    Problem: Congestive heart failure.  Plan: stable, last ef 65-70 in feb. currently not in exacerbation as no jvd crackles or edema. CXR with PVC on impression, net neg 400cc today, continue HD with UF per renal recs      Problem: Essential hypertension. Plan: history of HTN, but currently on midodrine, continue midodrine.

## 2017-06-28 DIAGNOSIS — J44.9 CHRONIC OBSTRUCTIVE PULMONARY DISEASE, UNSPECIFIED: ICD-10-CM

## 2017-06-28 PROCEDURE — 99233 SBSQ HOSP IP/OBS HIGH 50: CPT

## 2017-06-28 PROCEDURE — 99232 SBSQ HOSP IP/OBS MODERATE 35: CPT

## 2017-06-28 RX ADMIN — HEPARIN SODIUM 5000 UNIT(S): 5000 INJECTION INTRAVENOUS; SUBCUTANEOUS at 21:33

## 2017-06-28 RX ADMIN — MIDODRINE HYDROCHLORIDE 5 MILLIGRAM(S): 2.5 TABLET ORAL at 00:17

## 2017-06-28 RX ADMIN — GABAPENTIN 100 MILLIGRAM(S): 400 CAPSULE ORAL at 06:31

## 2017-06-28 RX ADMIN — HEPARIN SODIUM 5000 UNIT(S): 5000 INJECTION INTRAVENOUS; SUBCUTANEOUS at 15:25

## 2017-06-28 RX ADMIN — LEVETIRACETAM 500 MILLIGRAM(S): 250 TABLET, FILM COATED ORAL at 06:31

## 2017-06-28 RX ADMIN — MIDODRINE HYDROCHLORIDE 5 MILLIGRAM(S): 2.5 TABLET ORAL at 18:42

## 2017-06-28 RX ADMIN — Medication 81 MILLIGRAM(S): at 11:44

## 2017-06-28 RX ADMIN — MIDODRINE HYDROCHLORIDE 5 MILLIGRAM(S): 2.5 TABLET ORAL at 07:51

## 2017-06-28 RX ADMIN — LEVETIRACETAM 500 MILLIGRAM(S): 250 TABLET, FILM COATED ORAL at 18:33

## 2017-06-28 RX ADMIN — GABAPENTIN 100 MILLIGRAM(S): 400 CAPSULE ORAL at 18:33

## 2017-06-28 RX ADMIN — HEPARIN SODIUM 5000 UNIT(S): 5000 INJECTION INTRAVENOUS; SUBCUTANEOUS at 06:31

## 2017-06-28 NOTE — PROGRESS NOTE ADULT - PROBLEM SELECTOR PLAN 1
Plan: now resolved, on admission fever to 101.3, given vanco/zosyn, LLL infiltrate, currently on BiPAP due to resp distress initially, ICU consulted due to hypokalemia, patient de  - dc abx.

## 2017-06-28 NOTE — PROGRESS NOTE ADULT - ASSESSMENT
87 yr old male with extensive PMHx and recent admission for sepsis 2/2 PNA and another admission 6 months prior for septic shock secondary to UTI vs aspiration PNA who presented with AMS 2/2 fever, found to have new LLL infiltrate admitted now for HAP vs aspiration PNA now decided to stop all HD and ready to go to hospice.  Dispo-pending hospice placement

## 2017-06-28 NOTE — PROGRESS NOTE ADULT - SUBJECTIVE AND OBJECTIVE BOX
LUIS GARDNER   MRN-[id number]         CC: Patient is a 88y old  Male who presents with a chief complaint of fever (21 Jun 2017 11:16)  Pt is still awaiting placement    ROS:  UNABLE TO OBTAIN  due to:    DYSPNEA (Y/N): n	  N/V (Y/N):n	  SECRETIONS (Y/N):n	  AGITATION (Y/N):n  PAIN(Y/N):n       -Provocation/Palliation:     -Quality/Quantity:     -Radiating:     -Severity:     -Timing/Frequency:     -Impact on ADLs:     OTHER REVIEW OF SYSTEMS: States he feels ok, denies any hunger/thirst   ALLERGIES:  clonidine (Unknown)    OPIATE NAÏVE (Y/N): y  iSTOP REVIEWED (Y/N): y    MEDICATIONS: reviewed  MEDICATIONS  (STANDING):  heparin  Injectable 5000 Unit(s) SubCutaneous every 8 hours  aspirin  chewable 81 milliGRAM(s) Oral daily  levETIRAcetam 500 milliGRAM(s) Oral two times a day  gabapentin 100 milliGRAM(s) Oral two times a day  midodrine 5 milliGRAM(s) Oral every 8 hours    MEDICATIONS  (PRN):  acetaminophen   Tablet. 650 milliGRAM(s) Oral every 6 hours PRN Moderate Pain (4 - 6)    PHYSICAL EXAM:  T(C): 36.9 (06-28-17 @ 10:05), Max: 36.9 (06-28-17 @ 04:31)  T(F): 98.5 (06-28-17 @ 10:05), Max: 98.5 (06-28-17 @ 10:05)  HR: 72 (06-28-17 @ 10:05) (57 - 72)  BP: 149/70 (06-28-17 @ 10:05) (130/41 - 165/80)  RR: 16 (06-28-17 @ 10:05) (16 - 20)  SpO2: 100% (06-28-17 @ 10:05) (98% - 100%)    GENERAL: Awake, was looking around room upon entering.  Appears comfortable  HEENT: tracking when spoken to     	  NECK: no masses           CVS: nl S1S2           	  RESP: 2LNC, no oropharyngeal gurgling       	  GI: soft, NT, ND           	  : left AV graft           	  MUSC: min. spont mov't to bilateral UEs      	  NEURO: speaking with low tone but slow to react, following simple command    	  PSYCH: calm         SKIN: no open sores        	          LABS: reviewed  none new    IMAGING: reviewed  none new    ADVANCED DIRECTIVES:      DNR     DNI         DECISION MAKER:   LEGAL SURROGATE: none    PSYCHOSOCIAL-SPIRITUAL ASSESSMENT:       Reviewed       Care plan unchanged        GOALS OF CARE DISCUSSION       Palliative care info/counseling provided	           See previous Palliative Medicine Note       	      AGENCY CHOICE DISCUSSED:          Hospice       [facility]        REFERRALS	        Unit SW/Case Mgmt        Hospice         [ ]CRITICAL CARE TIME PROVIDED TO UNSTABLE PT W/ ORGAN FAILURE    Start:               End:  	       Minutes:          [x]> 50% OF THE TIME SPENT IN COUNSELING AND COORDINATING CARE   Start:               End:  	       Minutes:  [ ]PROLONGED SERVICE             FACE TO FACE: [x]PT     [ ]PT & FAMILY   Start:               End:  	       Minutes:

## 2017-06-28 NOTE — PROGRESS NOTE ADULT - SUBJECTIVE AND OBJECTIVE BOX
INTERVAL HPI/OVERNIGHT EVENTS:  Pt was seen and examined at the bedside. He was observed to be lying down comfortably.   with no c/o NVD, no CP or SOB.    VITAL SIGNS:  T(F): 98.5 (06-28-17 @ 10:05)  HR: 72 (06-28-17 @ 10:05)  BP: 149/70 (06-28-17 @ 10:05)  RR: 16 (06-28-17 @ 10:05)  SpO2: 100% (06-28-17 @ 10:05)  Wt(kg): --    PHYSICAL EXAM:    Constitutional: NAD, well-groomed, well-developed  HEENT: PERRLA, EOMI, Normal Hearing, MMM  Neck: No LAD, No JVD  Back: Normal spine flexure, No CVA tenderness  Respiratory: CTAB   Cardiovascular: S1 and S2, RRR, no M/G/R  Gastrointestinal: BS+, soft, NT/ND  Extremities: No peripheral edema  Vascular: 2+ peripheral pulses  Neurological: A/O x 3, no focal deficits  Psychiatric: Normal mood, normal affect  Musculoskeletal: 5/5 strength b/l upper and lower extremities  Skin: No rashes      MEDICATIONS  (STANDING):  heparin  Injectable 5000 Unit(s) SubCutaneous every 8 hours  aspirin  chewable 81 milliGRAM(s) Oral daily  levETIRAcetam 500 milliGRAM(s) Oral two times a day  gabapentin 100 milliGRAM(s) Oral two times a day  midodrine 5 milliGRAM(s) Oral every 8 hours    MEDICATIONS  (PRN):  acetaminophen   Tablet. 650 milliGRAM(s) Oral every 6 hours PRN Moderate Pain (4 - 6)      Allergies    clonidine (Unknown)    Intolerances        LABS:                RADIOLOGY & ADDITIONAL TESTS:

## 2017-06-28 NOTE — PROGRESS NOTE ADULT - SUBJECTIVE AND OBJECTIVE BOX
Chief Complaint/Reason for Consult: cv mgmt  INTERVAL HPI: no cp sob palp no events overnight  	  MEDICATIONS:  midodrine 5 milliGRAM(s) Oral every 8 hours        levETIRAcetam 500 milliGRAM(s) Oral two times a day  gabapentin 100 milliGRAM(s) Oral two times a day  acetaminophen   Tablet. 650 milliGRAM(s) Oral every 6 hours PRN        heparin  Injectable 5000 Unit(s) SubCutaneous every 8 hours  aspirin  chewable 81 milliGRAM(s) Oral daily      REVIEW OF SYSTEMS:  [x] As per HPI  CONSTITUTIONAL: No fever, weight loss, or fatigue  RESPIRATORY: No cough, wheezing, chills or hemoptysis; No Shortness of Breath  CARDIOVASCULAR: No chest pain, palpitations, dizziness, or leg swelling  GASTROINTESTINAL: No abdominal or epigastric pain. No nausea, vomiting, or hematemesis; No diarrhea or constipation. No melena or hematochezia.  MUSCULOSKELETAL: No joint pain or swelling; No muscle, back, or extremity pain  [x] All others negative	  [ ] Unable to obtain    PHYSICAL EXAM:  T(C): 36.9 (06-28-17 @ 10:05), Max: 36.9 (06-28-17 @ 04:31)  HR: 72 (06-28-17 @ 10:05) (57 - 72)  BP: 149/70 (06-28-17 @ 10:05) (130/41 - 165/80)  RR: 16 (06-28-17 @ 10:05) (16 - 20)  SpO2: 100% (06-28-17 @ 10:05) (98% - 100%)  Wt(kg): --  I&O's Summary        Appearance: Normal	  HEENT:   Normal oral mucosa  Cardiovascular: Normal S1 S2, No JVD, No murmurs, No edema  Respiratory: Lungs clear to auscultation	  Gastrointestinal:  Soft, Non-tender, + BS	  Extremities: Normal range of motion, No clubbing, cyanosis or edema  Vascular: Peripheral pulses palpable 2+ bilaterally    TELEMETRY: 	    ECG:    	  RADIOLOGY:   CXR:  CT:  US:    CARDIAC TESTING:  Echocardiogram:  Catheterization:  Stress Test:      LABS:	 	    CARDIAC MARKERS:                    proBNP:   Lipid Profile:   HgA1c:   TSH:     ASSESSMENT/PLAN: 	  Problem: Congestive heart failure.  Plan: stable, last ef 65-70 in feb. currently not in exacerbation as no jvd crackles or edema. CXR with PVC on impression, net neg 400cc today, continue HD with UF per renal recs      Problem: Essential hypertension. Plan: history of HTN, but currently on midodrine, continue midodrine.

## 2017-06-28 NOTE — PROGRESS NOTE ADULT - PROBLEM SELECTOR PLAN 3
Plan: flattened T waves on EKG, mobitx type 2 on EKG, previously type 1 heart block  - no more labs.

## 2017-06-28 NOTE — PROGRESS NOTE ADULT - SUBJECTIVE AND OBJECTIVE BOX
CC/ HPI  Patient is an 87 yr old male with chronic obstructive pulmonary disease, congestive heart failure, admitted for pneumonia complicated by sepsis, resting in bed today without acute respiratory complaint.    PAST MEDICAL & SURGICAL HISTORY:  AV graft thrombosis  Osteoarthritis  PVD (peripheral vascular disease)  COPD (chronic obstructive pulmonary disease)  Heart failure  Angina pectoris  ESRD (end stage renal disease)  Seizures: post traumatic  CAD (coronary artery disease)  HTN (hypertension)  Polyneuropathy  Hyperlipidemia  Dysphagia  Alzheimers disease  Osteoarthritis: Osteoarthritis  Chronic obstructive pulmonary disease: Chronic obstructive pulmonary disease  Anemia: Anemia  Dementia without behavioral disturbance: Dementia  Atherosclerosis of coronary artery: CAD (coronary artery disease)  Nondependent alcohol abuse: ETOH abuse  Depressive disorder: Depression  End-stage renal disease: ESRD on hemodialysis  Essential hypertension: HTN (hypertension)  Deep venous embolism and thrombosis of lower extremity: DVT (deep venous thrombosis)  Congestive heart failure: CHF (congestive heart failure)  Legal blindness: Legally blind  Hemodialysis access, AV graft: LUE loop AVG  Acquired arteriovenous fistula: AV fistula    SOCHX:   + tobacco,  -  alcohol    FMHX: FA/MO  - contributory     ROS reviewed below with positive findings marked (+) :  GEN:  fever, chills ENT: tracheostomy,   epistaxis,  sinusitis COR: +CAD, +CHF,  +HTN, dysrhythmia PUL: +COPD, ILD, asthma, pneumonia GI: PEG, dysphagia, hemorrhage, other IVON: +kidney disease, electrolyte disorder HEM:  anemia, thrombus, coagulopathy, cancer ENDO:  thyroid disease, diabetes mellitus CNS:  +dementia, stroke, seizure, PSY:  depression, anxiety, other    MEDICATIONS  (STANDING):  heparin  Injectable 5000 Unit(s) SubCutaneous every 8 hours  aspirin  chewable 81 milliGRAM(s) Oral daily  levETIRAcetam 500 milliGRAM(s) Oral two times a day  gabapentin 100 milliGRAM(s) Oral two times a day  midodrine 5 milliGRAM(s) Oral every 8 hours    MEDICATIONS  (PRN):  acetaminophen   Tablet. 650 milliGRAM(s) Oral every 6 hours PRN Moderate Pain (4 - 6)      Vital Signs Last 24 Hrs  T(C): 36.9 (28 Jun 2017 10:05), Max: 36.9 (28 Jun 2017 04:31)  T(F): 98.5 (28 Jun 2017 10:05), Max: 98.5 (28 Jun 2017 10:05)  HR: 72 (28 Jun 2017 10:05) (57 - 72)  BP: 149/70 (28 Jun 2017 10:05) (130/41 - 165/80)  BP(mean): --  RR: 16 (28 Jun 2017 10:05) (16 - 20)  SpO2: 100% (28 Jun 2017 10:05) (98% - 100%)    GENERAL:         comfortable,  - distress.  HEENT:            - trauma,  - icterus,  - injection,  - nasal discharge.  NECK:              - jugular venous distention, - thyromegaly.  LYMPH:           - lymphadenopathy, - masses.  RESP:              + crackles,   - rhonchi,   - wheezes.   COR:                S1S2 RRR  - murmurs,  - gallops,  - rubs.  ABD:                bowel sounds,   soft, - tender, - distended, - organomegaly.  EXT/MSC:         - cyanosis,  - clubbing,  - edema.    NEURO:             alert,   responds to stimuli.      CXR (6/19)  Mild to moderate pulmonary venous congestion, increased. Improving bibasilar infiltrates. Probable trace pleural effusions.    ASSESSMENT/PLAN    1)  Chronic obstructive pulmonary disease  2)  Congestive heart failure  3)  Dementia  4)  Chronic kidney disease      Comfort care, hospice transfer pending.  Bronchodilators:  Atrovent/ albuterol q 4 – 6 hours as needed  Cardiac/HTN:  BP stable  Discussed with medical team

## 2017-06-28 NOTE — PROGRESS NOTE ADULT - ASSESSMENT
88 y/o debilitated gentleman known to this service from prior admits, with h/o OA, PVD, COPD, ESRD on HD via left AV graft, dysphagia (previously refused peg), dementia, CAD, HTN, CHF, blindness, recurrent aspiration pna, achalasia, Zenker's diverticulum s/p botox, presenting again from LTC with fevers and hypoxia, likely septic from recurrent aspiration pna, who opted out of further HD, dying      Problem/Plan - 1:  ·  Problem: Palliative care by specialist.  Plan: no need to cont. heparin and FS, d/w primary team.  Still awaiting inpt. placement.  Can give dilaudid 0.2mg IV/subcutaneous or oxycodone solution 1mg sublingual q4h PRN for comfort/SOB and atropine opth 2 drops sublingual for oropharyngeal secretions q6h PRN.  DNR/DNI.     Problem/Plan - :  ·  Problem: Discharge planning issues.  Plan: await inpt hospice placement.  Pt is on waiting list for [facility]

## 2017-06-29 PROCEDURE — 99233 SBSQ HOSP IP/OBS HIGH 50: CPT

## 2017-06-29 PROCEDURE — 99232 SBSQ HOSP IP/OBS MODERATE 35: CPT

## 2017-06-29 RX ORDER — HYDROMORPHONE HYDROCHLORIDE 2 MG/ML
0.2 INJECTION INTRAMUSCULAR; INTRAVENOUS; SUBCUTANEOUS EVERY 4 HOURS
Qty: 0 | Refills: 0 | Status: DISCONTINUED | OUTPATIENT
Start: 2017-06-29 | End: 2017-06-29

## 2017-06-29 RX ORDER — ATROPINE SULFATE 1 %
2 DROPS OPHTHALMIC (EYE) EVERY 6 HOURS
Qty: 0 | Refills: 0 | Status: DISCONTINUED | OUTPATIENT
Start: 2017-06-29 | End: 2017-07-03

## 2017-06-29 RX ORDER — HYDROMORPHONE HYDROCHLORIDE 2 MG/ML
0.2 INJECTION INTRAMUSCULAR; INTRAVENOUS; SUBCUTANEOUS EVERY 4 HOURS
Qty: 0 | Refills: 0 | Status: DISCONTINUED | OUTPATIENT
Start: 2017-06-29 | End: 2017-07-03

## 2017-06-29 RX ADMIN — LEVETIRACETAM 500 MILLIGRAM(S): 250 TABLET, FILM COATED ORAL at 17:38

## 2017-06-29 RX ADMIN — Medication 81 MILLIGRAM(S): at 11:42

## 2017-06-29 RX ADMIN — LEVETIRACETAM 500 MILLIGRAM(S): 250 TABLET, FILM COATED ORAL at 07:04

## 2017-06-29 RX ADMIN — GABAPENTIN 100 MILLIGRAM(S): 400 CAPSULE ORAL at 07:04

## 2017-06-29 RX ADMIN — GABAPENTIN 100 MILLIGRAM(S): 400 CAPSULE ORAL at 17:38

## 2017-06-29 NOTE — PROGRESS NOTE ADULT - SUBJECTIVE AND OBJECTIVE BOX
CC/ HPI  Patient is an 87 yr old male with chronic obstructive pulmonary disease, congestive heart failure, admitted for pneumonia complicated by sepsis, resting in bed today without acute respiratory complaint.    PAST MEDICAL & SURGICAL HISTORY:  AV graft thrombosis  Osteoarthritis  PVD (peripheral vascular disease)  COPD (chronic obstructive pulmonary disease)  Heart failure  Angina pectoris  ESRD (end stage renal disease)  Seizures: post traumatic  CAD (coronary artery disease)  HTN (hypertension)  Polyneuropathy  Hyperlipidemia  Dysphagia  Alzheimers disease  Osteoarthritis: Osteoarthritis  Chronic obstructive pulmonary disease: Chronic obstructive pulmonary disease  Anemia: Anemia  Dementia without behavioral disturbance: Dementia  Atherosclerosis of coronary artery: CAD (coronary artery disease)  Nondependent alcohol abuse: ETOH abuse  Depressive disorder: Depression  End-stage renal disease: ESRD on hemodialysis  Essential hypertension: HTN (hypertension)  Deep venous embolism and thrombosis of lower extremity: DVT (deep venous thrombosis)  Congestive heart failure: CHF (congestive heart failure)  Legal blindness: Legally blind  Hemodialysis access, AV graft: LUE loop AVG  Acquired arteriovenous fistula: AV fistula    SOCHX:   + tobacco,  -  alcohol    FMHX: FA/MO  - contributory     ROS reviewed below with positive findings marked (+) :  GEN:  fever, chills ENT: tracheostomy,   epistaxis,  sinusitis COR: +CAD, +CHF,  +HTN, dysrhythmia PUL: +COPD, ILD, asthma, pneumonia GI: PEG, dysphagia, hemorrhage, other IVON: +kidney disease, electrolyte disorder HEM:  anemia, thrombus, coagulopathy, cancer ENDO:  thyroid disease, diabetes mellitus CNS:  +dementia, stroke, seizure, PSY:  depression, anxiety, other     MEDICATIONS  (STANDING):  aspirin  chewable 81 milliGRAM(s) Oral daily  levETIRAcetam 500 milliGRAM(s) Oral two times a day  gabapentin 100 milliGRAM(s) Oral two times a day    MEDICATIONS  (PRN):  acetaminophen   Tablet. 650 milliGRAM(s) Oral every 6 hours PRN Moderate Pain (4 - 6)  atropine 1% Ophthalmic Solution for SubLingual Use 2 Drop(s) SubLingual every 6 hours PRN secretions  HYDROmorphone  Injectable 0.2 milliGRAM(s) IV Push every 4 hours PRN sob/comfort      Vital Signs Last 24 Hrs  T(C): 36.5 (29 Jun 2017 07:00), Max: 36.9 (28 Jun 2017 10:05)  T(F): 97.7 (29 Jun 2017 07:00), Max: 98.5 (28 Jun 2017 10:05)  HR: 60 (29 Jun 2017 07:00) (54 - 72)  BP: 174/51 (29 Jun 2017 07:00) (137/83 - 174/51)  BP(mean): --  RR: 16 (29 Jun 2017 07:00) (16 - 20)  SpO2: 100% (29 Jun 2017 07:00) (100% - 100%)    GENERAL:         comfortable,  - distress.  HEENT:            - trauma,  - icterus,  - injection,  - nasal discharge.  NECK:              - jugular venous distention, - thyromegaly.  LYMPH:           - lymphadenopathy, - masses.  RESP:              + crackles,   - rales,   - rhonchi,   - wheezes.   COR:                S1S2   - gallops,  - rubs.  ABD:                bowel sounds,   soft, - tender, - distended, - organomegaly.  EXT/MSC:         - cyanosis,  - clubbing,  - edema.    NEURO:             alert,   responds to stimuli.        CXR (6/19)  Mild to moderate pulmonary venous congestion, increased. Improving bibasilar infiltrates. Probable trace pleural effusions.    ASSESSMENT/PLAN    1)  Chronic obstructive pulmonary disease  2)  Congestive heart failure  3)  Dementia  4)  Chronic kidney disease      Comfort care, hospice transfer pending.  Bronchodilators:  Atrovent/ albuterol q 4 – 6 hours as needed  Cardiac/HTN:  BP stable  Discussed with medical team

## 2017-06-29 NOTE — PROGRESS NOTE ADULT - SUBJECTIVE AND OBJECTIVE BOX
Patient is a 88y old  Male who presents with a chief complaint of fever (21 Jun 2017 11:16)      HPI:  The patient is an 86 yo M with ESRD (MWF HD), Alzheimer's dementia, CAD, COPD, angina, anemia, CHF, depression, HTN, HLD, OA, PVD, polyneuropathy, blindness, seizures, IDDM, and recurrent aspiration PNA 2/2 Zenker's diverticulum who presented with AMS for a few hours and found to have a temp of 102 with cough. Of note, the patient is a poor historian so the HPI is limited. The patient was initially noted to desaturate to the high 80's. The patient denied any chills, nausea, vomiting, diarrhea.     In the ED, T 101.3, HR 60, /64, RR 20, 99% on O2 NC    given Tylenol x1, vanco x1 and zosyn, poatssium IV 10 MeQ x3, BC x1 sent            INTERVAL HPI/OVERNIGHT EVENTS:::comfortable    HEALTH ISSUES - PROBLEM Dx:  COPD (chronic obstructive pulmonary disease): COPD (chronic obstructive pulmonary disease)  End of life care: End of life care  End-stage renal disease: End-stage renal disease  Aspiration pneumonia: Aspiration pneumonia  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  Palliative care by specialist: Palliative care by specialist  Sepsis, due to unspecified organism: Sepsis, due to unspecified organism  Pneumonia: Pneumonia  Acute respiratory failure, unspecified whether with hypoxia or hypercapnia: Acute respiratory failure, unspecified whether with hypoxia or hypercapnia  Hypokalemia: Hypokalemia  Nutrition, metabolism, and development symptoms: Nutrition, metabolism, and development symptoms  Need for prophylactic measure: Need for prophylactic measure  Diabetes: Diabetes  Anemia: Anemia  Essential hypertension: Essential hypertension  Congestive heart failure: Congestive heart failure  Chronic obstructive pulmonary disease: Chronic obstructive pulmonary disease  ESRD (end stage renal disease): ESRD (end stage renal disease)  Sepsis: Sepsis          PAST MEDICAL & SURGICAL HISTORY:  AV graft thrombosis  Osteoarthritis  PVD (peripheral vascular disease)  COPD (chronic obstructive pulmonary disease)  Heart failure  Angina pectoris  ESRD (end stage renal disease)  Seizures: post traumatic  CAD (coronary artery disease)  HTN (hypertension)  Polyneuropathy  Hyperlipidemia  Dysphagia  Hypotension  Alzheimers disease  Osteoarthritis: Osteoarthritis  Chronic obstructive pulmonary disease: Chronic obstructive pulmonary disease  Anemia: Anemia  Dementia without behavioral disturbance: Dementia  Atherosclerosis of coronary artery: CAD (coronary artery disease)  Nondependent alcohol abuse: ETOH abuse  Depressive disorder: Depression  End-stage renal disease: ESRD on hemodialysis  Essential hypertension: HTN (hypertension)  Deep venous embolism and thrombosis of lower extremity: DVT (deep venous thrombosis)  Congestive heart failure: CHF (congestive heart failure)  Legal blindness: Legally blind  Hemodialysis access, AV graft: LUE loop AVG          Consultant NOTE  REVIEWED  (++   )    REVIEW OF SYSTEMS:  [x] As per HPI    	  [ ] Unable to obtain          Vital Signs Last 24 Hrs  T(C): 36.2 (29 Jun 2017 21:16), Max: 37.1 (29 Jun 2017 08:50)  T(F): 97.1 (29 Jun 2017 21:16), Max: 98.7 (29 Jun 2017 08:50)  HR: 71 (29 Jun 2017 21:16) (54 - 71)  BP: 145/73 (29 Jun 2017 21:16) (137/83 - 174/51)  BP(mean): --  RR: 16 (29 Jun 2017 21:16) (16 - 20)  SpO2: 100% (29 Jun 2017 21:16) (100% - 100%)        PHYSICAL EXAMINATION:                                    ( ++   )  NO CHANGE  Appearance: Normal	  HEENT:   Normal oral mucosa, PERRL, EOMI	  Neck: Supple, + JVD/ - JVD; Carotid Bruit   Cardiovascular: Normal S1 S2, No JVD, No murmurs,   Respiratory: occ rhonchi	  Gastrointestinal:  Soft, Non-tender, + BS	  Skin: No rashes, No ecchymoses, No cyanosis  Extremities: Normal range of motion, No clubbing, cyanosis or edema  Vascular:   Neurologic: unch  Psychiatry:     aspirin  chewable 81 milliGRAM(s) Oral daily  levETIRAcetam 500 milliGRAM(s) Oral two times a day  gabapentin 100 milliGRAM(s) Oral two times a day  acetaminophen   Tablet. 650 milliGRAM(s) Oral every 6 hours PRN  atropine 1% Ophthalmic Solution for SubLingual Use 2 Drop(s) SubLingual every 6 hours PRN  HYDROmorphone  Injectable 0.2 milliGRAM(s) IV Push every 4 hours PRN                          CAPILLARY BLOOD GLUCOSE

## 2017-06-29 NOTE — PROGRESS NOTE ADULT - ASSESSMENT
88 y/o debilitated gentleman known to this service from prior admits, with h/o OA, PVD, COPD, ESRD on HD via left AV graft, dysphagia (previously refused peg), dementia, CAD, HTN, CHF, blindness, recurrent aspiration pna, achalasia, Zenker's diverticulum s/p botox, presenting again from LTC with fevers and hypoxia, likely septic from recurrent aspiration pna, who opted out of further HD, dying with min-mild oropharyngeal secretions/gurgling      Problem/Plan - 1:  ·  Problem: Palliative care by specialist.  Plan: DNR/DNI. cont. PRN atropine opth and dilaudid    Problem/Plan - 2 :  ·  Problem: Discharge planning issues.  Plan: await inpt hospice placement.  Pt is on waiting list for [facility]

## 2017-06-29 NOTE — PROGRESS NOTE ADULT - SUBJECTIVE AND OBJECTIVE BOX
Chief Complaint/Reason for Consult: cv mgmt  INTERVAL HPI: no cp sob palp no events overnight  	  MEDICATIONS:        levETIRAcetam 500 milliGRAM(s) Oral two times a day  gabapentin 100 milliGRAM(s) Oral two times a day  acetaminophen   Tablet. 650 milliGRAM(s) Oral every 6 hours PRN  HYDROmorphone  Injectable 0.2 milliGRAM(s) IV Push every 4 hours PRN        aspirin  chewable 81 milliGRAM(s) Oral daily  atropine 1% Ophthalmic Solution for SubLingual Use 2 Drop(s) SubLingual every 6 hours PRN      REVIEW OF SYSTEMS:  [x] As per HPI  CONSTITUTIONAL: No fever, weight loss, or fatigue  RESPIRATORY: No cough, wheezing, chills or hemoptysis; No Shortness of Breath  CARDIOVASCULAR: No chest pain, palpitations, dizziness, or leg swelling  GASTROINTESTINAL: No abdominal or epigastric pain. No nausea, vomiting, or hematemesis; No diarrhea or constipation. No melena or hematochezia.  MUSCULOSKELETAL: No joint pain or swelling; No muscle, back, or extremity pain  [x] All others negative	  [ ] Unable to obtain    PHYSICAL EXAM:  T(C): 37.1 (06-29-17 @ 08:50), Max: 37.1 (06-29-17 @ 08:50)  HR: 69 (06-29-17 @ 08:50) (54 - 72)  BP: 162/79 (06-29-17 @ 08:50) (137/83 - 174/51)  RR: 16 (06-29-17 @ 08:50) (16 - 20)  SpO2: 100% (06-29-17 @ 08:50) (100% - 100%)  Wt(kg): --  I&O's Summary        Appearance: Normal	  HEENT:   Normal oral mucosa  Cardiovascular: Normal S1 S2, No JVD, No murmurs, No edema  Respiratory: Lungs clear to auscultation	  Gastrointestinal:  Soft, Non-tender, + BS	  Extremities: Normal range of motion, No clubbing, cyanosis or edema  Vascular: Peripheral pulses palpable 2+ bilaterally    TELEMETRY: 	    ECG:    	  RADIOLOGY:   CXR:  CT:  US:    CARDIAC TESTING:  Echocardiogram:  Catheterization:  Stress Test:      LABS:	 	    CARDIAC MARKERS:                    proBNP:   Lipid Profile:   HgA1c:   TSH:     ASSESSMENT/PLAN: 	  Problem: Congestive heart failure.  Plan: stable, last ef 65-70 in feb. currently not in exacerbation as no jvd crackles or edema. CXR with PVC on impression, net neg 400cc today, continue HD with UF per renal recs      Problem: Essential hypertension. Plan: history of HTN, but currently on midodrine, continue midodrine.

## 2017-06-29 NOTE — PROGRESS NOTE ADULT - SUBJECTIVE AND OBJECTIVE BOX
LUIS GARDNER   MRN-[id number]         CC: Patient is a 88y old  Male who presents with a chief complaint of fever (21 Jun 2017 11:16)    ROS:  UNABLE TO OBTAIN  due to:    DYSPNEA (Y/N): n	  N/V (Y/N): n	  SECRETIONS (Y/N):n	  AGITATION (Y/N):n  PAIN(Y/N): n       -Provocation/Palliation:     -Quality/Quantity:     -Radiating:     -Severity:     -Timing/Frequency:     -Impact on ADLs:     OTHER REVIEW OF SYSTEMS:  ALLERGIES:  clonidine (Unknown)    OPIATE NAÏVE (Y/N): y  iSTOP REVIEWED (Y/N): y     MEDICATIONS: reviewed  MEDICATIONS  (STANDING):  aspirin  chewable 81 milliGRAM(s) Oral daily  levETIRAcetam 500 milliGRAM(s) Oral two times a day  gabapentin 100 milliGRAM(s) Oral two times a day    MEDICATIONS  (PRN):  acetaminophen   Tablet. 650 milliGRAM(s) Oral every 6 hours PRN Moderate Pain (4 - 6)  atropine 1% Ophthalmic Solution for SubLingual Use 2 Drop(s) SubLingual every 6 hours PRN secretions  HYDROmorphone  Injectable 0.2 milliGRAM(s) IV Push every 4 hours PRN sob/comfort      PHYSICAL EXAM:  T(C): 37.1 (06-29-17 @ 08:50), Max: 37.1 (06-29-17 @ 08:50)  T(F): 98.7 (06-29-17 @ 08:50), Max: 98.7 (06-29-17 @ 08:50)  HR: 69 (06-29-17 @ 08:50) (54 - 72)  BP: 162/79 (06-29-17 @ 08:50) (137/83 - 174/51)  RR: 16 (06-29-17 @ 08:50) (16 - 20)  SpO2: 100% (06-29-17 @ 08:50) (100% - 100%)    GENERAL: Awake, sitting up in bed watching T.V.  Appears comfortable  HEENT: tracking when spoken to     	  NECK: no masses           CVS: nl S1S2           	  RESP: 2LNC, min-mild oropharyngeal gurgling       	  GI: soft, NT, ND           	  : left AV graft           	  MUSC: min. spont mov't to bilateral UEs      	  NEURO: speaking with low tone but slow to react, following simple command    	  PSYCH: calm         SKIN: no open sores         LABS: reviewed  none    IMAGING: reviewed  none    ADVANCED DIRECTIVES:     FULL CODE     DNR     DNI     LIVING WILL     MOLST    DECISION MAKER:   LEGAL SURROGATE: none    PSYCHOSOCIAL-SPIRITUAL ASSESSMENT:       Reviewed       Care plan unchanged        GOALS OF CARE DISCUSSION       Palliative care info/counseling provided	           See previous Palliative Medicine Note       	      AGENCY CHOICE DISCUSSED:          Hospice       [facility]        REFERRALS	        Unit SW/Case Mgmt       Hospice         [ ]CRITICAL CARE TIME PROVIDED TO UNSTABLE PT W/ ORGAN FAILURE    Start:               End:  	       Minutes:          [x]> 50% OF THE TIME SPENT IN COUNSELING AND COORDINATING CARE   Start:               End:  	       Minutes:  [ ]PROLONGED SERVICE             FACE TO FACE: [x]PT     [ ]PT & FAMILY   Start:               End:  	       Minutes:

## 2017-06-29 NOTE — PROGRESS NOTE ADULT - SUBJECTIVE AND OBJECTIVE BOX
INTERVAL HPI/OVERNIGHT EVENTS: Pt was seen at the bedside and appears comfortable with no c/o chest pain, sob, fever.    VITAL SIGNS:  T(F): 98.7 (06-29-17 @ 08:50)  HR: 69 (06-29-17 @ 08:50)  BP: 162/79 (06-29-17 @ 08:50)  RR: 16 (06-29-17 @ 08:50)  SpO2: 100% (06-29-17 @ 08:50)  Wt(kg): --    PHYSICAL EXAM:    Constitutional: NAD, well-groomed, well-developed  HEENT: PERRLA, EOMI, Normal Hearing, MMM  Neck: No LAD, No JVD  Back: Normal spine flexure, No CVA tenderness  Respiratory: CTAB   Cardiovascular: S1 and S2, RRR, no M/G/R  Gastrointestinal: BS+, soft, NT/ND  Extremities: No peripheral edema  Vascular: 2+ peripheral pulses  Neurological: A/O x 3, no focal deficits  Psychiatric: Normal mood, normal affect  Musculoskeletal: 5/5 strength b/l upper and lower extremities  Skin: No rashes      MEDICATIONS  (STANDING):  aspirin  chewable 81 milliGRAM(s) Oral daily  levETIRAcetam 500 milliGRAM(s) Oral two times a day  gabapentin 100 milliGRAM(s) Oral two times a day    MEDICATIONS  (PRN):  acetaminophen   Tablet. 650 milliGRAM(s) Oral every 6 hours PRN Moderate Pain (4 - 6)  atropine 1% Ophthalmic Solution for SubLingual Use 2 Drop(s) SubLingual every 6 hours PRN secretions  HYDROmorphone  Injectable 0.2 milliGRAM(s) IV Push every 4 hours PRN sob/comfort      Allergies    clonidine (Unknown)    Intolerances        LABS:                RADIOLOGY & ADDITIONAL TESTS:

## 2017-06-30 PROCEDURE — 99232 SBSQ HOSP IP/OBS MODERATE 35: CPT

## 2017-06-30 RX ADMIN — LEVETIRACETAM 500 MILLIGRAM(S): 250 TABLET, FILM COATED ORAL at 18:37

## 2017-06-30 RX ADMIN — Medication 81 MILLIGRAM(S): at 11:40

## 2017-06-30 RX ADMIN — GABAPENTIN 100 MILLIGRAM(S): 400 CAPSULE ORAL at 06:29

## 2017-06-30 RX ADMIN — LEVETIRACETAM 500 MILLIGRAM(S): 250 TABLET, FILM COATED ORAL at 06:29

## 2017-06-30 RX ADMIN — GABAPENTIN 100 MILLIGRAM(S): 400 CAPSULE ORAL at 18:37

## 2017-06-30 NOTE — PROGRESS NOTE ADULT - SUBJECTIVE AND OBJECTIVE BOX
Chief Complaint/Reason for Consult: cv mgmt  INTERVAL HPI: no cp sob palp no events overnight  	  MEDICATIONS:        levETIRAcetam 500 milliGRAM(s) Oral two times a day  gabapentin 100 milliGRAM(s) Oral two times a day  acetaminophen   Tablet. 650 milliGRAM(s) Oral every 6 hours PRN  HYDROmorphone  Injectable 0.2 milliGRAM(s) IV Push every 4 hours PRN        aspirin  chewable 81 milliGRAM(s) Oral daily  atropine 1% Ophthalmic Solution for SubLingual Use 2 Drop(s) SubLingual every 6 hours PRN      REVIEW OF SYSTEMS:  [x] As per HPI  CONSTITUTIONAL: No fever, weight loss, or fatigue  RESPIRATORY: No cough, wheezing, chills or hemoptysis; No Shortness of Breath  CARDIOVASCULAR: No chest pain, palpitations, dizziness, or leg swelling  GASTROINTESTINAL: No abdominal or epigastric pain. No nausea, vomiting, or hematemesis; No diarrhea or constipation. No melena or hematochezia.  MUSCULOSKELETAL: No joint pain or swelling; No muscle, back, or extremity pain  [x] All others negative	  [ ] Unable to obtain    PHYSICAL EXAM:  T(C): 35.8 (06-30-17 @ 09:17), Max: 36.9 (06-29-17 @ 16:34)  HR: 68 (06-30-17 @ 09:17) (60 - 71)  BP: 172/65 (06-30-17 @ 09:17) (141/73 - 172/65)  RR: 18 (06-30-17 @ 09:17) (16 - 18)  SpO2: 100% (06-30-17 @ 04:46) (100% - 100%)  Wt(kg): --  I&O's Summary        Appearance: Normal	  HEENT:   Normal oral mucosa  Cardiovascular: Normal S1 S2, No JVD, No murmurs, No edema  Respiratory: Lungs clear to auscultation	  Gastrointestinal:  Soft, Non-tender, + BS	  Extremities: Normal range of motion, No clubbing, cyanosis or edema  Vascular: Peripheral pulses palpable 2+ bilaterally    TELEMETRY: 	    ECG:    	  RADIOLOGY:   CXR:  CT:  US:    CARDIAC TESTING:  Echocardiogram:  Catheterization:  Stress Test:      LABS:	 	    CARDIAC MARKERS:                    proBNP:   Lipid Profile:   HgA1c:   TSH:     ASSESSMENT/PLAN: 	  Problem: Congestive heart failure.  Plan: stable, last ef 65-70 in feb. currently not in exacerbation as no jvd crackles or edema. CXR with PVC on impression, net neg 400cc today, continue HD with UF per renal recs      Problem: Essential hypertension. Plan: history of HTN, but currently on midodrine, continue midodrine.

## 2017-06-30 NOTE — PROGRESS NOTE ADULT - PROBLEM SELECTOR PLAN 1
Plan: now resolved, on admission fever to 101.3, given vanco/zosyn, LLL infiltrate, currently on BiPAP due to resp distress initially  Abx discontinued, awaiting hospice placement

## 2017-06-30 NOTE — PROGRESS NOTE ADULT - ASSESSMENT
Patient is an 88 yo M with ESRD (MWF HD), Alzheimer's dementia, CAD, COPD, angina, anemia, CHF, depression, HTN, HLD, OA, PVD, polyneuropathy, blindness, seizures, IDDM, and recurrent aspiration PNA 2/2 Zenker's diverticulum admitted for fever (102 F) with cough on 6/21.

## 2017-06-30 NOTE — PROGRESS NOTE ADULT - SUBJECTIVE AND OBJECTIVE BOX
INTERVAL HPI/OVERNIGHT EVENTS:   patient is an 86 yo M with ESRD (MWF HD), Alzheimer's dementia, CAD, COPD, angina, anemia, CHF, depression, HTN, HLD, OA, PVD, polyneuropathy, blindness, seizures, IDDM, and recurrent aspiration PNA 2/2 Zenker's diverticulum admitted for fever (102 F) with cough on 6/21.    pt was examined bedside and looked comfortable and has DNR/DNI +.    VITAL SIGNS:  T(F): 96.5 (06-30-17 @ 09:17)  HR: 68 (06-30-17 @ 09:17)  BP: 172/65 (06-30-17 @ 09:17)  RR: 18 (06-30-17 @ 09:17)  SpO2: 100% (06-30-17 @ 04:46)  Wt(kg): --    PHYSICAL EXAM:    Constitutional: NAD, well-groomed, well-developed    HEENT: PERRLA, EOMI, Normal Hearing, conjuctival pallor +  Neck: No LAD, No JVD  Back: Normal spine flexure, No CVA tenderness  Respiratory: CTAB   Cardiovascular: S1 and S2, RRR, no M/G/R  Gastrointestinal: BS+, soft, NT/ND  Extremities: No peripheral edema  Vascular: 2+ peripheral pulses  Neurological: A/O x 3, no focal deficits  Psychiatric: Normal mood, normal affect  Musculoskeletal: 5/5 strength b/l upper and lower extremities  Skin: No rashes      MEDICATIONS  (STANDING):  aspirin  chewable 81 milliGRAM(s) Oral daily  levETIRAcetam 500 milliGRAM(s) Oral two times a day  gabapentin 100 milliGRAM(s) Oral two times a day    MEDICATIONS  (PRN):  acetaminophen   Tablet. 650 milliGRAM(s) Oral every 6 hours PRN Moderate Pain (4 - 6)  atropine 1% Ophthalmic Solution for SubLingual Use 2 Drop(s) SubLingual every 6 hours PRN secretions  HYDROmorphone  Injectable 0.2 milliGRAM(s) IV Push every 4 hours PRN sob/comfort      Allergies    clonidine (Unknown)    Intolerances        LABS:                RADIOLOGY & ADDITIONAL TESTS:

## 2017-06-30 NOTE — PROGRESS NOTE ADULT - SUBJECTIVE AND OBJECTIVE BOX
CC/ HPI  Patient is an 87 yr old male with chronic obstructive pulmonary disease, congestive heart failure, admitted for pneumonia complicated by sepsis, resting in bed today without acute respiratory complaint.    PAST MEDICAL & SURGICAL HISTORY:  AV graft thrombosis  Osteoarthritis  PVD (peripheral vascular disease)  COPD (chronic obstructive pulmonary disease)  Heart failure  Angina pectoris  ESRD (end stage renal disease)  Seizures: post traumatic  CAD (coronary artery disease)  HTN (hypertension)  Polyneuropathy  Hyperlipidemia  Dysphagia  Alzheimers disease  Osteoarthritis: Osteoarthritis  Chronic obstructive pulmonary disease: Chronic obstructive pulmonary disease  Anemia: Anemia  Dementia without behavioral disturbance: Dementia  Atherosclerosis of coronary artery: CAD (coronary artery disease)  Nondependent alcohol abuse: ETOH abuse  Depressive disorder: Depression  End-stage renal disease: ESRD on hemodialysis  Essential hypertension: HTN (hypertension)  Deep venous embolism and thrombosis of lower extremity: DVT (deep venous thrombosis)  Congestive heart failure: CHF (congestive heart failure)  Legal blindness: Legally blind  Hemodialysis access, AV graft: LUE loop AVG  Acquired arteriovenous fistula: AV fistula    SOCHX:   + tobacco,  -  alcohol    FMHX: FA/MO  - contributory     ROS reviewed below with positive findings marked (+) :  GEN:  fever, chills ENT: tracheostomy,   epistaxis,  sinusitis COR: +CAD, +CHF,  +HTN, dysrhythmia PUL: +COPD, ILD, asthma, pneumonia GI: PEG, dysphagia, hemorrhage, other IVON: +kidney disease, electrolyte disorder HEM:  anemia, thrombus, coagulopathy, cancer ENDO:  thyroid disease, diabetes mellitus CNS:  +dementia, stroke, seizure, PSY:  depression, anxiety, other       MEDICATIONS  (STANDING):  aspirin  chewable 81 milliGRAM(s) Oral daily  levETIRAcetam 500 milliGRAM(s) Oral two times a day  gabapentin 100 milliGRAM(s) Oral two times a day    MEDICATIONS  (PRN):  acetaminophen   Tablet. 650 milliGRAM(s) Oral every 6 hours PRN Moderate Pain (4 - 6)  atropine 1% Ophthalmic Solution for SubLingual Use 2 Drop(s) SubLingual every 6 hours PRN secretions  HYDROmorphone  Injectable 0.2 milliGRAM(s) IV Push every 4 hours PRN sob/comfort      Vital Signs Last 24 Hrs  T(C): 35.8 (30 Jun 2017 09:17), Max: 36.9 (29 Jun 2017 16:34)  T(F): 96.5 (30 Jun 2017 09:17), Max: 98.4 (29 Jun 2017 16:34)  HR: 68 (30 Jun 2017 09:17) (60 - 71)  BP: 172/65 (30 Jun 2017 09:17) (141/73 - 172/65)  BP(mean): --  RR: 18 (30 Jun 2017 09:17) (16 - 18)  SpO2: 100% (30 Jun 2017 04:46) (100% - 100%)    GENERAL:         comfortable,  - distress.  HEENT:            - trauma,  - icterus,  - injection,  - nasal discharge.  NECK:              - jugular venous distention, - thyromegaly.  LYMPH:           - lymphadenopathy, - masses.  RESP:              + crackles,   - rhonchi,   - wheezes.   COR:                S1S2 RRR  - murmurs,  - gallops,  - rubs.  ABD:                bowel sounds,   soft, - tender, - distended, - organomegaly.  EXT/MSC:         - cyanosis,  - clubbing,  - edema.    NEURO:             alert,   responds to stimuli.        CXR (6/19)  Mild to moderate pulmonary venous congestion, increased. Improving bibasilar infiltrates. Probable trace pleural effusions.    ASSESSMENT/PLAN    1)  Chronic obstructive pulmonary disease  2)  Congestive heart failure  3)  Dementia  4)  Chronic kidney disease      Comfort care, hospice transfer pending.  Bronchodilators:  Atrovent/ albuterol q 4 – 6 hours as needed  Cardiac/HTN:  BP stable  Discussed with medical team

## 2017-07-01 RX ADMIN — GABAPENTIN 100 MILLIGRAM(S): 400 CAPSULE ORAL at 17:40

## 2017-07-01 RX ADMIN — Medication 650 MILLIGRAM(S): at 12:46

## 2017-07-01 RX ADMIN — LEVETIRACETAM 500 MILLIGRAM(S): 250 TABLET, FILM COATED ORAL at 06:19

## 2017-07-01 RX ADMIN — Medication 650 MILLIGRAM(S): at 13:15

## 2017-07-01 RX ADMIN — Medication 81 MILLIGRAM(S): at 11:28

## 2017-07-01 RX ADMIN — LEVETIRACETAM 500 MILLIGRAM(S): 250 TABLET, FILM COATED ORAL at 17:40

## 2017-07-01 RX ADMIN — GABAPENTIN 100 MILLIGRAM(S): 400 CAPSULE ORAL at 06:19

## 2017-07-01 NOTE — PROGRESS NOTE ADULT - SUBJECTIVE AND OBJECTIVE BOX
INTERVAL HPI/OVERNIGHT EVENTS:  No acute events overnight. Patient was minimally alert this morning but appears to be near his baseline.  Nurse stated that there were no problems to address.  We are currently waiting for placement [facility]    VITAL SIGNS:  T(F): 98.5 (07-01-17 @ 08:27)  HR: 60 (07-01-17 @ 08:27)  BP: 128/63 (07-01-17 @ 08:27)  RR: 17 (07-01-17 @ 08:27)  SpO2: 98% (07-01-17 @ 08:27)  Wt(kg): --      PHYSICAL EXAM:      Constitutional: NAD, lying in bed  HEENT: no nasal discharge, no pharyngeal erythma  Neck: No lymphadenopathy, No JVD, No carotid bruit  Respiratory: mild wheezing, no crackles, no rhonchi, no cough  Cardiovascular: S1 and S2, RRR, no M/G/R  Gastrointestinal: BS+, soft, NT/ND  Extremities: No peripheral edema  Vascular: 2+ peripheral pulses  Neurological: Not responsive to questioning, no focal deficits  Psychiatric: minimally responsive  Musculoskeletal: Unable to test, patient not compliant  Skin: No rashes          MEDICATIONS  (STANDING):  aspirin  chewable 81 milliGRAM(s) Oral daily  levETIRAcetam 500 milliGRAM(s) Oral two times a day  gabapentin 100 milliGRAM(s) Oral two times a day    MEDICATIONS  (PRN):  acetaminophen   Tablet. 650 milliGRAM(s) Oral every 6 hours PRN Moderate Pain (4 - 6)  atropine 1% Ophthalmic Solution for SubLingual Use 2 Drop(s) SubLingual every 6 hours PRN secretions  HYDROmorphone  Injectable 0.2 milliGRAM(s) IV Push every 4 hours PRN sob/comfort      Allergies    clonidine (Unknown)    Intolerances        LABS:    No labs as patient is comfort care            RADIOLOGY & ADDITIONAL TESTS:

## 2017-07-01 NOTE — PROGRESS NOTE ADULT - ASSESSMENT
Patient is an 86 yo M with ESRD (MWF HD), Alzheimer's dementia, CAD, COPD, angina, anemia, CHF, depression, HTN, HLD, OA, PVD, polyneuropathy, blindness, seizures, IDDM, and recurrent aspiration PNA 2/2 Zenker's diverticulum currently on comfort care awaiting placement.

## 2017-07-01 NOTE — PROGRESS NOTE ADULT - SUBJECTIVE AND OBJECTIVE BOX
PUD ATTENDING FOR DR NAYLOR    CC/ HPI  Patient is an 87 yr old male with chronic obstructive pulmonary disease, congestive heart failure, admitted for pneumonia complicated by sepsis, resting in bed today without acute respiratory complaint.    PAST MEDICAL & SURGICAL HISTORY:  AV graft thrombosis  Osteoarthritis  PVD (peripheral vascular disease)  COPD (chronic obstructive pulmonary disease)  Heart failure  Angina pectoris  ESRD (end stage renal disease)  Seizures: post traumatic  CAD (coronary artery disease)  HTN (hypertension)  Polyneuropathy  Hyperlipidemia  Dysphagia  Alzheimers disease  Osteoarthritis: Osteoarthritis  Chronic obstructive pulmonary disease: Chronic obstructive pulmonary disease  Anemia: Anemia  Dementia without behavioral disturbance: Dementia  Atherosclerosis of coronary artery: CAD (coronary artery disease)  Nondependent alcohol abuse: ETOH abuse  Depressive disorder: Depression  End-stage renal disease: ESRD on hemodialysis  Essential hypertension: HTN (hypertension)  Deep venous embolism and thrombosis of lower extremity: DVT (deep venous thrombosis)  Congestive heart failure: CHF (congestive heart failure)  Legal blindness: Legally blind  Hemodialysis access, AV graft: LUE loop AVG  Acquired arteriovenous fistula: AV fistula    SOCHX:   + tobacco,  -  alcohol    FMHX: FA/MO  - contributory     ROS reviewed below with positive findings marked (+) :  no significant change, does not talk much; he has pain and wants tylenol  GEN:  fever, chills ENT: tracheostomy,   epistaxis,  sinusitis COR: +CAD, +CHF,  +HTN, dysrhythmia PUL: +COPD, ILD, asthma, pneumonia GI: PEG, dysphagia, hemorrhage, other IVON: +kidney disease, electrolyte disorder HEM:  anemia, thrombus, coagulopathy, cancer ENDO:  thyroid disease, diabetes mellitus CNS:  +dementia, stroke, seizure, PSY:  depression, anxiety, other     MEDICATIONS  (STANDING):  aspirin  chewable 81 milliGRAM(s) Oral daily  levETIRAcetam 500 milliGRAM(s) Oral two times a day  gabapentin 100 milliGRAM(s) Oral two times a day    MEDICATIONS  (PRN):  acetaminophen   Tablet. 650 milliGRAM(s) Oral every 6 hours PRN Moderate Pain (4 - 6)  atropine 1% Ophthalmic Solution for SubLingual Use 2 Drop(s) SubLingual every 6 hours PRN secretions  HYDROmorphone  Injectable 0.2 milliGRAM(s) IV Push every 4 hours PRN sob/comfort    Vital Signs Last 24 Hrs  T(C): 35.8 (30 Jun 2017 09:17), Max: 36.9 (29 Jun 2017 16:34)  T(F): 96.5 (30 Jun 2017 09:17), Max: 98.4 (29 Jun 2017 16:34)  HR: 68 (30 Jun 2017 09:17) (60 - 71)  BP: 172/65 (30 Jun 2017 09:17) (141/73 - 172/65)  BP(mean): --  RR: 18 (30 Jun 2017 09:17) (16 - 18)  SpO2: 100% (30 Jun 2017 04:46) (100% - 100%)  Reviewed, discussed with RN    GENERAL:         comfortable,  - distress.  HEENT:            - trauma,  - icterus,  - injection,  - nasal discharge.  NECK:              - jugular venous distention, - thyromegaly.  LYMPH:           - lymphadenopathy, - masses.  RESP:              DECREASED BREATH SOUNDS + crackles,  + rhonchi,   - wheezes.   COR:                S1S2 RRR  - murmurs,  - gallops,  - rubs.  ABD:                bowel sounds,   soft, - tender, - distended, - organomegaly.  EXT/MSC:         - cyanosis,  - clubbing,  - edema.+ TREMOR  NEURO:             alert,   responds to stimuli.    CXR (6/19)  Mild to moderate pulmonary venous congestion, increased. Improving bibasilar infiltrates. Probable trace pleural effusions.  reviewed    ASSESSMENT/PLAN    1)  Chronic obstructive pulmonary disease  2)  Congestive heart failure  3)  Dementia  4)  Chronic kidney disease  5) completed Rx for pneumonia    Comfort care, hospice transfer pending.  Bronchodilators:  Atrovent/ albuterol q 4 – 6 hours as needed  Cardiac/HTN:  BP stable  Discussed with medical team

## 2017-07-02 RX ADMIN — Medication 81 MILLIGRAM(S): at 11:55

## 2017-07-02 RX ADMIN — LEVETIRACETAM 500 MILLIGRAM(S): 250 TABLET, FILM COATED ORAL at 17:27

## 2017-07-02 RX ADMIN — Medication 650 MILLIGRAM(S): at 08:56

## 2017-07-02 RX ADMIN — GABAPENTIN 100 MILLIGRAM(S): 400 CAPSULE ORAL at 17:27

## 2017-07-02 RX ADMIN — Medication 650 MILLIGRAM(S): at 09:36

## 2017-07-02 RX ADMIN — GABAPENTIN 100 MILLIGRAM(S): 400 CAPSULE ORAL at 06:48

## 2017-07-02 RX ADMIN — LEVETIRACETAM 500 MILLIGRAM(S): 250 TABLET, FILM COATED ORAL at 06:48

## 2017-07-03 VITALS
SYSTOLIC BLOOD PRESSURE: 120 MMHG | DIASTOLIC BLOOD PRESSURE: 51 MMHG | HEART RATE: 70 BPM | TEMPERATURE: 98 F | RESPIRATION RATE: 17 BRPM | OXYGEN SATURATION: 100 %

## 2017-07-03 PROCEDURE — 84132 ASSAY OF SERUM POTASSIUM: CPT

## 2017-07-03 PROCEDURE — 94660 CPAP INITIATION&MGMT: CPT

## 2017-07-03 PROCEDURE — 96374 THER/PROPH/DIAG INJ IV PUSH: CPT

## 2017-07-03 PROCEDURE — 85027 COMPLETE CBC AUTOMATED: CPT

## 2017-07-03 PROCEDURE — 71045 X-RAY EXAM CHEST 1 VIEW: CPT

## 2017-07-03 PROCEDURE — 99232 SBSQ HOSP IP/OBS MODERATE 35: CPT

## 2017-07-03 PROCEDURE — 99233 SBSQ HOSP IP/OBS HIGH 50: CPT

## 2017-07-03 PROCEDURE — 85610 PROTHROMBIN TIME: CPT

## 2017-07-03 PROCEDURE — 99285 EMERGENCY DEPT VISIT HI MDM: CPT | Mod: 25

## 2017-07-03 PROCEDURE — 82803 BLOOD GASES ANY COMBINATION: CPT

## 2017-07-03 PROCEDURE — 80053 COMPREHEN METABOLIC PANEL: CPT

## 2017-07-03 PROCEDURE — 83605 ASSAY OF LACTIC ACID: CPT

## 2017-07-03 PROCEDURE — 36415 COLL VENOUS BLD VENIPUNCTURE: CPT

## 2017-07-03 PROCEDURE — 85730 THROMBOPLASTIN TIME PARTIAL: CPT

## 2017-07-03 PROCEDURE — 83735 ASSAY OF MAGNESIUM: CPT

## 2017-07-03 PROCEDURE — 84295 ASSAY OF SERUM SODIUM: CPT

## 2017-07-03 PROCEDURE — 87040 BLOOD CULTURE FOR BACTERIA: CPT

## 2017-07-03 PROCEDURE — 96375 TX/PRO/DX INJ NEW DRUG ADDON: CPT

## 2017-07-03 PROCEDURE — 84100 ASSAY OF PHOSPHORUS: CPT

## 2017-07-03 PROCEDURE — 93005 ELECTROCARDIOGRAM TRACING: CPT

## 2017-07-03 PROCEDURE — 90935 HEMODIALYSIS ONE EVALUATION: CPT

## 2017-07-03 PROCEDURE — 80048 BASIC METABOLIC PNL TOTAL CA: CPT

## 2017-07-03 PROCEDURE — 82330 ASSAY OF CALCIUM: CPT

## 2017-07-03 PROCEDURE — 85025 COMPLETE CBC W/AUTO DIFF WBC: CPT

## 2017-07-03 PROCEDURE — 84520 ASSAY OF UREA NITROGEN: CPT

## 2017-07-03 RX ORDER — ATROPINE SULFATE 1 %
2 DROPS OPHTHALMIC (EYE)
Qty: 0 | Refills: 0 | COMMUNITY
Start: 2017-07-03

## 2017-07-03 RX ORDER — HYDROMORPHONE HYDROCHLORIDE 2 MG/ML
0.2 INJECTION INTRAMUSCULAR; INTRAVENOUS; SUBCUTANEOUS EVERY 4 HOURS
Qty: 0 | Refills: 0 | Status: DISCONTINUED | OUTPATIENT
Start: 2017-07-03 | End: 2017-07-03

## 2017-07-03 RX ORDER — HYDROMORPHONE HYDROCHLORIDE 2 MG/ML
0.2 INJECTION INTRAMUSCULAR; INTRAVENOUS; SUBCUTANEOUS
Qty: 0 | Refills: 0 | COMMUNITY
Start: 2017-07-03

## 2017-07-03 RX ADMIN — Medication 650 MILLIGRAM(S): at 11:21

## 2017-07-03 RX ADMIN — LEVETIRACETAM 500 MILLIGRAM(S): 250 TABLET, FILM COATED ORAL at 05:45

## 2017-07-03 RX ADMIN — GABAPENTIN 100 MILLIGRAM(S): 400 CAPSULE ORAL at 05:45

## 2017-07-03 RX ADMIN — Medication 650 MILLIGRAM(S): at 12:00

## 2017-07-03 RX ADMIN — Medication 81 MILLIGRAM(S): at 11:21

## 2017-07-03 NOTE — PROGRESS NOTE ADULT - PROBLEM SELECTOR PROBLEM 2
Chronic obstructive pulmonary disease
Pneumonia
Pneumonia
Chronic obstructive pulmonary disease
Chronic obstructive pulmonary disease
ESRD (end stage renal disease)
Chronic obstructive pulmonary disease
ESRD (end stage renal disease)
ESRD (end stage renal disease)
Pneumonia
Acute respiratory failure, unspecified whether with hypoxia or hypercapnia
Palliative care by specialist
Sepsis, due to unspecified organism
Pneumonia

## 2017-07-03 NOTE — PROGRESS NOTE ADULT - PROBLEM SELECTOR PLAN 6
history of HTN  -no bp control at this time, midodrine was d/c
history of HTN  -no bp control at this time, midodrine was d/c
history of HTN, but currently on midodrine, continue midodrine
history of HTN, but currently on midodrine, continue midodrine.
history of HTN, but currently on midodrine, continue midodrine

## 2017-07-03 NOTE — PROGRESS NOTE ADULT - PROBLEM SELECTOR PLAN 5
albuterol/ipratropium q6, nebulizers prn
albuterol/ipratropium q6, nebulizers
albuterol/ipratropium q6, nebulizers
last ef 65-70 in feb. currently not in exacerbation
last ef 65-70 in feb. currently not in exacerbation
stable, last ef 65-70 in feb. currently not in exacerbation as no jvd crackles or edema.
table, last ef 65-70 in feb. currently not in exacerbation as no jvd crackles or edema..
stable, last ef 65-70 in feb. currently not in exacerbation as no jvd crackles or edema.

## 2017-07-03 NOTE — PROGRESS NOTE ADULT - PROBLEM SELECTOR PROBLEM 10
Nutrition, metabolism, and development symptoms

## 2017-07-03 NOTE — PROGRESS NOTE ADULT - PROBLEM SELECTOR PROBLEM 9
Need for prophylactic measure

## 2017-07-03 NOTE — PROGRESS NOTE ADULT - PROBLEM SELECTOR PROBLEM 3
Aspiration pneumonia
Sepsis
Sepsis
Aspiration pneumonia
Aspiration pneumonia
Hypokalemia
Aspiration pneumonia
Hypokalemia
Hypokalemia
Sepsis
Palliative care by specialist
Palliative care by specialist
Chronic kidney disease-mineral and bone disorder

## 2017-07-03 NOTE — PROGRESS NOTE ADULT - ASSESSMENT
Patient is an 88 yo M with ESRD (MWF HD), Alzheimer's dementia, CAD, COPD, angina, anemia, CHF, depression, HTN, HLD, OA, PVD, polyneuropathy, blindness, seizures, IDDM, and recurrent aspiration PNA 2/2 Zenker's diverticulum currently on comfort care awaiting placement.

## 2017-07-03 NOTE — PROGRESS NOTE ADULT - SUBJECTIVE AND OBJECTIVE BOX
INTERVAL HPI/OVERNIGHT EVENTS: Patient was examined at the bedside. Patient looked comfortable with no significant overnight events.    VITAL SIGNS:  T(F): 97.8 (07-03-17 @ 05:08)  HR: 73 (07-03-17 @ 05:08)  BP: 132/76 (07-03-17 @ 05:08)  RR: 18 (07-03-17 @ 05:08)  SpO2: 100% (07-03-17 @ 05:08)  Wt(kg): --    PHYSICAL EXAM:    Constitutional: Pt lying comfortably in bed, NAD  HEENT: PERRLA, EOMI, Normal Hearing, MMM  Neck: No LAD, No JVD  Back: Normal spine flexure, No CVA tenderness  Respiratory: mild wheezing, no crackles  Cardiovascular: S1 and S2, RRR, no M/G/R  Gastrointestinal: BS+, soft, NT/ND  Extremities: No peripheral edema  Vascular: 2+ peripheral pulses  Neurological: no fnd, pt not responsive to questioning.  Psychiatric: Normal mood, normal affect  Musculoskeletal: unable to test  Skin: No rashes      MEDICATIONS  (STANDING):  aspirin  chewable 81 milliGRAM(s) Oral daily  levETIRAcetam 500 milliGRAM(s) Oral two times a day  gabapentin 100 milliGRAM(s) Oral two times a day    MEDICATIONS  (PRN):  acetaminophen   Tablet. 650 milliGRAM(s) Oral every 6 hours PRN Moderate Pain (4 - 6)  atropine 1% Ophthalmic Solution for SubLingual Use 2 Drop(s) SubLingual every 6 hours PRN secretions  HYDROmorphone  Injectable 0.2 milliGRAM(s) IV Push every 4 hours PRN sob/comfort      Allergies    clonidine (Unknown)    Intolerances        LABS:                RADIOLOGY & ADDITIONAL TESTS:

## 2017-07-03 NOTE — PROGRESS NOTE ADULT - PROBLEM SELECTOR PROBLEM 8
Anemia
Diabetes
Anemia
Anemia
Diabetes

## 2017-07-03 NOTE — PROGRESS NOTE ADULT - PROBLEM SELECTOR PLAN 8
ISS
as stated in previously, likely ACD.
ISS

## 2017-07-03 NOTE — PROGRESS NOTE ADULT - PROBLEM SELECTOR PLAN 10
Dysphagia diet  DNR/DNI, comfort care  RMF pending hospice placement
Dysphagia diet  DNR/DNI, comfort care  RMF pending hospice placement
ar beauchamp, no IVF, restarted pureed diet  DNR/DNI, f/u palliative care, pending hospice placement   no MEWS, comfort care
ar beauchamp, no IVF, restarted pureed diet  DNR/DNI, f/u palliative care, pending hospice placement   no MEWS, comfort care    Dispo: pending hospice palcement
monitorsandi beauchamp, no IVF, restarted pureed diet  DNR/DNI, f/u palliative care, pending hospice placement  if pt stays will likely requre starting comfort measures and no MEWS    Dispo: pending hospice palcement
monitorsandi beauchamp, no IVF, restarted pureed diet  DNR/DNI, f/u palliative care, pending hospice placement  if pt stays will likely requre starting comfort measures and no MEWS    Dispo: pending hospice palcement
monitory lytes, no IVF, NPO while on BiPAP, bedside dysphagia screen once off BiPAP    DNR/DNI, f/u palliative care    Dispo: [facility]
ar beauchamp, no IVF, restarted pureed diet  DNR/DNI, f/u palliative care, pending hospice placement   no MEWS, comfort care    Dispo: pending hospice palcement

## 2017-07-03 NOTE — PROGRESS NOTE ADULT - PROBLEM SELECTOR PROBLEM 5
Chronic obstructive pulmonary disease
Chronic obstructive pulmonary disease
Congestive heart failure
Chronic obstructive pulmonary disease
Congestive heart failure
Congestive heart failure

## 2017-07-03 NOTE — PROGRESS NOTE ADULT - PROBLEM SELECTOR PROBLEM 7
ESRD (end stage renal disease)
ESRD (end stage renal disease)
Anemia
Diabetes
Anemia
Diabetes
ESRD (end stage renal disease)

## 2017-07-03 NOTE — PROGRESS NOTE ADULT - PROBLEM SELECTOR PROBLEM 1
Acute respiratory failure, unspecified whether with hypoxia or hypercapnia
Sepsis
Acute respiratory failure, unspecified whether with hypoxia or hypercapnia
Acute respiratory failure, unspecified whether with hypoxia or hypercapnia
Sepsis
ESRD (end stage renal disease)
End of life care
Palliative care by specialist
Sepsis
ESRD (end stage renal disease)

## 2017-07-03 NOTE — PROGRESS NOTE ADULT - ASSESSMENT
88 y/o debilitated gentleman known to this service from prior admits, with h/o OA, PVD, COPD, ESRD on HD via left AV graft, dysphagia (previously refused peg), dementia, CAD, HTN, CHF, blindness, recurrent aspiration pna, achalasia, Zenker's diverticulum s/p botox, presenting again from LTC with fevers and hypoxia, likely septic from recurrent aspiration pna, who opted out of further HD, dying      Problem/Plan - 1:  ·  Problem: Palliative care by specialist.  Plan: DNR/DNI. For [place] d/c at 2P today, Dr. Child made aware    Problem/Plan -2 :  ·  Problem: Pain Plan: cont. with Tylenol PRN, dilaudid changed to subcutaneous route

## 2017-07-03 NOTE — PROGRESS NOTE ADULT - PROVIDER SPECIALTY LIST ADULT
Cardiology
Internal Medicine
Nephrology
Palliative Care
Pulmonology
Internal Medicine

## 2017-07-03 NOTE — PROGRESS NOTE ADULT - SUBJECTIVE AND OBJECTIVE BOX
LUIS GARDNER   MRN-[id number]         CC: Patient is a 88y old  Male who presents with a chief complaint of fever (21 Jun 2017 11:16).  For [place] today,  set for 2P per unit .  Pt refusing dilaudid and is opting for tylenol for generalized pain per primary RN    ROS:  UNABLE TO OBTAIN  due to:    DYSPNEA (Y/N): n	  N/V (Y/N):n	  SECRETIONS (Y/N):n	  AGITATION (Y/N):n  PAIN(Y/N): n        -Provocation/Palliation:     -Quality/Quantity:     -Radiating:     -Severity:     -Timing/Frequency:     -Impact on ADLs:     OTHER REVIEW OF SYSTEMS:  ALLERGIES:  clonidine (Unknown)    OPIATE NAÏVE (Y/N): y  iSTOP REVIEWED (Y/N): y    MEDICATIONS: reviewed  MEDICATIONS  (STANDING):  aspirin  chewable 81 milliGRAM(s) Oral daily  levETIRAcetam 500 milliGRAM(s) Oral two times a day  gabapentin 100 milliGRAM(s) Oral two times a day    MEDICATIONS  (PRN):  acetaminophen   Tablet. 650 milliGRAM(s) Oral every 6 hours PRN Moderate Pain (4 - 6)  atropine 1% Ophthalmic Solution for SubLingual Use 2 Drop(s) SubLingual every 6 hours PRN secretions  HYDROmorphone  Injectable 0.2 milliGRAM(s) SubCutaneous every 4 hours PRN sob/comfort      PHYSICAL EXAM:  T(C): 36.9 (07-03-17 @ 08:48), Max: 36.9 (07-02-17 @ 19:05)  T(F): 98.5 (07-03-17 @ 08:48), Max: 98.5 (07-02-17 @ 19:05)  HR: 70 (07-03-17 @ 08:48) (68 - 73)  BP: 120/51 (07-03-17 @ 08:48) (120/51 - 158/77)  RR: 17 (07-03-17 @ 08:48) (17 - 21)  SpO2: 100% (07-03-17 @ 08:48) (97% - 100%)    GENERAL:Sleeping upon entering, easily arouseable verbally.  Appears fairly comfortable, rcv'd tylenol not long ago  HEENT: OD ptosis, +spont eye opening and tracking     	  CVS: no JVD          	  RESP: 2LNC, resp even and not labored        	  GI: soft, NT, ND            	  : left AV graft          	  MUSC: min. spont mov't to UEs    	  NEURO:  sleepy, but easily arouseable verbally, voice low   	  PSYCH:  calm          LABS: reviewed  none    IMAGING: reviewed  none    ADVANCED DIRECTIVES:     DNR     DNI     MOLST    DECISION MAKER:   LEGAL SURROGATE: 2 Attendings     PSYCHOSOCIAL-SPIRITUAL ASSESSMENT:       Reviewed       Care plan unchanged         GOALS OF CARE DISCUSSION       Palliative care info/counseling provided	           See previous Palliative Medicine Note    AGENCY CHOICE DISCUSSED:          Hospice       [facility]        REFERRALS	        Unit SW/Case Mgmt       Hospice        [ ]CRITICAL CARE TIME PROVIDED TO UNSTABLE PT W/ ORGAN FAILURE    Start:               End:  	       Minutes:          [x]> 50% OF THE TIME SPENT IN COUNSELING AND COORDINATING CARE   Start:               End:  	       Minutes:  [ ]PROLONGED SERVICE             FACE TO FACE: [x]PT     [ ]PT & FAMILY   Start:               End:  	       Minutes:

## 2017-07-03 NOTE — PROGRESS NOTE ADULT - PROBLEM SELECTOR PLAN 4
now on 4 K
3 K or 4 K with next HD
on HD
3 K or 4 K with next HD
Does not seem to be in exacerbation. No wheezing, on BiPAP  - Duoneb prn.
Does not seem to be in exacerbation. No wheezing, on BiPAP  - Duoneb prn..
on HD
Does not seem to be in exacerbation. No wheezing, on BiPAP  - Duoneb prn.

## 2017-07-03 NOTE — PROGRESS NOTE ADULT - PROBLEM SELECTOR PROBLEM 4
Hypokalemia
End-stage renal disease
Hypokalemia
COPD (chronic obstructive pulmonary disease)
Chronic obstructive pulmonary disease
End-stage renal disease
End-stage renal disease
COPD (chronic obstructive pulmonary disease)
Chronic obstructive pulmonary disease
End-stage renal disease
Hypokalemia

## 2017-07-03 NOTE — PROGRESS NOTE ADULT - SUBJECTIVE AND OBJECTIVE BOX
Chief Complaint/Reason for Consult: cv mgmt  INTERVAL HPI: no cp sob palp no events over weekend  	  MEDICATIONS:        levETIRAcetam 500 milliGRAM(s) Oral two times a day  gabapentin 100 milliGRAM(s) Oral two times a day  acetaminophen   Tablet. 650 milliGRAM(s) Oral every 6 hours PRN  HYDROmorphone  Injectable 0.2 milliGRAM(s) SubCutaneous every 4 hours PRN        aspirin  chewable 81 milliGRAM(s) Oral daily  atropine 1% Ophthalmic Solution for SubLingual Use 2 Drop(s) SubLingual every 6 hours PRN      REVIEW OF SYSTEMS:  [x] As per HPI  CONSTITUTIONAL: No fever, weight loss, or fatigue  RESPIRATORY: No cough, wheezing, chills or hemoptysis; No Shortness of Breath  CARDIOVASCULAR: No chest pain, palpitations, dizziness, or leg swelling  GASTROINTESTINAL: No abdominal or epigastric pain. No nausea, vomiting, or hematemesis; No diarrhea or constipation. No melena or hematochezia.  MUSCULOSKELETAL: No joint pain or swelling; No muscle, back, or extremity pain  [x] All others negative	  [ ] Unable to obtain    PHYSICAL EXAM:  T(C): 36.9 (07-03-17 @ 08:48), Max: 36.9 (07-02-17 @ 19:05)  HR: 70 (07-03-17 @ 08:48) (68 - 73)  BP: 120/51 (07-03-17 @ 08:48) (120/51 - 158/77)  RR: 17 (07-03-17 @ 08:48) (17 - 21)  SpO2: 100% (07-03-17 @ 08:48) (97% - 100%)  Wt(kg): --  I&O's Summary        Appearance: Normal	  HEENT:   Normal oral mucosa  Cardiovascular: Normal S1 S2, No JVD, No murmurs, No edema  Respiratory: Lungs clear to auscultation	  Gastrointestinal:  Soft, Non-tender, + BS	  Extremities: Normal range of motion, No clubbing, cyanosis or edema  Vascular: Peripheral pulses palpable 2+ bilaterally    TELEMETRY: 	    ECG:    	  RADIOLOGY:   CXR:  CT:  US:    CARDIAC TESTING:  Echocardiogram:  Catheterization:  Stress Test:      LABS:	 	    CARDIAC MARKERS:                    proBNP:   Lipid Profile:   HgA1c:   TSH:     ASSESSMENT/PLAN: 	  Problem: Congestive heart failure.  Plan: stable, last ef 65-70 in feb. currently not in exacerbation as no jvd crackles or edema. CXR with PVC on impression, net neg 400cc today, continue HD with UF per renal recs      Problem: Essential hypertension. Plan: history of HTN, but currently on midodrine, continue midodrine.

## 2017-07-03 NOTE — PROGRESS NOTE ADULT - PROBLEM SELECTOR PLAN 7
-no SSI as comfort care
-no SSI as comfort care
ISS
as stated in previously, likely ACD
ISS
as stated in previously, likely ACD

## 2017-07-03 NOTE — PROGRESS NOTE ADULT - PROBLEM SELECTOR PROBLEM 6
Congestive heart failure
Congestive heart failure
Essential hypertension
Congestive heart failure
Essential hypertension
Essential hypertension

## 2017-07-03 NOTE — PROGRESS NOTE ADULT - SUBJECTIVE AND OBJECTIVE BOX
CC/ HPI  Patient is an 87 yr old male with chronic obstructive pulmonary disease, congestive heart failure, admitted for pneumonia complicated by sepsis, resting in bed today without acute respiratory complaint.    PAST MEDICAL & SURGICAL HISTORY:  AV graft thrombosis  Osteoarthritis  PVD (peripheral vascular disease)  COPD (chronic obstructive pulmonary disease)  Heart failure  Angina pectoris  ESRD (end stage renal disease)  Seizures: post traumatic  CAD (coronary artery disease)  HTN (hypertension)  Polyneuropathy  Hyperlipidemia  Dysphagia  Alzheimers disease  Osteoarthritis: Osteoarthritis  Chronic obstructive pulmonary disease: Chronic obstructive pulmonary disease  Anemia: Anemia  Dementia without behavioral disturbance: Dementia  Atherosclerosis of coronary artery: CAD (coronary artery disease)  Nondependent alcohol abuse: ETOH abuse  Depressive disorder: Depression  End-stage renal disease: ESRD on hemodialysis  Essential hypertension: HTN (hypertension)  Deep venous embolism and thrombosis of lower extremity: DVT (deep venous thrombosis)  Congestive heart failure: CHF (congestive heart failure)  Legal blindness: Legally blind  Hemodialysis access, AV graft: LUE loop AVG  Acquired arteriovenous fistula: AV fistula    SOCHX:   + tobacco,  -  alcohol    FMHX: FA/MO  - contributory     ROS reviewed below with positive findings marked (+) :  GEN:  fever, chills ENT: tracheostomy,   epistaxis,  sinusitis COR: +CAD, +CHF,  +HTN, dysrhythmia PUL: +COPD, ILD, asthma, pneumonia GI: PEG, dysphagia, hemorrhage, other IVON: +kidney disease, electrolyte disorder HEM:  anemia, thrombus, coagulopathy, cancer ENDO:  thyroid disease, diabetes mellitus CNS:  +dementia, stroke, seizure, PSY:  depression, anxiety, other       MEDICATIONS  (STANDING):  aspirin  chewable 81 milliGRAM(s) Oral daily  levETIRAcetam 500 milliGRAM(s) Oral two times a day  gabapentin 100 milliGRAM(s) Oral two times a day    MEDICATIONS  (PRN):  acetaminophen   Tablet. 650 milliGRAM(s) Oral every 6 hours PRN Moderate Pain (4 - 6)  atropine 1% Ophthalmic Solution for SubLingual Use 2 Drop(s) SubLingual every 6 hours PRN secretions  HYDROmorphone  Injectable 0.2 milliGRAM(s) IV Push every 4 hours PRN sob/comfort      Vital Signs Last 24 Hrs  T(C): 36.9 (03 Jul 2017 08:48), Max: 36.9 (02 Jul 2017 19:05)  T(F): 98.5 (03 Jul 2017 08:48), Max: 98.5 (02 Jul 2017 19:05)  HR: 70 (03 Jul 2017 08:48) (68 - 73)  BP: 120/51 (03 Jul 2017 08:48) (120/51 - 158/77)  RR: 17 (03 Jul 2017 08:48) (17 - 21)  SpO2: 100% (03 Jul 2017 08:48) (97% - 100%)    GENERAL:         comfortable,  - distress.  HEENT:            - trauma,  - icterus,  - injection,  - nasal discharge.  NECK:              - jugular venous distention, - thyromegaly.  LYMPH:           - lymphadenopathy, - masses.  RESP:              + crackles,  - rhonchi, - wheezes, poor effort.   COR:                S1S2 RRR  - murmurs,  - gallops,  - rubs.  ABD:                bowel sounds,   soft, - tender, - distended, - organomegaly.  EXT/MSC:         - cyanosis,  - clubbing,  - edema.    NEURO:             alert,   responds to stimuli.      CXR (6/19)  Mild to moderate pulmonary venous congestion, increased. Improving bibasilar infiltrates. Probable trace pleural effusions.    ASSESSMENT/PLAN    1)  Chronic obstructive pulmonary disease  2)  Congestive heart failure  3)  Dementia  4)  Chronic kidney disease    Comfort care, hospice transfer pending.  Bronchodilators:  Atrovent/ albuterol q 4 – 6 hours as needed  Cardiac/HTN:  BP stable  Discussed with medical team

## 2017-07-03 NOTE — PROGRESS NOTE ADULT - PROBLEM SELECTOR PLAN 1
Not meeting SIRS criteria, no respiratory complaints. VS stable and wnl  -Abx discontinued, awaiting hospice placement

## 2017-07-06 DIAGNOSIS — N18.6 END STAGE RENAL DISEASE: ICD-10-CM

## 2017-07-06 DIAGNOSIS — E87.6 HYPOKALEMIA: ICD-10-CM

## 2017-07-06 DIAGNOSIS — K22.0 ACHALASIA OF CARDIA: ICD-10-CM

## 2017-07-06 DIAGNOSIS — K22.5 DIVERTICULUM OF ESOPHAGUS, ACQUIRED: ICD-10-CM

## 2017-07-06 DIAGNOSIS — A41.9 SEPSIS, UNSPECIFIED ORGANISM: ICD-10-CM

## 2017-07-06 DIAGNOSIS — I50.9 HEART FAILURE, UNSPECIFIED: ICD-10-CM
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
## 2017-07-06 DIAGNOSIS — R41.82 ALTERED MENTAL STATUS, UNSPECIFIED: ICD-10-CM

## 2017-07-06 DIAGNOSIS — I44.1 ATRIOVENTRICULAR BLOCK, SECOND DEGREE: ICD-10-CM

## 2017-07-06 DIAGNOSIS — F32.9 MAJOR DEPRESSIVE DISORDER, SINGLE EPISODE, UNSPECIFIED: ICD-10-CM

## 2017-07-06 DIAGNOSIS — E11.9 TYPE 2 DIABETES MELLITUS WITHOUT COMPLICATIONS: ICD-10-CM

## 2017-07-06 DIAGNOSIS — F10.10 ALCOHOL ABUSE, UNCOMPLICATED: ICD-10-CM

## 2017-07-06 DIAGNOSIS — Z79.82 LONG TERM (CURRENT) USE OF ASPIRIN: ICD-10-CM

## 2017-07-06 DIAGNOSIS — Z51.5 ENCOUNTER FOR PALLIATIVE CARE: ICD-10-CM

## 2017-07-06 DIAGNOSIS — D64.9 ANEMIA, UNSPECIFIED: ICD-10-CM

## 2017-07-06 DIAGNOSIS — I12.0 HYPERTENSIVE CHRONIC KIDNEY DISEASE WITH STAGE 5 CHRONIC KIDNEY DISEASE OR END STAGE RENAL DISEASE: ICD-10-CM

## 2017-07-06 DIAGNOSIS — I25.118 ATHEROSCLEROTIC HEART DISEASE OF NATIVE CORONARY ARTERY WITH OTHER FORMS OF ANGINA PECTORIS: ICD-10-CM

## 2017-07-06 DIAGNOSIS — E78.5 HYPERLIPIDEMIA, UNSPECIFIED: ICD-10-CM

## 2017-07-06 DIAGNOSIS — G30.9 ALZHEIMER'S DISEASE, UNSPECIFIED: ICD-10-CM

## 2017-07-06 DIAGNOSIS — G93.41 METABOLIC ENCEPHALOPATHY: ICD-10-CM

## 2017-07-06 DIAGNOSIS — J69.0 PNEUMONITIS DUE TO INHALATION OF FOOD AND VOMIT: ICD-10-CM

## 2017-07-06 DIAGNOSIS — Z79.4 LONG TERM (CURRENT) USE OF INSULIN: ICD-10-CM

## 2017-07-06 DIAGNOSIS — Z87.01 PERSONAL HISTORY OF PNEUMONIA (RECURRENT): ICD-10-CM

## 2017-07-06 DIAGNOSIS — I13.2 HYPERTENSIVE HEART AND CHRONIC KIDNEY DISEASE WITH HEART FAILURE AND WITH STAGE 5 CHRONIC KIDNEY DISEASE, OR END STAGE RENAL DISEASE: ICD-10-CM

## 2017-07-06 DIAGNOSIS — G62.9 POLYNEUROPATHY, UNSPECIFIED: ICD-10-CM

## 2017-07-06 DIAGNOSIS — Z99.2 DEPENDENCE ON RENAL DIALYSIS: ICD-10-CM

## 2017-07-06 DIAGNOSIS — Z88.8 ALLERGY STATUS TO OTHER DRUGS, MEDICAMENTS AND BIOLOGICAL SUBSTANCES STATUS: ICD-10-CM

## 2017-07-06 DIAGNOSIS — H54.8 LEGAL BLINDNESS, AS DEFINED IN USA: ICD-10-CM

## 2017-07-06 DIAGNOSIS — E83.89 OTHER DISORDERS OF MINERAL METABOLISM: ICD-10-CM

## 2017-07-06 DIAGNOSIS — Z86.718 PERSONAL HISTORY OF OTHER VENOUS THROMBOSIS AND EMBOLISM: ICD-10-CM

## 2017-07-06 DIAGNOSIS — M19.90 UNSPECIFIED OSTEOARTHRITIS, UNSPECIFIED SITE: ICD-10-CM

## 2017-07-06 DIAGNOSIS — J96.00 ACUTE RESPIRATORY FAILURE, UNSPECIFIED WHETHER WITH HYPOXIA OR HYPERCAPNIA: ICD-10-CM

## 2017-07-06 DIAGNOSIS — I73.9 PERIPHERAL VASCULAR DISEASE, UNSPECIFIED: ICD-10-CM

## 2017-07-06 DIAGNOSIS — F02.80 DEMENTIA IN OTHER DISEASES CLASSIFIED ELSEWHERE, UNSPECIFIED SEVERITY, WITHOUT BEHAVIORAL DISTURBANCE, PSYCHOTIC DISTURBANCE, MOOD DISTURBANCE, AND ANXIETY: ICD-10-CM

## 2017-07-06 DIAGNOSIS — Z66 DO NOT RESUSCITATE: ICD-10-CM

## 2017-07-06 DIAGNOSIS — R13.10 DYSPHAGIA, UNSPECIFIED: ICD-10-CM

## 2017-07-06 DIAGNOSIS — E83.39 OTHER DISORDERS OF PHOSPHORUS METABOLISM: ICD-10-CM

## 2017-07-06 DIAGNOSIS — J44.9 CHRONIC OBSTRUCTIVE PULMONARY DISEASE, UNSPECIFIED: ICD-10-CM

## 2018-06-13 NOTE — ED PROVIDER NOTE - ATTESTATION, MLM
5
I have reviewed and confirmed nurses' notes for patient's medications, allergies, medical history, and surgical history.

## 2018-09-11 NOTE — DIETITIAN INITIAL EVALUATION ADULT. - ENERGY NEEDS
1. Yes.  
Order placed  
Received voicemail on department phone from Autumn Ching - Speech Language Pathologist at [facility]. She performed a video swallow study on patient today and is requesting a Referral to Speech Therapy based on the results. OK to place referral?  
weight 72.1 kg 159# , height n/a  fluid - 500cc plus urine output, request a height to accurately  assess nutritional needs

## 2018-12-17 NOTE — DISCHARGE NOTE ADULT - PRINCIPAL DIAGNOSIS
Daily Note     Today's date: 2018  Patient name: Ayana Osborne  : 1933  MRN: [id number]  Referring provider: Lucy Tim MD  Dx:   Encounter Diagnosis     ICD-10-CM    1  Sciatica of left side M54 32    2  Lumbosacral radiculopathy M54 17    3  Acute on chronic combined systolic and diastolic congestive heart failure (HCC) I50 43                   Subjective: Pt states he has increased pain that radiates from his L knee up into his L SIJ with initial WB in the am   Pt states the alvarez 8-10 steps are very painful for him    Objective: See treatment diary below  Precautions: DM, HTN, cardiomyopathy, COPD     Daily Treatment Diary      Manual  12/3  12/7  12/14  12/17               MET for L ant pelvic rot X  X                    MET for L post pelvic rot      X  X                MFR L piriformis and gluteal region        x10'                L LE distraction        x3'                                             Exercise Diary  12/3  12/7 12/10  12/14  12/17             Hip add iso 5"x5  5"x20  5"x20  5"x20  5"x20             bridges 5"x5  5"x20  5"x20  5"x20  5"x20             DLS SLR R6KD  6H12BW  2x10ea  2x10ea               clamshells nv  5"x20  GTB 5"x20ea  GTB  5"x20               TA ball isos nv    5"x20  5"x20               sidestepping GTB nv  GTB x4laps  GTB x4laps  GTB 5"x20               Standing hip ext nv  2x10  2x10ea  2x10ea                TB hip abd    GTB 5"x20  GTB 5"20  GTB 5"x20                functional squats        x20                piriformis stretch          20"x4              hip ER stretch          20"x4                                                                                                                                                                                                                                           Modalities  12/3  12/17                   CP prn  x10'                                                   Assessment: Tolerated treatment well  Patient demonstrated fatigue post treatment and would benefit from continued PT   (+) sup-long sit test today for L post pelvic rot today  Improved pelvic alignment post MET  Pt states increased pain post stretches today in L SIJ  Mod hypertonicity L SIJ region, performed MFR which pt states was sore  Pt instructed to cont to ice as needed  Plan: Continue per plan of care  Acute respiratory failure, unspecified whether with hypoxia or hypercapnia

## 2019-02-27 NOTE — H&P ADULT - ASSESSMENT
Patient is an 87 yr old male with extensive PMHx and recent admission for sepsis 2/2 PNA and another admission 6 months prior for septic shock secondary to UTI vs aspiration PNA who presented with AMS 2/2 fever, likely PNA. No

## 2019-03-01 NOTE — PROGRESS NOTE ADULT - RS GEN PE MLT RESP DETAILS PC
good air movement/no chest wall tenderness/on BiPAP, rhonchi b/l/normal/airway patent Abdomen soft, non-tender, no guarding.

## 2019-10-30 NOTE — PROGRESS NOTE ADULT - PROBLEM SELECTOR PLAN 2
no inpt. hospice bed at [facility] yet, will likely transfer to [facility], await placement. DNR/DNI Patient

## 2020-01-09 NOTE — RESPIRATORY BIPAP POST ASSESSMENT FORM - RESPIRATORY EXPANSION/ACCESSORY MUSCLES/RETRACTIONS
Ms. Arteaag is an no retractions Ms. Arteaga is an 82 yo female with a PMH of alzheimers dz, CVA, Depression, CHF, HTN, MI (remote), recent dx of hypothyroidism who was admitted to [facility] on 2020 for CHF with + RSV who's hospital stay has been complicated by JONY, VRE of urine and anemia who was a RRT today for multiple episodes of hematemasis with associated N/V. Pt was prior on carafate and PPI during this hospitalization. During RRT pt was HDS and HR was NSR (pt not on beta blocker). Patient has been accepted to ICU for urgent blood transfusions and emergent EGD with GI.       PAST MEDICAL/SURGICAL/FAMILY/SOCIAL HISTORY:    Past Medical History:  Alzheimers disease    CVA (cerebral vascular accident)    Depression    HTN (hypertension)    MI, old.  recently Dx c hypothyroidism, TSH > 111 started Levothyroxin 50mcg qd as per Dr Vigil (2020 11:41)      Allergies  No Known Allergies      MEDICATIONS  (STANDING):  albuterol/ipratropium for Nebulization 3 milliLiter(s) Nebulizer every 4 hours  ampicillin  IVPB      ampicillin  IVPB 1 Gram(s) IV Intermittent every 12 hours  buDESOnide    Inhalation Suspension 0.5 milliGRAM(s) Inhalation every 12 hours  chlorhexidine 0.12% Liquid 15 milliLiter(s) Oral Mucosa every 12 hours  chlorhexidine 4% Liquid 1 Application(s) Topical <User Schedule>  dextrose 5% + sodium chloride 0.45%. 1000 milliLiter(s) (50 mL/Hr) IV Continuous <Continuous>  epoetin tawanna Injectable 16352 Unit(s) SubCutaneous every 7 days  levothyroxine Injectable 25 MICROGram(s) IV Push at bedtime  norepinephrine Infusion 0.05 MICROgram(s)/kG/Min (5.747 mL/Hr) IV Continuous <Continuous>  pantoprazole Infusion 8 mG/Hr (10 mL/Hr) IV Continuous <Continuous>  propofol Infusion 10 MICROgram(s)/kG/Min (3.678 mL/Hr) IV Continuous <Continuous>  sodium chloride 0.65% Nasal 1 Spray(s) Both Nostrils three times a day      Daily     Daily Weight in k.8 (2020 04:51)    Drug Dosing Weight  Height (cm): 147.3 (2020 03:00)  Weight (kg): 60.7 (2020 15:30)  BMI (kg/m2): 28 (2020 15:30)  BSA (m2): 1.54 (2020 15:30)    PAST MEDICAL & SURGICAL HISTORY:  Hypothyroidism  Constipation  Iron deficiency anemia  Major depressive disorder  Hyperlipidemia  GI bleed  Depression  Alzheimers disease  MI, old  CVA (cerebral vascular accident)  HTN (hypertension)  No significant past surgical history      FAMILY HISTORY:  Family history of essential hypertension (Child)  Family history of stroke      ADVANCE DIRECTIVES: Full code    REVIEW OF SYSTEMS: Pt unable to offer substantial history or ROS given current clinical condition coupled with baseline status.       Mode: AC/ CMV (Assist Control/ Continuous Mandatory Ventilation)  RR (machine): 16  TV (machine): 400  FiO2: 40  PEEP: 5  PS:   ITime: 1  MAP: 7  PIP: 26      ICU Vital Signs Last 24 Hrs  T(C): 36.4 (2020 15:35), Max: 37.6 (2020 11:10)  T(F): 97.6 (2020 15:35), Max: 99.6 (2020 11:10)  HR: 74 (2020 19:15) (58 - 88)  BP: 150/76 (2020 19:15) (95/33 - 232/175)  BP(mean): 94 (2020 19:15) (44 - 185)  RR: 16 (2020 19:15) (14 - 18)  SpO2: 100% (2020 19:15) (90% - 100%)      PHYSICAL EXAM PRIOR TO INTUBATION IN ICU ADMISSION:  GENERAL: NAD, well-groomed, well-developed; appears stated age, appears uncomfortable but not in distress  HEAD:  Atraumatic, Normocephalic  NERVOUS SYSTEM:  Alert and responsive, SHER, follows commands  CHEST/LUNG: Clear to percussion bilaterally; No rales, rhonchi, wheezing, or rubs  HEART: Regular rate and rhythm; No murmurs, rubs, or gallops  ABDOMEN: Soft, Nontender, Distended; Bowel sounds present  EXTREMITIES:  2+ Peripheral Pulses, No clubbing, cyanosis, or edema  LYMPH: No lymphadenopathy noted  SKIN: No rashes or lesions    LABS:  CBC Full  -  ( 2020 12:58 )  WBC Count : 7.23 K/uL  RBC Count : 2.32 M/uL  Hemoglobin : 7.2 g/dL  Hematocrit : 23.0 %  Platelet Count - Automated : 172 K/uL  Mean Cell Volume : 99.1 fl  Mean Cell Hemoglobin : 31.0 pg  Mean Cell Hemoglobin Concentration : 31.3 gm/dL          139  |  106  |  85<H>  ----------------------------<  231<H>  4.0   |  25  |  4.36<H>    Ca    7.7<L>      2020 12:58    POCT Blood Glucose.: 249 mg/dL (2020 14:39)    PT/INR - ( 2020 12:58 )   PT: 12.1 sec;   INR: 1.08 ratio      PTT - ( 2020 12:58 )  PTT:26.0 sec    CARDIAC MARKERS ( 2020 12:58 )  .080 ng/mL / x     / 19 U/L / x     / <1.0 ng/mL      RADIOLOGY:   Xray Kidney Ureter Bladder (20 @ 14:46)  IMPRESSION: Increasing stomach contents suggesting ingested material possibly hair or foreign bodies resulting in obstruction of the stomach outlet.    Xray Chest 1 View-PORTABLE IMMEDIATE (20 @ 15:55)  IMPRESSION: Tubes inserted. Advanced infiltrates again seen although there is improvement.        CRITICAL CARE TIME SPENT: 90 minutes

## 2020-02-21 NOTE — ED ADULT NURSE NOTE - QUALITY
alteration in breathing pattern/productive cough Patient/Caregiver provided printed discharge information.

## 2020-06-15 NOTE — PROGRESS NOTE ADULT - I WAS PHYSICALLY PRESENT FOR THE KEY PORTIONS OF THE EVALUATION AND MANAGEMENT (E/M) SERVICE PROVIDED.  I AGREE WITH THE ABOVE HISTORY, PHYSICAL, AND PLAN WHICH I HAVE REVIEWED AND EDITED WHERE APPROPRIATE
38 year old female in 2/2020 s/p surgical clip for ruptured anuerysm
Statement Selected

## 2020-08-05 NOTE — RESPIRATORY BIPAP PRE ASSESSMENT FORM - RESPIRATORY RATE (BREATHS/MIN)
Requested updates within Care Everywhere.  Patient's chart was reviewed for overdue JUNIOR topics.  Immunizations reconciled.    Orders placed:n/a  Labs Linked:n/a    
20

## 2020-09-22 NOTE — ED PROVIDER NOTE - CHIEF COMPLAINT
Name: Doc Coyne ADMIT: 2020   : 1941  PCP: Shannan Aguirre APRN    MRN: [id number] LOS: 0 days   AGE/SEX: 79 y.o. male  ROOM: [facility]     Chief Complaint   Patient presents with   • Eye Problem     bilateral       Subjective   HPI  Mr. Coyne is a 79 y.o. male with a history of A. fib, diabetes, hypertension, BPH who presents to [facility] with intermittent vision changes with reports of flashing of lights.  It comes and goes and he reports it is not currently happening.  He reports no headache.  No change in speech.  No focal weakness or numbness.  He denies chest pain palpitations.  No nausea vomiting diarrhea.  No dysuria.  No fevers or chills.  He does report nasal drip and this is relieved with over-the-counter antihistamine/Benadryl at home.    Past Medical History:   Diagnosis Date   • Atherosclerotic heart disease of native coronary artery without angina pectoris    • Atrial fibrillation (CMS/HCC)    • Benign prostatic hypertrophy    • Chest pain    • COPD (chronic obstructive pulmonary disease) (CMS/HCC)    • Diabetes mellitus (CMS/HCC)    • Difficulty walking    • Hypertension    • Peripheral neuropathy    • Prostate cancer (CMS/HCC)      Past Surgical History:   Procedure Laterality Date   • COLONOSCOPY     • HEMORRHOIDECTOMY     • PROSTATE SURGERY       Family History   Problem Relation Age of Onset   • Hypertension Father    • Heart failure Father    • Hyperlipidemia Father    • Heart disease Father    • Heart attack Father    • Heart attack Sister    • Stroke Mother    • Heart disease Mother    • Diabetes Mother    • Kidney disease Maternal Grandmother      Social History     Tobacco Use   • Smoking status: Former Smoker     Packs/day: 0.50     Types: Cigarettes     Quit date: 2020     Years since quittin.1   • Smokeless tobacco: Never Used   • Tobacco comment: QUIT 2 MONTHS AGO   Substance Use Topics   • Alcohol use: Yes     Frequency: Never      Comment: OCCASIONALLY/ wine   • Drug use: No     Medications Prior to Admission   Medication Sig Dispense Refill Last Dose   • CARTIA  MG 24 hr capsule TAKE 1 CAPSULE BY MOUTH DAILY 90 capsule 5    • diazePAM (VALIUM) 2 MG tablet Take one tab 60 minutes prior to test.  May take another dose after 30 minutes if needed. 2 tablet 0    • lisinopril (PRINIVIL,ZESTRIL) 30 MG tablet Take 30 mg by mouth Daily.  2    • metFORMIN (GLUCOPHAGE) 500 MG tablet Take 1 tablet by mouth 2 (Two) Times a Day With Meals. TAKE 1 TABLET BY MOUTH EVERY 12 HOURS WITH FOOD 180 tablet 1    • metoprolol tartrate (LOPRESSOR) 50 MG tablet Take 1 tablet by mouth Daily. 90 tablet 1    • NITROSTAT 0.4 MG SL tablet Place 0.4 mg under the tongue Every 5 (Five) Minutes As Needed.  1    • pantoprazole (PROTONIX) 40 MG EC tablet Take 1 tablet by mouth Daily. 30 tablet 0    • tamsulosin (FLOMAX) 0.4 MG capsule 24 hr capsule Take 1 capsule by mouth Daily.      • XARELTO 20 MG tablet TAKE 1 TABLET BY MOUTH DAILY 30 tablet 0    • Accu-Chek FastClix Lancets misc       • Blood Glucose Monitoring Suppl (ACCU-CHEK TOMAS PLUS) w/Device kit 1 kit 4 (Four) Times a Day. 1 kit 1    • Fluzone High-Dose Quadrivalent 0.7 ML suspension prefilled syringe TO BE ADMINISTERED BY PHARMACIST FOR IMMUNIZATION      • glucose blood test strip Use as instructed 100 each 12    • Lancets Misc. (ACCU-CHEK FASTCLIX LANCET) kit 1 kit 4 (Four) Times a Day. 1 kit 1    • rosuvastatin (CRESTOR) 10 MG tablet         Allergies:    Allergies   Allergen Reactions   • Isosorbide Nitrate    • Novocain  [Procaine]    • Penicillins    • Propoxyphene    • Cephalexin Rash   • Doxycycline Rash   • Sulfa Antibiotics Rash       Review of Systems   Constitutional: Negative for chills and fever.   HENT: Negative for sore throat and trouble swallowing.    Eyes: Positive for visual disturbance. Negative for pain.   Respiratory: Negative for cough, shortness of breath and wheezing.    Cardiovascular:  The patient is a 87y Male complaining of fever. Negative for chest pain, palpitations and leg swelling.   Gastrointestinal: Negative for constipation, diarrhea, nausea and vomiting.   Endocrine: Negative for cold intolerance and heat intolerance.   Genitourinary: Negative for difficulty urinating and dysuria.   Musculoskeletal: Negative for neck pain and neck stiffness.   Skin: Negative for pallor and rash.   Allergic/Immunologic: Positive for environmental allergies. Negative for food allergies.   Neurological: Negative for seizures and syncope.   Hematological: Negative for adenopathy. Does not bruise/bleed easily.   Psychiatric/Behavioral: Negative for agitation and confusion.        Objective    Vital Signs  Temp:  [97 °F (36.1 °C)-97.7 °F (36.5 °C)] 97 °F (36.1 °C)  Heart Rate:  [51-83] 60  Resp:  [16] 16  BP: (120-153)/() 141/118  SpO2:  [95 %-98 %] 97 %  on   ;   Device (Oxygen Therapy): room air  Body mass index is 27.75 kg/m².    Physical Exam  Vitals signs and nursing note reviewed.   Constitutional:       General: He is not in acute distress.  HENT:      Head: Normocephalic and atraumatic.   Eyes:      Extraocular Movements: Extraocular movements intact.      Conjunctiva/sclera: Conjunctivae normal.      Pupils: Pupils are equal, round, and reactive to light.   Neck:      Musculoskeletal: Normal range of motion. No muscular tenderness.   Cardiovascular:      Rate and Rhythm: Normal rate. Rhythm irregular.      Pulses: Normal pulses.   Pulmonary:      Effort: Pulmonary effort is normal.      Breath sounds: No wheezing or rales.   Abdominal:      General: There is no distension.      Palpations: Abdomen is soft.      Tenderness: There is no abdominal tenderness. There is no guarding or rebound.   Musculoskeletal: Normal range of motion.         General: No swelling.   Skin:     General: Skin is warm and dry.   Neurological:      Mental Status: He is alert.      Cranial Nerves: No cranial nerve deficit.   Psychiatric:         Mood and Affect: Mood  normal.         Behavior: Behavior normal.         Results Review:  I reviewed the patient's new clinical results.  I reviewed imaging, agree with interpretation.  I reviewed EKG/telemetry, atrial fibrillation, rate 60s.  I reviewed prior records.    Lab Results (last 24 hours)     Procedure Component Value Units Date/Time    CBC & Differential [[id number]]  (Abnormal) Collected: 09/21/20 2120    Specimen: Blood Updated: 09/21/20 2128    Narrative:      The following orders were created for panel order CBC & Differential.  Procedure                               Abnormality         Status                     ---------                               -----------         ------                     CBC Auto Differential[[id number]]        Abnormal            Final result                 Please view results for these tests on the individual orders.    Comprehensive Metabolic Panel [[id number]]  (Abnormal) Collected: 09/21/20 2120    Specimen: Blood Updated: 09/21/20 2144     Glucose 132 mg/dL      BUN 24 mg/dL      Creatinine 0.90 mg/dL      Sodium 142 mmol/L      Potassium 4.5 mmol/L      Chloride 105 mmol/L      CO2 31.1 mmol/L      Calcium 9.4 mg/dL      Total Protein 7.2 g/dL      Albumin 4.60 g/dL      ALT (SGPT) 30 U/L      AST (SGOT) 12 U/L      Alkaline Phosphatase 55 U/L      Total Bilirubin 0.8 mg/dL      eGFR Non African Amer 81 mL/min/1.73      Globulin 2.6 gm/dL      A/G Ratio 1.8 g/dL      BUN/Creatinine Ratio 26.7     Anion Gap 5.9 mmol/L     Narrative:      GFR Normal >60  Chronic Kidney Disease <60  Kidney Failure <15      Protime-INR [[id number]]  (Abnormal) Collected: 09/21/20 2120    Specimen: Blood Updated: 09/21/20 2144     Protime 24.1 Seconds      INR 2.23    CBC Auto Differential [[id number]]  (Abnormal) Collected: 09/21/20 2120    Specimen: Blood Updated: 09/21/20 2128     WBC 10.31 10*3/mm3      RBC 5.12 10*6/mm3      Hemoglobin 15.5 g/dL      Hematocrit 47.0 %      MCV 91.8 fL      MCH 30.3 pg       MCHC 33.0 g/dL      RDW 13.0 %      RDW-SD 43.9 fl      MPV 10.2 fL      Platelets 157 10*3/mm3      Neutrophil % 56.2 %      Lymphocyte % 33.5 %      Monocyte % 7.1 %      Eosinophil % 0.9 %      Basophil % 0.6 %      Immature Grans % 1.7 %      Neutrophils, Absolute 5.80 10*3/mm3      Lymphocytes, Absolute 3.45 10*3/mm3      Monocytes, Absolute 0.73 10*3/mm3      Eosinophils, Absolute 0.09 10*3/mm3      Basophils, Absolute 0.06 10*3/mm3      Immature Grans, Absolute 0.18 10*3/mm3      nRBC 0.0 /100 WBC     COVID PRE-OP / PRE-PROCEDURE SCREENING ORDER (NO ISOLATION) - Swab, Nasopharynx [[id number]]  (Normal) Collected: 09/21/20 2138    Specimen: Swab from Nasopharynx Updated: 09/21/20 2241    Narrative:      The following orders were created for panel order COVID PRE-OP / PRE-PROCEDURE SCREENING ORDER (NO ISOLATION) - Swab, Nasopharynx.  Procedure                               Abnormality         Status                     ---------                               -----------         ------                     Respiratory Panel PCR w/...[[id number]]  Normal              Final result                 Please view results for these tests on the individual orders.    Respiratory Panel PCR w/COVID-19(SARS-CoV-2) ECHO/JODI/JENNIFER/PAD/COR/MAD In-House, NP Swab in UTM/VTM, 3-4 HR TAT - Swab, Nasopharynx [[id number]]  (Normal) Collected: 09/21/20 2138    Specimen: Swab from Nasopharynx Updated: 09/21/20 2241     ADENOVIRUS, PCR Not Detected     Coronavirus 229E Not Detected     Coronavirus HKU1 Not Detected     Coronavirus NL63 Not Detected     Coronavirus OC43 Not Detected     COVID19 Not Detected     Human Metapneumovirus Not Detected     Human Rhinovirus/Enterovirus Not Detected     Influenza A PCR Not Detected     Influenza A H1 Not Detected     Influenza A H1 2009 PCR Not Detected     Influenza A H3 Not Detected     Influenza B PCR Not Detected     Parainfluenza Virus 1 Not Detected     Parainfluenza Virus 2 Not Detected      Parainfluenza Virus 3 Not Detected     Parainfluenza Virus 4 Not Detected     RSV, PCR Not Detected     Bordetella pertussis pcr Not Detected     Bordetella parapertussis PCR Not Detected     Chlamydophila pneumoniae PCR Not Detected     Mycoplasma pneumo by PCR Not Detected    Narrative:      Fact sheet for providers: https://docs.meebee/wp-content/uploads/IWK4313-7139-UM2.1-EUA-Provider-Fact-Sheet-3.pdf    Fact sheet for patients: https://docs.meebee/wp-content/uploads/ABI4547-8958-JR7.1-EUA-Patient-Fact-Sheet-1.pdf          CT Head Without Contrast   Preliminary Result   No evidence of acute infarction or hemorrhage. There is   age-appropriate atrophy, mild small vessel ischemic disease and mild to   moderate vascular calcification appreciated. Further evaluation could be   performed with a MRI examination of the brain as indicated.               Radiation dose reduction techniques were utilized, including automated   exposure control and exposure modulation based on body size.              MRI Brain Without Contrast    (Results Pending)   CT Angiogram Head    (Results Pending)   CT Angiogram Neck    (Results Pending)     Assessment/Plan      Active Hospital Problems    Diagnosis  POA   • **Visual changes [H53.9]  Yes   • Hypertension [I10]  Yes   • Benign prostatic hyperplasia [N40.0]  Yes   • COPD (chronic obstructive pulmonary disease) (CMS/HCC) [J44.9]  Yes   • Type 2 diabetes mellitus, without long-term current use of insulin (CMS/HCC) [E11.9]  Yes   • Atrial fibrillation (CMS/[facility]) [I48.91]  Yes      Resolved Hospital Problems   No resolved problems to display.     · Transient visual changes: We will proceed with stroke work-up with MRI and CTA.  Aspirin/statin.  Resume Xarelto when okay with neurology recommendations.  Consult neurology.  · Atrial fibrillation: Initially sided sinus on exam but telemetry does show atrial fibrillation.  Will check EKG to confirm.  Plan to resume his cardiac  regimen with the exception of Xarelto for now.  · Diabetes: Insulin as needed  · Hypertension: Permissive with possible stroke.  Can resume lisinopril if needed in the morning.  · BPH: Flomax  · Prophylaxis: SCD until resumption of chronic coagulation      I discussed the patients findings and my recommendations with patient and nursing staff.      Jozef West MD  [facility]  09/21/20  23:21 EDT    Dictated portions using Dragon dictation software.  During the entire encounter, I was wearing recommended PPE including face mask and eye protection. Hand sanitization was performed prior to entering room and upon exit.

## 2020-11-11 NOTE — ED PROVIDER NOTE - PR
Airway  Performed by: Timoteo Solo CRNA  Authorized by: Anna Moreno MD     Final Airway Type:  Endotracheal airway  Final Endotracheal Airway*:  ETT  ETT Size (mm)*:  7.0  Cuff*:  Regular  Technique Used for Successful ETT Placement:  Direct laryngoscopy  Devices/Methods Used in Placement*:  Mask  Intubation Procedure*:  Preoxygenation, ETCO2, Atraumatic, Dentition Unchanged and Phaynx Clear  Insertion Site:  Oral  Blade Type*:  MAC  Blade Size*:  3  Cuff Volume (mL):  2  Secured at (cm)*:  21  Placement Verified by: auscultation, capnometry and equal breath sounds    Glottic View*:  1 - full view of glottis  Attempts*:  1  Ventilation Between Attempts:  None  Number of Other Approaches Attempted:  0   Patient Identified, Procedure confirmed, Emergency equipment available and Safety protocols followed  Location:  OR  Urgency:  Elective  Difficult Airway: No    Indications for Airway Management:  Anesthesia  Spontaneous Ventilation: absent    Sedation Level:  Anesthetized  Mask Difficulty Assessment:  0 - not attempted  Performed By:  CRNA  CRNA:  Timoteo Solo CRNA         324

## 2021-01-01 NOTE — ED PROVIDER NOTE - PROGRESS NOTE DETAILS
18.11 ICU  to eval - dr fisher aware-  will dialyze today linda: pt received at sign out from dr mancini as pending micu consult for tele admission linda: pt received at sign out from dr mancini as pending micu consult for tele admission; micu deemed pt stable for fl, spoke to dr osei - will admit

## 2021-02-07 NOTE — ED ADULT NURSE REASSESSMENT NOTE - NS ED NURSE REASSESS COMMENT FT1
-- DO NOT REPLY / DO NOT REPLY ALL --  -- Message is from the [facility]--    Provider paged via [employer] - The below message was copied and pasted from a [employer] page:    2/7/2021 3:46 pm  Message Sent Date/Time  2/7/2021 3:48 pm  Source  [facility] Contact Center  Department  ACC  Phone Number  [phone]  Method  Secure Text  Contacted  Shirlene Yancey DO  Details  Patient  Message  [phone] ACC CALLER NAME: PATIENT RE: JOSH SEGURA PATIENT 1943 PATIENT PCP: [facility] PATIENT IS SCHEDULED FOR 7AM PROCEDURE TOMORROW, 2/7. PATIENT HASN'T TAKEN DOSE OF COUMADIN PILLS SINCE FRIDAY, 2/5. NEEDS ADVICE ON WHEN TO TAKE NEXT PILL. PLEASE CALL BACK TODAY, THANK YOU.   pt straight cath for urine and only small sample available to send for UA.

## 2021-10-22 NOTE — ED PROVIDER NOTE - PR
Patient is being transferred to [address], Room # 2214. Report given to Deya Mcduffie RN on [address] for routine progression of care. Report consisted of the following information SBAR, Kardex, ED Summary, MAR and Recent Results. Patient transferred to receiving unit by: Mc Alcantara (RN or tech name). Outstanding consults needed: Yes    Next labs due: Yes    The following personal items will be sent with the patient during transfer to the floor: All valuables:    Cardiac monitoring ordered: Yes    The following CURRENT information was reported to the receiving RN:    Code status: Full Code at time of transfer    Last set of vital signs:  Vital Signs  Level of Consciousness: Alert (0) (10/22/21 0001)  Temp: 97.7 °F (36.5 °C) (10/22/21 0001)  Temp Source: Oral (10/22/21 0001)  Pulse (Heart Rate): (!) 104 (10/22/21 0019)  Resp Rate: 20 (10/22/21 0001)  BP: (!) 141/75 (10/22/21 0001)  MAP (Monitor): 94 (10/22/21 0001)  MAP (Calculated): 97 (10/22/21 0001)  BP 1 Method: Automatic (10/21/21 2125)  BP Patient Position: At rest (10/21/21 2125)  MEWS Score: 3 (10/22/21 0001)         Oxygen Therapy  O2 Sat (%): 98 % (10/22/21 0001)  Pulse via Oximetry: 125 beats per minute (10/22/21 0001)  O2 Device: None (Room air) (10/21/21 2143)      Last pain assessment:  Pain 1  Pain Scale 1: Numeric (0 - 10)      Wounds: No     Urinary catheter: voiding  Is there a edwards order: No     LDAs:       Peripheral IV 10/21/21 Anterior;Proximal;Right Forearm (Active)       Peripheral IV 10/21/21 Left Antecubital (Active)         Opportunity for questions and clarification was provided.     Delmis Shepard RN .16

## 2023-07-03 NOTE — H&P ADULT - PROBLEM SELECTOR PLAN 6
as stated in previously, likely ACD history of HTN, but currently on midodrine, continue midodrine Sotyktu Counseling:  I discussed the most common side effects of Sotyktu including: common cold, sore throat, sinus infections, cold sores, canker sores, folliculitis, and acne.? I also discussed more serious side effects of Sotyktu including but not limited to: serious allergic reactions; increased risk for infections such as TB; cancers such as lymphomas; rhabdomyolysis and elevated CPK; and elevated triglycerides and liver enzymes.?

## 2024-01-25 NOTE — PROGRESS NOTE ADULT - PROBLEM/PLAN-3
DISPLAY PLAN FREE TEXT
former x 1 yr/No
DISPLAY PLAN FREE TEXT

## 2024-05-07 NOTE — H&P ADULT - EJECTION FRACTION >40 YES
Interval History:  Patient seen and examined. Overnight notes reviewed.  WBC improving on am labs. Afebrile.IV abx. Repeat blood cultures from 04/30 and 5/2 negative. ID following. Leukocytosis persists on am labs but improving. ID and hematology following. Palliative care consulted and pt made DNR.      Review of Systems   Unable to perform ROS: Patient nonverbal     Objective:     Vital Signs (Most Recent):  Temp: 98.3 °F (36.8 °C) (05/07/24 1558)  Pulse: 88 (05/07/24 1558)  Resp: 18 (05/07/24 1558)  BP: (!) 160/74 (05/07/24 1558)  SpO2: 95 % (05/07/24 1558) Vital Signs (24h Range):  Temp:  [97.8 °F (36.6 °C)-98.5 °F (36.9 °C)] 98.3 °F (36.8 °C)  Pulse:  [77-88] 88  Resp:  [16-18] 18  SpO2:  [94 %-98 %] 95 %  BP: (143-183)/(60-82) 160/74     Weight: 53.5 kg (118 lb)  Body mass index is 23.83 kg/m².    Intake/Output Summary (Last 24 hours) at 5/7/2024 1706  Last data filed at 5/7/2024 1624  Gross per 24 hour   Intake 1000 ml   Output 2400 ml   Net -1400 ml         Physical Exam  Vitals and nursing note reviewed.   Constitutional:       Appearance: Normal appearance. She is normal weight. She is ill-appearing (chronically).   HENT:      Head: Normocephalic and atraumatic.      Mouth/Throat:      Mouth: Mucous membranes are moist.      Pharynx: Oropharynx is clear.   Eyes:      Extraocular Movements: Extraocular movements intact.   Cardiovascular:      Rate and Rhythm: Normal rate and regular rhythm.      Pulses: Normal pulses.      Heart sounds: Normal heart sounds.   Pulmonary:      Effort: Pulmonary effort is normal.      Breath sounds: Decreased breath sounds present.   Abdominal:      General: Abdomen is flat. Bowel sounds are normal.      Palpations: Abdomen is soft.      Tenderness: There is no abdominal tenderness. There is no guarding or rebound.      Comments: G tube in place.    Genitourinary:     Comments: Mathur catheter in place.   Rectal tube with stool noted.   Neurological:      Mental Status: She is  alert. Mental status is at baseline.      Comments: Patient was alert and more interactive today.  Able to mouth yes or no to simple questions.             Significant Labs: All pertinent labs within the past 24 hours have been reviewed.  CBC:   Recent Labs   Lab 05/06/24  0609 05/07/24  0607   WBC 20.40* 17.97*   HGB 7.9* 8.3*   HCT 24.4* 26.8*   * 846*     CMP:   Recent Labs   Lab 05/06/24  0609 05/07/24  0607    135*   K 4.4 4.5    106   CO2 23 20*   * 235*   BUN 24* 24*   CREATININE 0.9 0.8   CALCIUM 8.5* 8.5*   PROT 6.3 6.4   ALBUMIN 2.6* 2.8*   BILITOT 0.2 0.2   ALKPHOS 126 129   AST 21 23   ALT 25 23   ANIONGAP 8 9       Significant Imaging: I have reviewed all pertinent imaging results/findings within the past 24 hours.    CXR:  Bilateral lower lung opacities suspicious for multifocal pneumonia, right greater than left, having progressed compared to prior exams.    normal LV function

## 2024-10-28 NOTE — PHYSICAL THERAPY INITIAL EVALUATION ADULT - REFERRING PHYSICIAN, REHAB EVAL
Dr. Riggs [facility]ology at [facility]  Gastroenterology  [address] [place] [id number]  Phone: [phone]  Fax:

## 2024-12-10 NOTE — PATIENT PROFILE ADULT. - ..
Medication: Eliquis passed department protocol.   Last office visit date: 8/20/2024- with Dr. Breen- per office visit note- \"he has paroxysmal atrial fibrillation and is maintained on apixaban 2.5 mg twice a day.\" \"He is anemic with a hemoglobin of under 9 g and his iron levels have been checked regularly and are normal.\"  Next appointment scheduled?: Yes -2/25/2025   Number of refills given: 5  CBC/Renal panel in chart from 6/3/2024  Escript sent   
Patient's spouse called office to request refill on Eliquis. Patient previously seen by Nicole. Patient stated that refill request was made, however patient is now being seen with [facility] Dr. Breen.   Spouse stated that patient will run out of current prescription on 12/12/24.    
no known mental health issues.
19-Jun-2017 11:54:11

## 2025-07-16 NOTE — PROGRESS NOTE ADULT - PROBLEM SELECTOR PLAN 1
no edema Not meeting SIRS criteria, no respiratory complaints. VS stable and wnl  -Abx discontinued, awaiting hospice placement